# Patient Record
Sex: MALE | Race: BLACK OR AFRICAN AMERICAN | NOT HISPANIC OR LATINO | Employment: UNEMPLOYED | URBAN - NONMETROPOLITAN AREA
[De-identification: names, ages, dates, MRNs, and addresses within clinical notes are randomized per-mention and may not be internally consistent; named-entity substitution may affect disease eponyms.]

---

## 2018-05-01 ENCOUNTER — OUTSIDE FACILITY SERVICE (OUTPATIENT)
Dept: CARDIOLOGY | Facility: CLINIC | Age: 23
End: 2018-05-01

## 2018-05-01 PROCEDURE — 93010 ELECTROCARDIOGRAM REPORT: CPT | Performed by: INTERNAL MEDICINE

## 2018-05-30 ENCOUNTER — OFFICE VISIT (OUTPATIENT)
Dept: CARDIOLOGY | Facility: CLINIC | Age: 23
End: 2018-05-30

## 2018-05-30 VITALS
DIASTOLIC BLOOD PRESSURE: 90 MMHG | OXYGEN SATURATION: 96 % | BODY MASS INDEX: 36.05 KG/M2 | SYSTOLIC BLOOD PRESSURE: 130 MMHG | HEIGHT: 73 IN | WEIGHT: 272 LBS | HEART RATE: 90 BPM

## 2018-05-30 DIAGNOSIS — Z53.21 PATIENT LEFT WITHOUT BEING SEEN: Primary | ICD-10-CM

## 2018-05-30 RX ORDER — HYDROCHLOROTHIAZIDE 25 MG/1
25 TABLET ORAL DAILY
COMMUNITY
End: 2019-10-31

## 2018-05-30 RX ORDER — METOPROLOL TARTRATE 100 MG/1
100 TABLET ORAL DAILY
COMMUNITY
End: 2018-06-06

## 2018-05-30 NOTE — PROGRESS NOTES
"  Patient left without being seen after vitals were taken.    Review of Systems       Objective   /90 (BP Location: Left arm, Patient Position: Sitting, Cuff Size: Adult) Comment: elisabet trevino.  Pulse 90   Ht 185.4 cm (73\")   Wt 123 kg (272 lb)   SpO2 96%   BMI 35.89 kg/m²   Physical Exam  Procedures      "

## 2018-06-06 ENCOUNTER — OFFICE VISIT (OUTPATIENT)
Dept: CARDIOLOGY | Facility: CLINIC | Age: 23
End: 2018-06-06

## 2018-06-06 VITALS
HEART RATE: 85 BPM | BODY MASS INDEX: 35.78 KG/M2 | DIASTOLIC BLOOD PRESSURE: 80 MMHG | OXYGEN SATURATION: 97 % | WEIGHT: 270 LBS | HEIGHT: 73 IN | SYSTOLIC BLOOD PRESSURE: 130 MMHG

## 2018-06-06 DIAGNOSIS — IMO0001 CLASS 2 OBESITY DUE TO EXCESS CALORIES WITH SERIOUS COMORBIDITY AND BODY MASS INDEX (BMI) OF 35.0 TO 35.9 IN ADULT: ICD-10-CM

## 2018-06-06 DIAGNOSIS — Z72.0 TOBACCO ABUSE: ICD-10-CM

## 2018-06-06 DIAGNOSIS — I10 ESSENTIAL HYPERTENSION: Primary | ICD-10-CM

## 2018-06-06 PROBLEM — E66.9 OBESITY: Status: ACTIVE | Noted: 2018-06-06

## 2018-06-06 PROCEDURE — 99243 OFF/OP CNSLTJ NEW/EST LOW 30: CPT | Performed by: INTERNAL MEDICINE

## 2018-06-06 PROCEDURE — 99406 BEHAV CHNG SMOKING 3-10 MIN: CPT | Performed by: INTERNAL MEDICINE

## 2018-06-06 RX ORDER — METOPROLOL SUCCINATE 100 MG/1
100 TABLET, EXTENDED RELEASE ORAL DAILY
COMMUNITY
End: 2018-07-05 | Stop reason: SDUPTHER

## 2018-06-06 NOTE — PROGRESS NOTES
Reason for Visit: Hyptertension.    HPI:  Donnie Webb is a 23 y.o. male is being seen for consultation today at the request of Dr Shay.  He has had gradually progressive hypertension and has had to go on blood pressure medications recently.  He notes his long as he takes his blood pressure medicines as blood pressure tends to be normal.  He has recently seen in the emergency room on 05/01/2018 for chest pain and hypertension.  He was diagnosed with atypical chest pain.  Has not had any recurrence of the symptoms.  He denies any further chest pain, palpitations, dizziness, syncope, PND, or orthopnea.  He smokes about a half pack a day.  He wanted to get set up with the specialists since is a strong family history of hypertension.        Patient Active Problem List   Diagnosis   • Essential hypertension   • Obesity   • Tobacco abuse       Social History   Substance Use Topics   • Smoking status: Current Every Day Smoker     Packs/day: 0.50     Types: Cigarettes   • Smokeless tobacco: Never Used   • Alcohol use No       Family History   Problem Relation Age of Onset   • Hypertension Mother    • Heart disease Mother    • No Known Problems Father        The following portions of the patient's history were reviewed and updated as appropriate: allergies, current medications, past family history, past medical history, past social history, past surgical history and problem list.      Current Outpatient Prescriptions:   •  metoprolol succinate XL (TOPROL-XL) 100 MG 24 hr tablet, Take 100 mg by mouth Daily., Disp: , Rfl:   •  hydrochlorothiazide (HYDRODIURIL) 25 MG tablet, Take 25 mg by mouth Daily., Disp: , Rfl:     Review of Systems   Constitution: Negative for chills, decreased appetite, fever and malaise/fatigue.   HENT: Positive for congestion. Negative for nosebleeds.    Eyes: Negative for blurred vision and double vision.   Cardiovascular: Negative for chest pain, irregular heartbeat, leg swelling and  "palpitations.   Respiratory: Negative for cough.    Endocrine: Negative for cold intolerance and heat intolerance.   Hematologic/Lymphatic: Does not bruise/bleed easily.   Skin: Negative for dry skin, itching and rash.   Musculoskeletal: Negative for back pain, joint pain, muscle cramps and neck pain.   Gastrointestinal: Negative for abdominal pain, constipation, diarrhea, heartburn and melena.   Genitourinary: Negative for dysuria, frequency and hematuria.   Neurological: Positive for headaches. Negative for dizziness, light-headedness and loss of balance.   Psychiatric/Behavioral: Negative for depression. The patient does not have insomnia and is not nervous/anxious.        Objective   /80 (BP Location: Left arm, Patient Position: Sitting, Cuff Size: Adult)   Pulse 85   Ht 185.4 cm (73\")   Wt 122 kg (270 lb)   SpO2 97%   BMI 35.62 kg/m²   Physical Exam   Constitutional: He is oriented to person, place, and time. He appears well-developed and well-nourished.   HENT:   Head: Normocephalic and atraumatic.   Eyes: Conjunctivae and EOM are normal. Pupils are equal, round, and reactive to light.   Neck: Normal range of motion. Neck supple. No JVD present. No thyromegaly present.   Cardiovascular: Normal rate, regular rhythm and normal heart sounds.    No murmur heard.  Pulmonary/Chest: Effort normal and breath sounds normal. He has no wheezes. He has no rales.   Abdominal: Soft. Bowel sounds are normal. He exhibits distension (Obese). There is no tenderness.   Musculoskeletal: Normal range of motion. He exhibits no edema.   Neurological: He is alert and oriented to person, place, and time. Coordination normal.   Skin: Skin is warm and dry. No rash noted.   Psychiatric: He has a normal mood and affect. His behavior is normal.     Procedures      ICD-10-CM ICD-9-CM   1. Essential hypertension I10 401.9   2. Tobacco abuse Z72.0 305.1   3. Class 2 obesity due to excess calories with serious comorbidity and body " mass index (BMI) of 35.0 to 35.9 in adult E66.09 278.00    Z68.35 V85.35         Assessment/Plan:  1.  Essential hypertension: Blood pressures well controlled today on current medications.  Encourage compliance.   on lifestyle modification.    2.  Tobacco abuse: Smoking one half pack per day.   on the importance of smoking cessation for approximately 4 minutes.    3.  Obesity: Patient's Body mass index is 35.62 kg/m². BMI is above normal parameters. Recommendations include: exercise counseling and nutrition counseling.

## 2018-07-05 RX ORDER — METOPROLOL SUCCINATE 100 MG/1
100 TABLET, EXTENDED RELEASE ORAL DAILY
Qty: 30 TABLET | Refills: 11 | Status: SHIPPED | OUTPATIENT
Start: 2018-07-05 | End: 2019-07-05

## 2018-07-05 NOTE — TELEPHONE ENCOUNTER
Patient request Toprol to be refilled. Dr. Shay prescribed but patient stated he no longer sees him and has not established a new PCP.

## 2019-10-29 ENCOUNTER — OUTSIDE FACILITY SERVICE (OUTPATIENT)
Dept: CARDIOLOGY | Facility: CLINIC | Age: 24
End: 2019-10-29

## 2019-10-29 PROCEDURE — 93306 TTE W/DOPPLER COMPLETE: CPT | Performed by: INTERNAL MEDICINE

## 2019-11-07 ENCOUNTER — TELEPHONE (OUTPATIENT)
Dept: CARDIOLOGY | Facility: CLINIC | Age: 24
End: 2019-11-07

## 2019-11-07 ENCOUNTER — OFFICE VISIT (OUTPATIENT)
Dept: CARDIOLOGY | Facility: CLINIC | Age: 24
End: 2019-11-07

## 2019-11-07 VITALS
WEIGHT: 247 LBS | HEART RATE: 89 BPM | SYSTOLIC BLOOD PRESSURE: 142 MMHG | OXYGEN SATURATION: 99 % | DIASTOLIC BLOOD PRESSURE: 100 MMHG | BODY MASS INDEX: 32.74 KG/M2 | HEIGHT: 73 IN

## 2019-11-07 DIAGNOSIS — I50.22 CHRONIC SYSTOLIC CONGESTIVE HEART FAILURE (HCC): Primary | ICD-10-CM

## 2019-11-07 DIAGNOSIS — I31.39 PERICARDIAL EFFUSION: ICD-10-CM

## 2019-11-07 DIAGNOSIS — I10 ESSENTIAL HYPERTENSION: ICD-10-CM

## 2019-11-07 DIAGNOSIS — E66.09 CLASS 1 OBESITY DUE TO EXCESS CALORIES WITH SERIOUS COMORBIDITY AND BODY MASS INDEX (BMI) OF 32.0 TO 32.9 IN ADULT: ICD-10-CM

## 2019-11-07 DIAGNOSIS — Z72.0 TOBACCO ABUSE: ICD-10-CM

## 2019-11-07 DIAGNOSIS — R60.0 LOWER EXTREMITY EDEMA: ICD-10-CM

## 2019-11-07 PROCEDURE — 99214 OFFICE O/P EST MOD 30 MIN: CPT | Performed by: INTERNAL MEDICINE

## 2019-11-07 PROCEDURE — 93000 ELECTROCARDIOGRAM COMPLETE: CPT | Performed by: INTERNAL MEDICINE

## 2019-11-07 PROCEDURE — 99406 BEHAV CHNG SMOKING 3-10 MIN: CPT | Performed by: INTERNAL MEDICINE

## 2019-11-07 RX ORDER — METOPROLOL SUCCINATE 100 MG/1
100 TABLET, EXTENDED RELEASE ORAL DAILY
Qty: 30 TABLET | Refills: 11 | Status: SHIPPED | OUTPATIENT
Start: 2019-11-07 | End: 2020-01-16 | Stop reason: SDUPTHER

## 2019-11-07 RX ORDER — ALBUTEROL SULFATE 90 UG/1
AEROSOL, METERED RESPIRATORY (INHALATION)
Refills: 0 | COMMUNITY
Start: 2019-09-02 | End: 2022-11-09

## 2019-11-07 NOTE — PROGRESS NOTES
Reason for Visit: Edema.    HPI:  Donnie Webb is a 24 y.o. male is being seen for consultation today at the request of Dr Shay for evaluation of edema and cardiomegaly CT scan.  He was previously seen in consultation in June 2018 for hypertension.  He has subsequently been a no-show for multiple appointments since then until being referred back today.  He then had an echocardiogram done on 10/29/2019 that demonstrated mild LV dilation, reduced left ventricular systolic function with EF of 30%, grade 2 diastolic dysfunction, mild RV dilation with mildly reduced systolic function, biatrial enlargement, mild MR, mild to moderate TR, dilated IVC.  He has noted gradually progressive swelling and some shortness of breath.  He has mild dyspnea on exertion.  The swelling has improved with the furosemide is been taking.  He notes that dose has been increased recently.  He is trying to cut back on the smoking.    Previous Cardiac Testing and Procedures:  -Chest x-ray (10/12/2019) mild cardiomegaly but no acute cardiopulmonary process  -CTA chest (10/14/2019) no evidence of pulmonary edema, heart is upper limits of normal size, small pericardial effusion, small right pleural effusion  -Echo (10/29/2019) mild LV dilation, reduced left ventricular systolic function with EF of 30%, grade 2 diastolic dysfunction, mild RV dilation with mildly reduced systolic function, biatrial enlargement, mild MR, mild to moderate TR, dilated IVC.    Patient Active Problem List   Diagnosis   • Essential hypertension   • Obesity   • Tobacco abuse       Social History     Tobacco Use   • Smoking status: Current Some Day Smoker     Packs/day: 0.50     Types: Cigarettes   • Smokeless tobacco: Never Used   Substance Use Topics   • Alcohol use: Yes   • Drug use: No       Family History   Problem Relation Age of Onset   • Hypertension Mother    • Heart disease Mother    • No Known Problems Father        The following portions of the patient's  "history were reviewed and updated as appropriate: allergies, current medications, past family history, past medical history, past social history, past surgical history and problem list.      Current Outpatient Medications:   •  albuterol sulfate  (90 Base) MCG/ACT inhaler, INHALE 1 TO 2 PUFFS BY MOUTH EVERY 4 HOURS AS NEEDED FOR WHEEZING DIFFICULTU BREATHING OR SHORTNESS OF BREATH, Disp: , Rfl: 0  •  furosemide (LASIX) 20 MG tablet, Take 20 mg by mouth Daily., Disp: , Rfl:   •  metoprolol succinate XL (TOPROL-XL) 100 MG 24 hr tablet, Take 1 tablet by mouth Daily., Disp: 30 tablet, Rfl: 11  •  sacubitril-valsartan (ENTRESTO) 24-26 MG tablet, Take 1 tablet by mouth 2 (Two) Times a Day., Disp: 60 tablet, Rfl: 11    Review of Systems   Constitution: Negative for chills and fever.   Cardiovascular: Positive for dyspnea on exertion and leg swelling. Negative for chest pain and paroxysmal nocturnal dyspnea.   Respiratory: Positive for shortness of breath. Negative for cough.    Skin: Negative for rash.   Gastrointestinal: Negative for abdominal pain and heartburn.   Neurological: Negative for dizziness and numbness.       Objective   /100 (BP Location: Left arm, Patient Position: Sitting, Cuff Size: Adult)   Pulse 89   Ht 185.4 cm (73\")   Wt 112 kg (247 lb)   SpO2 99%   BMI 32.59 kg/m²   Physical Exam   Constitutional: He is oriented to person, place, and time. He appears well-developed and well-nourished.   HENT:   Head: Normocephalic and atraumatic.   Cardiovascular: Normal rate, regular rhythm and normal heart sounds.   No murmur heard.  Pulmonary/Chest: Effort normal and breath sounds normal.   Abdominal: He exhibits distension (obese).   Musculoskeletal: He exhibits edema (moderate LE edema bilaterally).   Neurological: He is alert and oriented to person, place, and time.   Skin: Skin is warm and dry.   Psychiatric: He has a normal mood and affect.       ECG 12 Lead  Date/Time: 11/7/2019 10:04 " AM  Performed by: Guille Aguirre MD  Authorized by: Guille Aguirre MD   Comparison: compared with previous ECG from 5/1/2018  Similar to previous ECG  Rhythm: sinus rhythm  Rate: normal  T inversion: V6, V5, III, II and aVF  QRS axis: right                ICD-10-CM ICD-9-CM   1. Chronic systolic congestive heart failure (CMS/HCC) I50.22 428.22     428.0   2. Essential hypertension I10 401.9   3. Pericardial effusion I31.3 423.9   4. Lower extremity edema R60.0 782.3   5. Tobacco abuse Z72.0 305.1   6. Class 1 obesity due to excess calories with serious comorbidity and body mass index (BMI) of 32.0 to 32.9 in adult E66.09 278.00    Z68.32 V85.32         Assessment/Plan:  1.  Chronic systolic congestive heart failure: EF approximately 30%.  Nonischemic.  Improving symptoms with diuresis.  Will start Entresto.  Change metoprolol to succinate and titrate up to 100 mg.  Continue furosemide.    2.  Essential hypertension: Blood pressure is significantly elevated today.  Started on Entresto and titrating up metoprolol.    3.  Pericardial effusion: Small with no evidence of tamponade.  Likely secondary to CHF.    4.  Cardiomegaly: Secondary to CHF.  Mild RV and LV dilation with biatrial enlargement.    5.  Lower extreme edema: Secondary to CHF: Encourage low-sodium diet.  Continue furosemide.    6.  Tobacco abuse: Donnie Webb is a current cigarettes user.  He currently smokes 10 cigarettes per day for a duration of several years. I have educated him on the risk of diseases from using tobacco products such as cancer, COPD and heart diease. I advised him to quit and he is willing to quit. We have discussed the following method/s for tobacco cessation:  Education Material.  Together we have set a quit date for the near future.  He will follow up with me in 2 months or sooner to check on his progress. I spent 6 minutes counseling the patient.    7.  Obesity: Patient's Body mass index is 32.59 kg/m². BMI is above normal  parameters. Recommendations include: exercise counseling and nutrition counseling.

## 2019-11-13 NOTE — TELEPHONE ENCOUNTER
RICH FROM [facility] CALLED TO GET RECORDS ON THIS PT TO FAX TO HER [phone] PHONE [phone] EXT [phone]

## 2019-11-14 ENCOUNTER — TELEPHONE (OUTPATIENT)
Dept: CARDIOLOGY | Facility: CLINIC | Age: 24
End: 2019-11-14

## 2019-11-14 NOTE — TELEPHONE ENCOUNTER
----- Message from Guille Aguirre MD sent at 11/11/2019  5:10 PM CST -----  I called the number listed and requested an peer to peer.  They should be calling to set this up.    ----- Message -----  From: Waleska Ware MA  Sent: 11/8/2019   2:14 PM  To: MD STEVE Rosado FOR ENTREO. PLEASE ADVISE

## 2019-11-14 NOTE — TELEPHONE ENCOUNTER
RICH FROM [facility] CALLED TO GET RECORDS ON THIS PT TO FAX TO HER [phone] PHONE [phone] EXT [phone]

## 2019-11-18 NOTE — TELEPHONE ENCOUNTER
I called the pt and was his current cell in disconnected. I called and left a vm on his mothers phone

## 2019-11-18 NOTE — TELEPHONE ENCOUNTER
PA APPROVED FOR ENTRESTO FROM 11/7/19 THROUGH 11/14/2020. [facility] NOTIFIED-      CASE ID # [id number]

## 2020-01-16 ENCOUNTER — TELEPHONE (OUTPATIENT)
Dept: CARDIOLOGY | Facility: CLINIC | Age: 25
End: 2020-01-16

## 2020-01-16 ENCOUNTER — OFFICE VISIT (OUTPATIENT)
Dept: CARDIOLOGY | Facility: CLINIC | Age: 25
End: 2020-01-16

## 2020-01-16 VITALS
SYSTOLIC BLOOD PRESSURE: 132 MMHG | OXYGEN SATURATION: 99 % | HEIGHT: 73 IN | DIASTOLIC BLOOD PRESSURE: 80 MMHG | WEIGHT: 242 LBS | HEART RATE: 79 BPM | BODY MASS INDEX: 32.07 KG/M2

## 2020-01-16 DIAGNOSIS — E66.09 CLASS 1 OBESITY DUE TO EXCESS CALORIES WITH SERIOUS COMORBIDITY AND BODY MASS INDEX (BMI) OF 31.0 TO 31.9 IN ADULT: ICD-10-CM

## 2020-01-16 DIAGNOSIS — I50.22 CHRONIC SYSTOLIC (CONGESTIVE) HEART FAILURE (HCC): Primary | ICD-10-CM

## 2020-01-16 DIAGNOSIS — I31.39 PERICARDIAL EFFUSION: ICD-10-CM

## 2020-01-16 DIAGNOSIS — Z72.0 TOBACCO ABUSE: ICD-10-CM

## 2020-01-16 DIAGNOSIS — I42.8 NICM (NONISCHEMIC CARDIOMYOPATHY) (HCC): ICD-10-CM

## 2020-01-16 DIAGNOSIS — I10 ESSENTIAL HYPERTENSION: ICD-10-CM

## 2020-01-16 PROCEDURE — 99406 BEHAV CHNG SMOKING 3-10 MIN: CPT | Performed by: INTERNAL MEDICINE

## 2020-01-16 PROCEDURE — 99214 OFFICE O/P EST MOD 30 MIN: CPT | Performed by: INTERNAL MEDICINE

## 2020-01-16 RX ORDER — METOPROLOL SUCCINATE 200 MG/1
200 TABLET, EXTENDED RELEASE ORAL DAILY
Qty: 30 TABLET | Refills: 11 | Status: SHIPPED | OUTPATIENT
Start: 2020-01-16 | End: 2021-01-26 | Stop reason: SDUPTHER

## 2020-01-16 NOTE — PROGRESS NOTES
Reason for Visit: cardiovascular follow up.    HPI:  YESICA Webb is a 24 y.o. male is here today for follow-up.  He was last seen for repeat evaluation after having abnormal echo with decreased ejection fraction of approximately 30%.  He was started on optimal medical therapy Entresto was added.  He has now gotten preapproval for the Entresto but most recently had difficulty getting this from his pharmacy and has been off it for 6 days.  He can definitely tell a difference not taking it.  Symptoms greatly improved while he was on it in his swelling decreased.  He quit smoking for about 6 to 7 days and then started back.  He has now smoking half a pack a day again.  He denies any chest pain, palpitations, dizziness, syncope, PND, or orthopnea.    Previous Cardiac Testing and Procedures:  - Chest x-ray (10/12/2019) mild cardiomegaly but no acute cardiopulmonary process  -CTA chest (10/14/2019) no evidence of pulmonary edema, heart is upper limits of normal size, small pericardial effusion, small right pleural effusion  -Echo (10/29/2019) mild LV dilation, reduced left ventricular systolic function with EF of 30%, grade 2 diastolic dysfunction, mild RV dilation with mildly reduced systolic function, biatrial enlargement, mild MR, mild to moderate TR, dilated IVC.    Patient Active Problem List   Diagnosis   • Essential hypertension   • Obesity   • Tobacco abuse   • Chronic systolic (congestive) heart failure (CMS/HCC)   • NICM (nonischemic cardiomyopathy) (CMS/HCC)       Social History     Tobacco Use   • Smoking status: Current Some Day Smoker     Packs/day: 0.50     Types: Cigarettes   • Smokeless tobacco: Never Used   Substance Use Topics   • Alcohol use: Yes   • Drug use: No       Family History   Problem Relation Age of Onset   • Hypertension Mother    • Heart disease Mother    • No Known Problems Father        The following portions of the patient's history were reviewed and updated as appropriate:  "allergies, current medications, past family history, past medical history, past social history, past surgical history and problem list.      Current Outpatient Medications:   •  albuterol sulfate  (90 Base) MCG/ACT inhaler, INHALE 1 TO 2 PUFFS BY MOUTH EVERY 4 HOURS AS NEEDED FOR WHEEZING DIFFICULTU BREATHING OR SHORTNESS OF BREATH, Disp: , Rfl: 0  •  furosemide (LASIX) 20 MG tablet, Take 20 mg by mouth Daily., Disp: , Rfl:   •  metoprolol succinate XL (TOPROL-XL) 200 MG 24 hr tablet, Take 1 tablet by mouth Daily., Disp: 30 tablet, Rfl: 11  •  sacubitril-valsartan (ENTRESTO) 24-26 MG tablet, Take 1 tablet by mouth 2 (Two) Times a Day., Disp: 60 tablet, Rfl: 11    Review of Systems   Constitution: Negative for chills and fever.   Cardiovascular: Positive for dyspnea on exertion. Negative for chest pain and paroxysmal nocturnal dyspnea.   Respiratory: Positive for shortness of breath. Negative for cough.    Skin: Negative for rash.   Gastrointestinal: Negative for abdominal pain and heartburn.   Neurological: Negative for dizziness and numbness.       Objective   /80 (BP Location: Left arm, Patient Position: Sitting, Cuff Size: Adult)   Pulse 79   Ht 185.4 cm (72.99\")   Wt 110 kg (242 lb)   SpO2 99%   BMI 31.94 kg/m²   Physical Exam   Constitutional: He is oriented to person, place, and time. He appears well-developed and well-nourished.   HENT:   Head: Normocephalic and atraumatic.   Cardiovascular: Normal rate, regular rhythm and normal heart sounds.   No murmur heard.  Pulmonary/Chest: Effort normal and breath sounds normal.   Abdominal: He exhibits distension (obese).   Musculoskeletal: He exhibits no edema.   Neurological: He is alert and oriented to person, place, and time.   Skin: Skin is warm and dry.   Psychiatric: He has a normal mood and affect.     Procedures      ICD-10-CM ICD-9-CM   1. Chronic systolic (congestive) heart failure (CMS/HCC) I50.22 428.22     428.0   2. NICM (nonischemic " cardiomyopathy) (CMS/[facility]) I42.8 425.4   3. Essential hypertension I10 401.9   4. Pericardial effusion I31.3 423.9   5. Tobacco abuse Z72.0 305.1   6. Class 1 obesity due to excess calories with serious comorbidity and body mass index (BMI) of 31.0 to 31.9 in adult E66.09 278.00    Z68.31 V85.31         Assessment/Plan:  1. Chronic systolic congestive heart failure: EF approximately 30% on recent echo from 10/2019.  Nonischemic cardiomyopathy.  Continue Entresto and furosemide and titrate up metoprolol succinate to 200 mg.     2.  Essential hypertension: Blood pressure is borderline elevated today.  Metoprolol titrated up to 200 mg.     3.  Pericardial effusion: Small with no evidence of tamponade.  Likely secondary to CHF.  Will repeat echo once medical therapy is optimized.     4. Tobacco abuse: YESICA Webb  reports that he has been smoking cigarettes. He has been smoking about 0.50 packs per day. He has never used smokeless tobacco.. I have educated him on the risk of diseases from using tobacco products such as cancer, COPD and heart diease.  I advised him to quit and he is willing to quit. We have discussed the following method/s for tobacco cessation:  Education Material Counseling.  Together we have set a quit date for the near future.  He will follow up with me in 3 months or sooner to check on his progress.  I spent 6 minutes counseling the patient.     5.  Obesity: Patient's Body mass index is 31.94 kg/m².  Has lost 5 pounds compared to last visit.  BMI is above normal parameters. Recommendations include: exercise counseling and nutrition counseling.

## 2020-01-16 NOTE — TELEPHONE ENCOUNTER
This pt has yet to  his Entresto meds that was prescribed back in [facility].     PA was approved via peer to peer with DIANA FROM [employer] PHONE [phone] EXT [phone]    Today I called [facility] to see how much the out of pocket cost will be. [facility] said PA is still required.  I called Diana again and left her a vm to call me back.        CASE ID # [id number]

## 2020-01-17 NOTE — TELEPHONE ENCOUNTER
Jackelyn from [facility] called me back stating that the NDC the pharmacy is incorrect. She gave me new NDC of [phone]. I called pt pharmacy with this NDC. I was told that PA is still required. I called [facility] and left them a vm to call me back.

## 2020-01-20 DIAGNOSIS — I50.22 CHRONIC SYSTOLIC CONGESTIVE HEART FAILURE (HCC): ICD-10-CM

## 2020-01-21 NOTE — TELEPHONE ENCOUNTER
PA approval finally worked. Copay $4.00. [facility] notified- I left a vm to pt home.     PA APPROVED FOR ENTRESTO FROM 11/7/19 THROUGH 11/14/2020. [facility] NOTIFIED-        CASE ID # [id number]

## 2020-11-30 ENCOUNTER — TELEPHONE (OUTPATIENT)
Dept: CARDIOLOGY | Facility: CLINIC | Age: 25
End: 2020-11-30

## 2020-11-30 DIAGNOSIS — I50.22 CHRONIC SYSTOLIC CONGESTIVE HEART FAILURE (HCC): ICD-10-CM

## 2020-11-30 RX ORDER — SACUBITRIL AND VALSARTAN 24; 26 MG/1; MG/1
1 TABLET, FILM COATED ORAL 2 TIMES DAILY
Qty: 60 TABLET | Refills: 11 | Status: SHIPPED | OUTPATIENT
Start: 2020-11-30 | End: 2021-08-12 | Stop reason: SDUPTHER

## 2021-01-26 RX ORDER — METOPROLOL SUCCINATE 200 MG/1
200 TABLET, EXTENDED RELEASE ORAL DAILY
Qty: 30 TABLET | Refills: 11 | Status: SHIPPED | OUTPATIENT
Start: 2021-01-26 | End: 2021-10-20 | Stop reason: SDUPTHER

## 2021-05-14 ENCOUNTER — TELEPHONE (OUTPATIENT)
Dept: CARDIOLOGY | Facility: CLINIC | Age: 26
End: 2021-05-14

## 2021-05-14 NOTE — TELEPHONE ENCOUNTER
Patient no showed appt 3-, tried calling patient, number no longer in service.  Mailed no show letter also.

## 2021-08-12 DIAGNOSIS — I50.22 CHRONIC SYSTOLIC CONGESTIVE HEART FAILURE (HCC): ICD-10-CM

## 2021-08-12 NOTE — TELEPHONE ENCOUNTER
PATIENT NEEDS TO KEEP F/U APPT ON 8/18/21 FOR FURTHER MED REFILLS. OTHERWISE, PT NEEDS TO GET THIS AT PCP.

## 2021-08-13 RX ORDER — SACUBITRIL AND VALSARTAN 24; 26 MG/1; MG/1
1 TABLET, FILM COATED ORAL 2 TIMES DAILY
Qty: 60 TABLET | Refills: 0 | Status: SHIPPED | OUTPATIENT
Start: 2021-08-13 | End: 2021-10-20 | Stop reason: SDUPTHER

## 2021-10-20 ENCOUNTER — OFFICE VISIT (OUTPATIENT)
Dept: CARDIOLOGY | Facility: CLINIC | Age: 26
End: 2021-10-20

## 2021-10-20 VITALS
HEART RATE: 84 BPM | WEIGHT: 250 LBS | DIASTOLIC BLOOD PRESSURE: 100 MMHG | HEIGHT: 74 IN | BODY MASS INDEX: 32.08 KG/M2 | OXYGEN SATURATION: 98 % | SYSTOLIC BLOOD PRESSURE: 152 MMHG

## 2021-10-20 DIAGNOSIS — I42.8 NICM (NONISCHEMIC CARDIOMYOPATHY) (HCC): ICD-10-CM

## 2021-10-20 DIAGNOSIS — Z72.0 TOBACCO ABUSE: ICD-10-CM

## 2021-10-20 DIAGNOSIS — I50.22 CHRONIC SYSTOLIC (CONGESTIVE) HEART FAILURE (HCC): Primary | ICD-10-CM

## 2021-10-20 DIAGNOSIS — I31.39 PERICARDIAL EFFUSION: ICD-10-CM

## 2021-10-20 DIAGNOSIS — I50.22 CHRONIC SYSTOLIC CONGESTIVE HEART FAILURE (HCC): ICD-10-CM

## 2021-10-20 DIAGNOSIS — E66.09 CLASS 1 OBESITY DUE TO EXCESS CALORIES WITH SERIOUS COMORBIDITY AND BODY MASS INDEX (BMI) OF 32.0 TO 32.9 IN ADULT: ICD-10-CM

## 2021-10-20 DIAGNOSIS — I10 ESSENTIAL HYPERTENSION: ICD-10-CM

## 2021-10-20 PROBLEM — E66.811 CLASS 1 OBESITY DUE TO EXCESS CALORIES WITH SERIOUS COMORBIDITY AND BODY MASS INDEX (BMI) OF 32.0 TO 32.9 IN ADULT: Status: ACTIVE | Noted: 2018-06-06

## 2021-10-20 PROCEDURE — 99214 OFFICE O/P EST MOD 30 MIN: CPT | Performed by: INTERNAL MEDICINE

## 2021-10-20 PROCEDURE — 93000 ELECTROCARDIOGRAM COMPLETE: CPT | Performed by: INTERNAL MEDICINE

## 2021-10-20 RX ORDER — LEVETIRACETAM 500 MG/1
TABLET ORAL EVERY 12 HOURS SCHEDULED
COMMUNITY

## 2021-10-20 RX ORDER — HYDROCHLOROTHIAZIDE 50 MG/1
TABLET ORAL
COMMUNITY
Start: 2021-08-19 | End: 2022-11-09

## 2021-10-20 RX ORDER — METOPROLOL SUCCINATE 200 MG/1
200 TABLET, EXTENDED RELEASE ORAL DAILY
Qty: 90 TABLET | Refills: 3 | Status: SHIPPED | OUTPATIENT
Start: 2021-10-20 | End: 2022-10-31

## 2021-10-20 RX ORDER — SACUBITRIL AND VALSARTAN 24; 26 MG/1; MG/1
1 TABLET, FILM COATED ORAL 2 TIMES DAILY
Qty: 180 TABLET | Refills: 3 | Status: SHIPPED | OUTPATIENT
Start: 2021-10-20 | End: 2022-11-09

## 2021-10-20 NOTE — PROGRESS NOTES
Reason for Visit: cardiovascular follow up.    HPI:  YESICA Webb is a 26 y.o. male is here today for follow-up.  He has been a no show or cancellation to multiple appointments since he was last seen 1/16/2020.  He ran out of his medications and thinks that is why his blood pressure is high today.  He reports it is normal when he is on the medications.  He had to spend 10 days in care home due to unpaid court bills.  He denies any chest pain, palpitations, dizziness, syncope, PND, or orthopnea.  He is back to working regularly.  He is active at work and denies any significant shortness of breath.  He can tell a difference in how he feels based on his diet.      Previous Cardiac Testing and Procedures:  -Chest x-ray (10/12/2019) mild cardiomegaly but no acute cardiopulmonary process  -CTA chest (10/14/2019) no evidence of pulmonary edema, heart is upper limits of normal size, small pericardial effusion, small right pleural effusion  -Echo (10/29/2019) mild LV dilation, reduced left ventricular systolic function with EF of 30%, grade 2 diastolic dysfunction, mild RV dilation with mildly reduced systolic function, biatrial enlargement, mild MR, mild to moderate TR, dilated IVC.    Patient Active Problem List   Diagnosis   • Essential hypertension   • Class 1 obesity due to excess calories with serious comorbidity and body mass index (BMI) of 32.0 to 32.9 in adult   • Tobacco abuse   • Chronic systolic (congestive) heart failure (HCC)   • NICM (nonischemic cardiomyopathy) (HCC)       Social History     Tobacco Use   • Smoking status: Current Some Day Smoker     Packs/day: 0.50     Types: Cigarettes   • Smokeless tobacco: Never Used   Substance Use Topics   • Alcohol use: Yes   • Drug use: No       Family History   Problem Relation Age of Onset   • Hypertension Mother    • Heart disease Mother    • No Known Problems Father        The following portions of the patient's history were reviewed and updated as appropriate:  "allergies, current medications, past family history, past medical history, past social history, past surgical history and problem list.      Current Outpatient Medications:   •  albuterol sulfate  (90 Base) MCG/ACT inhaler, INHALE 1 TO 2 PUFFS BY MOUTH EVERY 4 HOURS AS NEEDED FOR WHEEZING DIFFICULTU BREATHING OR SHORTNESS OF BREATH, Disp: , Rfl: 0  •  furosemide (LASIX) 20 MG tablet, Take 20 mg by mouth Daily., Disp: , Rfl:   •  hydroCHLOROthiazide (HYDRODIURIL) 50 MG tablet, , Disp: , Rfl:   •  levETIRAcetam (Keppra) 500 MG tablet, Every 12 (Twelve) Hours., Disp: , Rfl:   •  metoprolol succinate XL (TOPROL-XL) 200 MG 24 hr tablet, Take 1 tablet by mouth Daily., Disp: 90 tablet, Rfl: 3  •  Potassium 99 MG tablet, Take  by mouth., Disp: , Rfl:   •  sacubitril-valsartan (Entresto) 24-26 MG tablet, Take 1 tablet by mouth 2 (Two) Times a Day., Disp: 180 tablet, Rfl: 3    Review of Systems   Constitutional: Positive for malaise/fatigue. Negative for chills and fever.   Cardiovascular: Negative for chest pain and paroxysmal nocturnal dyspnea.   Respiratory: Negative for cough and shortness of breath.    Skin: Negative for rash.   Gastrointestinal: Negative for abdominal pain and heartburn.   Neurological: Negative for dizziness and numbness.       Objective   /100 (BP Location: Left arm, Patient Position: Sitting, Cuff Size: Adult)   Pulse 84   Ht 188 cm (74\")   Wt 113 kg (250 lb)   SpO2 98%   BMI 32.10 kg/m²   Constitutional:       Appearance: Well-developed.   HENT:      Head: Normocephalic and atraumatic.   Pulmonary:      Effort: Pulmonary effort is normal.      Breath sounds: Normal breath sounds.   Cardiovascular:      Normal rate. Regular rhythm.      Murmurs: There is no murmur.      No gallop.   Edema:     Peripheral edema absent.   Skin:     General: Skin is warm and dry.   Neurological:      Mental Status: Alert and oriented to person, place, and time.         ECG 12 Lead    Date/Time: " 10/20/2021 3:34 PM  Performed by: Guille Aguirre MD  Authorized by: Guille Aguirre MD   Comparison: compared with previous ECG from 11/7/2019  Similar to previous ECG  Rhythm: sinus rhythm  Rate: normal  T inversion: V6, V5, II, III and aVF                ICD-10-CM ICD-9-CM   1. Chronic systolic (congestive) heart failure (HCC)  I50.22 428.22     428.0   2. Chronic systolic congestive heart failure (HCC)  I50.22 428.22     428.0   3. NICM (nonischemic cardiomyopathy) ([facility])  I42.8 425.4   4. Essential hypertension  I10 401.9   5. Pericardial effusion  I31.3 423.9   6. Tobacco abuse  Z72.0 305.1   7. Class 1 obesity due to excess calories with serious comorbidity and body mass index (BMI) of 32.0 to 32.9 in adult  E66.09 278.00    Z68.32 V85.32         Assessment/Plan:  1.  Chronic systolic congestive heart failure:  Nonischemic  cardiomyopathy.  EF ~ 30% on echo from 10/2019.   Refill Entresto and metoprolol succinate.  Counseled patient on compliance with medications and follow-up.  Continue to titrate up medical therapy as tolerated.  When to repeat echo once medical therapy is optimized.     2.  Essential hypertension: Blood pressure is elevated today but patient has not been taking all of his medications as he ran out of them.  Refill prescriptions and counseled on compliance.     3.  Pericardial effusion: Small with no evidence of tamponade noted on echo from 10/29/2019.  Mostly due to CHF.  Repeat echo planned in the future once he is on optimal medical therapy.     4. Tobacco abuse: YESICA Webb  reports that he has been smoking cigarettes. He has been smoking about 0.50 packs per day. He has never used smokeless tobacco.. I have educated him on the risk of diseases from using tobacco products such as cancer, COPD and heart disease.  I advised him to quit and he is not willing to quit.  I spent 4 minutes counseling the patient.  He is trying to cut back.       5.  Obesity: Patient's Body mass index is  32.1 kg/m². indicating that he is obese (BMI >30). Obesity-related health conditions include the following: hypertension. Obesity is worsening. BMI is is above average; BMI management plan is completed. We discussed portion control and increasing exercise.

## 2022-10-28 ENCOUNTER — TELEPHONE (OUTPATIENT)
Dept: CARDIOLOGY | Facility: CLINIC | Age: 27
End: 2022-10-28

## 2022-10-28 NOTE — TELEPHONE ENCOUNTER
Caller: WebbJeskia    Relationship: Mother    Best call back number: [phone]    What form or medical record are you requesting: PHYSICIANS STATEMENT FOR ASSISTANCE    Who is requesting this form or medical record from you: TAIWO     How would you like to receive the form or medical records (pick-up, mail, fax): FAX  FAX NUMBER LISTED ON FORM    Timeframe paperwork needed: ASAP    Additional notes: PT MOTHER CALLED IN ON PTS BEHALF - SHE REPORTS PAPERWORK WAS DROPPED OFF YESTERDAY AT OFFICE AND REQUIRES A SIGNATURE FROM DR WALLACE - THE PAPERWORK IS TO HELP HER SON GET ASSISTANCE - SHE REPORTS ITS THE ONLY PAPER THE OFFICE IS WAITING ON - PTS MOTHER SAYS FAX NUMBER IS LOCATED ON FORM

## 2022-10-28 NOTE — TELEPHONE ENCOUNTER
Call returned to pt mother. I told her that her have not been seen since 10/2021 and no showed his 3 mo f/u in Jan 2023. I made pt an appt with Dr loyd on 11/3 @ 10:15 Adhikari

## 2022-11-02 RX ORDER — METOPROLOL SUCCINATE 200 MG/1
TABLET, EXTENDED RELEASE ORAL
Qty: 90 TABLET | Refills: 0 | Status: SHIPPED | OUTPATIENT
Start: 2022-11-02 | End: 2022-11-09

## 2022-11-09 ENCOUNTER — OFFICE VISIT (OUTPATIENT)
Dept: CARDIOLOGY | Facility: CLINIC | Age: 27
End: 2022-11-09

## 2022-11-09 VITALS
OXYGEN SATURATION: 98 % | DIASTOLIC BLOOD PRESSURE: 100 MMHG | SYSTOLIC BLOOD PRESSURE: 150 MMHG | BODY MASS INDEX: 28.75 KG/M2 | HEIGHT: 74 IN | HEART RATE: 85 BPM | WEIGHT: 224 LBS

## 2022-11-09 DIAGNOSIS — Z72.0 TOBACCO ABUSE: ICD-10-CM

## 2022-11-09 DIAGNOSIS — I50.22 CHRONIC SYSTOLIC (CONGESTIVE) HEART FAILURE: Primary | ICD-10-CM

## 2022-11-09 DIAGNOSIS — I31.39 PERICARDIAL EFFUSION: ICD-10-CM

## 2022-11-09 DIAGNOSIS — I42.8 NICM (NONISCHEMIC CARDIOMYOPATHY): ICD-10-CM

## 2022-11-09 DIAGNOSIS — I10 ESSENTIAL HYPERTENSION: ICD-10-CM

## 2022-11-09 DIAGNOSIS — E66.3 OVERWEIGHT: ICD-10-CM

## 2022-11-09 PROBLEM — G40.909 SEIZURE DISORDER: Status: ACTIVE | Noted: 2022-11-09

## 2022-11-09 PROBLEM — E66.811 CLASS 1 OBESITY DUE TO EXCESS CALORIES WITH SERIOUS COMORBIDITY AND BODY MASS INDEX (BMI) OF 32.0 TO 32.9 IN ADULT: Status: RESOLVED | Noted: 2018-06-06 | Resolved: 2022-11-09

## 2022-11-09 PROBLEM — E66.09 CLASS 1 OBESITY DUE TO EXCESS CALORIES WITH SERIOUS COMORBIDITY AND BODY MASS INDEX (BMI) OF 32.0 TO 32.9 IN ADULT: Status: RESOLVED | Noted: 2018-06-06 | Resolved: 2022-11-09

## 2022-11-09 PROCEDURE — 93000 ELECTROCARDIOGRAM COMPLETE: CPT | Performed by: INTERNAL MEDICINE

## 2022-11-09 PROCEDURE — 99214 OFFICE O/P EST MOD 30 MIN: CPT | Performed by: INTERNAL MEDICINE

## 2022-11-09 RX ORDER — METOPROLOL SUCCINATE 100 MG/1
100 TABLET, EXTENDED RELEASE ORAL DAILY
Qty: 90 TABLET | Refills: 3 | Status: SHIPPED | OUTPATIENT
Start: 2022-11-09

## 2022-11-09 RX ORDER — SACUBITRIL AND VALSARTAN 49; 51 MG/1; MG/1
1 TABLET, FILM COATED ORAL 2 TIMES DAILY
Qty: 180 TABLET | Refills: 3 | Status: SHIPPED | OUTPATIENT
Start: 2022-11-09

## 2022-11-09 NOTE — PROGRESS NOTES
Reason for Visit: cardiovascular follow up.    HPI:  YESICA Webb is a 27 y.o. male is here today for follow-up.  Last seen in cardiology clinic on 10/20/2021.  He did not keep subsequent follow-up appointments.  He has been in and out of [facility].  He has been feeling well.  He denies any chest pain, palpitations, dizziness, syncope, PND, or orthopnea.  He is active, going out for walks and playing basketball.  He is considering getting a gym membership as well but doesn't want to pay the membership.      Previous Cardiac Testing and Procedures:  -Chest x-ray (10/12/2019) mild cardiomegaly but no acute cardiopulmonary process  -CTA chest (10/14/2019) no evidence of pulmonary edema, heart is upper limits of normal size, small pericardial effusion, small right pleural effusion  -Echo (10/29/2019) mild LV dilation, reduced left ventricular systolic function with EF of 30%, grade 2 diastolic dysfunction, mild RV dilation with mildly reduced systolic function, biatrial enlargement, mild MR, mild to moderate TR, dilated IVC.    Patient Active Problem List   Diagnosis   • Essential hypertension   • Tobacco abuse   • Chronic systolic (congestive) heart failure (HCC)   • NICM (nonischemic cardiomyopathy) (HCC)   • Seizure disorder (HCC)       Social History     Tobacco Use   • Smoking status: Some Days     Packs/day: 0.50     Types: Cigarettes   • Smokeless tobacco: Never   Vaping Use   • Vaping Use: Never used   Substance Use Topics   • Alcohol use: Yes   • Drug use: No       Family History   Problem Relation Age of Onset   • Hypertension Mother    • Heart disease Mother    • No Known Problems Father        The following portions of the patient's history were reviewed and updated as appropriate: allergies, current medications, past family history, past medical history, past social history, past surgical history and problem list.      Current Outpatient Medications:   •  levETIRAcetam (KEPPRA) 500 MG tablet, Every 12  "(Twelve) Hours., Disp: , Rfl:   •  metoprolol succinate XL (TOPROL-XL) 100 MG 24 hr tablet, Take 1 tablet by mouth Daily., Disp: 90 tablet, Rfl: 3  •  Potassium 99 MG tablet, Take  by mouth., Disp: , Rfl:   •  sacubitril-valsartan (Entresto) 49-51 MG tablet, Take 1 tablet by mouth 2 (Two) Times a Day., Disp: 180 tablet, Rfl: 3    Review of Systems   Constitutional: Negative for chills and fever.   Cardiovascular: Negative for chest pain, dyspnea on exertion, irregular heartbeat, paroxysmal nocturnal dyspnea and syncope.   Respiratory: Negative for cough and shortness of breath.    Skin: Negative for rash.   Gastrointestinal: Negative for abdominal pain and heartburn.   Neurological: Negative for dizziness and numbness.       Objective   /100 (BP Location: Left arm, Patient Position: Sitting, Cuff Size: Adult)   Pulse 85   Ht 188 cm (74.02\")   Wt 102 kg (224 lb)   SpO2 98%   BMI 28.75 kg/m²   Constitutional:       Appearance: Well-developed and overweight.   HENT:      Head: Normocephalic and atraumatic.   Pulmonary:      Effort: Pulmonary effort is normal.      Breath sounds: Normal breath sounds.   Cardiovascular:      Normal rate. Regular rhythm.      Murmurs: There is no murmur.      No gallop. No click.   Edema:     Peripheral edema absent.   Skin:     General: Skin is warm and dry.   Neurological:      Mental Status: Alert and oriented to person, place, and time.         ECG 12 Lead    Date/Time: 11/9/2022 3:26 PM  Performed by: Guille Aguirre MD  Authorized by: Guille Aguirre MD   Comparison: compared with previous ECG from 10/20/2021  Similar to previous ECG  Rhythm: sinus rhythm  Rate: normal  T inversion: V6, V5, V4, I, II, III and aVF                ICD-10-CM ICD-9-CM   1. Chronic systolic (congestive) heart failure (HCC)  I50.22 428.22     428.0   2. NICM (nonischemic cardiomyopathy) (HCC)  I42.8 425.4   3. Pericardial effusion  I31.39 423.9   4. Essential hypertension  I10 401.9   5. " Tobacco abuse  Z72.0 305.1   6. Overweight  E66.3 278.02         Assessment/Plan:  1. Chronic systolic congestive heart failure: Secondary to nonischemic cardiomyopathy.  EF reported to be approximately 30% on last available echo from 10/29/2019.  He is euvolemic and stable on exam today and has NYHA class I functional capacity.  He has been intermittently noncompliant with medications as well as follow-up appointment.  Restart metoprolol succinate at 100 mg.  Titrate up Entresto to 49/51 mg.  Plan to repeat echo once medical therapy is optimized.       2.  Essential hypertension: Blood pressure is significantly elevated today.  Restart metoprolol succinate and titrate up Entresto for heart failure and blood pressure control.     3.  Pericardial effusion: Small effusion with no evidence of tamponade noted on echo from 10/29/2019.  Plan to repeat echo as mentioned above once medical therapy is optimized.     4. Tobacco abuse: YESICA Webb  reports that he has been smoking cigarettes. He has been smoking an average of .5 packs per day. He has never used smokeless tobacco. I have educated him on the risk of diseases from using tobacco products such as cancer, COPD and heart disease. I advised him to quit and he is not willing to quit. I spent 3.5 minutes counseling the patient.     5.  Overweight: BMI is >= 25 and <30. (Overweight) The following options were offered after discussion;: exercise counseling/recommendations and nutrition counseling/recommendations.

## 2022-11-10 ENCOUNTER — TELEPHONE (OUTPATIENT)
Dept: CARDIOLOGY | Facility: CLINIC | Age: 27
End: 2022-11-10

## 2022-11-10 NOTE — TELEPHONE ENCOUNTER
Caller: Jesika Webb    Relationship: Mother    Best call back number: [phone]    What form or medical record are you requesting: FOOD STAMP     Who is requesting this form or medical record from you: [facility] STAMP OFFICE    How would you like to receive the form or medical records (pick-up, mail, fax): FAX  If fax, what is the fax number: ON THE PAPER    Timeframe paperwork needed: ASAP    Additional notes: PATIENT SAW DR. WALLACE YESTERDAY 11/09/22 AND WAS GIVEN A PAPER FROM THE FOOD STAMP OFFICE TO FILL OUT AND FAX OFF. PATIENTS MOM IS CALLING TO CHECK STATUS OF THIS PAPERWORK

## 2024-02-12 RX ORDER — METOPROLOL SUCCINATE 100 MG/1
100 TABLET, EXTENDED RELEASE ORAL DAILY
Qty: 30 TABLET | Refills: 0 | OUTPATIENT
Start: 2024-02-12

## 2024-02-15 RX ORDER — METOPROLOL SUCCINATE 100 MG/1
100 TABLET, EXTENDED RELEASE ORAL DAILY
Qty: 2 TABLET | Refills: 0 | OUTPATIENT
Start: 2024-02-15

## 2024-03-20 NOTE — PROGRESS NOTES
Reason for Visit: cardiovascular follow up.    HPI:  YESICA Webb is a 28 y.o. male is here today for follow-up.  He was last seen in cardiology clinic on 11/9/2022.  He subsequently canceled follow-up appointment and was lost to follow-up.  He was recently admitted at at [facility] earlier this month with hypertensive urgency and acute on chronic kidney disease.  Troponin was elevated and felt to be due to hypertensive crisis.  He was unable to breath for about 3 to 4 days prior to admission.  He is doing better now and breathing is improved.  He is only taking hydralazine once per day.  He feels like it makes him urinate more.  His blood pressure is high today.      Previous Cardiac Testing and Procedures:  -Chest x-ray (10/12/2019) mild cardiomegaly but no acute cardiopulmonary process  -CTA chest (10/14/2019) no evidence of pulmonary edema, heart is upper limits of normal size, small pericardial effusion, small right pleural effusion  -Echo (10/29/2019) mild LV dilation, reduced left ventricular systolic function with EF of 30%, grade 2 diastolic dysfunction, mild RV dilation with mildly reduced systolic function, biatrial enlargement, mild MR, mild to moderate TR, dilated IVC  -Chest x-ray (3/8/2024) bilateral alveolar interstitial opacities consistent with edema or pneumonia  -Echo (3/10/2024) EF 20-25%, mild LVH, severe LV dilation, mild MR, moderate LA enlargement, normal RV size and function    Lab data:  -BMP (4/14/2020) creatinine 1.28, potassium 3.4, sodium 138  -CBC (3/8/2024) WBC 7, hemoglobin 14.3, platelets 136  -BMP (3/8/2024) creatinine 3.51, potassium 4.1, sodium 141  -proBNP (3/9/2024) 13,377    Patient Active Problem List   Diagnosis    Essential hypertension    Tobacco use    Chronic systolic (congestive) heart failure    NICM (nonischemic cardiomyopathy)    Seizure disorder    Acute on chronic renal insufficiency       Social History     Tobacco Use    Smoking status:  "Some Days     Current packs/day: 0.50     Types: Cigarettes    Smokeless tobacco: Never   Vaping Use    Vaping status: Never Used   Substance Use Topics    Alcohol use: Yes    Drug use: No       Family History   Problem Relation Age of Onset    Hypertension Mother     Heart disease Mother     No Known Problems Father        The following portions of the patient's history were reviewed and updated as appropriate: allergies, current medications, past family history, past medical history, past social history, past surgical history, and problem list.      Current Outpatient Medications:     amLODIPine (NORVASC) 5 MG tablet, Take 1 tablet by mouth Daily. for blood pressure, Disp: 90 tablet, Rfl: 3    hydrALAZINE (APRESOLINE) 25 MG tablet, Take 1 tablet by mouth 3 (Three) Times a Day., Disp: 270 tablet, Rfl: 3    levETIRAcetam (KEPPRA) 500 MG tablet, Every 12 (Twelve) Hours., Disp: , Rfl:     metoprolol succinate XL (TOPROL-XL) 100 MG 24 hr tablet, Take 1 tablet by mouth Daily., Disp: 90 tablet, Rfl: 3    Potassium 99 MG tablet, Take  by mouth., Disp: , Rfl:     sacubitril-valsartan (Entresto) 49-51 MG tablet, Take 1 tablet by mouth 2 (Two) Times a Day., Disp: 180 tablet, Rfl: 3    isosorbide dinitrate (ISORDIL) 20 MG tablet, Take 1 tablet by mouth 3 (Three) Times a Day., Disp: 270 tablet, Rfl: 3    Review of Systems   Constitutional: Negative for chills and fever.   Cardiovascular:  Negative for chest pain and paroxysmal nocturnal dyspnea.   Respiratory:  Negative for cough and shortness of breath.    Skin:  Negative for rash.   Gastrointestinal:  Negative for abdominal pain and heartburn.   Neurological:  Negative for dizziness and numbness.       Objective   /92 (BP Location: Left arm, Patient Position: Sitting, Cuff Size: Adult)   Pulse 65   Ht 188 cm (74.02\")   Wt 104 kg (229 lb)   SpO2 98%   BMI 29.39 kg/m²   Constitutional:       Appearance: Well-developed.   HENT:      Head: Normocephalic and " atraumatic.   Pulmonary:      Effort: Pulmonary effort is normal.      Breath sounds: Normal breath sounds.   Cardiovascular:      Normal rate. Regular rhythm.   Edema:     Peripheral edema absent.   Skin:     General: Skin is warm and dry.   Neurological:      Mental Status: Alert and oriented to person, place, and time.       Procedures      ICD-10-CM ICD-9-CM   1. Chronic systolic (congestive) heart failure  I50.22 428.22     428.0   2. Essential hypertension  I10 401.9   3. Acute on chronic renal insufficiency  N28.9 593.9    N18.9 585.9   4. Tobacco use  Z72.0 305.1         Assessment/Plan:  1. Chronic systolic congestive heart failure: Nonischemic cardiomyopathy. EF 20-25% on echo from 3/10/2024.  Recent admission at [facility] for decompensated CHF.  Appears euvolemic today.  Continue metoprolol succinate and Entresto.  Only taking hydralazine once a day.  Changed to 25 mg 3 times daily and start Isordil.  Plan to order repeat echo when medical therapy is optimized.     2.  Essential hypertension: Blood pressure is elevated today.  Continue to optimize heart failure therapy as mentioned above to also help improve blood pressure.     3.  Acute on chronic renal insufficiency: Creatinine elevated during recent hospitalization was 3.51 on BMP from 3/8/2024.  Refer to nephrology clinic.  Repeat BMP prior to follow-up.     4. Tobacco use: YESICA Webb  reports that he has been smoking cigarettes. He has never used smokeless tobacco. I have educated him on the risk of diseases from using tobacco products such as cancer, COPD, and heart disease.  I advised him to quit and he is willing to quit. We have discussed the following method/s for tobacco cessation:  Counseling.  Together we have set a quit date for  the near future .  He will follow up with me in 1 month or sooner to check on his progress.  I spent 4 minutes counseling the patient.

## 2024-03-21 ENCOUNTER — OFFICE VISIT (OUTPATIENT)
Dept: CARDIOLOGY | Facility: CLINIC | Age: 29
End: 2024-03-21
Payer: MEDICAID

## 2024-03-21 VITALS
HEART RATE: 65 BPM | BODY MASS INDEX: 29.39 KG/M2 | WEIGHT: 229 LBS | OXYGEN SATURATION: 98 % | HEIGHT: 74 IN | DIASTOLIC BLOOD PRESSURE: 92 MMHG | SYSTOLIC BLOOD PRESSURE: 152 MMHG

## 2024-03-21 DIAGNOSIS — N28.9 ACUTE ON CHRONIC RENAL INSUFFICIENCY: ICD-10-CM

## 2024-03-21 DIAGNOSIS — N18.9 ACUTE ON CHRONIC RENAL INSUFFICIENCY: ICD-10-CM

## 2024-03-21 DIAGNOSIS — Z72.0 TOBACCO USE: ICD-10-CM

## 2024-03-21 DIAGNOSIS — I50.22 CHRONIC SYSTOLIC (CONGESTIVE) HEART FAILURE: Primary | ICD-10-CM

## 2024-03-21 DIAGNOSIS — I10 ESSENTIAL HYPERTENSION: ICD-10-CM

## 2024-03-21 RX ORDER — AMLODIPINE BESYLATE 5 MG/1
5 TABLET ORAL DAILY
COMMUNITY
Start: 2024-03-11 | End: 2024-03-21 | Stop reason: SDUPTHER

## 2024-03-21 RX ORDER — ISOSORBIDE DINITRATE 20 MG/1
20 TABLET ORAL 3 TIMES DAILY
Qty: 270 TABLET | Refills: 3 | Status: SHIPPED | OUTPATIENT
Start: 2024-03-21

## 2024-03-21 RX ORDER — AMLODIPINE BESYLATE 5 MG/1
5 TABLET ORAL DAILY
Qty: 90 TABLET | Refills: 3 | Status: SHIPPED | OUTPATIENT
Start: 2024-03-21

## 2024-03-21 RX ORDER — SACUBITRIL AND VALSARTAN 49; 51 MG/1; MG/1
1 TABLET, FILM COATED ORAL 2 TIMES DAILY
Qty: 180 TABLET | Refills: 3 | Status: SHIPPED | OUTPATIENT
Start: 2024-03-21

## 2024-03-21 RX ORDER — HYDRALAZINE HYDROCHLORIDE 25 MG/1
25 TABLET, FILM COATED ORAL 3 TIMES DAILY
Qty: 270 TABLET | Refills: 3 | Status: SHIPPED | OUTPATIENT
Start: 2024-03-21

## 2024-03-21 RX ORDER — METOPROLOL SUCCINATE 100 MG/1
100 TABLET, EXTENDED RELEASE ORAL DAILY
Qty: 90 TABLET | Refills: 3 | Status: SHIPPED | OUTPATIENT
Start: 2024-03-21

## 2024-03-21 RX ORDER — HYDRALAZINE HYDROCHLORIDE 50 MG/1
50 TABLET, FILM COATED ORAL DAILY
COMMUNITY
Start: 2024-03-11 | End: 2024-03-21 | Stop reason: SDUPTHER

## 2024-03-21 NOTE — LETTER
March 21, 2024     He Ortega MD  [address] [facility] [address]    Patient: YESICA Webb   YOB: 1995   Date of Visit: 3/21/2024       Dear He Ortega MD    YESICA Webb was in my office today. Below is a copy of my note.    If you have questions, please do not hesitate to call me. I look forward to following YESICA along with you.         Sincerely,        Guille Aguirre MD        CC: No Recipients      Reason for Visit: cardiovascular follow up.    HPI:  YESICA Webb is a 28 y.o. male is here today for follow-up.  He was last seen in cardiology clinic on 11/9/2022.  He subsequently canceled follow-up appointment and was lost to follow-up.  He was recently admitted at at [facility] earlier this month with hypertensive urgency and acute on chronic kidney disease.  Troponin was elevated and felt to be due to hypertensive crisis.  He was unable to breath for about 3 to 4 days prior to admission.  He is doing better now and breathing is improved.  He is only taking hydralazine once per day.  He feels like it makes him urinate more.  His blood pressure is high today.      Previous Cardiac Testing and Procedures:  -Chest x-ray (10/12/2019) mild cardiomegaly but no acute cardiopulmonary process  -CTA chest (10/14/2019) no evidence of pulmonary edema, heart is upper limits of normal size, small pericardial effusion, small right pleural effusion  -Echo (10/29/2019) mild LV dilation, reduced left ventricular systolic function with EF of 30%, grade 2 diastolic dysfunction, mild RV dilation with mildly reduced systolic function, biatrial enlargement, mild MR, mild to moderate TR, dilated IVC  -Chest x-ray (3/8/2024) bilateral alveolar interstitial opacities consistent with edema or pneumonia  -Echo (3/10/2024) EF 20-25%, mild LVH, severe LV dilation, mild MR, moderate LA enlargement, normal RV size and function    Lab data:  -BMP (4/14/2020) creatinine 1.28, potassium  3.4, sodium 138  -CBC (3/8/2024) WBC 7, hemoglobin 14.3, platelets 136  -BMP (3/8/2024) creatinine 3.51, potassium 4.1, sodium 141  -proBNP (3/9/2024) 13,377    Patient Active Problem List   Diagnosis   • Essential hypertension   • Tobacco use   • Chronic systolic (congestive) heart failure   • NICM (nonischemic cardiomyopathy)   • Seizure disorder   • Acute on chronic renal insufficiency       Social History     Tobacco Use   • Smoking status: Some Days     Current packs/day: 0.50     Types: Cigarettes   • Smokeless tobacco: Never   Vaping Use   • Vaping status: Never Used   Substance Use Topics   • Alcohol use: Yes   • Drug use: No       Family History   Problem Relation Age of Onset   • Hypertension Mother    • Heart disease Mother    • No Known Problems Father        The following portions of the patient's history were reviewed and updated as appropriate: allergies, current medications, past family history, past medical history, past social history, past surgical history, and problem list.      Current Outpatient Medications:   •  amLODIPine (NORVASC) 5 MG tablet, Take 1 tablet by mouth Daily. for blood pressure, Disp: 90 tablet, Rfl: 3  •  hydrALAZINE (APRESOLINE) 25 MG tablet, Take 1 tablet by mouth 3 (Three) Times a Day., Disp: 270 tablet, Rfl: 3  •  levETIRAcetam (KEPPRA) 500 MG tablet, Every 12 (Twelve) Hours., Disp: , Rfl:   •  metoprolol succinate XL (TOPROL-XL) 100 MG 24 hr tablet, Take 1 tablet by mouth Daily., Disp: 90 tablet, Rfl: 3  •  Potassium 99 MG tablet, Take  by mouth., Disp: , Rfl:   •  sacubitril-valsartan (Entresto) 49-51 MG tablet, Take 1 tablet by mouth 2 (Two) Times a Day., Disp: 180 tablet, Rfl: 3  •  isosorbide dinitrate (ISORDIL) 20 MG tablet, Take 1 tablet by mouth 3 (Three) Times a Day., Disp: 270 tablet, Rfl: 3    Review of Systems   Constitutional: Negative for chills and fever.   Cardiovascular:  Negative for chest pain and paroxysmal nocturnal dyspnea.   Respiratory:  Negative  "for cough and shortness of breath.    Skin:  Negative for rash.   Gastrointestinal:  Negative for abdominal pain and heartburn.   Neurological:  Negative for dizziness and numbness.       Objective  /92 (BP Location: Left arm, Patient Position: Sitting, Cuff Size: Adult)   Pulse 65   Ht 188 cm (74.02\")   Wt 104 kg (229 lb)   SpO2 98%   BMI 29.39 kg/m²   Constitutional:       Appearance: Well-developed.   HENT:      Head: Normocephalic and atraumatic.   Pulmonary:      Effort: Pulmonary effort is normal.      Breath sounds: Normal breath sounds.   Cardiovascular:      Normal rate. Regular rhythm.   Edema:     Peripheral edema absent.   Skin:     General: Skin is warm and dry.   Neurological:      Mental Status: Alert and oriented to person, place, and time.       Procedures      ICD-10-CM ICD-9-CM   1. Chronic systolic (congestive) heart failure  I50.22 428.22     428.0   2. Essential hypertension  I10 401.9   3. Acute on chronic renal insufficiency  N28.9 593.9    N18.9 585.9   4. Tobacco use  Z72.0 305.1         Assessment/Plan:  1. Chronic systolic congestive heart failure: Nonischemic cardiomyopathy. EF 20-25% on echo from 3/10/2024.  Recent admission at [facility] for decompensated CHF.  Appears euvolemic today.  Continue metoprolol succinate and Entresto.  Only taking hydralazine once a day.  Changed to 25 mg 3 times daily and start Isordil.  Plan to order repeat echo when medical therapy is optimized.     2.  Essential hypertension: Blood pressure is elevated today.  Continue to optimize heart failure therapy as mentioned above to also help improve blood pressure.     3.  Acute on chronic renal insufficiency: Creatinine elevated during recent hospitalization was 3.51 on BMP from 3/8/2024.  Refer to nephrology clinic.  Repeat BMP prior to follow-up.     4. Tobacco use: YESICA Webb  reports that he has been smoking cigarettes. He has never used smokeless tobacco. I have educated him on the risk of " diseases from using tobacco products such as cancer, COPD, and heart disease.  I advised him to quit and he is willing to quit. We have discussed the following method/s for tobacco cessation:  Counseling.  Together we have set a quit date for  the near future .  He will follow up with me in 1 month or sooner to check on his progress.  I spent 4 minutes counseling the patient.

## 2024-05-15 ENCOUNTER — TELEPHONE (OUTPATIENT)
Dept: CARDIOLOGY | Facility: CLINIC | Age: 29
End: 2024-05-15
Payer: MEDICAID

## 2024-05-15 NOTE — TELEPHONE ENCOUNTER
Left message for pt on voicemail to reschedule a missed appt if needed.  Okay to schedule next available.

## 2024-11-24 ENCOUNTER — HOSPITAL ENCOUNTER (INPATIENT)
Facility: HOSPITAL | Age: 29
LOS: 3 days | Discharge: HOME | End: 2024-11-27
Attending: INTERNAL MEDICINE | Admitting: HOSPITALIST
Payer: MEDICAID

## 2024-11-24 ENCOUNTER — APPOINTMENT (OUTPATIENT)
Dept: CARDIOLOGY | Facility: HOSPITAL | Age: 29
End: 2024-11-24
Payer: MEDICAID

## 2024-11-24 PROBLEM — F14.10 COCAINE ABUSE: Status: ACTIVE | Noted: 2024-11-24

## 2024-11-24 PROBLEM — I50.9 CONGESTIVE HEART FAILURE: Status: ACTIVE | Noted: 2024-11-24

## 2024-11-24 PROBLEM — Z91.199 MEDICALLY NONCOMPLIANT: Status: ACTIVE | Noted: 2024-11-24

## 2024-11-24 PROBLEM — I16.1 HYPERTENSIVE EMERGENCY: Status: ACTIVE | Noted: 2024-11-24

## 2024-11-24 LAB
ALBUMIN SERPL-MCNC: 3.2 G/DL (ref 3.5–5.2)
ALBUMIN/GLOB SERPL: 1 G/DL
ALP SERPL-CCNC: 70 U/L (ref 39–117)
ALT SERPL W P-5'-P-CCNC: 12 U/L (ref 1–41)
ANION GAP SERPL CALCULATED.3IONS-SCNC: 17 MMOL/L (ref 5–15)
APTT PPP: 33.6 SECONDS (ref 24.5–36)
APTT PPP: 62.9 SECONDS (ref 24.5–36)
AST SERPL-CCNC: 12 U/L (ref 1–40)
BACTERIA UR QL AUTO: NORMAL /HPF
BASOPHILS # BLD AUTO: 0.03 10*3/MM3 (ref 0–0.2)
BASOPHILS NFR BLD AUTO: 0.4 % (ref 0–1.5)
BH CV ECHO MEAS - AO MAX PG: 14.1 MMHG
BH CV ECHO MEAS - AO MEAN PG: 8 MMHG
BH CV ECHO MEAS - AO V2 MAX: 188 CM/SEC
BH CV ECHO MEAS - AO V2 VTI: 32.8 CM
BH CV ECHO MEAS - AVA(I,D): 3.1 CM2
BH CV ECHO MEAS - EDV(CUBED): 251.8 ML
BH CV ECHO MEAS - EDV(MOD-SP2): 146 ML
BH CV ECHO MEAS - EDV(MOD-SP4): 174 ML
BH CV ECHO MEAS - EF(MOD-BP): 30.5 %
BH CV ECHO MEAS - EF(MOD-SP2): 28.8 %
BH CV ECHO MEAS - EF(MOD-SP4): 32.2 %
BH CV ECHO MEAS - ESV(CUBED): 149.7 ML
BH CV ECHO MEAS - ESV(MOD-SP2): 104 ML
BH CV ECHO MEAS - ESV(MOD-SP4): 118 ML
BH CV ECHO MEAS - FS: 15.9 %
BH CV ECHO MEAS - IVS/LVPW: 1.03 CM
BH CV ECHO MEAS - IVSD: 1.5 CM
BH CV ECHO MEAS - LA DIMENSION: 5.4 CM
BH CV ECHO MEAS - LAT PEAK E' VEL: 12.6 CM/SEC
BH CV ECHO MEAS - LV MASS(C)D: 452.8 GRAMS
BH CV ECHO MEAS - LV MAX PG: 6 MMHG
BH CV ECHO MEAS - LV MEAN PG: 4 MMHG
BH CV ECHO MEAS - LV V1 MAX: 122 CM/SEC
BH CV ECHO MEAS - LV V1 VTI: 22.8 CM
BH CV ECHO MEAS - LVIDD: 6.3 CM
BH CV ECHO MEAS - LVIDS: 5.3 CM
BH CV ECHO MEAS - LVOT AREA: 4.5 CM2
BH CV ECHO MEAS - LVOT DIAM: 2.4 CM
BH CV ECHO MEAS - LVPWD: 1.46 CM
BH CV ECHO MEAS - MED PEAK E' VEL: 9 CM/SEC
BH CV ECHO MEAS - MR MAX PG: 74.3 MMHG
BH CV ECHO MEAS - MR MAX VEL: 430.5 CM/SEC
BH CV ECHO MEAS - MV A MAX VEL: 50.3 CM/SEC
BH CV ECHO MEAS - MV DEC SLOPE: 976 CM/SEC2
BH CV ECHO MEAS - MV E MAX VEL: 122 CM/SEC
BH CV ECHO MEAS - MV E/A: 2.43
BH CV ECHO MEAS - MV P1/2T: 45.6 MSEC
BH CV ECHO MEAS - MVA(P1/2T): 4.8 CM2
BH CV ECHO MEAS - PA V2 MAX: 89.3 CM/SEC
BH CV ECHO MEAS - RAP SYSTOLE: 3 MMHG
BH CV ECHO MEAS - RV MAX PG: 2.12 MMHG
BH CV ECHO MEAS - RV V1 MAX: 72.8 CM/SEC
BH CV ECHO MEAS - RVDD: 3.2 CM
BH CV ECHO MEAS - RVSP: 31.7 MMHG
BH CV ECHO MEAS - SV(LVOT): 103.1 ML
BH CV ECHO MEAS - SV(MOD-SP2): 42 ML
BH CV ECHO MEAS - SV(MOD-SP4): 56 ML
BH CV ECHO MEAS - TAPSE (>1.6): 1.58 CM
BH CV ECHO MEAS - TR MAX PG: 28.7 MMHG
BH CV ECHO MEAS - TR MAX VEL: 268 CM/SEC
BH CV ECHO MEASUREMENTS AVERAGE E/E' RATIO: 11.3
BH CV XLRA - RV BASE: 3.7 CM
BILIRUB SERPL-MCNC: 1.7 MG/DL (ref 0–1.2)
BILIRUB UR QL STRIP: NEGATIVE
BUN SERPL-MCNC: 64 MG/DL (ref 6–20)
BUN/CREAT SERPL: 12.9 (ref 7–25)
CALCIUM SPEC-SCNC: 8.6 MG/DL (ref 8.6–10.5)
CHLORIDE SERPL-SCNC: 100 MMOL/L (ref 98–107)
CLARITY UR: CLEAR
CO2 SERPL-SCNC: 21 MMOL/L (ref 22–29)
COLOR UR: YELLOW
CREAT SERPL-MCNC: 4.95 MG/DL (ref 0.76–1.27)
D-LACTATE SERPL-SCNC: 0.9 MMOL/L (ref 0.5–2)
DEPRECATED RDW RBC AUTO: 50.8 FL (ref 37–54)
EGFRCR SERPLBLD CKD-EPI 2021: 15.5 ML/MIN/1.73
EOSINOPHIL # BLD AUTO: 0.16 10*3/MM3 (ref 0–0.4)
EOSINOPHIL NFR BLD AUTO: 2.1 % (ref 0.3–6.2)
ERYTHROCYTE [DISTWIDTH] IN BLOOD BY AUTOMATED COUNT: 17.1 % (ref 12.3–15.4)
GLOBULIN UR ELPH-MCNC: 3.1 GM/DL
GLUCOSE SERPL-MCNC: 94 MG/DL (ref 65–99)
GLUCOSE UR STRIP-MCNC: ABNORMAL MG/DL
HCT VFR BLD AUTO: 31.6 % (ref 37.5–51)
HGB BLD-MCNC: 10 G/DL (ref 13–17.7)
HGB UR QL STRIP.AUTO: NEGATIVE
HYALINE CASTS UR QL AUTO: NORMAL /LPF
IMM GRANULOCYTES # BLD AUTO: 0.01 10*3/MM3 (ref 0–0.05)
IMM GRANULOCYTES NFR BLD AUTO: 0.1 % (ref 0–0.5)
INR PPP: 1.3 (ref 0.91–1.09)
KETONES UR QL STRIP: NEGATIVE
LEFT ATRIUM VOLUME: 75.7 ML
LEUKOCYTE ESTERASE UR QL STRIP.AUTO: NEGATIVE
LYMPHOCYTES # BLD AUTO: 1.68 10*3/MM3 (ref 0.7–3.1)
LYMPHOCYTES NFR BLD AUTO: 22.3 % (ref 19.6–45.3)
MAGNESIUM SERPL-MCNC: 1.8 MG/DL (ref 1.6–2.6)
MCH RBC QN AUTO: 26 PG (ref 26.6–33)
MCHC RBC AUTO-ENTMCNC: 31.6 G/DL (ref 31.5–35.7)
MCV RBC AUTO: 82.3 FL (ref 79–97)
MONOCYTES # BLD AUTO: 0.35 10*3/MM3 (ref 0.1–0.9)
MONOCYTES NFR BLD AUTO: 4.6 % (ref 5–12)
NEUTROPHILS NFR BLD AUTO: 5.32 10*3/MM3 (ref 1.7–7)
NEUTROPHILS NFR BLD AUTO: 70.5 % (ref 42.7–76)
NITRITE UR QL STRIP: NEGATIVE
NRBC BLD AUTO-RTO: 0 /100 WBC (ref 0–0.2)
NT-PROBNP SERPL-MCNC: ABNORMAL PG/ML (ref 0–450)
PH UR STRIP.AUTO: 8.5 [PH] (ref 5–8)
PHOSPHATE SERPL-MCNC: 3.8 MG/DL (ref 2.5–4.5)
PLATELET # BLD AUTO: 230 10*3/MM3 (ref 140–450)
PMV BLD AUTO: 10.9 FL (ref 6–12)
POTASSIUM SERPL-SCNC: 3 MMOL/L (ref 3.5–5.2)
POTASSIUM SERPL-SCNC: 3.8 MMOL/L (ref 3.5–5.2)
PROT ?TM UR-MCNC: 25.4 MG/DL
PROT SERPL-MCNC: 6.3 G/DL (ref 6–8.5)
PROT UR QL STRIP: ABNORMAL
PROTHROMBIN TIME: 16.8 SECONDS (ref 11.8–14.8)
RBC # BLD AUTO: 3.84 10*6/MM3 (ref 4.14–5.8)
RBC # UR STRIP: NORMAL /HPF
REF LAB TEST METHOD: NORMAL
SINUS: 2.6 CM
SODIUM SERPL-SCNC: 138 MMOL/L (ref 136–145)
SODIUM UR-SCNC: 119 MMOL/L
SP GR UR STRIP: 1.01 (ref 1–1.03)
SQUAMOUS #/AREA URNS HPF: NORMAL /HPF
STJ: 2.19 CM
UROBILINOGEN UR QL STRIP: ABNORMAL
WBC # UR STRIP: NORMAL /HPF
WBC NRBC COR # BLD AUTO: 7.55 10*3/MM3 (ref 3.4–10.8)

## 2024-11-24 PROCEDURE — 84132 ASSAY OF SERUM POTASSIUM: CPT | Performed by: INTERNAL MEDICINE

## 2024-11-24 PROCEDURE — 83605 ASSAY OF LACTIC ACID: CPT | Performed by: INTERNAL MEDICINE

## 2024-11-24 PROCEDURE — 83735 ASSAY OF MAGNESIUM: CPT | Performed by: INTERNAL MEDICINE

## 2024-11-24 PROCEDURE — 84100 ASSAY OF PHOSPHORUS: CPT | Performed by: INTERNAL MEDICINE

## 2024-11-24 PROCEDURE — 93306 TTE W/DOPPLER COMPLETE: CPT

## 2024-11-24 PROCEDURE — 84540 ASSAY OF URINE/UREA-N: CPT | Performed by: INTERNAL MEDICINE

## 2024-11-24 PROCEDURE — 25810000003 SODIUM CHLORIDE 0.9 % SOLUTION: Performed by: NURSE PRACTITIONER

## 2024-11-24 PROCEDURE — 81001 URINALYSIS AUTO W/SCOPE: CPT | Performed by: INTERNAL MEDICINE

## 2024-11-24 PROCEDURE — 85025 COMPLETE CBC W/AUTO DIFF WBC: CPT | Performed by: NURSE PRACTITIONER

## 2024-11-24 PROCEDURE — 84300 ASSAY OF URINE SODIUM: CPT | Performed by: INTERNAL MEDICINE

## 2024-11-24 PROCEDURE — 93306 TTE W/DOPPLER COMPLETE: CPT | Performed by: EMERGENCY MEDICINE

## 2024-11-24 PROCEDURE — 83880 ASSAY OF NATRIURETIC PEPTIDE: CPT | Performed by: INTERNAL MEDICINE

## 2024-11-24 PROCEDURE — 99222 1ST HOSP IP/OBS MODERATE 55: CPT | Performed by: EMERGENCY MEDICINE

## 2024-11-24 PROCEDURE — 25010000002 ENOXAPARIN PER 10 MG: Performed by: INTERNAL MEDICINE

## 2024-11-24 PROCEDURE — 25010000002 LORAZEPAM PER 2 MG: Performed by: INTERNAL MEDICINE

## 2024-11-24 PROCEDURE — 80053 COMPREHEN METABOLIC PANEL: CPT | Performed by: INTERNAL MEDICINE

## 2024-11-24 PROCEDURE — 82570 ASSAY OF URINE CREATININE: CPT | Performed by: INTERNAL MEDICINE

## 2024-11-24 PROCEDURE — 85730 THROMBOPLASTIN TIME PARTIAL: CPT | Performed by: NURSE PRACTITIONER

## 2024-11-24 PROCEDURE — 85610 PROTHROMBIN TIME: CPT | Performed by: NURSE PRACTITIONER

## 2024-11-24 PROCEDURE — 84156 ASSAY OF PROTEIN URINE: CPT | Performed by: INTERNAL MEDICINE

## 2024-11-24 RX ORDER — HYDRALAZINE HYDROCHLORIDE 25 MG/1
25 TABLET, FILM COATED ORAL 3 TIMES DAILY
Status: DISCONTINUED | OUTPATIENT
Start: 2024-11-24 | End: 2024-11-25

## 2024-11-24 RX ORDER — LORAZEPAM 2 MG/ML
2 INJECTION INTRAMUSCULAR
Status: DISCONTINUED | OUTPATIENT
Start: 2024-11-24 | End: 2024-11-27 | Stop reason: HOSPADM

## 2024-11-24 RX ORDER — NICOTINE 21 MG/24HR
1 PATCH, TRANSDERMAL 24 HOURS TRANSDERMAL
Status: DISCONTINUED | OUTPATIENT
Start: 2024-11-24 | End: 2024-11-27 | Stop reason: HOSPADM

## 2024-11-24 RX ORDER — ISOSORBIDE DINITRATE 10 MG/1
20 TABLET ORAL 3 TIMES DAILY
Status: DISCONTINUED | OUTPATIENT
Start: 2024-11-24 | End: 2024-11-27

## 2024-11-24 RX ORDER — ENOXAPARIN SODIUM 100 MG/ML
40 INJECTION SUBCUTANEOUS EVERY 24 HOURS
Status: DISCONTINUED | OUTPATIENT
Start: 2024-11-24 | End: 2024-11-27 | Stop reason: HOSPADM

## 2024-11-24 RX ORDER — LEVETIRACETAM 500 MG/1
500 TABLET ORAL EVERY 12 HOURS SCHEDULED
Status: DISCONTINUED | OUTPATIENT
Start: 2024-11-24 | End: 2024-11-27 | Stop reason: HOSPADM

## 2024-11-24 RX ORDER — HEPARIN SODIUM 10000 [USP'U]/100ML
18 INJECTION, SOLUTION INTRAVENOUS
Status: DISCONTINUED | OUTPATIENT
Start: 2024-11-24 | End: 2024-11-24

## 2024-11-24 RX ORDER — POTASSIUM CHLORIDE 1500 MG/1
40 TABLET, EXTENDED RELEASE ORAL EVERY 4 HOURS
Status: COMPLETED | OUTPATIENT
Start: 2024-11-24 | End: 2024-11-24

## 2024-11-24 RX ORDER — CHLORHEXIDINE GLUCONATE 500 MG/1
1 CLOTH TOPICAL EVERY 24 HOURS
Status: DISCONTINUED | OUTPATIENT
Start: 2024-11-25 | End: 2024-11-26

## 2024-11-24 RX ORDER — AMOXICILLIN 250 MG
2 CAPSULE ORAL 2 TIMES DAILY
Status: DISCONTINUED | OUTPATIENT
Start: 2024-11-24 | End: 2024-11-27 | Stop reason: HOSPADM

## 2024-11-24 RX ORDER — ACETAMINOPHEN 325 MG/1
650 TABLET ORAL EVERY 4 HOURS PRN
Status: DISCONTINUED | OUTPATIENT
Start: 2024-11-24 | End: 2024-11-27 | Stop reason: HOSPADM

## 2024-11-24 RX ORDER — AMLODIPINE BESYLATE 5 MG/1
5 TABLET ORAL DAILY
Status: DISCONTINUED | OUTPATIENT
Start: 2024-11-24 | End: 2024-11-26

## 2024-11-24 RX ORDER — NITROGLYCERIN 0.4 MG/1
0.4 TABLET SUBLINGUAL
Status: DISCONTINUED | OUTPATIENT
Start: 2024-11-24 | End: 2024-11-27 | Stop reason: HOSPADM

## 2024-11-24 RX ORDER — ONDANSETRON 4 MG/1
4 TABLET, ORALLY DISINTEGRATING ORAL EVERY 6 HOURS PRN
Status: DISCONTINUED | OUTPATIENT
Start: 2024-11-24 | End: 2024-11-27 | Stop reason: HOSPADM

## 2024-11-24 RX ORDER — CHLORHEXIDINE GLUCONATE 500 MG/1
1 CLOTH TOPICAL ONCE
Status: COMPLETED | OUTPATIENT
Start: 2024-11-24 | End: 2024-11-24

## 2024-11-24 RX ORDER — BISACODYL 5 MG/1
5 TABLET, DELAYED RELEASE ORAL DAILY PRN
Status: DISCONTINUED | OUTPATIENT
Start: 2024-11-24 | End: 2024-11-27 | Stop reason: HOSPADM

## 2024-11-24 RX ORDER — HYDRALAZINE HYDROCHLORIDE 20 MG/ML
10 INJECTION INTRAMUSCULAR; INTRAVENOUS EVERY 4 HOURS PRN
Status: DISCONTINUED | OUTPATIENT
Start: 2024-11-24 | End: 2024-11-27 | Stop reason: HOSPADM

## 2024-11-24 RX ORDER — SODIUM CHLORIDE 9 MG/ML
40 INJECTION, SOLUTION INTRAVENOUS AS NEEDED
Status: DISCONTINUED | OUTPATIENT
Start: 2024-11-24 | End: 2024-11-27 | Stop reason: HOSPADM

## 2024-11-24 RX ORDER — SODIUM CHLORIDE 0.9 % (FLUSH) 0.9 %
10 SYRINGE (ML) INJECTION AS NEEDED
Status: DISCONTINUED | OUTPATIENT
Start: 2024-11-24 | End: 2024-11-27 | Stop reason: HOSPADM

## 2024-11-24 RX ORDER — POLYETHYLENE GLYCOL 3350 17 G/17G
17 POWDER, FOR SOLUTION ORAL DAILY PRN
Status: DISCONTINUED | OUTPATIENT
Start: 2024-11-24 | End: 2024-11-27 | Stop reason: HOSPADM

## 2024-11-24 RX ORDER — ONDANSETRON 2 MG/ML
4 INJECTION INTRAMUSCULAR; INTRAVENOUS EVERY 6 HOURS PRN
Status: DISCONTINUED | OUTPATIENT
Start: 2024-11-24 | End: 2024-11-27 | Stop reason: HOSPADM

## 2024-11-24 RX ORDER — METOPROLOL SUCCINATE 100 MG/1
100 TABLET, EXTENDED RELEASE ORAL DAILY
Status: DISCONTINUED | OUTPATIENT
Start: 2024-11-24 | End: 2024-11-27 | Stop reason: HOSPADM

## 2024-11-24 RX ORDER — SODIUM CHLORIDE 9 MG/ML
75 INJECTION, SOLUTION INTRAVENOUS CONTINUOUS
Status: DISCONTINUED | OUTPATIENT
Start: 2024-11-24 | End: 2024-11-25

## 2024-11-24 RX ORDER — SODIUM CHLORIDE 0.9 % (FLUSH) 0.9 %
10 SYRINGE (ML) INJECTION EVERY 12 HOURS SCHEDULED
Status: DISCONTINUED | OUTPATIENT
Start: 2024-11-24 | End: 2024-11-27 | Stop reason: HOSPADM

## 2024-11-24 RX ORDER — BISACODYL 10 MG
10 SUPPOSITORY, RECTAL RECTAL DAILY PRN
Status: DISCONTINUED | OUTPATIENT
Start: 2024-11-24 | End: 2024-11-27 | Stop reason: HOSPADM

## 2024-11-24 RX ORDER — ACETAMINOPHEN 650 MG/1
650 SUPPOSITORY RECTAL EVERY 6 HOURS PRN
Status: DISCONTINUED | OUTPATIENT
Start: 2024-11-24 | End: 2024-11-27 | Stop reason: HOSPADM

## 2024-11-24 RX ADMIN — DOCUSATE SODIUM 50 MG AND SENNOSIDES 8.6 MG 2 TABLET: 8.6; 5 TABLET, FILM COATED ORAL at 08:34

## 2024-11-24 RX ADMIN — Medication 10 ML: at 20:38

## 2024-11-24 RX ADMIN — METOPROLOL SUCCINATE 100 MG: 100 TABLET, EXTENDED RELEASE ORAL at 08:34

## 2024-11-24 RX ADMIN — POTASSIUM CHLORIDE 40 MEQ: 1500 TABLET, EXTENDED RELEASE ORAL at 16:10

## 2024-11-24 RX ADMIN — LEVETIRACETAM 500 MG: 500 TABLET, FILM COATED ORAL at 20:34

## 2024-11-24 RX ADMIN — POTASSIUM CHLORIDE 40 MEQ: 1500 TABLET, EXTENDED RELEASE ORAL at 12:11

## 2024-11-24 RX ADMIN — SODIUM CHLORIDE 75 ML/HR: 9 INJECTION, SOLUTION INTRAVENOUS at 21:19

## 2024-11-24 RX ADMIN — Medication 1 APPLICATION: at 20:34

## 2024-11-24 RX ADMIN — SODIUM CHLORIDE 75 ML/HR: 9 INJECTION, SOLUTION INTRAVENOUS at 10:01

## 2024-11-24 RX ADMIN — ISOSORBIDE DINITRATE 20 MG: 20 TABLET ORAL at 20:37

## 2024-11-24 RX ADMIN — HYDRALAZINE HYDROCHLORIDE 25 MG: 25 TABLET ORAL at 08:33

## 2024-11-24 RX ADMIN — ENOXAPARIN SODIUM 40 MG: 100 INJECTION SUBCUTANEOUS at 08:34

## 2024-11-24 RX ADMIN — Medication 10 ML: at 08:34

## 2024-11-24 RX ADMIN — POTASSIUM CHLORIDE 40 MEQ: 1500 TABLET, EXTENDED RELEASE ORAL at 08:34

## 2024-11-24 RX ADMIN — CHLORHEXIDINE GLUCONATE 1 APPLICATION: 500 CLOTH TOPICAL at 06:25

## 2024-11-24 RX ADMIN — AMLODIPINE BESYLATE 5 MG: 5 TABLET ORAL at 08:34

## 2024-11-24 RX ADMIN — ISOSORBIDE DINITRATE 20 MG: 20 TABLET ORAL at 16:10

## 2024-11-24 RX ADMIN — ISOSORBIDE DINITRATE 20 MG: 20 TABLET ORAL at 08:34

## 2024-11-24 RX ADMIN — LORAZEPAM 2 MG: 2 INJECTION INTRAMUSCULAR; INTRAVENOUS at 06:25

## 2024-11-24 RX ADMIN — SACUBITRIL AND VALSARTAN 1 TABLET: 49; 51 TABLET, FILM COATED ORAL at 20:34

## 2024-11-24 RX ADMIN — HYDRALAZINE HYDROCHLORIDE 25 MG: 25 TABLET ORAL at 16:10

## 2024-11-24 RX ADMIN — SACUBITRIL AND VALSARTAN 1 TABLET: 49; 51 TABLET, FILM COATED ORAL at 08:33

## 2024-11-24 RX ADMIN — Medication 1 APPLICATION: at 06:25

## 2024-11-24 RX ADMIN — LEVETIRACETAM 500 MG: 500 TABLET, FILM COATED ORAL at 08:33

## 2024-11-24 RX ADMIN — HYDRALAZINE HYDROCHLORIDE 25 MG: 25 TABLET ORAL at 20:34

## 2024-11-24 NOTE — H&P
[facility]    Date of Admission: 11/24/2024  Date of Note: 11/24/24  Primary Care Physician: He Ortega MD   LOS: 0 days     History   Next of Kin:  Primary Emergency Contact: Jesika Webb   Code Status: Full Code.    He is a 29 y.o. male cocaine, marijuana user [facility] 11/24/2024 with hypertensive emergency witnessed by renal dysfunction and congestive heart failure who also  has a past medical history of Cardiomegaly, HTN (hypertension), and Seizure.  A quick review of his medications shows that the only drug that he currently has sufficient supplies of his metoprolol.  He has not filled any of his other medications since July.  He arrived late last night by private vehicle to the outside hospital complaining of chest pain, shortness of breath, headache and nausea and vomiting.  Blood pressure was 203/158.  They reported that he was taking Entresto, Keppra, hydralazine, isosorbide, metoprolol, amlodipine but I cannot see any recent fills of any of those except metoprolol.  He has a history of poor dentition, myocardial infarction, chronic bronchitis, cardiomegaly, renal failure, substance abuse disorder, seizure disorder.  He smokes a half a pack per day.  Denied alcohol use but he also denied cocaine use.  At the outside facility they started ondansetron, Bumex 1 mg x 2 doses along with 325 mg of aspirin, started on a heparin drip.  Patient was placed on a Cardene drip and transferred to us for further care and management.    Chest x-ray from the outside facility is noted as significant perihilar and hilar congestion with cephalization, prominent right horizontal fissure, no obvious nodules or consolidation.  No pleural effusions noted.  Cardiothoracic ratio increased 1 84-3 15 double heart density splaying of the kathy.  Urine drug screen positive for cocaine--patient denies.    On his chart  "(which can contain diagnoses that are not current) shows he  has Essential hypertension; Tobacco use; Chronic systolic (congestive) heart failure; NICM (nonischemic cardiomyopathy); Seizure disorder; Acute on chronic renal insufficiency; and Congestive heart failure on their problem list..     He takes Potassium, amLODIPine, hydrALAZINE, isosorbide dinitrate, levETIRAcetam, metoprolol succinate XL, and sacubitril-valsartan at home based on most current med reconciliation.   Complete list is below.  Reviewing his fill chart shows noncompliance but the patient says he has filled the more often than we think.  He says he only ran out of his sacubitril/valsartan a week ago even though he had a 90-day filled 7/11/2024 and should have run out in October.  He says that he has been taking the half dose which she had a few extras of.  He also says that Keppra was refilled sometime in October by the report but he thinks he refilled it after that but he admits he has not taken it for a long period of time.    Our 2020 emergency room notes state that the patient has a history of of congenital congestive heart failure--but he says he does not know of any congenital issue other than \"either me or my brother had a hole in her heart when we were born.\"  No surgery, no syndrome he is aware of.  He thinks he might have missed spoken and someone transcribed his congestive heart failure for congenital., in 2022 he had loss of consciousness generalized tonic clonic seizure after drinking 3-5 shots of hard liquor the night before.  He also mentioned a previous seizure previously: He said that he been using methamphetamine and watching his friends play video games with that initial event.  He also mentioned a type II non-ST elevation MI, chronic marijuana use, acute methamphetamine use, he was started on Keppra.  Did not follow-up.  There CT scan showed a asymmetric volume loss with adjacent hypodensities in the right temporal lobe and " left occipital encephalomalacia.    11/24/24: The ICU team was asked to evaluate the patient for admission.  Patient has proteinuria, cold hematuria, creatinine 5.29, albumin 2.9, bilirubin 1.62, proBNP 18,000 1817, arterial blood gas 7.4 60/30/66 likely consistent with a venous sample.  Bicarb 21.  Patient hyperventilated prior to needlestick most likely explanation for his mild respiratory alkalosis.  In their records they note chronic smoking, chronic alcohol use.     Past Medical History     Active and Resolved Problems  Active Hospital Problems    Diagnosis  POA    **Congestive heart failure [I50.9]  Yes      Resolved Hospital Problems   No resolved problems to display.       Past Medical History:   Past Medical History:   Diagnosis Date    Cardiomegaly     Congenital fusion of carpal bone     HTN (hypertension)     Seizure        Prior Surgeries: He  has a past surgical history that includes Elbow surgery.    Past Surgical History:   Past Surgical History:   Procedure Laterality Date    ELBOW PROCEDURE         Social and Family History     Family History:  family history includes Heart disease in his mother; Hypertension in his mother; No Known Problems in his father.    Tobacco/Social History:  reports that he has been smoking cigarettes. He has never used smokeless tobacco. He reports current alcohol use. He reports that he does not use drugs.    Allergies     Allergies:   He is allergic to penicillins.    Labs   Labs ordered.    Inpatient Medications   Scheduled Meds:[START ON 11/25/2024] Chlorhexidine Gluconate Cloth, 1 Application, Topical, Q24H  mupirocin, 1 Application, Each Nare, BID  niCARdipine in NaCl, , ,   nicotine, 1 patch, Transdermal, Q24H  potassium chloride ER, 40 mEq, Oral, Q4H  senna-docusate sodium, 2 tablet, Oral, BID  sodium chloride, 10 mL, Intravenous, Q12H      Continuous Infusions:niCARdipine, 5-15 mg/hr, Last Rate: Stopped (11/24/24 0514)      PRN Meds:.  niCARdipine in NaCl    I  "have reviewed the patient's current medications.   Outpatient Medications     Current Outpatient Medications   Medication Instructions    amLODIPine (NORVASC) 5 mg, Oral, Daily, for blood pressure    hydrALAZINE (APRESOLINE) 25 mg, Oral, 3 Times Daily    isosorbide dinitrate (ISORDIL) 20 mg, Oral, 3 Times Daily    levETIRAcetam (KEPPRA) 500 MG tablet Every 12 Hours Scheduled    metoprolol succinate XL (TOPROL-XL) 100 mg, Oral, Daily    Potassium 99 MG tablet Oral    sacubitril-valsartan (Entresto) 49-51 MG tablet 1 tablet, Oral, 2 Times Daily       Current Antibiotics   This patient does not have an active medication from one of the medication groupers.    Exam   Vent settings for last 24 hours:       Vital signs for last 24 hours:  Temp:  [98.3 °F (36.8 °C)] 98.3 °F (36.8 °C)  Heart Rate:  [97] 97  Resp:  [18] 18  BP: (122)/(65) 122/65    Vitals: His  height is 188 cm (74\") and weight is 96.8 kg (213 lb 6.5 oz). His oral temperature is 98.3 °F (36.8 °C). His blood pressure is 122/65 and his pulse is 97. His respiration is 18 and oxygen saturation is 98%.     PHYSICAL FINDINGS: SEE VITALS ABOVE  GENERAL APPEARANCE: ° Awake. ° Alert. ° Oriented to time, place, and person. ° Well developed. ° Well nourished. ° In no acute distress. ° Not ill-appearing. ° No jaundice.  HEAD: Injuries: No evidence of a head injury. ° Appearance: Head normocephalic.  NECK: No masses ° Thyroid: ° Not diffusely enlarged.  EYES: General/bilateral: Extraocular Movements: ° Normal. Pupils: ° PERRLA. Sclera: ° Showed no icterus.  THROAT: No stridor auscultated/heard.  EARS: Hearing: ° No hearing loss noted.  NOSE: General/bilateral: External Deformities: ° No external nose deformities.  LYMPH NODES: No cervical or generalized adenopathy.  CHEST: ° Visual inspection revealed no abnormalities.  LUNGS: ° Respiration rhythm and depth was normal. ° Normal breath sounds sounds. ° No wheezing was heard bilaterally. ° No rhonchi were heard. ° No " "rales/crackles were heard. ° No clubbing noted.  CARDIOVASCULAR: JVD not increased. Heart Rate And Rhythm: Normal. ° Heart Sounds: No S3/ S4 heard. ° Murmurs: No murmurs were heard. ° Edema: No edema noted.  BACK: No obvious deformity noted.  ABDOMEN: Visual Inspection: Abdomen was not distended. Auscultation: ° Abdominal auscultation revealed no abnormalities. ° Bowel sounds were normal. ° Palpation: ° Abdomen was soft. ° No abdominal tenderness. ° No mass was palpated in the abdomen. ° Soft. °Liver: Not visibly enlarged. Spleen: Not visibly enlarged.  MUSCULOSKELETAL SYSTEM : General/bilateral: ° Normal movement of all extremities.  FEET/EXTREMITIES: General/bilateral: ° No visible swelling of the feet.  NEUROLOGICAL: Nonfocal. °   SKIN: ° General appearance was normal. ° No new rashes noted.    Intake/Output   Intake/Output this shift:  I/O this shift:  In: -   Out: 425 [Urine:425]    Intake/Output last 3 shifts:  No intake/output data recorded.    Results and Cultures Review     Result Review:  I have personally reviewed the results from the time of this admission to 11/24/2024 05:28 CST and agree with these findings:  [x]  Laboratory list / accordion  [x]  Microbiology  [x]  Radiology  [x]  EKG/Telemetry   [x]  Cardiology/Vascular   []  Pathology  []  Old records  []  Other:  Most notable findings include: Reviewed labs from outside hospital.  No micro  X-ray consistent with heart failure  Ordered echo  Bedside EKG shows sinus    Assessment/Plan       Acute hypertensive emergency secondary to cocaine use: He denies cocaine use.  Says someone \"might have mixed in with my marijuana\" but he does admit to previous methamphetamine use.  Supportive care.  Cardene drip off.  Restart patient's home medications this morning.  Use Ativan for chest pain given cocaine abuse.  Use Ativan for hypertension given cocaine abuse.  Counseled patient strongly regarding cessation of street drugs.  See also #4.  Poor prognosis given " patient's noncompliance with medications, lack of prescription refills, changing dosages without requested changes from physician, noncompliance with tobacco and drug cessation.  Seizure: Patient says he is supposed to be on Keppra but ran out.  Restart home medication.  Tobaccoism: Recommend tobacco cessation.  Provide patient with nicotine patch.  Cocaine, marijuana and history of methamphetamine abuse: Recommend cessation.  Chest pain: Completely resolved.  Treat with Ativan.  Discontinue heparin drip.    Troponin elevation: Due to cocaine use.  Due to hypertension.  Due to noncompliance.  Chronic.  Get echocardiogram.  Consult cardiology for further recommendations as I suspect his EF is going to be low and he may be a candidate for further intervention.    Troponin  Diagnosis:  Non myocardial ischemia related troponin elevation:  During the normal course of the workup prior to admission to the hospital the patient underwent a troponin test.  Troponin is an excellent marker for acute ischemia of the myocardium but it is not diagnostic of a myocardial infarction and does not risk stratify patient's towards future risk of myocardial infarctions either.  Troponin elevation needs to be looked at in the appropriate clinical context.  Cocaine use definitely can cause an elevated troponin as can severe hypertension from noncompliance.  His renal failure will also augment any troponin elevation.    Excellent review: [email or web address]  here is a brief summary.   A type 1 MI, STEMI and non-STEMI, are supported by the presence of an acute coronary thrombus or a strong suspicion of such if angiography is unavailable.  Most patients with type 1 non STEMI or STEMI come with symptoms consistent with that diagnosis.    Type 2: Supply demand mismatch has heterogeneous underlying causes including acute blood loss, acute  hypoxia, shock, coronary vasospasm, bradyarrhythmias along with renal dysfunction etc..  Type 2 myocardial infarctions are often related to a fixed obstructive coronary disease but underlying CAD may not be present.  If patients have symptoms of acute myocardial ischemia such as typical chest pain, new ischemic EKG changes, pathological Q-waves, imaging evidence of loss of viable myocardium with a significant reversible perfusion defect on nuclear imaging or wall motion abnormality on echocardiography then further evaluation including risk stratification and or angiography may be beneficial.  When there is only an elevated troponin including a rise in troponin without new symptoms or EKG/imaging evidence it is usually most important to document a non MI troponin elevation due to nonischemic mechanisms of myocardial injury.    Type 3: Sudden cardiac death, type 4 MI due to PCI, type 5 MI due to CABG are not often seen on our service.  They usually have a clear cause.    For non myocardial ischemia related troponin elevation there are a multitude of causes.     Acute (on chronic) systolic or diastolic heart failure: Usually due to acute ventricular wall stretch/strain. Troponin elevations tend to be mild, with more indolent (or even flat) troponin trajectories.   Pericarditis and myocarditis: Due to direct injury from myocardial inflammation.   Cardiopulmonary resuscitation (CPR): Due to physical injury to the heart from mechanical chest compressions and from electrical shocks of external defibrillation.   Stress-induced (takotsubo) cardiomyopathy: Stress-induced release of neurohormonal factors and catecholamines that cause direct myocyte injury and transient dilatation of the ventricle.   Acute pulmonary embolism: Result of acute right ventricular wall stretch/strain, not from myocardial ischemia.   Sepsis without shock: Direct toxicity of circulating cytokines to cardiac myocytes. In the absence of evidence of  shock and symptoms/signs of myocardial ischemia, do not document type 2 MI.   Renal failure (acute kidney injury or chronic kidney disease): Multiple etiologies, but at least partially related to reduced renal clearance of troponin. In general, renal failure in the absence of symptoms/signs of ischemia is best classified as a non-MI troponin elevation. ESRD patients who present with volume overload due to missed dialysis also typically have a non-MI troponin elevation.   Stroke/intracranial hemorrhage: Mechanisms of myocardial injury and troponin elevation are incompletely understood, but may include catecholamine surges that injure the heart.    Unfortunately all patients are at risk of myocardial infarction at any time.  Many of the same risk factors including obesity, sleep apnea, high blood pressure, diabetes, hyperlipidemia among others put them at risk for myocardial infarction at any time.  At this time after evaluating the patient I believe that they are diagnosed with Non myocardial ischemia related troponin elevation:  There is no risk stratification or preventative measures in these patients other than treating their other underlying poor health conditions that may decrease the risk of myocardial infarction in the future.    VTE Prophylaxis: Discontinue heparin drip.  Start Lovenox.  40 mg twice daily for DVT prophylaxis.  No need to fully anticoagulate at this time.    Full code.    Total critical care time: 65 minutes.  Reviewing external charts.  Discussing with consultants.  Placing orders.  Young male with very chronic illness.  Called in by nurse practitioner.  Appropriately.  This is a very sick young man.    Due to a high probability of clinically significant, life threatening deterioration, the patient required my highest level of preparedness to intervene emergently and I personally spent this critical care time directly and personally managing the patient.     This critical care time included  obtaining a history; examining the patient; pulse oximetry; ordering and review of studies; arranging urgent treatment with development of a management plan; evaluation of patient's response to treatment; frequent reassessment; and, discussions with other providers.    This critical care time was performed to assess and manage the high probability of imminent, life-threatening deterioration that could result in multi-organ failure. It was exclusive of separately billable procedures and treating other patients and teaching time.    Please see MDM section and the rest of the note for further information on patient assessment and treatment.    Part of this note may be an electronic transcription/translation of spoken language to printed text using the Dragon Dictation System    Electronically signed by Mik Jaimes MD on 11/24/2024 at 05:28 CST  Pulmonary, Critical Care  [facility]  Please call with any questions  (Cell [phone])

## 2024-11-24 NOTE — CONSULTS
Nephrology ([facility]) Consult Note      Patient:  YESICA Webb  YOB: 1995  Date of Service: 11/24/2024  MRN: [id number]   Acct: [id number]   Primary Care Physician: He Ortega MD  Advance Directive:   Code Status and Medical Interventions: CPR (Attempt to Resuscitate); Full Support   Ordered at: 11/24/24 0646     Level Of Support Discussed With:    Patient     Code Status (Patient has no pulse and is not breathing):    CPR (Attempt to Resuscitate)     Medical Interventions (Patient has pulse or is breathing):    Full Support     Admit Date: 11/24/2024       Hospital Day: 0  Referring Provider: Mik Jaimes MD      Patient personally seen and examined.  Complete chart including Consults, Notes, Operative Reports, Labs, Cardiology, and Radiology studies reviewed as able.        Subjective:  YESICA Webb is a 29 y.o. male for whom we were consulted for evaluation and treatment of acute kidney injury with hypertension.  Patient has been seen once in the office in 2020 and subsequent lost to follow-up and also did not show for repeat referral earlier this year.  He was admitted in transfer from [facility] for acute kidney injury with CHF and hypertension.  There is a history of noncompliance with medications.  Also, he has a history of drug usage including alcohol cocaine cocaine, marijuana and  methamphetamine as well.  There was a reported history of hematuria but this was not available as there are no urine studies at this facility.  Patient denies specific lites upon question including chest pain, shortness of air at rest, nausea or vomiting, dysuria, hematuria, hemoptysis or rash    Allergies:  Penicillins    Home Meds:  Medications Prior to Admission   Medication Sig Dispense Refill Last Dose/Taking    amLODIPine (NORVASC) 5 MG tablet Take 1 tablet by mouth Daily. for blood pressure 90 tablet 3     hydrALAZINE (APRESOLINE) 25 MG tablet  Take 1 tablet by mouth 3 (Three) Times a Day. 270 tablet 3     isosorbide dinitrate (ISORDIL) 20 MG tablet Take 1 tablet by mouth 3 (Three) Times a Day. 270 tablet 3     levETIRAcetam (KEPPRA) 500 MG tablet Every 12 (Twelve) Hours.       metoprolol succinate XL (TOPROL-XL) 100 MG 24 hr tablet Take 1 tablet by mouth Daily. 90 tablet 3     Potassium 99 MG tablet Take  by mouth.       sacubitril-valsartan (Entresto) 49-51 MG tablet Take 1 tablet by mouth 2 (Two) Times a Day. 180 tablet 3        Medicines:  Current Facility-Administered Medications   Medication Dose Route Frequency Provider Last Rate Last Admin    acetaminophen (TYLENOL) tablet 650 mg  650 mg Oral Q4H PRN Mik Jaimes MD        Or    acetaminophen (TYLENOL) suppository 650 mg  650 mg Rectal Q6H PRN Mik Jaimes MD        amLODIPine (NORVASC) tablet 5 mg  5 mg Oral Daily Mik Jaimes MD   5 mg at 11/24/24 0834    sennosides-docusate (PERICOLACE) 8.6-50 MG per tablet 2 tablet  2 tablet Oral BID Mik Jaimes MD   2 tablet at 11/24/24 0834    And    polyethylene glycol (MIRALAX) packet 17 g  17 g Oral Daily PRN Mik Jaimes MD        And    bisacodyl (DULCOLAX) EC tablet 5 mg  5 mg Oral Daily PRN Mik Jaimes MD        And    bisacodyl (DULCOLAX) suppository 10 mg  10 mg Rectal Daily PRN Mik Jaimes MD        Calcium Replacement - Follow Nurse / BPA Driven Protocol   Not Applicable PRN Mik Jaimes MD        [START ON 11/25/2024] Chlorhexidine Gluconate Cloth 2 % pads 1 Application  1 Application Topical Q24H Mik Jaimes MD        Enoxaparin Sodium (LOVENOX) syringe 40 mg  40 mg Subcutaneous Q24H Mik Jaimes MD   40 mg at 11/24/24 0834    hydrALAZINE (APRESOLINE) tablet 25 mg  25 mg Oral TID Mik Jaimes MD   25 mg at 11/24/24 1610    isosorbide dinitrate (ISORDIL) tablet 20 mg  20 mg Oral TID Mik Jaimes MD   20 mg at 11/24/24 1610    levETIRAcetam (KEPPRA) tablet 500 mg  500 mg Oral Q12H Mik Jaimes  MD   500 mg at 11/24/24 0833    LORazepam (ATIVAN) injection 2 mg  2 mg Intravenous Q2H PRN Mik Jaimes MD   2 mg at 11/24/24 0625    Magnesium Standard Dose Replacement - Follow Nurse / BPA Driven Protocol   Not Applicable PRN Mik Jaimes MD        metoprolol succinate XL (TOPROL-XL) 24 hr tablet 100 mg  100 mg Oral Daily Mik Jaimes MD   100 mg at 11/24/24 0834    mupirocin (BACTROBAN) 2 % nasal ointment 1 Application  1 Application Each Nare BID Mik Jaimes MD   1 Application at 11/24/24 0625    niCARdipine (CARDENE) 20 mg in 200 mL NS infusion  5-15 mg/hr Intravenous Titrated Nallely Fuller APRN   Held at 11/24/24 0513    nicotine (NICODERM CQ) 14 MG/24HR patch 1 patch  1 patch Transdermal Q24H Mik Jaimes MD        nitroglycerin (NITROSTAT) SL tablet 0.4 mg  0.4 mg Sublingual Q5 Min PRN Mik Jaimes MD        ondansetron ODT (ZOFRAN-ODT) disintegrating tablet 4 mg  4 mg Oral Q6H PRN Mik Jaimes MD        Or    ondansetron (ZOFRAN) injection 4 mg  4 mg Intravenous Q6H PRN Mik Jaimes MD        Phosphorus Replacement - Follow Nurse / BPA Driven Protocol   Not Applicable PRN Mik Jaimes MD        Potassium Replacement - Follow Nurse / BPA Driven Protocol   Not Applicable PRN Mik Jaimes MD        sacubitril-valsartan (ENTRESTO) 49-51 MG tablet 1 tablet  1 tablet Oral BID Mik Jaimes MD   1 tablet at 11/24/24 0833    sodium chloride 0.9 % flush 10 mL  10 mL Intravenous Q12H Mik Jaimes MD   10 mL at 11/24/24 0834    sodium chloride 0.9 % flush 10 mL  10 mL Intravenous PRN Mik Jaimes MD        sodium chloride 0.9 % infusion 40 mL  40 mL Intravenous PRN Mik Jaimes MD        sodium chloride 0.9 % infusion  75 mL/hr Intravenous Continuous Adrian Pelaez APRN 75 mL/hr at 11/24/24 1001 75 mL/hr at 11/24/24 1001       Past Medical History:  Past Medical History:   Diagnosis Date    Cardiomegaly     Congenital fusion of carpal bone     HTN  (hypertension)     Seizure        Past Surgical History:  Past Surgical History:   Procedure Laterality Date    ELBOW PROCEDURE         Family History  Family History   Problem Relation Age of Onset    Hypertension Mother     Heart disease Mother     No Known Problems Father        Social History  Social History     Socioeconomic History    Marital status: Single   Tobacco Use    Smoking status: Some Days     Current packs/day: 0.50     Types: Cigarettes    Smokeless tobacco: Never   Vaping Use    Vaping status: Never Used   Substance and Sexual Activity    Alcohol use: Yes    Drug use: Yes     Types: Marijuana, Cocaine(coke)    Sexual activity: Defer         Review of Systems:  History obtained from chart review and the patient  General ROS: No fever or chills  Respiratory ROS: No cough, shortness of breath, wheezing  Cardiovascular ROS: No chest pain or palpitations  Gastrointestinal ROS: No abdominal pain or melena  Genito-Urinary ROS: No dysuria or hematuria  Psych ROS: No anxiety and depression  14 point ROS reviewed with the patient and negative except as noted above and in the HPI unless unable to obtain.      Objective:  Patient Vitals for the past 24 hrs:   BP Temp Temp src Pulse Resp SpO2 Height Weight   11/24/24 1545 141/90 -- -- 88 -- 97 % -- --   11/24/24 1530 148/95 -- -- 88 -- 98 % -- --   11/24/24 1515 152/93 -- -- 91 -- 94 % -- --   11/24/24 1500 149/93 -- -- 91 19 94 % -- --   11/24/24 1415 148/87 -- -- 88 -- 91 % -- --   11/24/24 1400 152/96 -- -- 93 21 90 % -- --   11/24/24 1345 150/90 -- -- 89 -- 94 % -- --   11/24/24 1330 153/90 -- -- 88 -- 96 % -- --   11/24/24 1315 153/98 -- -- 86 -- 98 % -- --   11/24/24 1300 152/98 -- -- 91 -- 93 % -- --   11/24/24 1215 157/97 -- -- 93 -- 95 % -- --   11/24/24 1200 150/97 98.3 °F (36.8 °C) Oral 92 24 94 % -- --   11/24/24 1145 150/98 -- -- 88 -- 95 % -- --   11/24/24 1130 152/97 -- -- 90 -- 95 % -- --   11/24/24 1115 146/95 -- -- 88 -- 92 % -- --   11/24/24  "1100 142/90 -- -- 92 19 95 % -- --   11/24/24 1045 144/96 -- -- 91 -- 93 % -- --   11/24/24 1030 144/83 -- -- 92 -- 93 % -- --   11/24/24 1015 144/85 -- -- 94 -- 93 % -- --   11/24/24 1000 135/89 -- -- 92 20 94 % -- --   11/24/24 0945 152/92 -- -- 97 -- 91 % -- --   11/24/24 0930 150/85 -- -- 97 -- 91 % -- --   11/24/24 0915 155/85 -- -- 84 -- 91 % -- --   11/24/24 0900 133/71 -- -- 106 21 91 % -- --   11/24/24 0845 144/80 -- -- 100 -- 94 % -- --   11/24/24 0815 143/97 98.3 °F (36.8 °C) Oral 100 21 96 % -- --   11/24/24 0800 168/92 -- -- 102 -- 98 % -- --   11/24/24 0745 159/94 -- -- 103 -- 92 % -- --   11/24/24 0730 160/92 -- -- 99 -- 95 % -- --   11/24/24 0715 (!) 174/106 -- -- 102 -- 92 % -- --   11/24/24 0700 163/93 -- -- 99 -- 95 % -- --   11/24/24 0615 143/84 -- -- 98 -- 93 % -- --   11/24/24 0600 154/85 -- -- 93 -- 95 % -- --   11/24/24 0545 152/89 -- -- 95 -- 96 % -- --   11/24/24 0530 150/80 -- -- 93 -- 95 % -- --   11/24/24 0515 138/80 -- -- 95 -- 98 % -- --   11/24/24 0513 122/65 -- -- -- -- -- -- --   11/24/24 0500 122/65 98.3 °F (36.8 °C) Oral 97 18 98 % -- --   11/24/24 0459 -- -- -- 97 -- 98 % 188 cm (74\") 96.8 kg (213 lb 6.5 oz)       Intake/Output Summary (Last 24 hours) at 11/24/2024 1626  Last data filed at 11/24/2024 1606  Gross per 24 hour   Intake 537.1 ml   Output 5850 ml   Net -5312.9 ml     General: awake/alert   Chest:  clear to auscultation bilaterally without respiratory distress  CVS: regular rate and rhythm  Abdominal: soft, nontender, positive bowel sounds  Extremities: no cyanosis or edema  Skin: warm and dry without rash      Labs:  Results from last 7 days   Lab Units 11/24/24  0543   WBC 10*3/mm3 7.55   HEMOGLOBIN g/dL 10.0*   HEMATOCRIT % 31.6*   PLATELETS 10*3/mm3 230         Results from last 7 days   Lab Units 11/24/24  0550   SODIUM mmol/L 138   POTASSIUM mmol/L 3.0*   CHLORIDE mmol/L 100   CO2 mmol/L 21.0*   BUN mg/dL 64*   CREATININE mg/dL 4.95*   CALCIUM mg/dL 8.6   EGFR " "mL/min/1.73 15.5*   BILIRUBIN mg/dL 1.7*   ALK PHOS U/L 70   ALT (SGPT) U/L 12   AST (SGOT) U/L 12   GLUCOSE mg/dL 94       Radiology:   Imaging Results (Last 72 Hours)       ** No results found for the last 72 hours. **            Culture:  No results found for: \"BLOODCX\", \"URINECX\", \"WOUNDCX\", \"MRSACX\", \"RESPCX\", \"STOOLCX\"      Assessment   Acute kidney injury  Accelerated hypertension  Hypokalemia  Metabolic acidosis  Polysubstance abuse  Anemia  Nonischemic cardiomyopathy  Chronic systolic congestive heart failure    Plan:  Discussed with patient, nursing  Workup reviewed today  Monitor labs  Cardiology evaluation reviewed  Encourage substance cessation  Monitor potassium levels and replace as needed/indicated cautiously given acute kidney injury with uncertain baseline as available data is 4 years old  Blood pressure medications adjusted  Wean Cardene drip as tolerated  Further testing ordered ongoing  Encouraged follow-up with nephrology as outpatient if able to avoid dialysis for continued medical management      Thank you for the consult, we appreciate the opportunity to provide care to your patients.  Feel free to contact me if I can be of any further assistance.      Rangel Vigil MD  11/24/2024  16:26 CST    "

## 2024-11-24 NOTE — CONSULTS
[facility] CONSULT NOTE    Patient Care Team:  He Ortega MD as PCP - General (Internal Medicine)  Mik Jaimes MD  REASON FOR REFERRAL: Heart failure      Subjective     Patient is a 29 y.o. male with a past medical history of dilated cardiomyopathy with an ejection fraction on echocardiogram earlier this year showing systolic function in the 20 to 25% range as well as drug abuse including cocaine and marijuana (and possibly methamphetamine) who was transferred to the ICU from [facility] overnight for hypertensive emergency, acute kidney injury and acute CHF exacerbation.  He also has a history of hypertension and seizure disorder.  He is noncompliant with his medications.  It is difficult to get a history out of the patient as he will only stay awake for a few seconds before falling back asleep.    Patient reportedly presented to the emergency department at outside facility complaining of chest pain, shortness of breath and nausea and vomiting.  Blood pressure was elevated with systolics greater than 200 mmHg.  He was placed on a heparin and Cardene drip and given Bumex IV before being transferred.    Dr. Jaimes was able to review his home medication list which should include potassium, amlodipine, hydralazine, Imdur, Keppra, metoprolol, and Entresto, which patient has not been taking any of.    This morning, lab work showed a BUN of 64 and a creatinine of 4.95.  Potassium low at 3.0.  Lactic acid normal.  BNP was elevated at 23,000    Cardiology consulted to assist with care.    Review of Systems   Review of Systems   Constitutional:  Positive for fatigue.   HENT:  Negative for trouble swallowing.    Eyes:  Negative for pain.   Respiratory:  Positive for chest tightness and shortness of breath. Negative for cough and wheezing.    Cardiovascular:  Negative for chest pain, palpitations and leg swelling.   Gastrointestinal:  Positive for nausea and vomiting. Negative for  abdominal pain.   Musculoskeletal:  Negative for arthralgias.   Skin:  Negative for color change.   Neurological:  Positive for weakness. Negative for dizziness.   Psychiatric/Behavioral:  Negative for confusion.        History  Past Medical History:   Diagnosis Date    Cardiomegaly     Congenital fusion of carpal bone     HTN (hypertension)     Seizure      Past Surgical History:   Procedure Laterality Date    ELBOW PROCEDURE       Family History   Problem Relation Age of Onset    Hypertension Mother     Heart disease Mother     No Known Problems Father      Social History     Tobacco Use    Smoking status: Some Days     Current packs/day: 0.50     Types: Cigarettes    Smokeless tobacco: Never   Vaping Use    Vaping status: Never Used   Substance Use Topics    Alcohol use: Yes    Drug use: Yes     Types: Marijuana, Cocaine(coke)     Medications Prior to Admission   Medication Sig Dispense Refill Last Dose/Taking    amLODIPine (NORVASC) 5 MG tablet Take 1 tablet by mouth Daily. for blood pressure 90 tablet 3     hydrALAZINE (APRESOLINE) 25 MG tablet Take 1 tablet by mouth 3 (Three) Times a Day. 270 tablet 3     isosorbide dinitrate (ISORDIL) 20 MG tablet Take 1 tablet by mouth 3 (Three) Times a Day. 270 tablet 3     levETIRAcetam (KEPPRA) 500 MG tablet Every 12 (Twelve) Hours.       metoprolol succinate XL (TOPROL-XL) 100 MG 24 hr tablet Take 1 tablet by mouth Daily. 90 tablet 3     Potassium 99 MG tablet Take  by mouth.       sacubitril-valsartan (Entresto) 49-51 MG tablet Take 1 tablet by mouth 2 (Two) Times a Day. 180 tablet 3      Allergies:  Penicillins    Objective     Vital Signs  Temp:  [98.3 °F (36.8 °C)] 98.3 °F (36.8 °C)  Heart Rate:  [] 106  Resp:  [18-21] 21  BP: (122-174)/() 133/71    Physical Exam:  Vitals and nursing note reviewed.   Constitutional:       Appearance: Normal appearance. Well-developed. Acutely ill-appearing.   Eyes:      Extraocular Movements: Extraocular movements intact.       Pupils: Pupils are equal, round, and reactive to light.   HENT:      Head: Normocephalic and atraumatic.    Mouth/Throat:      Pharynx: Oropharynx is clear.   Neck:      Vascular: JVD normal.      Trachea: Trachea normal.   Pulmonary:      Effort: Pulmonary effort is normal.      Breath sounds: Normal breath sounds. No wheezing. No rhonchi. No rales.   Cardiovascular:      PMI at left midclavicular line. Normal rate. Regular rhythm. Normal S1. Normal S2.       Murmurs: There is no murmur.      No gallop.  No click. No rub.   Pulses:     Dorsalis pedis: 2+ bilaterally.     Posterior tibial: 2+ bilaterally.  Abdominal:      General: Bowel sounds are normal.      Palpations: Abdomen is soft.      Tenderness: There is no abdominal tenderness.   Musculoskeletal: Normal range of motion.      Cervical back: Normal range of motion and neck supple. Skin:     General: Skin is warm and dry.      Capillary Refill: Capillary refill takes less than 2 seconds.   Feet:      Right foot:      Skin integrity: Skin integrity normal.      Left foot:      Skin integrity: Skin integrity normal.   Neurological:      Mental Status: Oriented to person, place and time. Lethargic.      Sensory: Sensation is intact.      Motor: Motor function is intact.      Coordination: Coordination is intact.   Psychiatric:         Speech: Speech normal.         Behavior: Behavior is cooperative.       Results Review:   Lab Results (last 72 hours)       Procedure Component Value Units Date/Time    Comprehensive Metabolic Panel [[id number]]  (Abnormal) Collected: 11/24/24 0550    Specimen: Blood Updated: 11/24/24 0630     Glucose 94 mg/dL      BUN 64 mg/dL      Creatinine 4.95 mg/dL      Sodium 138 mmol/L      Potassium 3.0 mmol/L      Chloride 100 mmol/L      CO2 21.0 mmol/L      Calcium 8.6 mg/dL      Total Protein 6.3 g/dL      Albumin 3.2 g/dL      ALT (SGPT) 12 U/L      AST (SGOT) 12 U/L      Alkaline Phosphatase 70 U/L      Total Bilirubin 1.7 mg/dL       Globulin 3.1 gm/dL      A/G Ratio 1.0 g/dL      BUN/Creatinine Ratio 12.9     Anion Gap 17.0 mmol/L      eGFR 15.5 mL/min/1.73     Narrative:      GFR Normal >60  Chronic Kidney Disease <60  Kidney Failure <15      Magnesium [[id number]]  (Normal) Collected: 11/24/24 0550    Specimen: Blood Updated: 11/24/24 0630     Magnesium 1.8 mg/dL     Phosphorus [[id number]]  (Normal) Collected: 11/24/24 0550    Specimen: Blood Updated: 11/24/24 0628     Phosphorus 3.8 mg/dL     BNP [[id number]]  (Abnormal) Collected: 11/24/24 0550    Specimen: Blood Updated: 11/24/24 0626     proBNP 23,612.0 pg/mL     Narrative:      This assay is used as an aid in the diagnosis of individuals suspected of having heart failure. It can be used as an aid in the diagnosis of acute decompensated heart failure (ADHF) in patients presenting with signs and symptoms of ADHF to the emergency department (ED). In addition, NT-proBNP of <300 pg/mL indicates ADHF is not likely.    Age Range Result Interpretation  NT-proBNP Concentration (pg/mL:      <50             Positive            >450                   Gray                 300-450                    Negative             <300    50-75           Positive            >900                  Gray                300-900                  Negative            <300      >75             Positive            >1800                  Gray                300-1800                  Negative            <300    Lactic Acid, Plasma [[id number]]  (Normal) Collected: 11/24/24 0550    Specimen: Blood Updated: 11/24/24 0622     Lactate 0.9 mmol/L     Protime-INR [[id number]]  (Abnormal) Collected: 11/24/24 0543    Specimen: Blood Updated: 11/24/24 0620     Protime 16.8 Seconds      INR 1.30    aPTT [[id number]]  (Abnormal) Collected: 11/24/24 0543    Specimen: Blood Updated: 11/24/24 0620     PTT 62.9 seconds     CBC & Differential [[id number]]  (Abnormal) Collected: 11/24/24 0543    Specimen: Blood Updated: 11/24/24 0609     Narrative:      The following orders were created for panel order CBC & Differential.  Procedure                               Abnormality         Status                     ---------                               -----------         ------                     CBC Auto Differential[[id number]]        Abnormal            Final result                 Please view results for these tests on the individual orders.    CBC Auto Differential [[id number]]  (Abnormal) Collected: 11/24/24 0543    Specimen: Blood Updated: 11/24/24 0609     WBC 7.55 10*3/mm3      RBC 3.84 10*6/mm3      Hemoglobin 10.0 g/dL      Hematocrit 31.6 %      MCV 82.3 fL      MCH 26.0 pg      MCHC 31.6 g/dL      RDW 17.1 %      RDW-SD 50.8 fl      MPV 10.9 fL      Platelets 230 10*3/mm3      Neutrophil % 70.5 %      Lymphocyte % 22.3 %      Monocyte % 4.6 %      Eosinophil % 2.1 %      Basophil % 0.4 %      Immature Grans % 0.1 %      Neutrophils, Absolute 5.32 10*3/mm3      Lymphocytes, Absolute 1.68 10*3/mm3      Monocytes, Absolute 0.35 10*3/mm3      Eosinophils, Absolute 0.16 10*3/mm3      Basophils, Absolute 0.03 10*3/mm3      Immature Grans, Absolute 0.01 10*3/mm3      nRBC 0.0 /100 WBC             Results for orders placed in visit on 10/29/19    SCANNED - ECHOCARDIOGRAM      Assessment & Plan       Hypertensive emergency    Essential hypertension    Tobacco use    Chronic systolic (congestive) heart failure    NICM (nonischemic cardiomyopathy)    Acute on chronic renal insufficiency    Congestive heart failure    Cocaine abuse    Medically noncompliant      Plan:    29-year-old male with history of nonischemic, dilated cardiomyopathy with an ejection fraction in the 20s on echocardiogram performed at outside facility earlier this year presents as a transfer for hypertensive emergency, acute kidney injury, altered mental status and drug abuse including marijuana, cocaine and possibly methamphetamine.  Vitals are currently stable.  Patient is very  lethargic but does awaken and able to answer questions for a few seconds before falling back asleep.    Currently he is receiving amlodipine 5 daily, Lovenox prophylactic dose, hydralazine 25 3 times daily, Imdur 20 3 times daily, metoprolol 100 daily, Entresto 49/51 twice daily.  He is also on a Cardene drip to control his pressures.    Cardiology recommendations:  1.  Repeat transthoracic echocardiogram to assess LV function and volume status  2.  Will likely need LifeVest on discharge  3.   on drug abuse cessation  4.  Patient has already been restarted on his home CHF regimen including amlodipine, hydralazine, Imdur, metoprolol and Entresto.  Hold off on diuresis due to the acute kidney injury  5.  Recommend nephrology consult to discuss need for dialysis    Continue to follow along.  Thank you for the consult.  Please call with any questions.    I discussed the patient's findings and my recommendations with patient.     Bryan Maciel,   Interventional cardiology  [facility]  11/24/24  10:19 CST

## 2024-11-24 NOTE — PLAN OF CARE
Goal Outcome Evaluation:      Pt A&OX4. Follows commands. NSR 80's-90's. SBP maintained <160. RA. Voiding in urinal. Potassium checked around 1620 d/t replacement and pt voiding 4L of urine out - recheck 3.8. NS infusing @75. NPO at midnight for renal US.

## 2024-11-25 ENCOUNTER — APPOINTMENT (OUTPATIENT)
Dept: ULTRASOUND IMAGING | Facility: HOSPITAL | Age: 29
End: 2024-11-25
Payer: MEDICAID

## 2024-11-25 LAB
25(OH)D3 SERPL-MCNC: 12.8 NG/ML (ref 30–100)
ALBUMIN SERPL-MCNC: 3.2 G/DL (ref 3.5–5.2)
ALBUMIN/GLOB SERPL: 1.1 G/DL
ALP SERPL-CCNC: 66 U/L (ref 39–117)
ALT SERPL W P-5'-P-CCNC: 11 U/L (ref 1–41)
ANION GAP SERPL CALCULATED.3IONS-SCNC: 14 MMOL/L (ref 5–15)
AST SERPL-CCNC: 12 U/L (ref 1–40)
BASOPHILS # BLD AUTO: 0.05 10*3/MM3 (ref 0–0.2)
BASOPHILS NFR BLD AUTO: 0.7 % (ref 0–1.5)
BILIRUB SERPL-MCNC: 0.9 MG/DL (ref 0–1.2)
BUN SERPL-MCNC: 50 MG/DL (ref 6–20)
BUN/CREAT SERPL: 12.1 (ref 7–25)
CALCIUM SPEC-SCNC: 8.6 MG/DL (ref 8.6–10.5)
CHLORIDE SERPL-SCNC: 106 MMOL/L (ref 98–107)
CO2 SERPL-SCNC: 20 MMOL/L (ref 22–29)
CREAT SERPL-MCNC: 4.13 MG/DL (ref 0.76–1.27)
CREAT UR-MCNC: 33.5 MG/DL
DEPRECATED RDW RBC AUTO: 52.6 FL (ref 37–54)
EGFRCR SERPLBLD CKD-EPI 2021: 19.3 ML/MIN/1.73
EOSINOPHIL # BLD AUTO: 0.23 10*3/MM3 (ref 0–0.4)
EOSINOPHIL NFR BLD AUTO: 3.1 % (ref 0.3–6.2)
ERYTHROCYTE [DISTWIDTH] IN BLOOD BY AUTOMATED COUNT: 17.1 % (ref 12.3–15.4)
GLOBULIN UR ELPH-MCNC: 3 GM/DL
GLUCOSE SERPL-MCNC: 88 MG/DL (ref 65–99)
HCT VFR BLD AUTO: 32.6 % (ref 37.5–51)
HGB BLD-MCNC: 9.9 G/DL (ref 13–17.7)
IMM GRANULOCYTES # BLD AUTO: 0.02 10*3/MM3 (ref 0–0.05)
IMM GRANULOCYTES NFR BLD AUTO: 0.3 % (ref 0–0.5)
LYMPHOCYTES # BLD AUTO: 1.59 10*3/MM3 (ref 0.7–3.1)
LYMPHOCYTES NFR BLD AUTO: 21.4 % (ref 19.6–45.3)
MAGNESIUM SERPL-MCNC: 1.8 MG/DL (ref 1.6–2.6)
MCH RBC QN AUTO: 25.6 PG (ref 26.6–33)
MCHC RBC AUTO-ENTMCNC: 30.4 G/DL (ref 31.5–35.7)
MCV RBC AUTO: 84.5 FL (ref 79–97)
MONOCYTES # BLD AUTO: 0.44 10*3/MM3 (ref 0.1–0.9)
MONOCYTES NFR BLD AUTO: 5.9 % (ref 5–12)
NEUTROPHILS NFR BLD AUTO: 5.09 10*3/MM3 (ref 1.7–7)
NEUTROPHILS NFR BLD AUTO: 68.6 % (ref 42.7–76)
NRBC BLD AUTO-RTO: 0 /100 WBC (ref 0–0.2)
PHOSPHATE SERPL-MCNC: 3.1 MG/DL (ref 2.5–4.5)
PLATELET # BLD AUTO: 253 10*3/MM3 (ref 140–450)
PMV BLD AUTO: 11.1 FL (ref 6–12)
POTASSIUM SERPL-SCNC: 3.7 MMOL/L (ref 3.5–5.2)
PROT SERPL-MCNC: 6.2 G/DL (ref 6–8.5)
PTH-INTACT SERPL-MCNC: 238.2 PG/ML (ref 15–65)
RBC # BLD AUTO: 3.86 10*6/MM3 (ref 4.14–5.8)
SODIUM SERPL-SCNC: 140 MMOL/L (ref 136–145)
URATE SERPL-MCNC: 10.6 MG/DL (ref 3.4–7)
UUN 24H UR-MCNC: 280 MG/DL
WBC NRBC COR # BLD AUTO: 7.42 10*3/MM3 (ref 3.4–10.8)

## 2024-11-25 PROCEDURE — 82306 VITAMIN D 25 HYDROXY: CPT | Performed by: INTERNAL MEDICINE

## 2024-11-25 PROCEDURE — 84550 ASSAY OF BLOOD/URIC ACID: CPT | Performed by: NURSE PRACTITIONER

## 2024-11-25 PROCEDURE — 25010000002 NICARDIPINE HCL IN NACL 20-0.9 MG/200ML-% SOLUTION: Performed by: NURSE PRACTITIONER

## 2024-11-25 PROCEDURE — 83735 ASSAY OF MAGNESIUM: CPT | Performed by: INTERNAL MEDICINE

## 2024-11-25 PROCEDURE — 99232 SBSQ HOSP IP/OBS MODERATE 35: CPT | Performed by: HOSPITALIST

## 2024-11-25 PROCEDURE — 84100 ASSAY OF PHOSPHORUS: CPT | Performed by: INTERNAL MEDICINE

## 2024-11-25 PROCEDURE — 25010000002 ENOXAPARIN PER 10 MG: Performed by: INTERNAL MEDICINE

## 2024-11-25 PROCEDURE — 85025 COMPLETE CBC W/AUTO DIFF WBC: CPT | Performed by: INTERNAL MEDICINE

## 2024-11-25 PROCEDURE — 25010000002 HYDRALAZINE PER 20 MG: Performed by: NURSE PRACTITIONER

## 2024-11-25 PROCEDURE — 76775 US EXAM ABDO BACK WALL LIM: CPT

## 2024-11-25 PROCEDURE — 83970 ASSAY OF PARATHORMONE: CPT | Performed by: INTERNAL MEDICINE

## 2024-11-25 PROCEDURE — 80053 COMPREHEN METABOLIC PANEL: CPT | Performed by: INTERNAL MEDICINE

## 2024-11-25 RX ORDER — HYDRALAZINE HYDROCHLORIDE 50 MG/1
50 TABLET, FILM COATED ORAL 3 TIMES DAILY
Status: DISCONTINUED | OUTPATIENT
Start: 2024-11-25 | End: 2024-11-26

## 2024-11-25 RX ADMIN — Medication 1 APPLICATION: at 09:32

## 2024-11-25 RX ADMIN — LEVETIRACETAM 500 MG: 500 TABLET, FILM COATED ORAL at 09:31

## 2024-11-25 RX ADMIN — CHLORHEXIDINE GLUCONATE 1 APPLICATION: 500 CLOTH TOPICAL at 04:55

## 2024-11-25 RX ADMIN — ISOSORBIDE DINITRATE 20 MG: 20 TABLET ORAL at 16:07

## 2024-11-25 RX ADMIN — ISOSORBIDE DINITRATE 20 MG: 20 TABLET ORAL at 20:11

## 2024-11-25 RX ADMIN — HYDRALAZINE HYDROCHLORIDE 50 MG: 50 TABLET ORAL at 16:07

## 2024-11-25 RX ADMIN — ISOSORBIDE DINITRATE 20 MG: 20 TABLET ORAL at 09:31

## 2024-11-25 RX ADMIN — SACUBITRIL AND VALSARTAN 1 TABLET: 49; 51 TABLET, FILM COATED ORAL at 20:11

## 2024-11-25 RX ADMIN — METOPROLOL SUCCINATE 100 MG: 100 TABLET, EXTENDED RELEASE ORAL at 09:31

## 2024-11-25 RX ADMIN — NICARDIPINE HYDROCHLORIDE 5 MG/HR: 0.1 INJECTION INTRAVENOUS at 06:36

## 2024-11-25 RX ADMIN — LEVETIRACETAM 500 MG: 500 TABLET, FILM COATED ORAL at 20:11

## 2024-11-25 RX ADMIN — HYDRALAZINE HYDROCHLORIDE 50 MG: 50 TABLET ORAL at 09:31

## 2024-11-25 RX ADMIN — SACUBITRIL AND VALSARTAN 1 TABLET: 49; 51 TABLET, FILM COATED ORAL at 09:31

## 2024-11-25 RX ADMIN — AMLODIPINE BESYLATE 5 MG: 5 TABLET ORAL at 09:31

## 2024-11-25 RX ADMIN — Medication 1 APPLICATION: at 20:11

## 2024-11-25 RX ADMIN — Medication 10 ML: at 20:11

## 2024-11-25 RX ADMIN — HYDRALAZINE HYDROCHLORIDE 10 MG: 20 INJECTION INTRAMUSCULAR; INTRAVENOUS at 01:41

## 2024-11-25 RX ADMIN — ENOXAPARIN SODIUM 40 MG: 100 INJECTION SUBCUTANEOUS at 09:31

## 2024-11-25 RX ADMIN — DOCUSATE SODIUM 50 MG AND SENNOSIDES 8.6 MG 2 TABLET: 8.6; 5 TABLET, FILM COATED ORAL at 09:31

## 2024-11-25 RX ADMIN — Medication 10 ML: at 09:32

## 2024-11-25 RX ADMIN — HYDRALAZINE HYDROCHLORIDE 50 MG: 50 TABLET ORAL at 20:11

## 2024-11-25 NOTE — PROGRESS NOTES
Nephrology ([facility]) Progress Note      Patient:  YESICA Webb  YOB: 1995  Date of Service: 11/25/2024  MRN: [id number]   Acct: [id number]   Primary Care Physician: He Ortega MD  Advance Directive:   Code Status and Medical Interventions: CPR (Attempt to Resuscitate); Full Support   Ordered at: 11/24/24 0646     Level Of Support Discussed With:    Patient     Code Status (Patient has no pulse and is not breathing):    CPR (Attempt to Resuscitate)     Medical Interventions (Patient has pulse or is breathing):    Full Support     Admit Date: 11/24/2024       Hospital Day: 1  Referring Provider: Mik Jaimes MD      Patient personally seen and examined.  Complete chart including Consults, Notes, Operative Reports, Labs, Cardiology, and Radiology studies reviewed as able.        Subjective:  YESICA Webb is a 29 y.o. male for whom we were consulted for evaluation and treatment of acute kidney injury/hypertension.  Patient previously seen in our office once in 202 but lost to follow up since then. Had repeat referral earlier this year but no-showed for his visit. History of hypertension, dilated cardiomyopathy, chronic systolic CHF, seizure disorder, polysubstance abuse. Had note been taking any of his home medications recently. Presented to [place] [facility] with chest pain, dyspnea, n/v. Found to have blood pressure >200 systolic and MINDY. Transferred to [facility]. Initial labs at this facility showed creatinine 4.95, BUN 64. Blood pressure managed with Cardene drip and resumption of home medications. On initial nephrology exam denied chest pain or dyspnea. No urine changes. No hemopytsis or rash.     Today is awake and alert. Feeling a little better. Cardene is off and blood pressure is improving but not yet at goal. Urine output nonoliguric    Allergies:  Penicillins    Home Meds:  Medications Prior to Admission   Medication Sig Dispense Refill Last  Dose/Taking    metoprolol succinate XL (TOPROL-XL) 100 MG 24 hr tablet Take 1 tablet by mouth Daily. 90 tablet 3 Past Week    amLODIPine (NORVASC) 5 MG tablet Take 1 tablet by mouth Daily. for blood pressure 90 tablet 3 More than a month    hydrALAZINE (APRESOLINE) 25 MG tablet Take 1 tablet by mouth 3 (Three) Times a Day. 270 tablet 3 More than a month    isosorbide dinitrate (ISORDIL) 20 MG tablet Take 1 tablet by mouth 3 (Three) Times a Day. 270 tablet 3 More than a month    levETIRAcetam (KEPPRA) 500 MG tablet Take 1 tablet by mouth 2 (Two) Times a Day.   More than a month    Potassium 99 MG tablet Take 1 tablet by mouth Daily.   More than a month    sacubitril-valsartan (Entresto) 49-51 MG tablet Take 1 tablet by mouth 2 (Two) Times a Day. 180 tablet 3 More than a month       Medicines:  Current Facility-Administered Medications   Medication Dose Route Frequency Provider Last Rate Last Admin    acetaminophen (TYLENOL) tablet 650 mg  650 mg Oral Q4H PRN Mik Jaimes MD        Or    acetaminophen (TYLENOL) suppository 650 mg  650 mg Rectal Q6H PRN Mik Jaimes MD        amLODIPine (NORVASC) tablet 5 mg  5 mg Oral Daily Mik Jaimes MD   5 mg at 11/24/24 0834    sennosides-docusate (PERICOLACE) 8.6-50 MG per tablet 2 tablet  2 tablet Oral BID Mik Jaimes MD   2 tablet at 11/24/24 0834    And    polyethylene glycol (MIRALAX) packet 17 g  17 g Oral Daily PRN Mik Jaimes MD        And    bisacodyl (DULCOLAX) EC tablet 5 mg  5 mg Oral Daily PRN Mik Jaimes MD        And    bisacodyl (DULCOLAX) suppository 10 mg  10 mg Rectal Daily PRN Mik Jaimes MD        Calcium Replacement - Follow Nurse / BPA Driven Protocol   Not Applicable PRN Mik Jaimes MD        Chlorhexidine Gluconate Cloth 2 % pads 1 Application  1 Application Topical Q24H Mik Jaimes MD   1 Application at 11/25/24 0455    Enoxaparin Sodium (LOVENOX) syringe 40 mg  40 mg Subcutaneous Q24H Mik Jaimes MD   40 mg  at 11/24/24 0834    hydrALAZINE (APRESOLINE) injection 10 mg  10 mg Intravenous Q4H PRN Nallely Fuller APRN   10 mg at 11/25/24 0141    hydrALAZINE (APRESOLINE) tablet 50 mg  50 mg Oral TID Mani Mena MD        isosorbide dinitrate (ISORDIL) tablet 20 mg  20 mg Oral TID Mik Jaimes MD   20 mg at 11/24/24 2037    levETIRAcetam (KEPPRA) tablet 500 mg  500 mg Oral Q12H Mik Jaimes MD   500 mg at 11/24/24 2034    LORazepam (ATIVAN) injection 2 mg  2 mg Intravenous Q2H PRN Mik Jaimes MD   2 mg at 11/24/24 0625    Magnesium Standard Dose Replacement - Follow Nurse / BPA Driven Protocol   Not Applicable PRN Mik Jaimes MD        metoprolol succinate XL (TOPROL-XL) 24 hr tablet 100 mg  100 mg Oral Daily Mik Jaimes MD   100 mg at 11/24/24 0834    mupirocin (BACTROBAN) 2 % nasal ointment 1 Application  1 Application Each Nare BID Mik Jaimes MD   1 Application at 11/24/24 2034    niCARdipine (CARDENE) 20 mg in 200 mL NS infusion  5-15 mg/hr Intravenous Titrated Nallely Fuller APRN   Stopped at 11/25/24 0700    nicotine (NICODERM CQ) 14 MG/24HR patch 1 patch  1 patch Transdermal Q24H Mik Jaimes MD        nitroglycerin (NITROSTAT) SL tablet 0.4 mg  0.4 mg Sublingual Q5 Min PRN Mik Jaimes MD        ondansetron ODT (ZOFRAN-ODT) disintegrating tablet 4 mg  4 mg Oral Q6H PRN Mik Jaimes MD        Or    ondansetron (ZOFRAN) injection 4 mg  4 mg Intravenous Q6H PRN Mik Jaimes MD        Phosphorus Replacement - Follow Nurse / BPA Driven Protocol   Not Applicable PRN Mik Jaimes MD        Potassium Replacement - Follow Nurse / BPA Driven Protocol   Not Applicable PRN Mik Jaimes MD        sacubitril-valsartan (ENTRESTO) 49-51 MG tablet 1 tablet  1 tablet Oral BID Mik Jaimes MD   1 tablet at 11/24/24 2034    sodium chloride 0.9 % flush 10 mL  10 mL Intravenous Q12H Mik Jaimes MD   10 mL at 11/24/24 2038    sodium chloride 0.9 % flush 10 mL  10 mL  Intravenous PRN Mik Jaimes MD        sodium chloride 0.9 % infusion 40 mL  40 mL Intravenous YEISONN Mik Jaimes MD           Past Medical History:  Past Medical History:   Diagnosis Date    Cardiomegaly     Congenital fusion of carpal bone     HTN (hypertension)     Seizure        Past Surgical History:  Past Surgical History:   Procedure Laterality Date    ELBOW PROCEDURE         Family History  Family History   Problem Relation Age of Onset    Hypertension Mother     Heart disease Mother     No Known Problems Father        Social History  Social History     Socioeconomic History    Marital status: Single   Tobacco Use    Smoking status: Some Days     Current packs/day: 0.50     Types: Cigarettes    Smokeless tobacco: Never   Vaping Use    Vaping status: Never Used   Substance and Sexual Activity    Alcohol use: Yes    Drug use: Yes     Types: Marijuana, Cocaine(coke)    Sexual activity: Defer       Review of Systems:  History obtained from chart review and the patient  General ROS: No fever or chills  Respiratory ROS: No cough, shortness of breath, wheezing  Cardiovascular ROS: No chest pain or palpitations  Gastrointestinal ROS: No abdominal pain or melena  Genito-Urinary ROS: No dysuria or hematuria  Psych ROS: No anxiety and depression  14 point ROS reviewed with the patient and negative except as noted above and in the HPI unless unable to obtain.    Objective:  Patient Vitals for the past 24 hrs:   BP Temp Temp src Pulse Resp SpO2 Weight   11/25/24 0800 (P) 143/95 -- -- (P) 85 -- (P) 98 % --   11/25/24 0700 144/93 -- -- (P) 86 -- (P) 100 % --   11/25/24 0636 140/86 -- -- -- -- -- --   11/25/24 0630 133/79 -- -- 88 -- 97 % --   11/25/24 0615 140/86 -- -- 90 -- 98 % --   11/25/24 0600 142/78 -- -- 88 -- 97 % --   11/25/24 0545 146/80 -- -- 88 -- 98 % --   11/25/24 0530 145/78 -- -- 90 -- 96 % --   11/25/24 0515 149/82 -- -- 89 -- 96 % --   11/25/24 0500 157/87 -- -- 91 -- 97 % --   11/25/24 0456 (!)  170/103 -- -- -- -- -- --   11/25/24 0400 (!) 169/102 98.7 °F (37.1 °C) Oral 90 13 96 % 98.5 kg (217 lb 2.5 oz)   11/25/24 0300 151/98 -- -- 93 -- 97 % --   11/25/24 0245 152/93 -- -- 89 -- 99 % --   11/25/24 0205 156/98 -- -- 95 -- 97 % --   11/25/24 0130 (!) 162/108 -- -- 92 -- 97 % --   11/25/24 0115 137/100 -- -- 92 -- 97 % --   11/25/24 0100 138/99 -- -- 92 -- 97 % --   11/25/24 0045 150/100 -- -- 91 -- 96 % --   11/25/24 0030 (!) 143/102 -- -- 91 -- 96 % --   11/25/24 0015 (!) 147/105 -- -- 90 -- 98 % --   11/24/24 2315 150/96 98.5 °F (36.9 °C) Oral 90 13 98 % --   11/24/24 2245 138/86 -- -- 90 -- 99 % --   11/24/24 2215 147/96 -- -- 96 -- 94 % --   11/24/24 2200 146/97 -- -- 91 -- 94 % --   11/24/24 2145 153/93 -- -- 93 -- 95 % --   11/24/24 2120 159/97 -- -- 88 -- 97 % --   11/24/24 2030 147/99 -- -- 100 -- (!) 89 % --   11/24/24 2015 145/94 -- -- 85 -- 92 % --   11/24/24 2000 137/89 98.5 °F (36.9 °C) Oral 89 15 93 % --   11/24/24 1945 138/86 -- -- 90 -- 95 % --   11/24/24 1915 144/98 -- -- 87 -- 98 % --   11/24/24 1900 137/94 -- -- 77 -- 95 % --   11/24/24 1845 127/84 -- -- 92 -- 95 % --   11/24/24 1830 131/87 -- -- 90 -- 96 % --   11/24/24 1815 148/96 -- -- 92 -- 95 % --   11/24/24 1730 128/74 -- -- 91 -- 96 % --   11/24/24 1715 132/80 -- -- 89 -- 96 % --   11/24/24 1700 131/83 -- -- 87 20 97 % --   11/24/24 1645 (!) 143/102 -- -- 83 -- 99 % --   11/24/24 1630 (!) 156/103 -- -- 88 -- 99 % --   11/24/24 1600 (!) 151/101 -- -- 84 -- 95 % --   11/24/24 1545 141/90 98.7 °F (37.1 °C) Oral 88 -- 97 % --   11/24/24 1530 148/95 -- -- 88 -- 98 % --   11/24/24 1515 152/93 -- -- 91 -- 94 % --   11/24/24 1500 149/93 -- -- 91 19 94 % --   11/24/24 1415 148/87 -- -- 88 -- 91 % --   11/24/24 1400 152/96 -- -- 93 21 90 % --   11/24/24 1345 150/90 -- -- 89 -- 94 % --   11/24/24 1330 153/90 -- -- 88 -- 96 % --   11/24/24 1315 153/98 -- -- 86 -- 98 % --   11/24/24 1300 152/98 -- -- 91 -- 93 % --   11/24/24 1215 157/97 -- --  93 -- 95 % --   11/24/24 1200 150/97 98.3 °F (36.8 °C) Oral 92 24 94 % --   11/24/24 1145 150/98 -- -- 88 -- 95 % --   11/24/24 1130 152/97 -- -- 90 -- 95 % --   11/24/24 1115 146/95 -- -- 88 -- 92 % --   11/24/24 1100 142/90 -- -- 92 19 95 % --   11/24/24 1045 144/96 -- -- 91 -- 93 % --   11/24/24 1030 144/83 -- -- 92 -- 93 % --   11/24/24 1015 144/85 -- -- 94 -- 93 % --   11/24/24 1000 135/89 -- -- 92 20 94 % --   11/24/24 0945 152/92 -- -- 97 -- 91 % --   11/24/24 0930 150/85 -- -- 97 -- 91 % --   11/24/24 0915 155/85 -- -- 84 -- 91 % --       Intake/Output Summary (Last 24 hours) at 11/25/2024 0908  Last data filed at 11/25/2024 0700  Gross per 24 hour   Intake 1907.85 ml   Output 6750 ml   Net -4842.15 ml     General: awake/alert   Chest:  clear to auscultation bilaterally without respiratory distress  CVS: regular rate and rhythm  Abdominal: soft, nontender, positive bowel sounds  Extremities: no cyanosis or edema  Skin: warm and dry without rash      Labs:  Results from last 7 days   Lab Units 11/25/24  0355 11/24/24  0543   WBC 10*3/mm3 7.42 7.55   HEMOGLOBIN g/dL 9.9* 10.0*   HEMATOCRIT % 32.6* 31.6*   PLATELETS 10*3/mm3 253 230         Results from last 7 days   Lab Units 11/25/24  0355 11/24/24  1621 11/24/24  0550   SODIUM mmol/L 140  --  138   POTASSIUM mmol/L 3.7 3.8 3.0*   CHLORIDE mmol/L 106  --  100   CO2 mmol/L 20.0*  --  21.0*   BUN mg/dL 50*  --  64*   CREATININE mg/dL 4.13*  --  4.95*   CALCIUM mg/dL 8.6  --  8.6   EGFR mL/min/1.73 19.3*  --  15.5*   BILIRUBIN mg/dL 0.9  --  1.7*   ALK PHOS U/L 66  --  70   ALT (SGPT) U/L 11  --  12   AST (SGOT) U/L 12  --  12   GLUCOSE mg/dL 88  --  94       Radiology:   Imaging Results (Last 72 Hours)       Procedure Component Value Units Date/Time    US Renal Bilateral [[id number]] Collected: 11/25/24 0836     Updated: 11/25/24 0840    Narrative:      RENAL ULTRASOUND COMPLETE 11/25/2024 6:59 AM     REASON FOR EXAM: ACUTE KIDNEY INJURY       COMPARISON: None   "     TECHNIQUE: Multiple longitudinal and transverse realtime sonographic  images of the kidneys and urinary bladder are obtained.  Images and  report are stored in PACS per institutional and state regulations.     FINDINGS:     RIGHT KIDNEY: 10.9 cm. Normal in size, shape, contour and position. No  solid or cystic masses. The central echo complex is compact with no  evidence for hydronephrosis. No nephrolithiasis or abnormal perinephric  fluid collections . No hydroureter.     LEFT KIDNEY: 10.7 cm. Normal in size, shape, contour and position. No  solid or cystic masses. The central echo complex is compact with no  evidence for hydronephrosis. No nephrolithiasis or abnormal perinephric  fluid collections . No hydroureter.     PELVIS: The bladder is mildly distended with anechoic urine and  demonstrates no significant wall thickening or internal echogenicities.  There is no surrounding ascites.       Impression:      1. Unremarkable renal ultrasound.           This report was signed and finalized on 11/25/2024 8:36 AM by Dr. Juan Parrish MD.               Culture:  No results found for: \"BLOODCX\", \"URINECX\", \"WOUNDCX\", \"MRSACX\", \"RESPCX\", \"STOOLCX\"      Assessment    Acute kidney injury  Unknown baseline chronic kidney disease  Hypokalemia--improved  Metabolic acidosis  Hypertensive emergency  Polysubstance abuse  Anemia  Nonischemic cardiomyopathy  Chronic systolic congestive heart failure  Secondary hyperparathyroidism   Medical noncompliance    Plan:   OK to discontinue IV fluids  Renal US reviewed--unremarkable  Maintaining excellent urine output so far. Net negative fluid balance several liters since admission. No need for diuretics at this time but if urine output declines or if additional BP control is needed would not be opposed to starting a diuretic  Renal function starting to improve. No indication for dialysis currently. Will monitor closely for additional renal recovery but suspect patient has " fairly advanced CKD as his baseline.      Dario Fernandez, APRN  11/25/2024  09:08 CST

## 2024-11-25 NOTE — PLAN OF CARE
Goal Outcome Evaluation:  Plan of Care Reviewed With: patient        Progress: improving  Outcome Evaluation: A/O. NSR 80-90s. SBP goal<160. Hydralazine x1, cardene gtt restarted. RA. NPO since midnight for renal US today. Safety maintained       Problem: Adult Inpatient Plan of Care  Goal: Plan of Care Review  Outcome: Progressing  Flowsheets (Taken 11/25/2024 0537)  Progress: improving  Outcome Evaluation: A/O. NSR 80-90s. SBP goal<160. Hydralazine x1, cardene gtt restarted. RA. NPO since midnight for renal US today. Safety maintained  Plan of Care Reviewed With: patient  Goal: Patient-Specific Goal (Individualized)  Outcome: Progressing  Goal: Absence of Hospital-Acquired Illness or Injury  Outcome: Progressing  Intervention: Identify and Manage Fall Risk  Recent Flowsheet Documentation  Taken 11/25/2024 0500 by Anna Gates RN  Safety Promotion/Fall Prevention: safety round/check completed  Taken 11/25/2024 0400 by Anna Gates RN  Safety Promotion/Fall Prevention: safety round/check completed  Taken 11/25/2024 0300 by Anna Gates RN  Safety Promotion/Fall Prevention: safety round/check completed  Taken 11/25/2024 0200 by Anna Gates RN  Safety Promotion/Fall Prevention: safety round/check completed  Taken 11/25/2024 0100 by Anna Gates RN  Safety Promotion/Fall Prevention: safety round/check completed  Taken 11/25/2024 0000 by Anna Gates RN  Safety Promotion/Fall Prevention: safety round/check completed  Taken 11/24/2024 2300 by Anna Gates RN  Safety Promotion/Fall Prevention: safety round/check completed  Taken 11/24/2024 2200 by Anna Gates RN  Safety Promotion/Fall Prevention: safety round/check completed  Taken 11/24/2024 2100 by Anna Gates RN  Safety Promotion/Fall Prevention: safety round/check completed  Taken 11/24/2024 2000 by Anna Gates RN  Safety Promotion/Fall Prevention: safety round/check completed  Taken 11/24/2024 1900 by Rushing,  JULIANN Castillo  Safety Promotion/Fall Prevention: safety round/check completed  Intervention: Prevent Skin Injury  Recent Flowsheet Documentation  Taken 11/25/2024 0500 by Anna Gates RN  Body Position: position changed independently  Taken 11/25/2024 0400 by Anna Gates RN  Skin Protection:   incontinence pads utilized   silicone foam dressing in place  Taken 11/25/2024 0300 by Anna Gates RN  Body Position: position changed independently  Taken 11/25/2024 0100 by Anna Gates RN  Body Position: position changed independently  Taken 11/25/2024 0000 by Anna Gates RN  Skin Protection:   incontinence pads utilized   silicone foam dressing in place  Taken 11/24/2024 2300 by Anna Gates RN  Body Position: position changed independently  Taken 11/24/2024 2100 by Anna Gates RN  Body Position: position changed independently  Taken 11/24/2024 2000 by Anna Gates RN  Skin Protection:   incontinence pads utilized   silicone foam dressing in place  Taken 11/24/2024 1900 by Anna Gates RN  Body Position: position changed independently  Intervention: Prevent and Manage VTE (Venous Thromboembolism) Risk  Recent Flowsheet Documentation  Taken 11/24/2024 2000 by Anna Gates RN  VTE Prevention/Management: (see MAR) other (see comments)  Intervention: Prevent Infection  Recent Flowsheet Documentation  Taken 11/25/2024 0400 by Anna Gates RN  Infection Prevention: hand hygiene promoted  Taken 11/25/2024 0000 by Anna Gates RN  Infection Prevention: hand hygiene promoted  Taken 11/24/2024 2000 by Anna Gates RN  Infection Prevention: hand hygiene promoted  Goal: Optimal Comfort and Wellbeing  Outcome: Progressing  Intervention: Provide Person-Centered Care  Recent Flowsheet Documentation  Taken 11/25/2024 0400 by Anna Gates RN  Trust Relationship/Rapport: care explained  Taken 11/25/2024 0000 by Anna Gates RN  Trust Relationship/Rapport: care  explained  Taken 11/24/2024 2000 by Anna Gates RN  Trust Relationship/Rapport: care explained  Goal: Readiness for Transition of Care  Outcome: Progressing     Problem: Comorbidity Management  Goal: Maintenance of Heart Failure Symptom Control  Outcome: Progressing  Intervention: Maintain Heart Failure Management  Recent Flowsheet Documentation  Taken 11/25/2024 0400 by Anna Gates RN  Medication Review/Management: medications reviewed  Taken 11/25/2024 0000 by Anna Gates RN  Medication Review/Management: medications reviewed  Taken 11/24/2024 2000 by Anna Gates RN  Medication Review/Management: medications reviewed  Goal: Blood Pressure in Desired Range  Outcome: Progressing  Intervention: Maintain Blood Pressure Management  Recent Flowsheet Documentation  Taken 11/25/2024 0400 by Anna Gates RN  Medication Review/Management: medications reviewed  Taken 11/25/2024 0000 by Anna Gates RN  Medication Review/Management: medications reviewed  Taken 11/24/2024 2000 by Anna Gates RN  Medication Review/Management: medications reviewed

## 2024-11-25 NOTE — PROGRESS NOTES
[facility] Intensivist Services    Date of Admission: 11/24/2024  Date of Note: 11/25/24  Primary Care Physician: He Ortega MD   LOS: 1 day     History   Next of Kin:  Primary Emergency Contact: Jesika Webb   Code Status: Code Status and Medical Interventions: CPR (Attempt to Resuscitate); Full Support    He is a 29 y.o. male admitted 11/24/2024 with Hypertensive emergency who also  has a past medical history of Cardiomegaly, Congenital fusion of carpal bone, HTN (hypertension), and Seizure.    On his chart (which can contain diagnoses that are not current) shows he  has Essential hypertension; Tobacco use; Chronic systolic (congestive) heart failure; NICM (nonischemic cardiomyopathy); Seizure disorder; Acute on chronic renal insufficiency; Congestive heart failure; Cocaine abuse; Medically noncompliant; and Hypertensive emergency on their problem list..     The ICU team was asked to evaluate the patient for hypertensive emergency with renal dysfunction and congestive heart failure.  He was transferred to our facility from [facility].  He does have a history of cardiomegaly, hypertension, and seizures.  He tells me that he is not taken many of his medications in a few days as he has been nauseous.  He is a marijuana user, but was noted to be also positive for cocaine on outside urine drug screen.  Based on our medication dispense history, it appears he has not filled many of his medications with the exception of metoprolol in several months.  He has a history of poor dentition, myocardial infarction, chronic bronchitis, cardiomegaly, renal failure, substance abuse disorder, and seizure disorder.  He has been restarted on all of his home medication.  Cardiology has increased his hydralazine to 50 mg 3 times daily.    Interval history:  11/25: Patient remains on isosorbide 20 mg 3 times daily, amlodipine 5 mg daily, Entresto 49-51 twice daily, and metoprolol 100  mg daily.  Hydralazine has been increased to 50 mg 3 times daily.  He has no complaints for me this morning.  Latest echocardiogram from yesterday shows EF 31 to 35%, moderate to severely dilated left ventricle, along with moderate concentric LVH, mildly dilated left atrium, and no hemodynamically significant valvular disease.  Past Medical History     Active and Resolved Problems  Active Hospital Problems    Diagnosis  POA    **Hypertensive emergency [I16.1]  Unknown    Congestive heart failure [I50.9]  Yes    Cocaine abuse [F14.10]  Unknown    Medically noncompliant [Z91.199]  Not Applicable    Acute on chronic renal insufficiency [N28.9, N18.9]  Yes    Chronic systolic (congestive) heart failure [I50.22]  Yes    NICM (nonischemic cardiomyopathy) [I42.8]  Yes    Tobacco use [Z72.0]  Yes    Essential hypertension [I10]  Yes      Resolved Hospital Problems   No resolved problems to display.       Past Medical History:   Past Medical History:   Diagnosis Date    Cardiomegaly     Congenital fusion of carpal bone     HTN (hypertension)     Seizure        Prior Surgeries: He  has a past surgical history that includes Elbow surgery.    Past Surgical History:   Past Surgical History:   Procedure Laterality Date    ELBOW PROCEDURE         Social and Family History     Family History:  family history includes Heart disease in his mother; Hypertension in his mother; No Known Problems in his father.    Tobacco/Social History:  reports that he has been smoking cigarettes. He has never used smokeless tobacco. He reports current alcohol use. He reports current drug use. Drugs: Marijuana and Cocaine(coke).    Allergies     Allergies:   He is allergic to penicillins.    Labs   Basic Labs:  CBC:      Lab 11/25/24  0355 11/24/24  0543   WBC 7.42 7.55   HEMOGLOBIN 9.9* 10.0*   HEMATOCRIT 32.6* 31.6*   PLATELETS 253 230   NEUTROS ABS 5.09 5.32   IMMATURE GRANS (ABS) 0.02 0.01   LYMPHS ABS 1.59 1.68   MONOS ABS 0.44 0.35   EOS ABS 0.23  0.16   MCV 84.5 82.3       LIVER FUNCTION TESTS:      Lab 11/25/24  0355 11/24/24  0550   TOTAL PROTEIN 6.2 6.3   ALBUMIN 3.2* 3.2*   GLOBULIN 3.0 3.1   ALT (SGPT) 11 12   AST (SGOT) 12 12   BILIRUBIN 0.9 1.7*   ALK PHOS 66 70       ABG:      Lab 11/25/24  0355 11/24/24  0550   ANION GAP 14.0 17.0*       CMP:      Lab 11/25/24  0355 11/24/24  1621 11/24/24  0550   SODIUM 140  --  138   POTASSIUM 3.7 3.8 3.0*   CHLORIDE 106  --  100   BUN 50*  --  64*   CREATININE 4.13*  --  4.95*   CALCIUM 8.6  --  8.6   MAGNESIUM 1.8  --  1.8   PHOSPHORUS 3.1  --  3.8       Diabetic:      Inpatient Medications   Medications: Scheduled Meds:amLODIPine, 5 mg, Oral, Daily  Chlorhexidine Gluconate Cloth, 1 Application, Topical, Q24H  enoxaparin, 40 mg, Subcutaneous, Q24H  hydrALAZINE, 25 mg, Oral, TID  isosorbide dinitrate, 20 mg, Oral, TID  levETIRAcetam, 500 mg, Oral, Q12H  metoprolol succinate XL, 100 mg, Oral, Daily  mupirocin, 1 Application, Each Nare, BID  nicotine, 1 patch, Transdermal, Q24H  sacubitril-valsartan, 1 tablet, Oral, BID  senna-docusate sodium, 2 tablet, Oral, BID  sodium chloride, 10 mL, Intravenous, Q12H      Continuous Infusions:niCARdipine, 5-15 mg/hr, Last Rate: Stopped (11/25/24 0700)  sodium chloride, 75 mL/hr, Last Rate: 75 mL/hr (11/24/24 2119)      PRN Meds:.  acetaminophen **OR** acetaminophen    senna-docusate sodium **AND** polyethylene glycol **AND** bisacodyl **AND** bisacodyl    Calcium Replacement - Follow Nurse / BPA Driven Protocol    hydrALAZINE    LORazepam    Magnesium Standard Dose Replacement - Follow Nurse / BPA Driven Protocol    nitroglycerin    ondansetron ODT **OR** ondansetron    Phosphorus Replacement - Follow Nurse / BPA Driven Protocol    Potassium Replacement - Follow Nurse / BPA Driven Protocol    sodium chloride    sodium chloride    I have reviewed the patient's current medications.   Outpatient Medications     Current Outpatient Medications   Medication Instructions     "amLODIPine (NORVASC) 5 mg, Oral, Daily, for blood pressure    hydrALAZINE (APRESOLINE) 25 mg, Oral, 3 Times Daily    isosorbide dinitrate (ISORDIL) 20 mg, Oral, 3 Times Daily    levETIRAcetam (KEPPRA) 500 mg, Oral, 2 Times Daily    metoprolol succinate XL (TOPROL-XL) 100 mg, Oral, Daily    Potassium 99 MG tablet 1 tablet, Oral, Daily    sacubitril-valsartan (Entresto) 49-51 MG tablet 1 tablet, Oral, 2 Times Daily       Current Antibiotics   This patient does not have an active medication from one of the medication groupers.    Exam   Vent settings for last 24 hours:       Vital signs for last 24 hours:  Temp:  [98.3 °F (36.8 °C)-98.7 °F (37.1 °C)] 98.7 °F (37.1 °C)  Heart Rate:  [] 88  Resp:  [13-24] 13  BP: (127-170)/() 144/93    Vitals: His  height is 188 cm (74\") and weight is 98.5 kg (217 lb 2.5 oz). His oral temperature is 98.7 °F (37.1 °C). His blood pressure is 144/93 and his pulse is 88. His respiration is 13 and oxygen saturation is 97%.     GENERAL:  Alert, awake and oriented x 3, pleasant, no acute distress.   SKIN:  Warm, dry, capillary refill < 2 seconds  EYES:  Pupils equal, round and reactive to light.  EOMs intact.    HEAD:  Normocephalic, atraumatic  NECK:  Supple   RESP:  Lungs clear to auscultation. Good airflow. Normal respiratory effort.   CARDIAC:  Regular rate and rhythm. Normal S1,S2. No edema  GI:  Soft, nontender, normal bowel sounds  MSK:  Normal muscle bulk, tone  NEUROLOGICAL:  No focal neurological deficits. Moves all extremities well. Alert and Oriented x 3.   PSYCHIATRIC:  Normal affect and mood.      Intake/Output   Intake/Output this shift:  No intake/output data recorded.    Intake/Output last 3 shifts:  I/O last 3 completed shifts:  In: 1907.9 [P.O.:480; I.V.:1427.9]  Out: 9100 [Urine:9100]    Results and Cultures Review     Result Review:  I have personally reviewed the results from the time of this admission to 11/25/2024 08:24 CST and agree with these findings:  [x] " " Laboratory list / accordion  []  Microbiology  [x]  Radiology  []  EKG/Telemetry   [x]  Cardiology/Vascular   []  Pathology  []  Old records  []  Other:  Most notable findings include: BNP 23,000 612, BUN 50, creatinine 4.13, uric acid 10.6, hemoglobin 9.9, hematocrit 32.6.  Echocardiogram showed EF 31 to 35%, findings consistent with dilated cardiomyopathy, and mildly dilated left atrial cavity.      Culture Data:   No results found for: \"BLOODCX\", \"URINECX\", \"WOUNDCX\", \"MRSACX\", \"RESPCX\", \"STOOLCX\"    Assessment/Plan   29-year-old male with a history of myocardial infarction, chronic bronchitis, cardiomegaly, renal failure, substance use disorder, and seizure disorder admitted with hypertensive emergency, renal dysfunction, and congestive heart failure.  He has been restarted on his home antihypertensive medications.  He remains off of the Cardene infusion at this time.    Acute hypertensive emergency secondary to cocaine use:   -He denies cocaine use.  Says someone \"might have mixed in with my marijuana\" but he does admit to previous methamphetamine use.  -Continue supportive care.  -Patient did require cardene infusion early this morning, but it has since been weaned off  -Remains on sosorbide 20 mg 3 times daily, amlodipine 5 mg daily, Entresto 49-51 twice daily, and metoprolol 100 mg daily.  Hydralazine has been increased to 50 mg 3 times daily.  -Ativan ordered prn for chest pain given cocaine abuse.  -Counseled patient strongly regarding cessation of street drugs.  See also #4.  -Poor prognosis given patient's noncompliance with medications, lack of prescription refills, changing dosages without requested changes from physician, noncompliance with tobacco and drug cessation.    History of Seizures  -No seizure activity noted at our facility   -Patient says he is supposed to be on Keppra but ran out.  -Home dose Keppra of  500 mg BID has been restarted    Tobacco abuse   -Recommend tobacco cessation.  " Continue nicotine patch q24h    Cocaine, marijuana and history of methamphetamine abuse   -Recommend cessation.    Chest pain  -Completely resolved.  -Heparin drip was discontinued.  -Ativan available prn      Troponin elevation:  -In setting of cocaine use and hypertension as well as medication noncompliance.  Mild chronic noted from previous labs  -Echocardiogram results as mentioned above  -Cardiology following, we appreciate their recommendations     Chronic Systolic Congestive Heart failure  -EF 31 to 35% as noted on echo from yesterday  -Patient also found to have findings consistent with dilated cardiomyopathy  -Continue hydralazine, isosorbide, Entresto, Toprol-XL, and amlodipine as scheduled  -Patient may need LifeVest on discharge, defer to cardiology  -Patient will likely need outpatient follow-up with cardiology    Acute kidney injury  -In setting of substance abuse and hypertensive emergency  -Unclear as to patient's baseline renal function as we did not have previous labs here within the last 4 years on this patient  -BUN 50, creatinine 4.13 and improving  -Patient continues to have excellent urine output  -Renal ultrasound was unremarkable  -Nephrology following, we appreciate their recommendations  -Patient may benefit from from outpatient nephrology referral once discharged    VTE Prophylaxis:    Pharmacologic VTE prophylaxis orders are present.      Active VTE Prophylaxis:  Pharmacologic:        Start     Dose Route Frequency Stop    11/24/24 0900  Enoxaparin Sodium (LOVENOX) syringe 40 mg         40 mg SC Every 24 Hours --                  Select Mechanical VTE Prophylaxis if Desired & Appropriate    Code Status and Medical Interventions: CPR (Attempt to Resuscitate); Full Support   Ordered at: 11/24/24 0646     Level Of Support Discussed With:    Patient     Code Status (Patient has no pulse and is not breathing):    CPR (Attempt to Resuscitate)     Medical Interventions (Patient has pulse or is  breathing):    Full Support         Part of this note may be an electronic transcription/translation of spoken language to printed text using the Dragon Dictation System    Electronically signed by Manjit Walker PA-C on 11/25/2024 at 08:24 CST

## 2024-11-25 NOTE — PLAN OF CARE
Goal Outcome Evaluation:  Plan of Care Reviewed With: patient        Progress: improving     Cardene remains off today. No complaints. Waiting on an open bed for transfer out of ICU.       Problem: Adult Inpatient Plan of Care  Goal: Plan of Care Review  Outcome: Progressing  Flowsheets (Taken 11/25/2024 1616)  Progress: improving  Plan of Care Reviewed With: patient  Goal: Patient-Specific Goal (Individualized)  Outcome: Progressing  Goal: Absence of Hospital-Acquired Illness or Injury  Outcome: Progressing  Intervention: Identify and Manage Fall Risk  Recent Flowsheet Documentation  Taken 11/25/2024 1600 by Jackelyn Aponte RN  Safety Promotion/Fall Prevention: safety round/check completed  Taken 11/25/2024 1500 by Jackelyn Aponte RN  Safety Promotion/Fall Prevention: (Simultaneous filing. User may be unaware of other data.) safety round/check completed  Taken 11/25/2024 1300 by Jackelyn Aponte RN  Safety Promotion/Fall Prevention: safety round/check completed  Taken 11/25/2024 1200 by Jackelyn Aponte RN  Safety Promotion/Fall Prevention: safety round/check completed  Intervention: Prevent Skin Injury  Recent Flowsheet Documentation  Taken 11/25/2024 1600 by Jackelyn Aponte RN  Body Position: position changed independently  Taken 11/25/2024 1300 by Jackelyn Aponte RN  Body Position: position changed independently  Taken 11/25/2024 1142 by Jackelyn Aponte RN  Skin Protection: incontinence pads utilized  Taken 11/25/2024 0800 by Jackelyn Aponte RN  Skin Protection: incontinence pads utilized  Goal: Optimal Comfort and Wellbeing  Outcome: Progressing  Goal: Readiness for Transition of Care  Outcome: Progressing     Problem: Comorbidity Management  Goal: Maintenance of Heart Failure Symptom Control  Outcome: Progressing  Goal: Blood Pressure in Desired Range  Outcome: Progressing

## 2024-11-25 NOTE — PROGRESS NOTES
"ID: YESICA Webb is a 29 y.o. male with a PMH of CHF due to NICM, hypertension, CKD who presents for hypertensive emergency in the context of substance use.    S: Today, the patient is alert and reports feeling well.  He states that he had presented with shortness of breath and chest discomfort that have resolved and have not recurred since transfer to [facility].  He does mention that prior to hospitalization he had not been taking his medications for a little bit due to previously having some nausea and difficulty keeping the pills down and then not getting back on them.  When he was taking his medications, he states that he was only taking his Entresto, isosorbide, and hydralazine once daily.  He reports feeling quite well this morning without any significant symptoms.  He denies chest pain, shortness breath, palpitations, lightheadedness, nausea, or other acute concerns.  He was more hypertensive again overnight and was started on nicardipine infusion that has been weaned off as of this morning.    Medications: Reviewed    Review of Systems: All pertinent negative and positives as noted above.  Otherwise, all systems reviewed and found to be negative.    Telemetry: Reviewed by me and shows sinus rhythm with 1 6 beat run of NSVT overnight but no other significant arrhythmias.    O:  BP (P) 143/95   Pulse (P) 85   Temp (P) 97.9 °F (36.6 °C) (Oral)   Resp (P) 18   Ht 188 cm (74\")   Wt 98.5 kg (217 lb 2.5 oz)   SpO2 (P) 98%   BMI 27.88 kg/m²   Temp:  [97.9 °F (36.6 °C)-98.7 °F (37.1 °C)] (P) 97.9 °F (36.6 °C)  Heart Rate:  [] (P) 85  Resp:  [13-24] (P) 18  BP: (127-170)/() (P) 143/95    Gen: male lying in bed in NAD  HEENT: NC/AT, sclera anicteric  Neck: Supple, JVD elevated  CV: RRR, no m/r/g  Pulm: CTAB, NWOB  Abd: Soft, ND/NT  Ext: WWP, no edema  Neuro: Aox3, grossly nonfocal  Psych: Appropriate mood and affect    Diagnostic Data:    CMP   CMP          11/24/2024    05:50 11/24/2024    " 16:21 11/25/2024    03:55   CMP   Glucose 94   88    BUN 64   50    Creatinine 4.95   4.13    EGFR 15.5   19.3    Sodium 138   140    Potassium 3.0  3.8  3.7    Chloride 100   106    Calcium 8.6   8.6    Total Protein 6.3   6.2    Albumin 3.2   3.2    Globulin 3.1   3.0    Total Bilirubin 1.7   0.9    Alkaline Phosphatase 70   66    AST (SGOT) 12   12    ALT (SGPT) 12   11    Albumin/Globulin Ratio 1.0   1.1    BUN/Creatinine Ratio 12.9   12.1    Anion Gap 17.0   14.0      CBC   CBC          11/24/2024    05:43 11/25/2024    03:55   CBC   WBC 7.55  7.42    RBC 3.84  3.86    Hemoglobin 10.0  9.9    Hematocrit 31.6  32.6    MCV 82.3  84.5    MCH 26.0  25.6    MCHC 31.6  30.4    RDW 17.1  17.1    Platelets 230  253      Magnesium   Magnesium   Date/Time Value Ref Range Status   11/25/2024 0355 1.8 1.6 - 2.6 mg/dL Final     Results for orders placed during the hospital encounter of 11/24/24    Adult Transthoracic Echo Complete W/ Cont if Necessary Per Protocol    Interpretation Summary    Left ventricular systolic function is severely decreased. Left ventricular ejection fraction appears to be 31 - 35%.    The left ventricular cavity is moderate to severely dilated. Left ventricular wall thickness is consistent with moderate concentric hypertrophy.    The findings are consistent with dilated cardiomyopathy.    Mildly reduced right ventricular systolic function noted.    The left atrial cavity is mildly dilated.    No hemodynamically significant valvular disease.      ASSESSMENT/PLAN:    1.  Hypertensive emergency  2.  Essential hypertension  3.  Chronic systolic CHF  4.  Nonischemic cardiomyopathy  5.  Acute on chronic renal insufficiency-improving  6.  Cocaine use  7.  Medical nonadherence    - Increase hydralazine to 50 mg 3 times daily  - Continue Isordil 20 mg 3 times daily but if BP remains elevated consider increasing to 30 mg 3 times daily later today  - Continue Entresto 49/51 mg twice daily  - Continue  Toprol- mg daily  - Continue amlodipine 5 mg daily  - He does appear to have elevated JVD and may benefit from some diuresis although it may be worthwhile to allow a little more time for renal function to stabilize.  Nephrology also consulted, their input is appreciated.  - Encourage abstinence from substance use.  - Patient counseled on the role of BID and 3 times daily medications.  He states that he was unaware of meds being prescribed multiple times a day and would be adherent if continued on medications with multiple doses per day.  - Consider LifeVest on discharge

## 2024-11-25 NOTE — PAYOR COMM NOTE
"REF:  [id number]    [facility]  [place],   [phone] OR  FAX  [phone]       YESICA Webb (29 y.o. Male)       Date of Birth   1995    Social Security Number       Address   [address] [place] [address]    Home Phone   [phone]    MRN   [id number]       [place]   Christianity    Marital Status   Single                            Admission Date   11/24/24    Admission Type   Urgent    Admitting Provider   Mik Jaimes MD    Attending Provider   Mik Jaimes MD    Department, Room/Bed   [facility] INTENSIVE CARE, I002/1       Discharge Date       Discharge Disposition       Discharge Destination                                 Attending Provider: Mik Jaimes MD    Allergies: Penicillins    Isolation: None   Infection: None   Code Status: CPR    Ht: 188 cm (74\")   Wt: 98.5 kg (217 lb 2.5 oz)    Admission Cmt: None   Principal Problem: Hypertensive emergency [I16.1]                   Active Insurance as of 11/24/2024       Primary Coverage       Payor Plan Insurance Group Employer/Plan Group    [employer] MEDICAID [facility] MEDICAID [id number]       Payor Plan Address Payor Plan Phone Number Payor Plan Fax Number Effective Dates    [address] [address] [phone]  5/1/2018 - None Entered    [facility] [id number]         Subscriber Name Subscriber Birth Date Member ID       YESICA WEBB 1995 [id number]                     Emergency Contacts        (Rel.) Home Phone Work Phone Mobile Phone    Jesika Webb (Mother) -- -- [phone]          Vic Trejo, RN   Registered Nurse  Nursing     Plan of Care      Signed     Date of Service: 11/24/24 1659  Creation Time: 11/24/24 1659     Signed         Goal Outcome Evaluation:   Pt A&OX4. Follows commands. NSR 80's-90's. SBP maintained <160. RA. Voiding in urinal. Potassium checked around 1620 d/t replacement and pt voiding 4L of urine out - recheck 3.8. NS infusing @75. NPO at midnight for " renal US.                     Anna Gates, RN   Registered Nurse  Nursing     Plan of Care      Signed     Date of Service: 11/25/24 0543  Creation Time: 11/25/24 0543     Signed         Goal Outcome Evaluation:  Plan of Care Reviewed With: patient  Progress: improving  Outcome Evaluation: A/O. NSR 80-90s. SBP goal<160. Hydralazine x1, cardene gtt restarted. RA. NPO since midnight for renal US today. Safety maintained   Problem: Adult Inpatient Plan of Care  Goal: Plan of Care Review  Outcome: Progressing  Flowsheets (Taken 11/25/2024 0537)  Progress: improving  Outcome Evaluation: A/O. NSR 80-90s. SBP goal<160. Hydralazine x1, cardene gtt restarted. RA. NPO since midnight for renal US today. Safety maintained  Plan of Care Reviewed With: patient                   History & Physical        Mik Jaimes MD at 11/24/24 0528               [facility] Intensivist Services    Date of Admission: 11/24/2024  Date of Note: 11/24/24  Primary Care Physician: He Ortega MD   LOS: 0 days     History   Next of Kin:  Primary Emergency Contact: Jesika Webb   Code Status: Full Code.    He is a 29 y.o. male cocaine, marijuana user transferred from [facility] and admitted 11/24/2024 with hypertensive emergency witnessed by renal dysfunction and congestive heart failure who also  has a past medical history of Cardiomegaly, HTN (hypertension), and Seizure.  A quick review of his medications shows that the only drug that he currently has sufficient supplies of his metoprolol.  He has not filled any of his other medications since July.  He arrived late last night by private vehicle to the outside hospital complaining of chest pain, shortness of breath, headache and nausea and vomiting.  Blood pressure was 203/158.  They reported that he was taking Entresto, Keppra, hydralazine, isosorbide, metoprolol, amlodipine but I cannot see any recent fills of any of those except  metoprolol.  He has a history of poor dentition, myocardial infarction, chronic bronchitis, cardiomegaly, renal failure, substance abuse disorder, seizure disorder.  He smokes a half a pack per day.  Denied alcohol use but he also denied cocaine use.  At the outside facility they started ondansetron, Bumex 1 mg x 2 doses along with 325 mg of aspirin, started on a heparin drip.  Patient was placed on a Cardene drip and transferred to us for further care and management.    Chest x-ray from the outside facility is noted as significant perihilar and hilar congestion with cephalization, prominent right horizontal fissure, no obvious nodules or consolidation.  No pleural effusions noted.  Cardiothoracic ratio increased 1 84-3 15 double heart density splaying of the kathy.  Urine drug screen positive for cocaine--patient denies.    On his chart (which can contain diagnoses that are not current) shows he  has Essential hypertension; Tobacco use; Chronic systolic (congestive) heart failure; NICM (nonischemic cardiomyopathy); Seizure disorder; Acute on chronic renal insufficiency; and Congestive heart failure on their problem list..     He takes Potassium, amLODIPine, hydrALAZINE, isosorbide dinitrate, levETIRAcetam, metoprolol succinate XL, and sacubitril-valsartan at home based on most current med reconciliation.   Complete list is below.  Reviewing his fill chart shows noncompliance but the patient says he has filled the more often than we think.  He says he only ran out of his sacubitril/valsartan a week ago even though he had a 90-day filled 7/11/2024 and should have run out in October.  He says that he has been taking the half dose which she had a few extras of.  He also says that Keppra was refilled sometime in October by the report but he thinks he refilled it after that but he admits he has not taken it for a long period of time.    Our 2020 emergency room notes state that the patient has a history of of congenital  "congestive heart failure--but he says he does not know of any congenital issue other than \"either me or my brother had a hole in her heart when we were born.\"  No surgery, no syndrome he is aware of.  He thinks he might have missed spoken and someone transcribed his congestive heart failure for congenital., in 2022 he had loss of consciousness generalized tonic clonic seizure after drinking 3-5 shots of hard liquor the night before.  He also mentioned a previous seizure previously: He said that he been using methamphetamine and watching his friends play video games with that initial event.  He also mentioned a type II non-ST elevation MI, chronic marijuana use, acute methamphetamine use, he was started on Keppra.  Did not follow-up.  There CT scan showed a asymmetric volume loss with adjacent hypodensities in the right temporal lobe and left occipital encephalomalacia.    11/24/24: The ICU team was asked to evaluate the patient for admission.  Patient has proteinuria, cold hematuria, creatinine 5.29, albumin 2.9, bilirubin 1.62, proBNP 18,000 1817, arterial blood gas 7.4 60/30/66 likely consistent with a venous sample.  Bicarb 21.  Patient hyperventilated prior to needlestick most likely explanation for his mild respiratory alkalosis.  In their records they note chronic smoking, chronic alcohol use.     Past Medical History     Active and Resolved Problems  Active Hospital Problems    Diagnosis  POA    **Congestive heart failure [I50.9]  Yes      Resolved Hospital Problems   No resolved problems to display.       Past Medical History:   Past Medical History:   Diagnosis Date    Cardiomegaly     Congenital fusion of carpal bone     HTN (hypertension)     Seizure        Prior Surgeries: He  has a past surgical history that includes Elbow surgery.    Past Surgical History:   Past Surgical History:   Procedure Laterality Date    ELBOW PROCEDURE         Social and Family History     Family History:  family history " "includes Heart disease in his mother; Hypertension in his mother; No Known Problems in his father.    Tobacco/Social History:  reports that he has been smoking cigarettes. He has never used smokeless tobacco. He reports current alcohol use. He reports that he does not use drugs.    Allergies     Allergies:   He is allergic to penicillins.    Labs   Labs ordered.    Inpatient Medications   Scheduled Meds:[START ON 11/25/2024] Chlorhexidine Gluconate Cloth, 1 Application, Topical, Q24H  mupirocin, 1 Application, Each Nare, BID  niCARdipine in NaCl, , ,   nicotine, 1 patch, Transdermal, Q24H  potassium chloride ER, 40 mEq, Oral, Q4H  senna-docusate sodium, 2 tablet, Oral, BID  sodium chloride, 10 mL, Intravenous, Q12H      Continuous Infusions:niCARdipine, 5-15 mg/hr, Last Rate: Stopped (11/24/24 0514)      PRN Meds:.  niCARdipine in NaCl    I have reviewed the patient's current medications.   Outpatient Medications     Current Outpatient Medications   Medication Instructions    amLODIPine (NORVASC) 5 mg, Oral, Daily, for blood pressure    hydrALAZINE (APRESOLINE) 25 mg, Oral, 3 Times Daily    isosorbide dinitrate (ISORDIL) 20 mg, Oral, 3 Times Daily    levETIRAcetam (KEPPRA) 500 MG tablet Every 12 Hours Scheduled    metoprolol succinate XL (TOPROL-XL) 100 mg, Oral, Daily    Potassium 99 MG tablet Oral    sacubitril-valsartan (Entresto) 49-51 MG tablet 1 tablet, Oral, 2 Times Daily       Current Antibiotics   This patient does not have an active medication from one of the medication groupers.    Exam   Vent settings for last 24 hours:       Vital signs for last 24 hours:  Temp:  [98.3 °F (36.8 °C)] 98.3 °F (36.8 °C)  Heart Rate:  [97] 97  Resp:  [18] 18  BP: (122)/(65) 122/65    Vitals: His  height is 188 cm (74\") and weight is 96.8 kg (213 lb 6.5 oz). His oral temperature is 98.3 °F (36.8 °C). His blood pressure is 122/65 and his pulse is 97. His respiration is 18 and oxygen saturation is 98%.     PHYSICAL FINDINGS: " SEE VITALS ABOVE  GENERAL APPEARANCE: ° Awake. ° Alert. ° Oriented to time, place, and person. ° Well developed. ° Well nourished. ° In no acute distress. ° Not ill-appearing. ° No jaundice.  HEAD: Injuries: No evidence of a head injury. ° Appearance: Head normocephalic.  NECK: No masses ° Thyroid: ° Not diffusely enlarged.  EYES: General/bilateral: Extraocular Movements: ° Normal. Pupils: ° PERRLA. Sclera: ° Showed no icterus.  THROAT: No stridor auscultated/heard.  EARS: Hearing: ° No hearing loss noted.  NOSE: General/bilateral: External Deformities: ° No external nose deformities.  LYMPH NODES: No cervical or generalized adenopathy.  CHEST: ° Visual inspection revealed no abnormalities.  LUNGS: ° Respiration rhythm and depth was normal. ° Normal breath sounds sounds. ° No wheezing was heard bilaterally. ° No rhonchi were heard. ° No rales/crackles were heard. ° No clubbing noted.  CARDIOVASCULAR: JVD not increased. Heart Rate And Rhythm: Normal. ° Heart Sounds: No S3/ S4 heard. ° Murmurs: No murmurs were heard. ° Edema: No edema noted.  BACK: No obvious deformity noted.  ABDOMEN: Visual Inspection: Abdomen was not distended. Auscultation: ° Abdominal auscultation revealed no abnormalities. ° Bowel sounds were normal. ° Palpation: ° Abdomen was soft. ° No abdominal tenderness. ° No mass was palpated in the abdomen. ° Soft. °Liver: Not visibly enlarged. Spleen: Not visibly enlarged.  MUSCULOSKELETAL SYSTEM : General/bilateral: ° Normal movement of all extremities.  FEET/EXTREMITIES: General/bilateral: ° No visible swelling of the feet.  NEUROLOGICAL: Nonfocal. °   SKIN: ° General appearance was normal. ° No new rashes noted.    Intake/Output   Intake/Output this shift:  I/O this shift:  In: -   Out: 425 [Urine:425]    Intake/Output last 3 shifts:  No intake/output data recorded.    Results and Cultures Review     Result Review:  I have personally reviewed the results from the time of this admission to 11/24/2024  "05:28 CST and agree with these findings:  [x]  Laboratory list / accordion  [x]  Microbiology  [x]  Radiology  [x]  EKG/Telemetry   [x]  Cardiology/Vascular   []  Pathology  []  Old records  []  Other:  Most notable findings include: Reviewed labs from outside hospital.  No micro  X-ray consistent with heart failure  Ordered echo  Bedside EKG shows sinus    Assessment/Plan       Acute hypertensive emergency secondary to cocaine use: He denies cocaine use.  Says someone \"might have mixed in with my marijuana\" but he does admit to previous methamphetamine use.  Supportive care.  Cardene drip off.  Restart patient's home medications this morning.  Use Ativan for chest pain given cocaine abuse.  Use Ativan for hypertension given cocaine abuse.  Counseled patient strongly regarding cessation of street drugs.  See also #4.  Poor prognosis given patient's noncompliance with medications, lack of prescription refills, changing dosages without requested changes from physician, noncompliance with tobacco and drug cessation.  Seizure: Patient says he is supposed to be on Keppra but ran out.  Restart home medication.  Tobaccoism: Recommend tobacco cessation.  Provide patient with nicotine patch.  Cocaine, marijuana and history of methamphetamine abuse: Recommend cessation.  Chest pain: Completely resolved.  Treat with Ativan.  Discontinue heparin drip.    Troponin elevation: Due to cocaine use.  Due to hypertension.  Due to noncompliance.  Chronic.  Get echocardiogram.  Consult cardiology for further recommendations as I suspect his EF is going to be low and he may be a candidate for further intervention.    Troponin  Diagnosis:  Non myocardial ischemia related troponin elevation:  During the normal course of the workup prior to admission to the hospital the patient underwent a troponin test.  Troponin is an excellent marker for acute ischemia of the myocardium but it is not diagnostic of a myocardial infarction and does not " risk stratify patient's towards future risk of myocardial infarctions either.  Troponin elevation needs to be looked at in the appropriate clinical context.  Cocaine use definitely can cause an elevated troponin as can severe hypertension from noncompliance.  His renal failure will also augment any troponin elevation.    Excellent review: https://www.the-hospitalist.org/hospitalist/article/864177/cardiology/ogbj-xnnzqcdd-emyzsyevz-[email or web address]-myocardial-infarction  here is a brief summary.   A type 1 MI, STEMI and non-STEMI, are supported by the presence of an acute coronary thrombus or a strong suspicion of such if angiography is unavailable.  Most patients with type 1 non STEMI or STEMI come with symptoms consistent with that diagnosis.    Type 2: Supply demand mismatch has heterogeneous underlying causes including acute blood loss, acute hypoxia, shock, coronary vasospasm, bradyarrhythmias along with renal dysfunction etc..  Type 2 myocardial infarctions are often related to a fixed obstructive coronary disease but underlying CAD may not be present.  If patients have symptoms of acute myocardial ischemia such as typical chest pain, new ischemic EKG changes, pathological Q-waves, imaging evidence of loss of viable myocardium with a significant reversible perfusion defect on nuclear imaging or wall motion abnormality on echocardiography then further evaluation including risk stratification and or angiography may be beneficial.  When there is only an elevated troponin including a rise in troponin without new symptoms or EKG/imaging evidence it is usually most important to document a non MI troponin elevation due to nonischemic mechanisms of myocardial injury.    Type 3: Sudden cardiac death, type 4 MI due to PCI, type 5 MI due to CABG are not often seen on our service.  They usually have a clear cause.    For non myocardial ischemia related troponin elevation there are a multitude of causes.     Acute (on chronic)  systolic or diastolic heart failure: Usually due to acute ventricular wall stretch/strain. Troponin elevations tend to be mild, with more indolent (or even flat) troponin trajectories.   Pericarditis and myocarditis: Due to direct injury from myocardial inflammation.   Cardiopulmonary resuscitation (CPR): Due to physical injury to the heart from mechanical chest compressions and from electrical shocks of external defibrillation.   Stress-induced (takotsubo) cardiomyopathy: Stress-induced release of neurohormonal factors and catecholamines that cause direct myocyte injury and transient dilatation of the ventricle.   Acute pulmonary embolism: Result of acute right ventricular wall stretch/strain, not from myocardial ischemia.   Sepsis without shock: Direct toxicity of circulating cytokines to cardiac myocytes. In the absence of evidence of shock and symptoms/signs of myocardial ischemia, do not document type 2 MI.   Renal failure (acute kidney injury or chronic kidney disease): Multiple etiologies, but at least partially related to reduced renal clearance of troponin. In general, renal failure in the absence of symptoms/signs of ischemia is best classified as a non-MI troponin elevation. ESRD patients who present with volume overload due to missed dialysis also typically have a non-MI troponin elevation.   Stroke/intracranial hemorrhage: Mechanisms of myocardial injury and troponin elevation are incompletely understood, but may include catecholamine surges that injure the heart.    Unfortunately all patients are at risk of myocardial infarction at any time.  Many of the same risk factors including obesity, sleep apnea, high blood pressure, diabetes, hyperlipidemia among others put them at risk for myocardial infarction at any time.  At this time after evaluating the patient I believe that they are diagnosed with Non myocardial ischemia related troponin elevation:  There is no risk stratification or preventative  measures in these patients other than treating their other underlying poor health conditions that may decrease the risk of myocardial infarction in the future.    VTE Prophylaxis: Discontinue heparin drip.  Start Lovenox.  40 mg twice daily for DVT prophylaxis.  No need to fully anticoagulate at this time.    Full code.    Total critical care time: 65 minutes.  Reviewing external charts.  Discussing with consultants.  Placing orders.  Young male with very chronic illness.  Called in by nurse practitioner.  Appropriately.  This is a very sick young man.    Due to a high probability of clinically significant, life threatening deterioration, the patient required my highest level of preparedness to intervene emergently and I personally spent this critical care time directly and personally managing the patient.     This critical care time included obtaining a history; examining the patient; pulse oximetry; ordering and review of studies; arranging urgent treatment with development of a management plan; evaluation of patient's response to treatment; frequent reassessment; and, discussions with other providers.    This critical care time was performed to assess and manage the high probability of imminent, life-threatening deterioration that could result in multi-organ failure. It was exclusive of separately billable procedures and treating other patients and teaching time.    Please see MDM section and the rest of the note for further information on patient assessment and treatment.    Part of this note may be an electronic transcription/translation of spoken language to printed text using the Dragon Dictation System    Electronically signed by Mik Jaimes MD on 11/24/2024 at 05:28 CST  Pulmonary, Critical Care  [facility]  Please call with any questions  (Cell [phone])                        Electronically signed by Mik Jaimes MD at 11/24/24 1322       Vital Signs (last 2 days)       Date/Time  Temp Temp src Pulse Resp BP Patient Position SpO2    11/25/24 0636 -- -- -- -- 140/86 -- --    11/25/24 0630 -- -- 88 -- 133/79 -- 97    11/25/24 0615 -- -- 90 -- 140/86 -- 98    11/25/24 0600 -- -- 88 -- 142/78 -- 97    11/25/24 0545 -- -- 88 -- 146/80 -- 98    11/25/24 0530 -- -- 90 -- 145/78 -- 96    11/25/24 0515 -- -- 89 -- 149/82 -- 96    11/25/24 0500 -- -- 91 -- 157/87 -- 97    11/25/24 0456 -- -- -- -- 170/103 -- --    11/25/24 0400 98.7 (37.1) Oral 90 13 169/102 -- 96    11/25/24 0300 -- -- 93 -- 151/98 -- 97    11/25/24 0245 -- -- 89 -- 152/93 -- 99    11/25/24 0205 -- -- 95 -- 156/98 -- 97    11/25/24 0130 -- -- 92 -- 162/108 -- 97    11/25/24 0115 -- -- 92 -- 137/100 -- 97    11/25/24 0100 -- -- 92 -- 138/99 -- 97    11/25/24 0045 -- -- 91 -- 150/100 -- 96    11/25/24 0030 -- -- 91 -- 143/102 -- 96    11/25/24 0015 -- -- 90 -- 147/105 -- 98    11/24/24 2315 98.5 (36.9) Oral 90 13 150/96 Lying 98    11/24/24 2245 -- -- 90 -- 138/86 -- 99    11/24/24 2215 -- -- 96 -- 147/96 -- 94    11/24/24 2200 -- -- 91 -- 146/97 -- 94    11/24/24 2145 -- -- 93 -- 153/93 -- 95    11/24/24 2120 -- -- 88 -- 159/97 -- 97    11/24/24 2030 -- -- 100 -- 147/99 -- 89    11/24/24 2015 -- -- 85 -- 145/94 -- 92    11/24/24 2000 98.5 (36.9) Oral 89 15 137/89 Lying 93    11/24/24 1945 -- -- 90 -- 138/86 -- 95    11/24/24 1915 -- -- 87 -- 144/98 -- 98    11/24/24 1900 -- -- 77 -- 137/94 -- 95    11/24/24 1845 -- -- 92 -- 127/84 -- 95    11/24/24 1830 -- -- 90 -- 131/87 -- 96    11/24/24 1815 -- -- 92 -- 148/96 -- 95    11/24/24 1730 -- -- 91 -- 128/74 -- 96    11/24/24 1715 -- -- 89 -- 132/80 -- 96    11/24/24 1700 -- -- 87 20 131/83 Lying 97    11/24/24 1645 -- -- 83 -- 143/102 -- 99    11/24/24 1630 -- -- 88 -- 156/103 -- 99    11/24/24 1600 -- -- 84 -- 151/101 -- 95    11/24/24 1545 98.7 (37.1) Oral 88 -- 141/90 -- 97    11/24/24 1530 -- -- 88 -- 148/95 -- 98    11/24/24 1515 -- -- 91 -- 152/93 -- 94    11/24/24 1500 -- -- 91 19  149/93 Lying 94    11/24/24 1415 -- -- 88 -- 148/87 -- 91    11/24/24 1400 -- -- 93 21 152/96 Lying 90    11/24/24 1345 -- -- 89 -- 150/90 -- 94    11/24/24 1330 -- -- 88 -- 153/90 -- 96    11/24/24 1315 -- -- 86 -- 153/98 -- 98    11/24/24 1300 -- -- 91 -- 152/98 -- 93    11/24/24 1215 -- -- 93 -- 157/97 -- 95    11/24/24 1200 98.3 (36.8) Oral 92 24 150/97 Lying 94    11/24/24 1145 -- -- 88 -- 150/98 -- 95    11/24/24 1130 -- -- 90 -- 152/97 -- 95    11/24/24 1115 -- -- 88 -- 146/95 -- 92    11/24/24 1100 -- -- 92 19 142/90 Lying 95    11/24/24 1045 -- -- 91 -- 144/96 -- 93    11/24/24 1030 -- -- 92 -- 144/83 -- 93    11/24/24 1015 -- -- 94 -- 144/85 -- 93    11/24/24 1000 -- -- 92 20 135/89 Lying 94    11/24/24 0945 -- -- 97 -- 152/92 -- 91    11/24/24 0930 -- -- 97 -- 150/85 -- 91    11/24/24 0915 -- -- 84 -- 155/85 -- 91    11/24/24 0900 -- -- 106 21 133/71 Lying 91    11/24/24 0845 -- -- 100 -- 144/80 -- 94    11/24/24 0815 98.3 (36.8) Oral 100 21 143/97 Lying 96    11/24/24 0800 -- -- 102 -- 168/92 -- 98    11/24/24 0745 -- -- 103 -- 159/94 -- 92    11/24/24 0730 -- -- 99 -- 160/92 -- 95    11/24/24 0715 -- -- 102 -- 174/106 -- 92    11/24/24 0700 -- -- 99 -- 163/93 -- 95    11/24/24 0615 -- -- 98 -- 143/84 -- 93    11/24/24 0600 -- -- 93 -- 154/85 -- 95    11/24/24 0545 -- -- 95 -- 152/89 -- 96    11/24/24 0530 -- -- 93 -- 150/80 -- 95    11/24/24 0515 -- -- 95 -- 138/80 -- 98    11/24/24 0513 -- -- -- -- 122/65 -- --    11/24/24 0500 98.3 (36.8) Oral 97 18 122/65 -- 98    11/24/24 0459 -- -- 97 -- -- -- 98          Oxygen Therapy (last 2 days)       Date/Time SpO2 Device (Oxygen Therapy) Flow (L/min) (Oxygen Therapy) Oxygen Concentration (%) ETCO2 (mmHg)    11/25/24 0630 97 -- -- -- --    11/25/24 0615 98 -- -- -- --    11/25/24 0600 97 -- -- -- --    11/25/24 0545 98 -- -- -- --    11/25/24 0530 96 -- -- -- --    11/25/24 0515 96 -- -- -- --    11/25/24 0500 97 -- -- -- --    11/25/24 0400 96 room air -- --  --    11/25/24 0300 97 -- -- -- --    11/25/24 0245 99 -- -- -- --    11/25/24 0205 97 -- -- -- --    11/25/24 0130 97 -- -- -- --    11/25/24 0115 97 -- -- -- --    11/25/24 0100 97 -- -- -- --    11/25/24 0045 96 -- -- -- --    11/25/24 0030 96 -- -- -- --    11/25/24 0015 98 -- -- -- --    11/25/24 0000 -- room air -- -- --    11/24/24 2315 98 room air -- -- --    11/24/24 2245 99 -- -- -- --    11/24/24 2215 94 -- -- -- --    11/24/24 2200 94 -- -- -- --    11/24/24 2145 95 -- -- -- --    11/24/24 2120 97 -- -- -- --    11/24/24 2030 89 -- -- -- --    11/24/24 2015 92 -- -- -- --    11/24/24 2000 93 room air -- -- --    11/24/24 1945 95 -- -- -- --    11/24/24 1915 98 -- -- -- --    11/24/24 1900 95 -- -- -- --    11/24/24 1845 95 -- -- -- --    11/24/24 1830 96 -- -- -- --    11/24/24 1815 95 -- -- -- --    11/24/24 1730 96 -- -- -- --    11/24/24 1715 96 -- -- -- --    11/24/24 1700 97 room air -- -- --    11/24/24 1645 99 -- -- -- --    11/24/24 1630 99 room air -- -- --    11/24/24 1600 95 -- -- -- --    11/24/24 1545 97 -- -- -- --    11/24/24 1530 98 -- -- -- --    11/24/24 1515 94 -- -- -- --    11/24/24 1500 94 room air -- -- --    11/24/24 1415 91 -- -- -- --    11/24/24 1400 90 room air -- -- --    11/24/24 1345 94 -- -- -- --    11/24/24 1330 96 -- -- -- --    11/24/24 1315 98 -- -- -- --    11/24/24 1300 93 -- -- -- --    11/24/24 1230 -- room air -- -- --    11/24/24 1215 95 -- -- -- --    11/24/24 1200 94 room air -- -- --    11/24/24 1145 95 -- -- -- --    11/24/24 1130 95 -- -- -- --    11/24/24 1115 92 -- -- -- --    11/24/24 1100 95 room air -- -- --    11/24/24 1045 93 -- -- -- --    11/24/24 1030 93 -- -- -- --    11/24/24 1015 93 -- -- -- --    11/24/24 1000 94 room air -- -- --    11/24/24 0945 91 -- -- -- --    11/24/24 0930 91 -- -- -- --    11/24/24 0915 91 -- -- -- --    11/24/24 0900 91 room air -- -- --    11/24/24 0845 94 -- -- -- --    11/24/24 0830 -- room air -- -- --    11/24/24  0815 96 room air -- -- --    11/24/24 0800 98 -- -- -- --    11/24/24 0745 92 -- -- -- --    11/24/24 0730 95 -- -- -- --    11/24/24 0715 92 -- -- -- --    11/24/24 0700 95 -- -- -- --    11/24/24 0615 93 -- -- -- --    11/24/24 0600 95 -- -- -- --    11/24/24 0545 96 -- -- -- --    11/24/24 0530 95 -- -- -- --    11/24/24 0515 98 -- -- -- --    11/24/24 0500 98 room air -- -- --    11/24/24 0459 98 -- -- -- --          Intake & Output (last 2 days)         11/23 0701 11/24 0700 11/24 0701 11/25 0700    P.O.  480    I.V. (mL/kg)  1427.9 (14.5)    Total Intake(mL/kg)  1907.9 (19.4)    Urine (mL/kg/hr) 1125 7675 (3.2)    Total Output 1125 7675    Net -9095 -7664.2                Facility-Administered Medications as of 11/25/2024   Medication Dose Route Frequency Provider Last Rate Last Admin    acetaminophen (TYLENOL) tablet 650 mg  650 mg Oral Q4H PRN Mik Jaimes MD        Or    acetaminophen (TYLENOL) suppository 650 mg  650 mg Rectal Q6H PRN Mik Jaimes MD        amLODIPine (NORVASC) tablet 5 mg  5 mg Oral Daily Mik Jaimes MD   5 mg at 11/24/24 0834    sennosides-docusate (PERICOLACE) 8.6-50 MG per tablet 2 tablet  2 tablet Oral BID Mik Jaimes MD   2 tablet at 11/24/24 0834    And    polyethylene glycol (MIRALAX) packet 17 g  17 g Oral Daily PRN Mik Jaimes MD        And    bisacodyl (DULCOLAX) EC tablet 5 mg  5 mg Oral Daily PRN Mik Jaimes MD        And    bisacodyl (DULCOLAX) suppository 10 mg  10 mg Rectal Daily PRN Mik Jaimes MD        Calcium Replacement - Follow Nurse / BPA Driven Protocol   Not Applicable PRN Mik Jaimes MD        [COMPLETED] Chlorhexidine Gluconate Cloth 2 % pads 1 Application  1 Application Topical Once Mik Jaimes MD   1 Application at 11/24/24 0625    Chlorhexidine Gluconate Cloth 2 % pads 1 Application  1 Application Topical Q24H Mik Jaimes MD   1 Application at 11/25/24 0455    Enoxaparin Sodium (LOVENOX) syringe 40 mg  40 mg  Subcutaneous Q24H Mik Jaimes MD   40 mg at 11/24/24 0834    hydrALAZINE (APRESOLINE) injection 10 mg  10 mg Intravenous Q4H PRN Nallely Fuller APRN   10 mg at 11/25/24 0141    hydrALAZINE (APRESOLINE) tablet 25 mg  25 mg Oral TID Mik Jaimes MD   25 mg at 11/24/24 2034    isosorbide dinitrate (ISORDIL) tablet 20 mg  20 mg Oral TID Mik Jaimes MD   20 mg at 11/24/24 2037    levETIRAcetam (KEPPRA) tablet 500 mg  500 mg Oral Q12H Mik Jaimes MD   500 mg at 11/24/24 2034    LORazepam (ATIVAN) injection 2 mg  2 mg Intravenous Q2H PRN Mik Jaimes MD   2 mg at 11/24/24 0625    Magnesium Standard Dose Replacement - Follow Nurse / BPA Driven Protocol   Not Applicable PRN Mik Jaimes MD        metoprolol succinate XL (TOPROL-XL) 24 hr tablet 100 mg  100 mg Oral Daily Mik Jaimes MD   100 mg at 11/24/24 0834    mupirocin (BACTROBAN) 2 % nasal ointment 1 Application  1 Application Each Nare BID Mik Jaimes MD   1 Application at 11/24/24 2034    niCARdipine (CARDENE) 20 mg in 200 mL NS infusion  5-15 mg/hr Intravenous Titrated Nallely Fuller APRN 50 mL/hr at 11/25/24 0636 5 mg/hr at 11/25/24 0636    nicotine (NICODERM CQ) 14 MG/24HR patch 1 patch  1 patch Transdermal Q24H Mik Jaimes MD        nitroglycerin (NITROSTAT) SL tablet 0.4 mg  0.4 mg Sublingual Q5 Min PRN Mik Jaimes MD        ondansetron ODT (ZOFRAN-ODT) disintegrating tablet 4 mg  4 mg Oral Q6H PRN Mik Jaimes MD        Or    ondansetron (ZOFRAN) injection 4 mg  4 mg Intravenous Q6H PRN Mik Jaimes MD        Phosphorus Replacement - Follow Nurse / BPA Driven Protocol   Not Applicable PRN Mik Jaimes MD        [COMPLETED] potassium chloride (KLOR-CON M20) CR tablet 40 mEq  40 mEq Oral Q4H Dada Santos MD   40 mEq at 11/24/24 1610    Potassium Replacement - Follow Nurse / BPA Driven Protocol   Not Applicable PRN Mik Jaimes MD        sacubitril-valsartan (ENTRESTO) 49-51 MG tablet 1  tablet  1 tablet Oral BID Mik Jaimes MD   1 tablet at 11/24/24 2034    sodium chloride 0.9 % flush 10 mL  10 mL Intravenous Q12H Mik Jaimes MD   10 mL at 11/24/24 2038    sodium chloride 0.9 % flush 10 mL  10 mL Intravenous PRN Mik Jaimes MD        sodium chloride 0.9 % infusion 40 mL  40 mL Intravenous PRN Mik Jaimes MD        sodium chloride 0.9 % infusion  75 mL/hr Intravenous Continuous Adrian Pelaez, APRN 75 mL/hr at 11/24/24 2119 75 mL/hr at 11/24/24 2119     Orders (last 48 hrs)        Start     Ordered    11/25/24 0600  CBC & Differential  Every Third Day,   Status:  Canceled      Comments: Discontinue After Heparin Stopped      11/24/24 0501    11/25/24 0600  Calcium, Ionized  Morning Draw         11/24/24 0540    11/25/24 0600  CBC & Differential  Morning Draw         11/24/24 0540    11/25/24 0600  Comprehensive Metabolic Panel  Morning Draw         11/24/24 0540    11/25/24 0600  Magnesium  Morning Draw         11/24/24 0540    11/25/24 0600  Phosphorus  Morning Draw         11/24/24 0540    11/25/24 0600  PTH, Intact  Morning Draw         11/24/24 1632    11/25/24 0600  Vitamin D,25-Hydroxy  Morning Draw         11/24/24 1632    11/25/24 0600  CBC Auto Differential  PROCEDURE ONCE         11/24/24 2201    11/25/24 0400  Chlorhexidine Gluconate Cloth 2 % pads 1 Application  Every 24 Hours         11/24/24 0549    11/25/24 0001  NPO Diet NPO Type: Strict NPO  Diet Effective Midnight         11/24/24 1707    11/24/24 2250  hydrALAZINE (APRESOLINE) injection 10 mg  Every 4 Hours PRN         11/24/24 2250    11/24/24 2133  Urinalysis, Microscopic Only - Urine, Clean Catch  Once         11/24/24 2132    11/24/24 1932  Potassium  Timed,   Status:  Canceled         11/24/24 0632    11/24/24 1633  Strict Intake & Output  Every Shift       11/24/24 1632    11/24/24 1633  Daily Weights  Daily       11/24/24 1632    11/24/24 1633  Urinalysis With Microscopic If Indicated (No Culture) -  "Urine, Clean Catch  Once         11/24/24 1632    11/24/24 1633  Creatinine Urine Random (kidney function) GFR component - Urine, Clean Catch  Once         11/24/24 1632    11/24/24 1633  Sodium, Urine, Random - Urine, Clean Catch  Once         11/24/24 1632    11/24/24 1633  Protein, Urine, Random - Urine, Clean Catch  Once         11/24/24 1632    11/24/24 1633  Urea Nitrogen, Urine - Urine, Clean Catch  Once         11/24/24 1632    11/24/24 1633  US Renal Bilateral  1 Time Imaging         11/24/24 1632    11/24/24 1610  Potassium  STAT         11/24/24 1609    11/24/24 1000  sodium chloride 0.9 % infusion  Continuous         11/24/24 0904    11/24/24 0900  sodium chloride 0.9 % flush 10 mL  Every 12 Hours Scheduled         11/24/24 0549    11/24/24 0900  sennosides-docusate (PERICOLACE) 8.6-50 MG per tablet 2 tablet  2 Times Daily        Placed in \"And\" Linked Group    11/24/24 0549    11/24/24 0900  amLODIPine (NORVASC) tablet 5 mg  Daily         11/24/24 0638    11/24/24 0900  hydrALAZINE (APRESOLINE) tablet 25 mg  3 Times Daily         11/24/24 0638    11/24/24 0900  isosorbide dinitrate (ISORDIL) tablet 20 mg  3 Times Daily         11/24/24 0638    11/24/24 0900  levETIRAcetam (KEPPRA) tablet 500 mg  Every 12 Hours Scheduled        Note to Pharmacy: For tube route administration disperse crushed tablets in 10 mL of water, shake for 5 minutes to dissolve, and administer immediately via enteral feeding tube.    11/24/24 0638    11/24/24 0900  metoprolol succinate XL (TOPROL-XL) 24 hr tablet 100 mg  Daily         11/24/24 0638    11/24/24 0900  sacubitril-valsartan (ENTRESTO) 49-51 MG tablet 1 tablet  2 Times Daily         11/24/24 0638    11/24/24 0900  Enoxaparin Sodium (LOVENOX) syringe 40 mg  Every 24 Hours         11/24/24 0646    11/24/24 0730  potassium chloride (KLOR-CON M20) CR tablet 40 mEq  Every 4 Hours         11/24/24 0632    11/24/24 0700  Adult Transthoracic Echo Complete W/ Cont if Necessary " "Per Protocol  Once        Comments: CHF in 28 YO Drug user, Positive COCAINE and troponins.    11/24/24 0549    11/24/24 0700  Inpatient Cardiology Consult  Once        Specialty:  Cardiology  Provider:  Bryan Maciel,     11/24/24 0549    11/24/24 0700  Tobacco Cessation Education  Once         11/24/24 0549    11/24/24 0645  mupirocin (BACTROBAN) 2 % nasal ointment 1 Application  2 Times Daily         11/24/24 0549    11/24/24 0645  Chlorhexidine Gluconate Cloth 2 % pads 1 Application  Once         11/24/24 0549    11/24/24 0645  nicotine (NICODERM CQ) 14 MG/24HR patch 1 patch  Every 24 Hours Scheduled         11/24/24 0549    11/24/24 0643  Code Status and Medical Interventions: CPR (Attempt to Resuscitate); Full Support  Continuous         11/24/24 0646    11/24/24 0600  aPTT  Every 6 Hours,   Status:  Canceled      Comments: During Infusion, Once 2 Consecutive Therapeutic Levels Are Drawn, Change to Every Morning      11/24/24 0501    11/24/24 0600  heparin 09692 units/250 mL (100 units/mL) in 0.45 % NaCl infusion  Titrated,   Status:  Discontinued         11/24/24 0501    11/24/24 0600  niCARdipine (CARDENE) 20 mg in 200 mL NS infusion  Titrated         11/24/24 0501    11/24/24 0600  Vital Signs Every Hour and Per Hospital Policy Based on Patient Condition  Every Hour       11/24/24 0549    11/24/24 0600  Intake & Output  Every Hour       11/24/24 0549    11/24/24 0550  Daily Weights  Daily       11/24/24 0549    11/24/24 0548  ondansetron ODT (ZOFRAN-ODT) disintegrating tablet 4 mg  Every 6 Hours PRN        Placed in \"Or\" Linked Group    11/24/24 0549    11/24/24 0548  ondansetron (ZOFRAN) injection 4 mg  Every 6 Hours PRN        Placed in \"Or\" Linked Group    11/24/24 0549    11/24/24 0548  POC Glucose Once  Once        Comments: On Arrival to Unit. If Greater Than 140, Call Admitting Provider for Orders      11/24/24 0549    11/24/24 0544  acetaminophen (TYLENOL) tablet 650 mg  Every 4 Hours " "PRN        Placed in \"Or\" Linked Group    11/24/24 0549    11/24/24 0544  acetaminophen (TYLENOL) suppository 650 mg  Every 6 Hours PRN        Placed in \"Or\" Linked Group    11/24/24 0549    11/24/24 0544  Potassium Replacement - Follow Nurse / BPA Driven Protocol  As Needed         11/24/24 0549    11/24/24 0544  Magnesium Standard Dose Replacement - Follow Nurse / BPA Driven Protocol  As Needed         11/24/24 0549    11/24/24 0544  Phosphorus Replacement - Follow Nurse / BPA Driven Protocol  As Needed         11/24/24 0549    11/24/24 0544  Calcium Replacement - Follow Nurse / BPA Driven Protocol  As Needed         11/24/24 0549    11/24/24 0542  Diet: Cardiac, Renal; Healthy Heart (2-3 Na+); Low Potassium, Low Sodium (2-3g), Low Phosphorus; Texture: Regular (IDDSI 7); Fluid Consistency: Thin (IDDSI 0)  Diet Effective Now,   Status:  Canceled         11/24/24 0549    11/24/24 0542  Inpatient Nephrology Consult  Once        Specialty:  Nephrology  Provider:  Rangel Vigil MD    11/24/24 0549    11/24/24 0541  Continuous Cardiac Monitoring  Continuous        Comments: Follow Standing Orders As Outlined in Process Instructions (Open Order Report to View Full Instructions)    11/24/24 0549    11/24/24 0541  Maintain IV Access  Continuous,   Status:  Canceled         11/24/24 0549    11/24/24 0541  Telemetry - Place Orders & Notify Provider of Results When Patient Experiences Acute Chest Pain, Dysrhythmia or Respiratory Distress  Continuous        Comments: Open Order Report to View Parameters Requiring Provider Notification    11/24/24 0549    11/24/24 0541  Continuous Pulse Oximetry  Continuous         11/24/24 0549    11/24/24 0541  Height & Weight  Once         11/24/24 0549    11/24/24 0541  Oral Care - Patient Not on NPPV & Not Intubated  Every Shift       11/24/24 0549    11/24/24 0541  Target Arousal Level RASS 0 to -2  Continuous         11/24/24 0549    11/24/24 0541  Use Mobility Guidelines " "for Advancement of Activity  Continuous         11/24/24 0549    11/24/24 0541  Insert Peripheral IV  Once         11/24/24 0549    11/24/24 0541  Saline Lock & Maintain IV Access  Continuous         11/24/24 0549    11/24/24 0540  nitroglycerin (NITROSTAT) SL tablet 0.4 mg  Every 5 Minutes PRN         11/24/24 0549    11/24/24 0540  sodium chloride 0.9 % flush 10 mL  As Needed         11/24/24 0549    11/24/24 0540  sodium chloride 0.9 % infusion 40 mL  As Needed         11/24/24 0549    11/24/24 0540  polyethylene glycol (MIRALAX) packet 17 g  Daily PRN        Placed in \"And\" Linked Group    11/24/24 0549    11/24/24 0540  bisacodyl (DULCOLAX) EC tablet 5 mg  Daily PRN        Placed in \"And\" Linked Group    11/24/24 0549    11/24/24 0540  bisacodyl (DULCOLAX) suppository 10 mg  Daily PRN        Placed in \"And\" Linked Group    11/24/24 0549    11/24/24 0539  LORazepam (ATIVAN) injection 2 mg  Every 2 Hours PRN         11/24/24 0540    11/24/24 0538  BNP  STAT         11/24/24 0540    11/24/24 0537  Comprehensive Metabolic Panel  STAT         11/24/24 0540    11/24/24 0537  Magnesium  STAT         11/24/24 0540    11/24/24 0537  Phosphorus  STAT         11/24/24 0540    11/24/24 0537  Lactic Acid, Plasma  STAT         11/24/24 0540    11/24/24 0502  Inpatient Admission  Once         11/24/24 0502    11/24/24 0455  Initiate & Follow Heparin Protocol  Continuous,   Status:  Canceled         11/24/24 0501    11/24/24 0455  Heparin Nomogram / Protocol Adjustment  Once,   Status:  Canceled         11/24/24 0501    11/24/24 0455  Protime-INR  STAT        Comments: Prior to Initial Heparin Bolus      11/24/24 0501    11/24/24 0455  aPTT  STAT        Comments: Prior to Initial Heparin Bolus      11/24/24 0501    11/24/24 0455  CBC & Differential  STAT        Comments: Prior to Initial Heparin Bolus      11/24/24 0501    11/24/24 0455  Notify Provider Before Increasing Rate Above 2000 units/hr  Continuous        Comments: " Open Order Report to View Parameters Requiring Provider Notification    11/24/24 0501 11/24/24 0455  Notify Provider if Any Bleeding Occurs (Thomas or Occult)  Continuous        Comments: Open Order Report to View Parameters Requiring Provider Notification    11/24/24 0501 11/24/24 0455  CBC Auto Differential  PROCEDURE ONCE        Comments: Prior to Initial Heparin Bolus      11/24/24 0501    Unscheduled  aPTT  As Needed       11/24/24 0501    Unscheduled  RN To Release aPTT Order 6 Hours After Heparin Bolus & 6 Hours After Any Heparin Rate Change  As Needed       11/24/24 0501    Unscheduled  After 2 Consecutive Therapeutic aPTTs, Obtain aPTT Daily.  If a Rate Adjustment is Necessary, Resume Every 6 Hour aPTT Draws  As Needed       11/24/24 0501                     Consult Notes (last 48 hours)        Rangel Vigil MD at 11/24/24 1626        Consult Orders    1. Inpatient Nephrology Consult [[id number]] ordered by Mik Jaimes MD at 11/24/24 0549                 Nephrology ([facility]) Consult Note      Patient:  YESICA Webb  YOB: 1995  Date of Service: 11/24/2024  MRN: [id number]   Acct: [id number]   Primary Care Physician: He Ortega MD  Advance Directive:   Code Status and Medical Interventions: CPR (Attempt to Resuscitate); Full Support   Ordered at: 11/24/24 0646     Level Of Support Discussed With:    Patient     Code Status (Patient has no pulse and is not breathing):    CPR (Attempt to Resuscitate)     Medical Interventions (Patient has pulse or is breathing):    Full Support     Admit Date: 11/24/2024       Hospital Day: 0  Referring Provider: Mik Jaimes MD      Patient personally seen and examined.  Complete chart including Consults, Notes, Operative Reports, Labs, Cardiology, and Radiology studies reviewed as able.        Subjective:  YESICA Webb is a 29 y.o. male for whom we were consulted for evaluation and treatment of  acute kidney injury with hypertension.  Patient has been seen once in the office in 2020 and subsequent lost to follow-up and also did not show for repeat referral earlier this year.  He was admitted in transfer from [facility] for acute kidney injury with CHF and hypertension.  There is a history of noncompliance with medications.  Also, he has a history of drug usage including alcohol cocaine cocaine, marijuana and  methamphetamine as well.  There was a reported history of hematuria but this was not available as there are no urine studies at this facility.  Patient denies specific lites upon question including chest pain, shortness of air at rest, nausea or vomiting, dysuria, hematuria, hemoptysis or rash    Allergies:  Penicillins    Home Meds:  Medications Prior to Admission   Medication Sig Dispense Refill Last Dose/Taking    amLODIPine (NORVASC) 5 MG tablet Take 1 tablet by mouth Daily. for blood pressure 90 tablet 3     hydrALAZINE (APRESOLINE) 25 MG tablet Take 1 tablet by mouth 3 (Three) Times a Day. 270 tablet 3     isosorbide dinitrate (ISORDIL) 20 MG tablet Take 1 tablet by mouth 3 (Three) Times a Day. 270 tablet 3     levETIRAcetam (KEPPRA) 500 MG tablet Every 12 (Twelve) Hours.       metoprolol succinate XL (TOPROL-XL) 100 MG 24 hr tablet Take 1 tablet by mouth Daily. 90 tablet 3     Potassium 99 MG tablet Take  by mouth.       sacubitril-valsartan (Entresto) 49-51 MG tablet Take 1 tablet by mouth 2 (Two) Times a Day. 180 tablet 3        Medicines:  Current Facility-Administered Medications   Medication Dose Route Frequency Provider Last Rate Last Admin    acetaminophen (TYLENOL) tablet 650 mg  650 mg Oral Q4H PRN Mik Jaimes MD        Or    acetaminophen (TYLENOL) suppository 650 mg  650 mg Rectal Q6H PRN Mik Jaimes MD        amLODIPine (NORVASC) tablet 5 mg  5 mg Oral Daily Mik Jaimes MD   5 mg at 11/24/24 0834    sennosides-docusate (PERICOLACE) 8.6-50 MG per  tablet 2 tablet  2 tablet Oral BID Mik Jaimes MD   2 tablet at 11/24/24 0834    And    polyethylene glycol (MIRALAX) packet 17 g  17 g Oral Daily PRN Mik Jaimes MD        And    bisacodyl (DULCOLAX) EC tablet 5 mg  5 mg Oral Daily PRN Mik Jaimes MD        And    bisacodyl (DULCOLAX) suppository 10 mg  10 mg Rectal Daily PRN Mik Jaimes MD        Calcium Replacement - Follow Nurse / BPA Driven Protocol   Not Applicable PRN Mik Jaimes MD        [START ON 11/25/2024] Chlorhexidine Gluconate Cloth 2 % pads 1 Application  1 Application Topical Q24H Mik Jaimes MD        Enoxaparin Sodium (LOVENOX) syringe 40 mg  40 mg Subcutaneous Q24H Mik Jaimes MD   40 mg at 11/24/24 0834    hydrALAZINE (APRESOLINE) tablet 25 mg  25 mg Oral TID Mik Jaimes MD   25 mg at 11/24/24 1610    isosorbide dinitrate (ISORDIL) tablet 20 mg  20 mg Oral TID iMk Jaimes MD   20 mg at 11/24/24 1610    levETIRAcetam (KEPPRA) tablet 500 mg  500 mg Oral Q12H Mik Jaimes MD   500 mg at 11/24/24 0833    LORazepam (ATIVAN) injection 2 mg  2 mg Intravenous Q2H PRN Mik Jaimes MD   2 mg at 11/24/24 0625    Magnesium Standard Dose Replacement - Follow Nurse / BPA Driven Protocol   Not Applicable PRN Mik Jaimes MD        metoprolol succinate XL (TOPROL-XL) 24 hr tablet 100 mg  100 mg Oral Daily Mik Jaimes MD   100 mg at 11/24/24 0834    mupirocin (BACTROBAN) 2 % nasal ointment 1 Application  1 Application Each Nare BID Mik Jaimes MD   1 Application at 11/24/24 0625    niCARdipine (CARDENE) 20 mg in 200 mL NS infusion  5-15 mg/hr Intravenous Titrated Nallely Fuller APRN   Held at 11/24/24 0513    nicotine (NICODERM CQ) 14 MG/24HR patch 1 patch  1 patch Transdermal Q24H Mik Jaimes MD        nitroglycerin (NITROSTAT) SL tablet 0.4 mg  0.4 mg Sublingual Q5 Min PRN Mik Jaimes MD        ondansetron ODT (ZOFRAN-ODT) disintegrating tablet 4 mg  4 mg Oral Q6H PRN Mik Jaimes  MD KAVON        Or    ondansetron (ZOFRAN) injection 4 mg  4 mg Intravenous Q6H PRN Mik Jaimes MD        Phosphorus Replacement - Follow Nurse / BPA Driven Protocol   Not Applicable PRN Mik Jaimes MD        Potassium Replacement - Follow Nurse / BPA Driven Protocol   Not Applicable PRN Mik Jaimes MD        sacubitril-valsartan (ENTRESTO) 49-51 MG tablet 1 tablet  1 tablet Oral BID Mik Jaimes MD   1 tablet at 11/24/24 0833    sodium chloride 0.9 % flush 10 mL  10 mL Intravenous Q12H Mik Jaimes MD   10 mL at 11/24/24 0834    sodium chloride 0.9 % flush 10 mL  10 mL Intravenous PRN Mik Jaimes MD        sodium chloride 0.9 % infusion 40 mL  40 mL Intravenous PRN Mik Jaimes MD        sodium chloride 0.9 % infusion  75 mL/hr Intravenous Continuous Adrian Pelaez APRN 75 mL/hr at 11/24/24 1001 75 mL/hr at 11/24/24 1001       Past Medical History:  Past Medical History:   Diagnosis Date    Cardiomegaly     Congenital fusion of carpal bone     HTN (hypertension)     Seizure        Past Surgical History:  Past Surgical History:   Procedure Laterality Date    ELBOW PROCEDURE         Family History  Family History   Problem Relation Age of Onset    Hypertension Mother     Heart disease Mother     No Known Problems Father        Social History  Social History     Socioeconomic History    Marital status: Single   Tobacco Use    Smoking status: Some Days     Current packs/day: 0.50     Types: Cigarettes    Smokeless tobacco: Never   Vaping Use    Vaping status: Never Used   Substance and Sexual Activity    Alcohol use: Yes    Drug use: Yes     Types: Marijuana, Cocaine(coke)    Sexual activity: Defer         Review of Systems:  History obtained from chart review and the patient  General ROS: No fever or chills  Respiratory ROS: No cough, shortness of breath, wheezing  Cardiovascular ROS: No chest pain or palpitations  Gastrointestinal ROS: No abdominal pain or melena  Genito-Urinary ROS:  No dysuria or hematuria  Psych ROS: No anxiety and depression  14 point ROS reviewed with the patient and negative except as noted above and in the HPI unless unable to obtain.      Objective:  Patient Vitals for the past 24 hrs:   BP Temp Temp src Pulse Resp SpO2 Height Weight   11/24/24 1545 141/90 -- -- 88 -- 97 % -- --   11/24/24 1530 148/95 -- -- 88 -- 98 % -- --   11/24/24 1515 152/93 -- -- 91 -- 94 % -- --   11/24/24 1500 149/93 -- -- 91 19 94 % -- --   11/24/24 1415 148/87 -- -- 88 -- 91 % -- --   11/24/24 1400 152/96 -- -- 93 21 90 % -- --   11/24/24 1345 150/90 -- -- 89 -- 94 % -- --   11/24/24 1330 153/90 -- -- 88 -- 96 % -- --   11/24/24 1315 153/98 -- -- 86 -- 98 % -- --   11/24/24 1300 152/98 -- -- 91 -- 93 % -- --   11/24/24 1215 157/97 -- -- 93 -- 95 % -- --   11/24/24 1200 150/97 98.3 °F (36.8 °C) Oral 92 24 94 % -- --   11/24/24 1145 150/98 -- -- 88 -- 95 % -- --   11/24/24 1130 152/97 -- -- 90 -- 95 % -- --   11/24/24 1115 146/95 -- -- 88 -- 92 % -- --   11/24/24 1100 142/90 -- -- 92 19 95 % -- --   11/24/24 1045 144/96 -- -- 91 -- 93 % -- --   11/24/24 1030 144/83 -- -- 92 -- 93 % -- --   11/24/24 1015 144/85 -- -- 94 -- 93 % -- --   11/24/24 1000 135/89 -- -- 92 20 94 % -- --   11/24/24 0945 152/92 -- -- 97 -- 91 % -- --   11/24/24 0930 150/85 -- -- 97 -- 91 % -- --   11/24/24 0915 155/85 -- -- 84 -- 91 % -- --   11/24/24 0900 133/71 -- -- 106 21 91 % -- --   11/24/24 0845 144/80 -- -- 100 -- 94 % -- --   11/24/24 0815 143/97 98.3 °F (36.8 °C) Oral 100 21 96 % -- --   11/24/24 0800 168/92 -- -- 102 -- 98 % -- --   11/24/24 0745 159/94 -- -- 103 -- 92 % -- --   11/24/24 0730 160/92 -- -- 99 -- 95 % -- --   11/24/24 0715 (!) 174/106 -- -- 102 -- 92 % -- --   11/24/24 0700 163/93 -- -- 99 -- 95 % -- --   11/24/24 0615 143/84 -- -- 98 -- 93 % -- --   11/24/24 0600 154/85 -- -- 93 -- 95 % -- --   11/24/24 0545 152/89 -- -- 95 -- 96 % -- --   11/24/24 0530 150/80 -- -- 93 -- 95 % -- --   11/24/24 0515  "138/80 -- -- 95 -- 98 % -- --   11/24/24 0513 122/65 -- -- -- -- -- -- --   11/24/24 0500 122/65 98.3 °F (36.8 °C) Oral 97 18 98 % -- --   11/24/24 0459 -- -- -- 97 -- 98 % 188 cm (74\") 96.8 kg (213 lb 6.5 oz)       Intake/Output Summary (Last 24 hours) at 11/24/2024 1626  Last data filed at 11/24/2024 1606  Gross per 24 hour   Intake 537.1 ml   Output 5850 ml   Net -5312.9 ml     General: awake/alert   Chest:  clear to auscultation bilaterally without respiratory distress  CVS: regular rate and rhythm  Abdominal: soft, nontender, positive bowel sounds  Extremities: no cyanosis or edema  Skin: warm and dry without rash      Labs:  Results from last 7 days   Lab Units 11/24/24  0543   WBC 10*3/mm3 7.55   HEMOGLOBIN g/dL 10.0*   HEMATOCRIT % 31.6*   PLATELETS 10*3/mm3 230         Results from last 7 days   Lab Units 11/24/24  0550   SODIUM mmol/L 138   POTASSIUM mmol/L 3.0*   CHLORIDE mmol/L 100   CO2 mmol/L 21.0*   BUN mg/dL 64*   CREATININE mg/dL 4.95*   CALCIUM mg/dL 8.6   EGFR mL/min/1.73 15.5*   BILIRUBIN mg/dL 1.7*   ALK PHOS U/L 70   ALT (SGPT) U/L 12   AST (SGOT) U/L 12   GLUCOSE mg/dL 94       Radiology:   Imaging Results (Last 72 Hours)       ** No results found for the last 72 hours. **            Culture:  No results found for: \"BLOODCX\", \"URINECX\", \"WOUNDCX\", \"MRSACX\", \"RESPCX\", \"STOOLCX\"      Assessment   Acute kidney injury  Accelerated hypertension  Hypokalemia  Metabolic acidosis  Polysubstance abuse  Anemia  Nonischemic cardiomyopathy  Chronic systolic congestive heart failure    Plan:  Discussed with patient, nursing  Workup reviewed today  Monitor labs  Cardiology evaluation reviewed  Encourage substance cessation  Monitor potassium levels and replace as needed/indicated cautiously given acute kidney injury with uncertain baseline as available data is 4 years old  Blood pressure medications adjusted  Wean Cardene drip as tolerated  Further testing ordered ongoing  Encouraged follow-up with " nephrology as outpatient if able to avoid dialysis for continued medical management      Thank you for the consult, we appreciate the opportunity to provide care to your patients.  Feel free to contact me if I can be of any further assistance.      Rangel Vigil MD  11/24/2024  16:26 CST      Electronically signed by Rangel Vigil MD at 11/24/24 1632       Ajitcarlos Bryan DO Jose at 11/24/24 1019            [facility] CONSULT NOTE    Patient Care Team:  He Ortega MD as PCP - General (Internal Medicine)  Mik Jaimes MD  REASON FOR REFERRAL: Heart failure      Subjective     Patient is a 29 y.o. male with a past medical history of dilated cardiomyopathy with an ejection fraction on echocardiogram earlier this year showing systolic function in the 20 to 25% range as well as drug abuse including cocaine and marijuana (and possibly methamphetamine) who was transferred to the ICU from [facility] overnight for hypertensive emergency, acute kidney injury and acute CHF exacerbation.  He also has a history of hypertension and seizure disorder.  He is noncompliant with his medications.  It is difficult to get a history out of the patient as he will only stay awake for a few seconds before falling back asleep.    Patient reportedly presented to the emergency department at outside facility complaining of chest pain, shortness of breath and nausea and vomiting.  Blood pressure was elevated with systolics greater than 200 mmHg.  He was placed on a heparin and Cardene drip and given Bumex IV before being transferred.    Dr. Jaimes was able to review his home medication list which should include potassium, amlodipine, hydralazine, Imdur, Keppra, metoprolol, and Entresto, which patient has not been taking any of.    This morning, lab work showed a BUN of 64 and a creatinine of 4.95.  Potassium low at 3.0.  Lactic acid normal.  BNP was elevated at 23,000    Cardiology  consulted to assist with care.    Review of Systems   Review of Systems   Constitutional:  Positive for fatigue.   HENT:  Negative for trouble swallowing.    Eyes:  Negative for pain.   Respiratory:  Positive for chest tightness and shortness of breath. Negative for cough and wheezing.    Cardiovascular:  Negative for chest pain, palpitations and leg swelling.   Gastrointestinal:  Positive for nausea and vomiting. Negative for abdominal pain.   Musculoskeletal:  Negative for arthralgias.   Skin:  Negative for color change.   Neurological:  Positive for weakness. Negative for dizziness.   Psychiatric/Behavioral:  Negative for confusion.        History  Past Medical History:   Diagnosis Date    Cardiomegaly     Congenital fusion of carpal bone     HTN (hypertension)     Seizure      Past Surgical History:   Procedure Laterality Date    ELBOW PROCEDURE       Family History   Problem Relation Age of Onset    Hypertension Mother     Heart disease Mother     No Known Problems Father      Social History     Tobacco Use    Smoking status: Some Days     Current packs/day: 0.50     Types: Cigarettes    Smokeless tobacco: Never   Vaping Use    Vaping status: Never Used   Substance Use Topics    Alcohol use: Yes    Drug use: Yes     Types: Marijuana, Cocaine(coke)     Medications Prior to Admission   Medication Sig Dispense Refill Last Dose/Taking    amLODIPine (NORVASC) 5 MG tablet Take 1 tablet by mouth Daily. for blood pressure 90 tablet 3     hydrALAZINE (APRESOLINE) 25 MG tablet Take 1 tablet by mouth 3 (Three) Times a Day. 270 tablet 3     isosorbide dinitrate (ISORDIL) 20 MG tablet Take 1 tablet by mouth 3 (Three) Times a Day. 270 tablet 3     levETIRAcetam (KEPPRA) 500 MG tablet Every 12 (Twelve) Hours.       metoprolol succinate XL (TOPROL-XL) 100 MG 24 hr tablet Take 1 tablet by mouth Daily. 90 tablet 3     Potassium 99 MG tablet Take  by mouth.       sacubitril-valsartan (Entresto) 49-51 MG tablet Take 1 tablet by  mouth 2 (Two) Times a Day. 180 tablet 3      Allergies:  Penicillins    Objective     Vital Signs  Temp:  [98.3 °F (36.8 °C)] 98.3 °F (36.8 °C)  Heart Rate:  [] 106  Resp:  [18-21] 21  BP: (122-174)/() 133/71    Physical Exam:  Vitals and nursing note reviewed.   Constitutional:       Appearance: Normal appearance. Well-developed. Acutely ill-appearing.   Eyes:      Extraocular Movements: Extraocular movements intact.      Pupils: Pupils are equal, round, and reactive to light.   HENT:      Head: Normocephalic and atraumatic.    Mouth/Throat:      Pharynx: Oropharynx is clear.   Neck:      Vascular: JVD normal.      Trachea: Trachea normal.   Pulmonary:      Effort: Pulmonary effort is normal.      Breath sounds: Normal breath sounds. No wheezing. No rhonchi. No rales.   Cardiovascular:      PMI at left midclavicular line. Normal rate. Regular rhythm. Normal S1. Normal S2.       Murmurs: There is no murmur.      No gallop.  No click. No rub.   Pulses:     Dorsalis pedis: 2+ bilaterally.     Posterior tibial: 2+ bilaterally.  Abdominal:      General: Bowel sounds are normal.      Palpations: Abdomen is soft.      Tenderness: There is no abdominal tenderness.   Musculoskeletal: Normal range of motion.      Cervical back: Normal range of motion and neck supple. Skin:     General: Skin is warm and dry.      Capillary Refill: Capillary refill takes less than 2 seconds.   Feet:      Right foot:      Skin integrity: Skin integrity normal.      Left foot:      Skin integrity: Skin integrity normal.   Neurological:      Mental Status: Oriented to person, place and time. Lethargic.      Sensory: Sensation is intact.      Motor: Motor function is intact.      Coordination: Coordination is intact.   Psychiatric:         Speech: Speech normal.         Behavior: Behavior is cooperative.       Results Review:   Lab Results (last 72 hours)       Procedure Component Value Units Date/Time    Comprehensive Metabolic Panel  [[id number]]  (Abnormal) Collected: 11/24/24 0550    Specimen: Blood Updated: 11/24/24 0630     Glucose 94 mg/dL      BUN 64 mg/dL      Creatinine 4.95 mg/dL      Sodium 138 mmol/L      Potassium 3.0 mmol/L      Chloride 100 mmol/L      CO2 21.0 mmol/L      Calcium 8.6 mg/dL      Total Protein 6.3 g/dL      Albumin 3.2 g/dL      ALT (SGPT) 12 U/L      AST (SGOT) 12 U/L      Alkaline Phosphatase 70 U/L      Total Bilirubin 1.7 mg/dL      Globulin 3.1 gm/dL      A/G Ratio 1.0 g/dL      BUN/Creatinine Ratio 12.9     Anion Gap 17.0 mmol/L      eGFR 15.5 mL/min/1.73     Narrative:      GFR Normal >60  Chronic Kidney Disease <60  Kidney Failure <15      Magnesium [[id number]]  (Normal) Collected: 11/24/24 0550    Specimen: Blood Updated: 11/24/24 0630     Magnesium 1.8 mg/dL     Phosphorus [[id number]]  (Normal) Collected: 11/24/24 0550    Specimen: Blood Updated: 11/24/24 0628     Phosphorus 3.8 mg/dL     BNP [[id number]]  (Abnormal) Collected: 11/24/24 0550    Specimen: Blood Updated: 11/24/24 0626     proBNP 23,612.0 pg/mL     Narrative:      This assay is used as an aid in the diagnosis of individuals suspected of having heart failure. It can be used as an aid in the diagnosis of acute decompensated heart failure (ADHF) in patients presenting with signs and symptoms of ADHF to the emergency department (ED). In addition, NT-proBNP of <300 pg/mL indicates ADHF is not likely.    Age Range Result Interpretation  NT-proBNP Concentration (pg/mL:      <50             Positive            >450                   Gray                 300-450                    Negative             <300    50-75           Positive            >900                  Gray                300-900                  Negative            <300      >75             Positive            >1800                  Gray                300-1800                  Negative            <300    Lactic Acid, Plasma [[id number]]  (Normal) Collected: 11/24/24 0550    Specimen:  Blood Updated: 11/24/24 0622     Lactate 0.9 mmol/L     Protime-INR [[id number]]  (Abnormal) Collected: 11/24/24 0543    Specimen: Blood Updated: 11/24/24 0620     Protime 16.8 Seconds      INR 1.30    aPTT [[id number]]  (Abnormal) Collected: 11/24/24 0543    Specimen: Blood Updated: 11/24/24 0620     PTT 62.9 seconds     CBC & Differential [[id number]]  (Abnormal) Collected: 11/24/24 0543    Specimen: Blood Updated: 11/24/24 0609    Narrative:      The following orders were created for panel order CBC & Differential.  Procedure                               Abnormality         Status                     ---------                               -----------         ------                     CBC Auto Differential[[id number]]        Abnormal            Final result                 Please view results for these tests on the individual orders.    CBC Auto Differential [[id number]]  (Abnormal) Collected: 11/24/24 0543    Specimen: Blood Updated: 11/24/24 0609     WBC 7.55 10*3/mm3      RBC 3.84 10*6/mm3      Hemoglobin 10.0 g/dL      Hematocrit 31.6 %      MCV 82.3 fL      MCH 26.0 pg      MCHC 31.6 g/dL      RDW 17.1 %      RDW-SD 50.8 fl      MPV 10.9 fL      Platelets 230 10*3/mm3      Neutrophil % 70.5 %      Lymphocyte % 22.3 %      Monocyte % 4.6 %      Eosinophil % 2.1 %      Basophil % 0.4 %      Immature Grans % 0.1 %      Neutrophils, Absolute 5.32 10*3/mm3      Lymphocytes, Absolute 1.68 10*3/mm3      Monocytes, Absolute 0.35 10*3/mm3      Eosinophils, Absolute 0.16 10*3/mm3      Basophils, Absolute 0.03 10*3/mm3      Immature Grans, Absolute 0.01 10*3/mm3      nRBC 0.0 /100 WBC             Results for orders placed in visit on 10/29/19    SCANNED - ECHOCARDIOGRAM      Assessment & Plan       Hypertensive emergency    Essential hypertension    Tobacco use    Chronic systolic (congestive) heart failure    NICM (nonischemic cardiomyopathy)    Acute on chronic renal insufficiency    Congestive heart failure    Cocaine  abuse    Medically noncompliant      Plan:    29-year-old male with history of nonischemic, dilated cardiomyopathy with an ejection fraction in the 20s on echocardiogram performed at outside facility earlier this year presents as a transfer for hypertensive emergency, acute kidney injury, altered mental status and drug abuse including marijuana, cocaine and possibly methamphetamine.  Vitals are currently stable.  Patient is very lethargic but does awaken and able to answer questions for a few seconds before falling back asleep.    Currently he is receiving amlodipine 5 daily, Lovenox prophylactic dose, hydralazine 25 3 times daily, Imdur 20 3 times daily, metoprolol 100 daily, Entresto 49/51 twice daily.  He is also on a Cardene drip to control his pressures.    Cardiology recommendations:  1.  Repeat transthoracic echocardiogram to assess LV function and volume status  2.  Will likely need LifeVest on discharge  3.   on drug abuse cessation  4.  Patient has already been restarted on his home CHF regimen including amlodipine, hydralazine, Imdur, metoprolol and Entresto.  Hold off on diuresis due to the acute kidney injury  5.  Recommend nephrology consult to discuss need for dialysis    Continue to follow along.  Thank you for the consult.  Please call with any questions.    I discussed the patient's findings and my recommendations with patient.     Bryan Maciel DO  Interventional cardiology  [facility]  11/24/24  10:19 CST        Electronically signed by Bryan Maciel DO at 11/24/24 2814

## 2024-11-26 LAB
25(OH)D3 SERPL-MCNC: 8.3 NG/ML (ref 30–100)
ABSOLUTE LUNG FLUID CONTENT: 47 % (ref 20–35)
ALBUMIN SERPL-MCNC: 3.2 G/DL (ref 3.5–5.2)
ALBUMIN/GLOB SERPL: 1.1 G/DL
ALP SERPL-CCNC: 65 U/L (ref 39–117)
ALT SERPL W P-5'-P-CCNC: 11 U/L (ref 1–41)
ANION GAP SERPL CALCULATED.3IONS-SCNC: 13 MMOL/L (ref 5–15)
AST SERPL-CCNC: 12 U/L (ref 1–40)
BILIRUB SERPL-MCNC: 0.5 MG/DL (ref 0–1.2)
BUN SERPL-MCNC: 44 MG/DL (ref 6–20)
BUN/CREAT SERPL: 10.5 (ref 7–25)
CALCIUM SPEC-SCNC: 8.6 MG/DL (ref 8.6–10.5)
CHLORIDE SERPL-SCNC: 103 MMOL/L (ref 98–107)
CO2 SERPL-SCNC: 22 MMOL/L (ref 22–29)
CREAT SERPL-MCNC: 4.18 MG/DL (ref 0.76–1.27)
DEPRECATED RDW RBC AUTO: 52.1 FL (ref 37–54)
EGFRCR SERPLBLD CKD-EPI 2021: 19 ML/MIN/1.73
ERYTHROCYTE [DISTWIDTH] IN BLOOD BY AUTOMATED COUNT: 16.9 % (ref 12.3–15.4)
GLOBULIN UR ELPH-MCNC: 3 GM/DL
GLUCOSE SERPL-MCNC: 99 MG/DL (ref 65–99)
HCT VFR BLD AUTO: 34.6 % (ref 37.5–51)
HGB BLD-MCNC: 10.5 G/DL (ref 13–17.7)
MCH RBC QN AUTO: 25.7 PG (ref 26.6–33)
MCHC RBC AUTO-ENTMCNC: 30.3 G/DL (ref 31.5–35.7)
MCV RBC AUTO: 84.8 FL (ref 79–97)
PLATELET # BLD AUTO: 271 10*3/MM3 (ref 140–450)
PMV BLD AUTO: 11 FL (ref 6–12)
POTASSIUM SERPL-SCNC: 3.9 MMOL/L (ref 3.5–5.2)
PROT SERPL-MCNC: 6.2 G/DL (ref 6–8.5)
RBC # BLD AUTO: 4.08 10*6/MM3 (ref 4.14–5.8)
SODIUM SERPL-SCNC: 138 MMOL/L (ref 136–145)
WBC NRBC COR # BLD AUTO: 6.47 10*3/MM3 (ref 3.4–10.8)

## 2024-11-26 PROCEDURE — 94726 PLETHYSMOGRAPHY LUNG VOLUMES: CPT | Performed by: HOSPITALIST

## 2024-11-26 PROCEDURE — 80053 COMPREHEN METABOLIC PANEL: CPT | Performed by: NURSE PRACTITIONER

## 2024-11-26 PROCEDURE — 94726 PLETHYSMOGRAPHY LUNG VOLUMES: CPT

## 2024-11-26 PROCEDURE — 99232 SBSQ HOSP IP/OBS MODERATE 35: CPT

## 2024-11-26 PROCEDURE — 85027 COMPLETE CBC AUTOMATED: CPT | Performed by: PHYSICIAN ASSISTANT

## 2024-11-26 PROCEDURE — 82306 VITAMIN D 25 HYDROXY: CPT | Performed by: NURSE PRACTITIONER

## 2024-11-26 PROCEDURE — 25010000002 ENOXAPARIN PER 10 MG: Performed by: INTERNAL MEDICINE

## 2024-11-26 RX ORDER — HYDRALAZINE HYDROCHLORIDE 50 MG/1
100 TABLET, FILM COATED ORAL 3 TIMES DAILY
Status: DISCONTINUED | OUTPATIENT
Start: 2024-11-26 | End: 2024-11-27 | Stop reason: HOSPADM

## 2024-11-26 RX ORDER — AMLODIPINE BESYLATE 5 MG/1
5 TABLET ORAL
Status: COMPLETED | OUTPATIENT
Start: 2024-11-26 | End: 2024-11-26

## 2024-11-26 RX ORDER — AMLODIPINE BESYLATE 10 MG/1
10 TABLET ORAL DAILY
Status: DISCONTINUED | OUTPATIENT
Start: 2024-11-27 | End: 2024-11-27 | Stop reason: HOSPADM

## 2024-11-26 RX ADMIN — METOPROLOL SUCCINATE 100 MG: 100 TABLET, EXTENDED RELEASE ORAL at 09:13

## 2024-11-26 RX ADMIN — ENOXAPARIN SODIUM 40 MG: 100 INJECTION SUBCUTANEOUS at 09:18

## 2024-11-26 RX ADMIN — HYDRALAZINE HYDROCHLORIDE 50 MG: 50 TABLET ORAL at 09:12

## 2024-11-26 RX ADMIN — AMLODIPINE BESYLATE 5 MG: 5 TABLET ORAL at 12:57

## 2024-11-26 RX ADMIN — AMLODIPINE BESYLATE 5 MG: 5 TABLET ORAL at 09:13

## 2024-11-26 RX ADMIN — DOCUSATE SODIUM 50 MG AND SENNOSIDES 8.6 MG 2 TABLET: 8.6; 5 TABLET, FILM COATED ORAL at 20:59

## 2024-11-26 RX ADMIN — SACUBITRIL AND VALSARTAN 1 TABLET: 49; 51 TABLET, FILM COATED ORAL at 21:00

## 2024-11-26 RX ADMIN — HYDRALAZINE HYDROCHLORIDE 100 MG: 50 TABLET ORAL at 15:54

## 2024-11-26 RX ADMIN — ISOSORBIDE DINITRATE 20 MG: 20 TABLET ORAL at 20:59

## 2024-11-26 RX ADMIN — ISOSORBIDE DINITRATE 20 MG: 20 TABLET ORAL at 09:14

## 2024-11-26 RX ADMIN — HYDRALAZINE HYDROCHLORIDE 100 MG: 50 TABLET ORAL at 21:07

## 2024-11-26 RX ADMIN — Medication 10 ML: at 21:08

## 2024-11-26 RX ADMIN — SACUBITRIL AND VALSARTAN 1 TABLET: 49; 51 TABLET, FILM COATED ORAL at 09:11

## 2024-11-26 RX ADMIN — LEVETIRACETAM 500 MG: 500 TABLET, FILM COATED ORAL at 09:14

## 2024-11-26 RX ADMIN — ISOSORBIDE DINITRATE 20 MG: 20 TABLET ORAL at 15:54

## 2024-11-26 RX ADMIN — Medication 10 ML: at 11:46

## 2024-11-26 RX ADMIN — DOCUSATE SODIUM 50 MG AND SENNOSIDES 8.6 MG 2 TABLET: 8.6; 5 TABLET, FILM COATED ORAL at 09:13

## 2024-11-26 RX ADMIN — LEVETIRACETAM 500 MG: 500 TABLET, FILM COATED ORAL at 21:00

## 2024-11-26 NOTE — PROGRESS NOTES
Nephrology ([facility]) Progress Note      Patient:  YESICA Webb  YOB: 1995  Date of Service: 11/26/2024  MRN: [id number]   Acct: [id number]   Primary Care Physician: He Ortega MD  Advance Directive:   Code Status and Medical Interventions: CPR (Attempt to Resuscitate); Full Support   Ordered at: 11/24/24 0646     Level Of Support Discussed With:    Patient     Code Status (Patient has no pulse and is not breathing):    CPR (Attempt to Resuscitate)     Medical Interventions (Patient has pulse or is breathing):    Full Support     Admit Date: 11/24/2024       Hospital Day: 2  Referring Provider: Mik Jaimes MD      Patient personally seen and examined.  Complete chart including Consults, Notes, Operative Reports, Labs, Cardiology, and Radiology studies reviewed as able.        Subjective:  YESICA Webb is a 29 y.o. male for whom we were consulted for evaluation and treatment of acute kidney injury/hypertension.  Patient previously seen in our office once in 202 but lost to follow up since then. Had repeat referral earlier this year but no-showed for his visit. History of hypertension, dilated cardiomyopathy, chronic systolic CHF, seizure disorder, polysubstance abuse. Had note been taking any of his home medications recently. Presented to [place] ER with chest pain, dyspnea, n/v. Found to have blood pressure >200 systolic and MINDY. Transferred to [facility]. Initial labs at this facility showed creatinine 4.95, BUN 64. Blood pressure managed with Cardene drip and resumption of home medications. On initial nephrology exam denied chest pain or dyspnea. No urine changes. No hemopytsis or rash. BP improved and Cardene weaned off. Moved to medical floor    Today is awake and alert. No new complaints or overnight issues. Urine output nonoliguric    Allergies:  Penicillins    Home Meds:  Medications Prior to Admission   Medication Sig Dispense Refill Last  Dose/Taking    metoprolol succinate XL (TOPROL-XL) 100 MG 24 hr tablet Take 1 tablet by mouth Daily. 90 tablet 3 Past Week    amLODIPine (NORVASC) 5 MG tablet Take 1 tablet by mouth Daily. for blood pressure 90 tablet 3 More than a month    hydrALAZINE (APRESOLINE) 25 MG tablet Take 1 tablet by mouth 3 (Three) Times a Day. 270 tablet 3 More than a month    isosorbide dinitrate (ISORDIL) 20 MG tablet Take 1 tablet by mouth 3 (Three) Times a Day. 270 tablet 3 More than a month    levETIRAcetam (KEPPRA) 500 MG tablet Take 1 tablet by mouth 2 (Two) Times a Day.   More than a month    Potassium 99 MG tablet Take 1 tablet by mouth Daily.   More than a month    sacubitril-valsartan (Entresto) 49-51 MG tablet Take 1 tablet by mouth 2 (Two) Times a Day. 180 tablet 3 More than a month       Medicines:  Current Facility-Administered Medications   Medication Dose Route Frequency Provider Last Rate Last Admin    acetaminophen (TYLENOL) tablet 650 mg  650 mg Oral Q4H PRN Mik Jaimes MD        Or    acetaminophen (TYLENOL) suppository 650 mg  650 mg Rectal Q6H PRN Mik Jaimes MD        amLODIPine (NORVASC) tablet 5 mg  5 mg Oral Daily Mik Jaimes MD   5 mg at 11/26/24 0913    sennosides-docusate (PERICOLACE) 8.6-50 MG per tablet 2 tablet  2 tablet Oral BID Mik Jaimes MD   2 tablet at 11/26/24 0913    And    polyethylene glycol (MIRALAX) packet 17 g  17 g Oral Daily PRN Mik Jaimes MD        And    bisacodyl (DULCOLAX) EC tablet 5 mg  5 mg Oral Daily PRN Mik Jaimes MD        And    bisacodyl (DULCOLAX) suppository 10 mg  10 mg Rectal Daily PRN Mik Jaimes MD        Calcium Replacement - Follow Nurse / BPA Driven Protocol   Not Applicable PRN Mik Jaimes MD        Enoxaparin Sodium (LOVENOX) syringe 40 mg  40 mg Subcutaneous Q24H Mik Jaimes MD   40 mg at 11/26/24 0918    hydrALAZINE (APRESOLINE) injection 10 mg  10 mg Intravenous Q4H PRN Nallely Fuller, APRN   10 mg at 11/25/24 0148     hydrALAZINE (APRESOLINE) tablet 100 mg  100 mg Oral TID Jenna Ospina MD        isosorbide dinitrate (ISORDIL) tablet 20 mg  20 mg Oral TID Mik Jaimes MD   20 mg at 11/26/24 0914    levETIRAcetam (KEPPRA) tablet 500 mg  500 mg Oral Q12H Mik Jaimes MD   500 mg at 11/26/24 0914    LORazepam (ATIVAN) injection 2 mg  2 mg Intravenous Q2H PRN Mik Jaimes MD   2 mg at 11/24/24 0625    Magnesium Standard Dose Replacement - Follow Nurse / BPA Driven Protocol   Not Applicable PRN Mik Jaimes MD        metoprolol succinate XL (TOPROL-XL) 24 hr tablet 100 mg  100 mg Oral Daily Mik Jaimes MD   100 mg at 11/26/24 0913    nicotine (NICODERM CQ) 14 MG/24HR patch 1 patch  1 patch Transdermal Q24H Mik Jaimes MD        nitroglycerin (NITROSTAT) SL tablet 0.4 mg  0.4 mg Sublingual Q5 Min PRN Mik Jaimes MD        ondansetron ODT (ZOFRAN-ODT) disintegrating tablet 4 mg  4 mg Oral Q6H PRN Mik Jaimes MD        Or    ondansetron (ZOFRAN) injection 4 mg  4 mg Intravenous Q6H PRN Mik Jaimes MD        Phosphorus Replacement - Follow Nurse / BPA Driven Protocol   Not Applicable PRN Mik Jaimes MD        Potassium Replacement - Follow Nurse / BPA Driven Protocol   Not Applicable PRN Mik Jaimes MD        sacubitril-valsartan (ENTRESTO) 49-51 MG tablet 1 tablet  1 tablet Oral BID Mik Jaimes MD   1 tablet at 11/26/24 0911    sodium chloride 0.9 % flush 10 mL  10 mL Intravenous Q12H Mik Jaimes MD   10 mL at 11/25/24 2011    sodium chloride 0.9 % flush 10 mL  10 mL Intravenous PRN Mik Jaimes MD        sodium chloride 0.9 % infusion 40 mL  40 mL Intravenous PRN Mik Jaimes MD           Past Medical History:  Past Medical History:   Diagnosis Date    Cardiomegaly     Congenital fusion of carpal bone     HTN (hypertension)     Seizure        Past Surgical History:  Past Surgical History:   Procedure Laterality Date    ELBOW PROCEDURE         Family  "History  Family History   Problem Relation Age of Onset    Hypertension Mother     Heart disease Mother     No Known Problems Father        Social History  Social History     Socioeconomic History    Marital status: Single   Tobacco Use    Smoking status: Some Days     Current packs/day: 0.50     Types: Cigarettes    Smokeless tobacco: Never   Vaping Use    Vaping status: Never Used   Substance and Sexual Activity    Alcohol use: Yes    Drug use: Yes     Types: Marijuana, Cocaine(coke)    Sexual activity: Defer       Review of Systems:  History obtained from chart review and the patient  General ROS: No fever or chills  Respiratory ROS: No cough, shortness of breath, wheezing  Cardiovascular ROS: No chest pain or palpitations  Gastrointestinal ROS: No abdominal pain or melena  Genito-Urinary ROS: No dysuria or hematuria  Psych ROS: No anxiety and depression  14 point ROS reviewed with the patient and negative except as noted above and in the HPI unless unable to obtain.    Objective:  Patient Vitals for the past 24 hrs:   BP Temp Temp src Pulse Resp SpO2 Height Weight   11/26/24 0911 (!) 154/103 -- -- 79 -- -- -- --   11/26/24 0740 143/86 98.4 °F (36.9 °C) Oral 79 18 98 % -- --   11/26/24 0428 148/97 97.8 °F (36.6 °C) Oral 93 18 98 % 188 cm (74\") 93.3 kg (205 lb 9.6 oz)   11/26/24 0000 149/92 98.3 °F (36.8 °C) Oral -- 16 -- -- --   11/25/24 2000 144/96 97.8 °F (36.6 °C) Oral 89 16 97 % -- --   11/25/24 1600 147/97 98.9 °F (37.2 °C) -- 87 10 99 % -- --   11/25/24 1419 149/82 -- -- 89 15 98 % -- --   11/25/24 1211 152/95 -- -- 78 19 100 % -- --       Intake/Output Summary (Last 24 hours) at 11/26/2024 1101  Last data filed at 11/26/2024 0440  Gross per 24 hour   Intake 1580 ml   Output 1750 ml   Net -170 ml     General: awake/alert   Chest:  clear to auscultation bilaterally without respiratory distress  CVS: regular rate and rhythm  Abdominal: soft, nontender, positive bowel sounds  Extremities: no cyanosis or " edema  Skin: warm and dry without rash      Labs:  Results from last 7 days   Lab Units 11/26/24 0228 11/25/24  0355 11/24/24  0543   WBC 10*3/mm3 6.47 7.42 7.55   HEMOGLOBIN g/dL 10.5* 9.9* 10.0*   HEMATOCRIT % 34.6* 32.6* 31.6*   PLATELETS 10*3/mm3 271 253 230         Results from last 7 days   Lab Units 11/26/24 0228 11/25/24  0355 11/24/24  1621 11/24/24  0550   SODIUM mmol/L 138 140  --  138   POTASSIUM mmol/L 3.9 3.7 3.8 3.0*   CHLORIDE mmol/L 103 106  --  100   CO2 mmol/L 22.0 20.0*  --  21.0*   BUN mg/dL 44* 50*  --  64*   CREATININE mg/dL 4.18* 4.13*  --  4.95*   CALCIUM mg/dL 8.6 8.6  --  8.6   EGFR mL/min/1.73 19.0* 19.3*  --  15.5*   BILIRUBIN mg/dL 0.5 0.9  --  1.7*   ALK PHOS U/L 65 66  --  70   ALT (SGPT) U/L 11 11  --  12   AST (SGOT) U/L 12 12  --  12   GLUCOSE mg/dL 99 88  --  94       Radiology:   Imaging Results (Last 72 Hours)       Procedure Component Value Units Date/Time    US Renal Bilateral [[id number]] Collected: 11/25/24 0836     Updated: 11/25/24 0840    Narrative:      RENAL ULTRASOUND COMPLETE 11/25/2024 6:59 AM     REASON FOR EXAM: ACUTE KIDNEY INJURY       COMPARISON: None       TECHNIQUE: Multiple longitudinal and transverse realtime sonographic  images of the kidneys and urinary bladder are obtained.  Images and  report are stored in PACS per institutional and state regulations.     FINDINGS:     RIGHT KIDNEY: 10.9 cm. Normal in size, shape, contour and position. No  solid or cystic masses. The central echo complex is compact with no  evidence for hydronephrosis. No nephrolithiasis or abnormal perinephric  fluid collections . No hydroureter.     LEFT KIDNEY: 10.7 cm. Normal in size, shape, contour and position. No  solid or cystic masses. The central echo complex is compact with no  evidence for hydronephrosis. No nephrolithiasis or abnormal perinephric  fluid collections . No hydroureter.     PELVIS: The bladder is mildly distended with anechoic urine and  demonstrates no  "significant wall thickening or internal echogenicities.  There is no surrounding ascites.       Impression:      1. Unremarkable renal ultrasound.           This report was signed and finalized on 11/25/2024 8:36 AM by Dr. Juan Parrish MD.               Culture:  No results found for: \"BLOODCX\", \"URINECX\", \"WOUNDCX\", \"MRSACX\", \"RESPCX\", \"STOOLCX\"      Assessment    Acute kidney injury  Unknown baseline chronic kidney disease  Hypokalemia--improved  Metabolic acidosis  Hypertensive emergency  Polysubstance abuse  Anemia  Nonischemic cardiomyopathy  Chronic systolic congestive heart failure  Secondary hyperparathyroidism   Medical noncompliance    Plan:  Continue to titrate HTN medications as needed  Monitor labs for any additional renal recovery      Dario Fernandez, APRN  11/26/2024  11:01 CST    "

## 2024-11-26 NOTE — PROGRESS NOTES
[facility] Intensivist Services  Transfer Note    Date of Admission: 11/24/2024  Date of Note: 11/26/24  Primary Care Physician: He Ortega MD      History     He is a 29 y.o. male admitted 11/24/2024 with Hypertensive emergency who also  has a past medical history of Cardiomegaly, Congenital fusion of carpal bone, HTN (hypertension), and Seizure.    The ICU team was asked to evaluate the patient for hypertensive urgency felt to be secondary to substance abuse, acute on chronic CKD.  Cardiology and nephrology has been consulted.  Patient has been transition from nicardipine to oral antihypertensives.    He is remained hemodynamically stable throughout the day felt to be stable for downgrade to MedSurg    Case was discussed with the overnight hospitalist Dr. Conway who agrees to accept the patient under his services    Past Medical History     Active Hospital Problems:    Active Hospital Problems    Diagnosis     **Hypertensive emergency     Congestive heart failure     Cocaine abuse     Medically noncompliant     Acute on chronic renal insufficiency     Chronic systolic (congestive) heart failure     NICM (nonischemic cardiomyopathy)     Tobacco use     Essential hypertension        Past Medical History:   Past Medical History:   Diagnosis Date    Cardiomegaly     Congenital fusion of carpal bone     HTN (hypertension)     Seizure        Prior Surgeries: He  has a past surgical history that includes Elbow surgery.    Past Surgical History:   Past Surgical History:   Procedure Laterality Date    ELBOW PROCEDURE         Social and Family History     Family History:  family history includes Heart disease in his mother; Hypertension in his mother; No Known Problems in his father.    Tobacco/Social History:  reports that he has been smoking cigarettes. He has never used smokeless tobacco. He reports current alcohol use. He reports current drug use. Drugs: Marijuana and  "Cocaine(coke).    Allergies     Allergies:   Allergies   Allergen Reactions    Penicillins Anaphylaxis         Exam     Vitals: His  height is 188 cm (74\") and weight is 98.5 kg (217 lb 2.5 oz). His oral temperature is 98.3 °F (36.8 °C). His blood pressure is 149/92 and his pulse is 89. His respiration is 16 and oxygen saturation is 97%.       Assessment/Plan     Plan: Downgrade to MedSurg/telemetry    OK to floor.    Hospitalist Team will assist with medical management, and assume Primary Attending, once on the Floor.    Thank you.        Electronically signed by SUKHDEV Gastelum on 11/26/2024 at 04:02 CST   "

## 2024-11-26 NOTE — PLAN OF CARE
Problem: Adult Inpatient Plan of Care  Goal: Plan of Care Review  Outcome: Progressing  Flowsheets (Taken 11/26/2024 0519)  Progress: no change  Outcome Evaluation: Patient has no c/o pain. Up ad charli. VSS. Safety maintained.  Plan of Care Reviewed With: patient

## 2024-11-26 NOTE — PROGRESS NOTES
[facility] Intensivist Services    Date of Admission: 11/24/2024  Date of Note: 11/26/24  Primary Care Physician: He Ortega MD   LOS: 2 days     History   Next of Kin:  Primary Emergency Contact: Jesika Webb   Code Status: Code Status and Medical Interventions: CPR (Attempt to Resuscitate); Full Support    He is a 29 y.o. male admitted 11/24/2024 with Hypertensive emergency who also  has a past medical history of Cardiomegaly, Congenital fusion of carpal bone, HTN (hypertension), and Seizure.    On his chart (which can contain diagnoses that are not current) shows he  has Essential hypertension; Tobacco use; Chronic systolic (congestive) heart failure; NICM (nonischemic cardiomyopathy); Seizure disorder; Acute on chronic renal insufficiency; Congestive heart failure; Cocaine abuse; Medically noncompliant; and Hypertensive emergency on their problem list..     The ICU team was asked to evaluate the patient for hypertensive emergency with renal dysfunction and congestive heart failure.  He was transferred to our facility from [facility].  He does have a history of cardiomegaly, hypertension, and seizures.  He tells me that he is not taken many of his medications in a few days as he has been nauseous.  He is a marijuana user, but was noted to be also positive for cocaine on outside urine drug screen.  Based on our medication dispense history, it appears he has not filled many of his medications with the exception of metoprolol in several months.  He has a history of poor dentition, myocardial infarction, chronic bronchitis, cardiomegaly, renal failure, substance abuse disorder, and seizure disorder.  He has been restarted on all of his home medication.  Cardiology has increased his hydralazine to 50 mg 3 times daily.    Interval history:  11/25: Patient remains on isosorbide 20 mg 3 times daily, amlodipine 5 mg daily, Entresto 49-51 twice daily, and metoprolol 100  mg daily.  Hydralazine has been increased to 50 mg 3 times daily.  He has no complaints for me this morning.  Latest echocardiogram from yesterday shows EF 31 to 35%, moderate to severely dilated left ventricle, along with moderate concentric LVH, mildly dilated left atrium, and no hemodynamically significant valvular disease.  11/26  Was transferred to us on the floor overnight creatinine 4.18 ultrasound unremarkable cardiomyopathy EF 31% cardiology on board history of cocaine abuse felt nonischemic cardiomyopathy remains on hydralazine and Imdur Entresto Toprol-XL Norvasc consider LifeVest on discharge  Past Medical History     Active and Resolved Problems  Active Hospital Problems    Diagnosis  POA    **Hypertensive emergency [I16.1]  Unknown    Congestive heart failure [I50.9]  Yes    Cocaine abuse [F14.10]  Unknown    Medically noncompliant [Z91.199]  Not Applicable    Acute on chronic renal insufficiency [N28.9, N18.9]  Yes    Chronic systolic (congestive) heart failure [I50.22]  Yes    NICM (nonischemic cardiomyopathy) [I42.8]  Yes    Tobacco use [Z72.0]  Yes    Essential hypertension [I10]  Yes      Resolved Hospital Problems   No resolved problems to display.       Past Medical History:   Past Medical History:   Diagnosis Date    Cardiomegaly     Congenital fusion of carpal bone     HTN (hypertension)     Seizure        Prior Surgeries: He  has a past surgical history that includes Elbow surgery.    Past Surgical History:   Past Surgical History:   Procedure Laterality Date    ELBOW PROCEDURE         Social and Family History     Family History:  family history includes Heart disease in his mother; Hypertension in his mother; No Known Problems in his father.    Tobacco/Social History:  reports that he has been smoking cigarettes. He has never used smokeless tobacco. He reports current alcohol use. He reports current drug use. Drugs: Marijuana and Cocaine(coke).    Allergies     Allergies:   He is allergic  to penicillins.    Labs   Basic Labs:  CBC:      Lab 11/26/24 0228 11/25/24  0355 11/24/24  0543   WBC 6.47 7.42 7.55   HEMOGLOBIN 10.5* 9.9* 10.0*   HEMATOCRIT 34.6* 32.6* 31.6*   PLATELETS 271 253 230   NEUTROS ABS  --  5.09 5.32   IMMATURE GRANS (ABS)  --  0.02 0.01   LYMPHS ABS  --  1.59 1.68   MONOS ABS  --  0.44 0.35   EOS ABS  --  0.23 0.16   MCV 84.8 84.5 82.3       LIVER FUNCTION TESTS:      Lab 11/26/24 0228 11/25/24  0355 11/24/24  0550   TOTAL PROTEIN 6.2 6.2 6.3   ALBUMIN 3.2* 3.2* 3.2*   GLOBULIN 3.0 3.0 3.1   ALT (SGPT) 11 11 12   AST (SGOT) 12 12 12   BILIRUBIN 0.5 0.9 1.7*   ALK PHOS 65 66 70       ABG:      Lab 11/26/24 0228 11/25/24  0355 11/24/24  0550   ANION GAP 13.0 14.0 17.0*       CMP:      Lab 11/26/24 0228 11/25/24  0355 11/24/24  1621 11/24/24  0550   SODIUM 138 140  --  138   POTASSIUM 3.9 3.7 3.8 3.0*   CHLORIDE 103 106  --  100   BUN 44* 50*  --  64*   CREATININE 4.18* 4.13*  --  4.95*   CALCIUM 8.6 8.6  --  8.6   MAGNESIUM  --  1.8  --  1.8   PHOSPHORUS  --  3.1  --  3.8       Diabetic:      Inpatient Medications   Medications: Scheduled Meds:amLODIPine, 5 mg, Oral, Daily  enoxaparin, 40 mg, Subcutaneous, Q24H  hydrALAZINE, 50 mg, Oral, TID  isosorbide dinitrate, 20 mg, Oral, TID  levETIRAcetam, 500 mg, Oral, Q12H  metoprolol succinate XL, 100 mg, Oral, Daily  nicotine, 1 patch, Transdermal, Q24H  sacubitril-valsartan, 1 tablet, Oral, BID  senna-docusate sodium, 2 tablet, Oral, BID  sodium chloride, 10 mL, Intravenous, Q12H      Continuous Infusions:     PRN Meds:.  acetaminophen **OR** acetaminophen    senna-docusate sodium **AND** polyethylene glycol **AND** bisacodyl **AND** bisacodyl    Calcium Replacement - Follow Nurse / BPA Driven Protocol    hydrALAZINE    LORazepam    Magnesium Standard Dose Replacement - Follow Nurse / BPA Driven Protocol    nitroglycerin    ondansetron ODT **OR** ondansetron    Phosphorus Replacement - Follow Nurse / BPA Driven Protocol    Potassium  "Replacement - Follow Nurse / BPA Driven Protocol    sodium chloride    sodium chloride    I have reviewed the patient's current medications.   Outpatient Medications     Current Outpatient Medications   Medication Instructions    amLODIPine (NORVASC) 5 mg, Oral, Daily, for blood pressure    hydrALAZINE (APRESOLINE) 25 mg, Oral, 3 Times Daily    isosorbide dinitrate (ISORDIL) 20 mg, Oral, 3 Times Daily    levETIRAcetam (KEPPRA) 500 mg, Oral, 2 Times Daily    metoprolol succinate XL (TOPROL-XL) 100 mg, Oral, Daily    Potassium 99 MG tablet 1 tablet, Oral, Daily    sacubitril-valsartan (Entresto) 49-51 MG tablet 1 tablet, Oral, 2 Times Daily       Current Antibiotics   This patient does not have an active medication from one of the medication groupers.    Exam   Vent settings for last 24 hours:       Vital signs for last 24 hours:  Temp:  [97.8 °F (36.6 °C)-98.9 °F (37.2 °C)] 98.4 °F (36.9 °C)  Heart Rate:  [78-93] 79  Resp:  [10-19] 18  BP: (136-154)/() 154/103    Vitals: His  height is 188 cm (74\") and weight is 93.3 kg (205 lb 9.6 oz). His oral temperature is 98.4 °F (36.9 °C). His blood pressure is 154/103 (abnormal) and his pulse is 79. His respiration is 18 and oxygen saturation is 98%.     GENERAL:  Alert, awake and oriented x 3, pleasant, no acute distress.   SKIN:  Warm, dry, capillary refill < 2 seconds  EYES:  Pupils equal, round and reactive to light.  EOMs intact.    HEAD:  Normocephalic, atraumatic  NECK:  Supple   RESP:  Lungs clear to auscultation. Good airflow. Normal respiratory effort.   CARDIAC:  Regular rate and rhythm. Normal S1,S2. No edema  GI:  Soft, nontender, normal bowel sounds  MSK:  Normal muscle bulk, tone  NEUROLOGICAL:  No focal neurological deficits. Moves all extremities well. Alert and Oriented x 3.   PSYCHIATRIC:  Normal affect and mood.      Intake/Output   Intake/Output this shift:  No intake/output data recorded.    Intake/Output last 3 shifts:  I/O last 3 completed " "shifts:  In: 4611.8 [P.O.:3260; I.V.:1351.8]  Out: 5550 [Urine:5550]    Results and Cultures Review     Result Review:  I have personally reviewed the results from the time of this admission to 11/26/2024 10:27 CST and agree with these findings:  [x]  Laboratory list / accordion  []  Microbiology  [x]  Radiology  []  EKG/Telemetry   [x]  Cardiology/Vascular   []  Pathology  []  Old records  []  Other:  Most notable findings include: BNP 23,000 612, BUN 50, creatinine 4.13, uric acid 10.6, hemoglobin 9.9, hematocrit 32.6.  Echocardiogram showed EF 31 to 35%, findings consistent with dilated cardiomyopathy, and mildly dilated left atrial cavity.      Culture Data:   No results found for: \"BLOODCX\", \"URINECX\", \"WOUNDCX\", \"MRSACX\", \"RESPCX\", \"STOOLCX\"    Assessment/Plan   29-year-old male with a history of myocardial infarction, chronic bronchitis, cardiomegaly, renal failure, substance use disorder, and seizure disorder admitted with hypertensive emergency, renal dysfunction, and congestive heart failure.  He has been restarted on his home antihypertensive medications.  He remains off of the Cardene infusion at this time.    Acute hypertensive emergency secondary to cocaine use:   -He denies cocaine use.  Says someone \"might have mixed in with my marijuana\" but he does admit to previous methamphetamine use.  -Continue supportive care.  -Patient did require cardene infusion early this morning, but it has since been weaned off  -Remains on sosorbide 20 mg 3 times daily, amlodipine 5 mg daily, Entresto 49-51 twice daily, and metoprolol 100 mg daily.  Hydralazine has been increased to 50 mg 3 times daily.  -Ativan ordered prn for chest pain given cocaine abuse.  -Counseled patient strongly regarding cessation of street drugs.  See also #4.  -Poor prognosis given patient's noncompliance with medications, lack of prescription refills, changing dosages without requested changes from physician, noncompliance with tobacco and " drug cessation.    History of Seizures  -No seizure activity noted at our facility   -Patient says he is supposed to be on Keppra but ran out.  -Home dose Keppra of  500 mg BID has been restarted    Tobacco abuse   -Recommend tobacco cessation.  Continue nicotine patch q24h    Cocaine, marijuana and history of methamphetamine abuse   -Recommend cessation.    Chest pain  -Completely resolved.  -Heparin drip was discontinued.  -Ativan available prn      Troponin elevation:  -In setting of cocaine use and hypertension as well as medication noncompliance.  Mild chronic noted from previous labs  -Echocardiogram results as mentioned above  -Cardiology following, we appreciate their recommendations     Chronic Systolic Congestive Heart failure  -EF 31 to 35% as noted on echo from yesterday  -Patient also found to have findings consistent with dilated cardiomyopathy  -Continue hydralazine, isosorbide, Entresto, Toprol-XL, and amlodipine as scheduled  -Patient may need LifeVest on discharge, defer to cardiology  -Patient will likely need outpatient follow-up with cardiology    Acute kidney injury  -In setting of substance abuse and hypertensive emergency  -Unclear as to patient's baseline renal function as we did not have previous labs here within the last 4 years on this patient  -BUN 50, creatinine 4.13 and improving  -Patient continues to have excellent urine output  -Renal ultrasound was unremarkable  -Nephrology following, we appreciate their recommendations  -Patient may benefit from from outpatient nephrology referral once discharged    VTE Prophylaxis:    Pharmacologic VTE prophylaxis orders are present.      Active VTE Prophylaxis:  Pharmacologic:        Start     Dose Route Frequency Stop    11/24/24 0900  Enoxaparin Sodium (LOVENOX) syringe 40 mg         40 mg SC Every 24 Hours --                  Select Mechanical VTE Prophylaxis if Desired & Appropriate    Code Status and Medical Interventions: CPR (Attempt to  Resuscitate); Full Support   Ordered at: 11/24/24 0646     Level Of Support Discussed With:    Patient     Code Status (Patient has no pulse and is not breathing):    CPR (Attempt to Resuscitate)     Medical Interventions (Patient has pulse or is breathing):    Full Support         Part of this note may be an electronic transcription/translation of spoken language to printed text using the Dragon Dictation System    Electronically signed by Jenna Ospina MD on 11/26/2024 at 10:27 CST

## 2024-11-26 NOTE — PROGRESS NOTES
[facility]    Date:  11/26/24     Vitals:    11/26/24 0911   BP: (!) 154/103   Pulse: 79   Resp:    Temp:    SpO2:         Indication:  Heart Failure    Procedure:  ReDS device sensor unit applied to right side of chest and right side of back.  Appropriate positioning confirmed based off of the unit's calculation.  Chest measurement obtained with the chest size ruler.  Measurement session performed over 45 seconds.      Results:  ReDS Value= 47    Interpretation:  >46% - markedly elevated lung fluid content    Fatmata Liz, CRT 11/26/24 09:29 CST

## 2024-11-26 NOTE — PROGRESS NOTES
[facility] -  Progress Note     LOS: 2 days   Patient Care Team:  He Ortega MD as PCP - General (Internal Medicine)    Chief Complaint:    Subjective     Interval History:   Upon my examination patient is lying flat in bed.  He denies any cardiac concerns at this time.  Tolerating his medications well.  Denies any dizziness lightheadedness.  Able to ambulate and void appropriately.    Review of Systems:     Review of Systems   All other systems reviewed and are negative.    Objective     Vital Sign Min/Max for last 24 hours  Temp  Min: 97.8 °F (36.6 °C)  Max: 98.9 °F (37.2 °C)   BP  Min: 143/86  Max: 154/103   Pulse  Min: 78  Max: 93   Resp  Min: 10  Max: 19   SpO2  Min: 97 %  Max: 100 %   No data recorded   Weight  Min: 93.3 kg (205 lb 9.6 oz)  Max: 93.3 kg (205 lb 9.6 oz)         11/26/24  0428   Weight: 93.3 kg (205 lb 9.6 oz)       Telemetry: Normal sinus rhythm rates in the 80s predominantly      Physical Exam:    Constitutional:       Appearance: Healthy appearance. Not in distress.   Neck:      Vascular: JVD normal.   Pulmonary:      Effort: Pulmonary effort is normal.      Breath sounds: Normal breath sounds. No wheezing. No rhonchi. No rales.   Cardiovascular:      PMI at left midclavicular line. Normal rate. Regular rhythm. Normal S1. Normal S2.       Murmurs: There is no murmur.      No gallop.  No click. No rub.   Edema:     Peripheral edema absent.   Musculoskeletal: Normal range of motion.      Cervical back: Normal range of motion. Skin:     General: Skin is warm and dry.   Neurological:      Mental Status: Alert and oriented to person, place and time.       Results Review:   Lab Results (last 72 hours)       Procedure Component Value Units Date/Time    Comprehensive Metabolic Panel [[id number]]  (Abnormal) Collected: 11/26/24 0228    Specimen: Blood Updated: 11/26/24 0358     Glucose 99 mg/dL      BUN 44 mg/dL      Creatinine 4.18 mg/dL      Sodium 138 mmol/L       Potassium 3.9 mmol/L      Chloride 103 mmol/L      CO2 22.0 mmol/L      Calcium 8.6 mg/dL      Total Protein 6.2 g/dL      Albumin 3.2 g/dL      ALT (SGPT) 11 U/L      AST (SGOT) 12 U/L      Alkaline Phosphatase 65 U/L      Total Bilirubin 0.5 mg/dL      Globulin 3.0 gm/dL      A/G Ratio 1.1 g/dL      BUN/Creatinine Ratio 10.5     Anion Gap 13.0 mmol/L      eGFR 19.0 mL/min/1.73     Narrative:      GFR Normal >60  Chronic Kidney Disease <60  Kidney Failure <15      CBC (No Diff) [[id number]]  (Abnormal) Collected: 11/26/24 0228    Specimen: Blood Updated: 11/26/24 0333     WBC 6.47 10*3/mm3      RBC 4.08 10*6/mm3      Hemoglobin 10.5 g/dL      Hematocrit 34.6 %      MCV 84.8 fL      MCH 25.7 pg      MCHC 30.3 g/dL      RDW 16.9 %      RDW-SD 52.1 fl      MPV 11.0 fL      Platelets 271 10*3/mm3     Vitamin D,25-Hydroxy [[id number]] Collected: 11/26/24 0228    Specimen: Blood Updated: 11/26/24 0329    Creatinine Urine Random (kidney function) GFR component - Urine, Clean Catch [[id number]] Collected: 11/24/24 2041    Specimen: Urine, Clean Catch Updated: 11/25/24 1157     Creatinine, Urine 33.5 mg/dL     Narrative:      Reference intervals for random urine have not been established.  Clinical usage is dependent upon physician's interpretation in combination with other laboratory tests.       Urea Nitrogen, Urine - Urine, Clean Catch [[id number]] Collected: 11/24/24 2041    Specimen: Urine, Clean Catch Updated: 11/25/24 1157     Urea Nitrogen, Urine 280 mg/dL     Narrative:      Reference intervals for random urine have not been established.  Clinical usage is dependent upon physician's interpretation in combination with other laboratory tests.       Vitamin D,25-Hydroxy [[id number]]  (Abnormal) Collected: 11/25/24 0355    Specimen: Blood Updated: 11/25/24 1147     25 Hydroxy, Vitamin D 12.8 ng/ml     Narrative:      Reference Range for Total Vitamin D 25(OH)     Deficiency <20.0 ng/mL   Insufficiency 21-29 ng/mL    Sufficiency  ng/mL  Toxicity >100 ng/ml      Uric Acid [[id number]]  (Abnormal) Collected: 11/25/24 0355    Specimen: Blood Updated: 11/25/24 0926     Uric Acid 10.6 mg/dL     Comprehensive Metabolic Panel [[id number]]  (Abnormal) Collected: 11/25/24 0355    Specimen: Blood Updated: 11/25/24 0520     Glucose 88 mg/dL      BUN 50 mg/dL      Creatinine 4.13 mg/dL      Sodium 140 mmol/L      Potassium 3.7 mmol/L      Chloride 106 mmol/L      CO2 20.0 mmol/L      Calcium 8.6 mg/dL      Total Protein 6.2 g/dL      Albumin 3.2 g/dL      ALT (SGPT) 11 U/L      AST (SGOT) 12 U/L      Alkaline Phosphatase 66 U/L      Total Bilirubin 0.9 mg/dL      Globulin 3.0 gm/dL      A/G Ratio 1.1 g/dL      BUN/Creatinine Ratio 12.1     Anion Gap 14.0 mmol/L      eGFR 19.3 mL/min/1.73     Narrative:      GFR Normal >60  Chronic Kidney Disease <60  Kidney Failure <15      Magnesium [[id number]]  (Normal) Collected: 11/25/24 0355    Specimen: Blood Updated: 11/25/24 0520     Magnesium 1.8 mg/dL     Phosphorus [[id number]]  (Normal) Collected: 11/25/24 0355    Specimen: Blood Updated: 11/25/24 0520     Phosphorus 3.1 mg/dL     PTH, Intact [[id number]]  (Abnormal) Collected: 11/25/24 0355    Specimen: Blood Updated: 11/25/24 0455     PTH, Intact 238.2 pg/mL     Narrative:      Results may be falsely decreased if patient taking Biotin.      CBC & Differential [[id number]]  (Abnormal) Collected: 11/25/24 0355    Specimen: Blood Updated: 11/25/24 0444    Narrative:      The following orders were created for panel order CBC & Differential.  Procedure                               Abnormality         Status                     ---------                               -----------         ------                     CBC Auto Differential[[id number]]        Abnormal            Final result                 Please view results for these tests on the individual orders.    CBC Auto Differential [[id number]]  (Abnormal) Collected: 11/25/24 0355     Specimen: Blood Updated: 11/25/24 0444     WBC 7.42 10*3/mm3      RBC 3.86 10*6/mm3      Hemoglobin 9.9 g/dL      Hematocrit 32.6 %      MCV 84.5 fL      MCH 25.6 pg      MCHC 30.4 g/dL      RDW 17.1 %      RDW-SD 52.6 fl      MPV 11.1 fL      Platelets 253 10*3/mm3      Neutrophil % 68.6 %      Lymphocyte % 21.4 %      Monocyte % 5.9 %      Eosinophil % 3.1 %      Basophil % 0.7 %      Immature Grans % 0.3 %      Neutrophils, Absolute 5.09 10*3/mm3      Lymphocytes, Absolute 1.59 10*3/mm3      Monocytes, Absolute 0.44 10*3/mm3      Eosinophils, Absolute 0.23 10*3/mm3      Basophils, Absolute 0.05 10*3/mm3      Immature Grans, Absolute 0.02 10*3/mm3      nRBC 0.0 /100 WBC     Urinalysis With Microscopic If Indicated (No Culture) - Urine, Clean Catch [[id number]]  (Abnormal) Collected: 11/24/24 2041    Specimen: Urine, Clean Catch Updated: 11/24/24 2136     Color, UA Yellow     Appearance, UA Clear     pH, UA 8.5     Specific Gravity, UA 1.010     Glucose,  mg/dL (Trace)     Ketones, UA Negative     Bilirubin, UA Negative     Blood, UA Negative     Protein, UA 30 mg/dL (1+)     Leuk Esterase, UA Negative     Nitrite, UA Negative     Urobilinogen, UA 1.0 E.U./dL    Urinalysis, Microscopic Only - Urine, Clean Catch [[id number]] Collected: 11/24/24 2041    Specimen: Urine, Clean Catch Updated: 11/24/24 2136     RBC, UA 0-2 /HPF      WBC, UA None Seen /HPF      Bacteria, UA None Seen /HPF      Squamous Epithelial Cells, UA None Seen /HPF      Hyaline Casts, UA None Seen /LPF      Methodology Automated Microscopy    Sodium, Urine, Random - Urine, Clean Catch [[id number]] Collected: 11/24/24 2041    Specimen: Urine, Clean Catch Updated: 11/24/24 2117     Sodium, Urine 119 mmol/L     Narrative:      Reference intervals for random urine have not been established.  Clinical usage is dependent upon physician's interpretation in combination with other laboratory tests.       Protein, Urine, Random - Urine, Clean Catch  [[id number]] Collected: 11/24/24 2041    Specimen: Urine, Clean Catch Updated: 11/24/24 2117     Total Protein, Urine 25.4 mg/dL     Narrative:      Reference intervals for random urine have not been established.  Clinical usage is dependent upon physician's interpretation in combination with other laboratory tests.       Potassium [[id number]]  (Normal) Collected: 11/24/24 1621    Specimen: Blood Updated: 11/24/24 1645     Potassium 3.8 mmol/L     aPTT [[id number]]  (Normal) Collected: 11/24/24 1221    Specimen: Blood Updated: 11/24/24 1255     PTT 33.6 seconds     Comprehensive Metabolic Panel [[id number]]  (Abnormal) Collected: 11/24/24 0550    Specimen: Blood Updated: 11/24/24 0630     Glucose 94 mg/dL      BUN 64 mg/dL      Creatinine 4.95 mg/dL      Sodium 138 mmol/L      Potassium 3.0 mmol/L      Chloride 100 mmol/L      CO2 21.0 mmol/L      Calcium 8.6 mg/dL      Total Protein 6.3 g/dL      Albumin 3.2 g/dL      ALT (SGPT) 12 U/L      AST (SGOT) 12 U/L      Alkaline Phosphatase 70 U/L      Total Bilirubin 1.7 mg/dL      Globulin 3.1 gm/dL      A/G Ratio 1.0 g/dL      BUN/Creatinine Ratio 12.9     Anion Gap 17.0 mmol/L      eGFR 15.5 mL/min/1.73     Narrative:      GFR Normal >60  Chronic Kidney Disease <60  Kidney Failure <15      Magnesium [[id number]]  (Normal) Collected: 11/24/24 0550    Specimen: Blood Updated: 11/24/24 0630     Magnesium 1.8 mg/dL     Phosphorus [[id number]]  (Normal) Collected: 11/24/24 0550    Specimen: Blood Updated: 11/24/24 0628     Phosphorus 3.8 mg/dL     BNP [[id number]]  (Abnormal) Collected: 11/24/24 0550    Specimen: Blood Updated: 11/24/24 0626     proBNP 23,612.0 pg/mL     Narrative:      This assay is used as an aid in the diagnosis of individuals suspected of having heart failure. It can be used as an aid in the diagnosis of acute decompensated heart failure (ADHF) in patients presenting with signs and symptoms of ADHF to the emergency department (ED). In addition, NT-proBNP of  <300 pg/mL indicates ADHF is not likely.    Age Range Result Interpretation  NT-proBNP Concentration (pg/mL:      <50             Positive            >450                   Gray                 300-450                    Negative             <300    50-75           Positive            >900                  Gray                300-900                  Negative            <300      >75             Positive            >1800                  Gray                300-1800                  Negative            <300    Lactic Acid, Plasma [[id number]]  (Normal) Collected: 11/24/24 0550    Specimen: Blood Updated: 11/24/24 0622     Lactate 0.9 mmol/L     Protime-INR [[id number]]  (Abnormal) Collected: 11/24/24 0543    Specimen: Blood Updated: 11/24/24 0620     Protime 16.8 Seconds      INR 1.30    aPTT [[id number]]  (Abnormal) Collected: 11/24/24 0543    Specimen: Blood Updated: 11/24/24 0620     PTT 62.9 seconds     CBC & Differential [[id number]]  (Abnormal) Collected: 11/24/24 0543    Specimen: Blood Updated: 11/24/24 0609    Narrative:      The following orders were created for panel order CBC & Differential.  Procedure                               Abnormality         Status                     ---------                               -----------         ------                     CBC Auto Differential[[id number]]        Abnormal            Final result                 Please view results for these tests on the individual orders.    CBC Auto Differential [[id number]]  (Abnormal) Collected: 11/24/24 0543    Specimen: Blood Updated: 11/24/24 0609     WBC 7.55 10*3/mm3      RBC 3.84 10*6/mm3      Hemoglobin 10.0 g/dL      Hematocrit 31.6 %      MCV 82.3 fL      MCH 26.0 pg      MCHC 31.6 g/dL      RDW 17.1 %      RDW-SD 50.8 fl      MPV 10.9 fL      Platelets 230 10*3/mm3      Neutrophil % 70.5 %      Lymphocyte % 22.3 %      Monocyte % 4.6 %      Eosinophil % 2.1 %      Basophil % 0.4 %      Immature Grans % 0.1 %       "Neutrophils, Absolute 5.32 10*3/mm3      Lymphocytes, Absolute 1.68 10*3/mm3      Monocytes, Absolute 0.35 10*3/mm3      Eosinophils, Absolute 0.16 10*3/mm3      Basophils, Absolute 0.03 10*3/mm3      Immature Grans, Absolute 0.01 10*3/mm3      nRBC 0.0 /100 WBC                 Echo EF Estimated  No results found for: \"ECHOEFEST\"      Cath Ejection Fraction Quantitative  No results found for: \"CATHEF\"        Medication Review: yes  Current Facility-Administered Medications   Medication Dose Route Frequency Provider Last Rate Last Admin    acetaminophen (TYLENOL) tablet 650 mg  650 mg Oral Q4H PRN Mik Jaimes MD        Or    acetaminophen (TYLENOL) suppository 650 mg  650 mg Rectal Q6H PRN Mik Jaimes MD        amLODIPine (NORVASC) tablet 5 mg  5 mg Oral Daily Mik Jaimes MD   5 mg at 11/26/24 0913    sennosides-docusate (PERICOLACE) 8.6-50 MG per tablet 2 tablet  2 tablet Oral BID Mik Jaimes MD   2 tablet at 11/26/24 0913    And    polyethylene glycol (MIRALAX) packet 17 g  17 g Oral Daily PRN Mik Jaimes MD        And    bisacodyl (DULCOLAX) EC tablet 5 mg  5 mg Oral Daily PRN Mik Jaimes MD        And    bisacodyl (DULCOLAX) suppository 10 mg  10 mg Rectal Daily PRN Mik Jaimes MD        Calcium Replacement - Follow Nurse / BPA Driven Protocol   Not Applicable PRN Mik Jaimes MD        Enoxaparin Sodium (LOVENOX) syringe 40 mg  40 mg Subcutaneous Q24H Mik Jaimes MD   40 mg at 11/26/24 0918    hydrALAZINE (APRESOLINE) injection 10 mg  10 mg Intravenous Q4H PRN Nallely Fuller APRN   10 mg at 11/25/24 0141    hydrALAZINE (APRESOLINE) tablet 100 mg  100 mg Oral TID Jenna Ospina MD        isosorbide dinitrate (ISORDIL) tablet 20 mg  20 mg Oral TID Mik Jaimes MD   20 mg at 11/26/24 0914    levETIRAcetam (KEPPRA) tablet 500 mg  500 mg Oral Q12H Mik Jaimes MD   500 mg at 11/26/24 0914    LORazepam (ATIVAN) injection 2 mg  2 mg Intravenous Q2H PRN Theodore, " Mik JACOBSON MD   2 mg at 11/24/24 0625    Magnesium Standard Dose Replacement - Follow Nurse / BPA Driven Protocol   Not Applicable PRN Mik Jaimes MD        metoprolol succinate XL (TOPROL-XL) 24 hr tablet 100 mg  100 mg Oral Daily Mik Jaimes MD   100 mg at 11/26/24 0913    nicotine (NICODERM CQ) 14 MG/24HR patch 1 patch  1 patch Transdermal Q24H Mik Jaimes MD        nitroglycerin (NITROSTAT) SL tablet 0.4 mg  0.4 mg Sublingual Q5 Min PRN Mik Jaimes MD        ondansetron ODT (ZOFRAN-ODT) disintegrating tablet 4 mg  4 mg Oral Q6H PRN Mik Jaimes MD        Or    ondansetron (ZOFRAN) injection 4 mg  4 mg Intravenous Q6H PRN Mik Jaimes MD        Phosphorus Replacement - Follow Nurse / BPA Driven Protocol   Not Applicable PRN Mik Jaimes MD        Potassium Replacement - Follow Nurse / BPA Driven Protocol   Not Applicable PRN Mik Jaimes MD        sacubitril-valsartan (ENTRESTO) 49-51 MG tablet 1 tablet  1 tablet Oral BID Mik Jaimes MD   1 tablet at 11/26/24 0911    sodium chloride 0.9 % flush 10 mL  10 mL Intravenous Q12H Mik Jaimes MD   10 mL at 11/25/24 2011    sodium chloride 0.9 % flush 10 mL  10 mL Intravenous PRN Mik Jaimes MD        sodium chloride 0.9 % infusion 40 mL  40 mL Intravenous PRN Mik Jaimes MD             Assessment & Plan     1.  Hypertensive emergency  2.  Essential hypertension  3.  Chronic systolic CHF  4.  Nonischemic cardiomyopathy  5.  Acute on chronic renal insufficiency-improving  6.  Cocaine use  7.  Medical nonadherence    -Continue hydralazine 100 mg 3 times daily  - Continue Toprol- mg daily  - Continue Entresto 49/51 mg twice daily  - Increase amlodipine to 10 mg daily (will give an additional 5 mg dose now  -Discussed diuretic therapy with nephrology, will continue to monitor renal function and consider in the near future  -Cardiology will continue to follow    Further orders per Dr. Mena    Electronically signed  by Bassem Mathisor, APRN, 11/26/24, 11:02 AM CST.

## 2024-11-27 ENCOUNTER — READMISSION MANAGEMENT (OUTPATIENT)
Dept: CALL CENTER | Facility: HOSPITAL | Age: 29
End: 2024-11-27
Payer: MEDICAID

## 2024-11-27 VITALS
WEIGHT: 206.2 LBS | DIASTOLIC BLOOD PRESSURE: 84 MMHG | SYSTOLIC BLOOD PRESSURE: 135 MMHG | HEART RATE: 86 BPM | OXYGEN SATURATION: 99 % | HEIGHT: 74 IN | RESPIRATION RATE: 16 BRPM | TEMPERATURE: 97.4 F | BODY MASS INDEX: 26.46 KG/M2

## 2024-11-27 LAB
ALBUMIN SERPL-MCNC: 3.2 G/DL (ref 3.5–5.2)
ALBUMIN/GLOB SERPL: 1.1 G/DL
ALP SERPL-CCNC: 58 U/L (ref 39–117)
ALT SERPL W P-5'-P-CCNC: 10 U/L (ref 1–41)
ANION GAP SERPL CALCULATED.3IONS-SCNC: 8 MMOL/L (ref 5–15)
AST SERPL-CCNC: 12 U/L (ref 1–40)
BILIRUB SERPL-MCNC: 0.3 MG/DL (ref 0–1.2)
BUN SERPL-MCNC: 45 MG/DL (ref 6–20)
BUN/CREAT SERPL: 10.9 (ref 7–25)
CALCIUM SPEC-SCNC: 8.7 MG/DL (ref 8.6–10.5)
CHLORIDE SERPL-SCNC: 108 MMOL/L (ref 98–107)
CO2 SERPL-SCNC: 24 MMOL/L (ref 22–29)
CREAT SERPL-MCNC: 4.13 MG/DL (ref 0.76–1.27)
EGFRCR SERPLBLD CKD-EPI 2021: 19.3 ML/MIN/1.73
GLOBULIN UR ELPH-MCNC: 2.8 GM/DL
GLUCOSE SERPL-MCNC: 99 MG/DL (ref 65–99)
POTASSIUM SERPL-SCNC: 4.2 MMOL/L (ref 3.5–5.2)
PROT SERPL-MCNC: 6 G/DL (ref 6–8.5)
SODIUM SERPL-SCNC: 140 MMOL/L (ref 136–145)

## 2024-11-27 PROCEDURE — 99232 SBSQ HOSP IP/OBS MODERATE 35: CPT

## 2024-11-27 PROCEDURE — 25010000002 HYDRALAZINE PER 20 MG: Performed by: NURSE PRACTITIONER

## 2024-11-27 PROCEDURE — 25010000002 ENOXAPARIN PER 10 MG: Performed by: INTERNAL MEDICINE

## 2024-11-27 PROCEDURE — 80053 COMPREHEN METABOLIC PANEL: CPT | Performed by: NURSE PRACTITIONER

## 2024-11-27 RX ORDER — FUROSEMIDE 20 MG/1
20 TABLET ORAL 2 TIMES DAILY
Qty: 60 TABLET | Refills: 0 | Status: SHIPPED | OUTPATIENT
Start: 2024-11-27

## 2024-11-27 RX ORDER — AMLODIPINE BESYLATE 10 MG/1
10 TABLET ORAL DAILY
Qty: 30 TABLET | Refills: 0 | Status: SHIPPED | OUTPATIENT
Start: 2024-11-28

## 2024-11-27 RX ORDER — HYDRALAZINE HYDROCHLORIDE 100 MG/1
100 TABLET, FILM COATED ORAL 3 TIMES DAILY
Qty: 90 TABLET | Refills: 0 | Status: SHIPPED | OUTPATIENT
Start: 2024-11-27

## 2024-11-27 RX ORDER — ISOSORBIDE DINITRATE 10 MG/1
40 TABLET ORAL 3 TIMES DAILY
Qty: 360 TABLET | Refills: 0 | Status: SHIPPED | OUTPATIENT
Start: 2024-11-27

## 2024-11-27 RX ORDER — ASPIRIN 81 MG/1
81 TABLET ORAL DAILY
Qty: 30 TABLET | Refills: 0 | Status: SHIPPED | OUTPATIENT
Start: 2024-11-27

## 2024-11-27 RX ORDER — ISOSORBIDE DINITRATE 10 MG/1
40 TABLET ORAL 3 TIMES DAILY
Status: DISCONTINUED | OUTPATIENT
Start: 2024-11-27 | End: 2024-11-27 | Stop reason: HOSPADM

## 2024-11-27 RX ADMIN — ACETAMINOPHEN 650 MG: 325 TABLET ORAL at 05:06

## 2024-11-27 RX ADMIN — AMLODIPINE BESYLATE 10 MG: 10 TABLET ORAL at 08:54

## 2024-11-27 RX ADMIN — DOCUSATE SODIUM 50 MG AND SENNOSIDES 8.6 MG 2 TABLET: 8.6; 5 TABLET, FILM COATED ORAL at 08:54

## 2024-11-27 RX ADMIN — ISOSORBIDE DINITRATE 40 MG: 10 TABLET ORAL at 09:02

## 2024-11-27 RX ADMIN — SACUBITRIL AND VALSARTAN 1 TABLET: 49; 51 TABLET, FILM COATED ORAL at 08:53

## 2024-11-27 RX ADMIN — LEVETIRACETAM 500 MG: 500 TABLET, FILM COATED ORAL at 08:53

## 2024-11-27 RX ADMIN — HYDRALAZINE HYDROCHLORIDE 100 MG: 50 TABLET ORAL at 08:53

## 2024-11-27 RX ADMIN — ENOXAPARIN SODIUM 40 MG: 100 INJECTION SUBCUTANEOUS at 08:54

## 2024-11-27 RX ADMIN — METOPROLOL SUCCINATE 100 MG: 100 TABLET, EXTENDED RELEASE ORAL at 08:54

## 2024-11-27 RX ADMIN — HYDRALAZINE HYDROCHLORIDE 10 MG: 20 INJECTION INTRAMUSCULAR; INTRAVENOUS at 05:06

## 2024-11-27 NOTE — PROGRESS NOTES
[facility] Intensivist Services    Date of Admission: 11/24/2024  Date of Note: 11/27/24  Primary Care Physician: He Ortega MD   LOS: 3 days     History   Next of Kin:  Primary Emergency Contact: Jesika Webb   Code Status: Code Status and Medical Interventions: CPR (Attempt to Resuscitate); Full Support    He is a 29 y.o. male admitted 11/24/2024 with Hypertensive emergency who also  has a past medical history of Cardiomegaly, Congenital fusion of carpal bone, HTN (hypertension), and Seizure.    On his chart (which can contain diagnoses that are not current) shows he  has Essential hypertension; Tobacco use; Chronic systolic (congestive) heart failure; NICM (nonischemic cardiomyopathy); Seizure disorder; Acute on chronic renal insufficiency; Congestive heart failure; Cocaine abuse; Medically noncompliant; and Hypertensive emergency on their problem list..     The ICU team was asked to evaluate the patient for hypertensive emergency with renal dysfunction and congestive heart failure.  He was transferred to our facility from [facility].  He does have a history of cardiomegaly, hypertension, and seizures.  He tells me that he is not taken many of his medications in a few days as he has been nauseous.  He is a marijuana user, but was noted to be also positive for cocaine on outside urine drug screen.  Based on our medication dispense history, it appears he has not filled many of his medications with the exception of metoprolol in several months.  He has a history of poor dentition, myocardial infarction, chronic bronchitis, cardiomegaly, renal failure, substance abuse disorder, and seizure disorder.  He has been restarted on all of his home medication.  Cardiology has increased his hydralazine to 50 mg 3 times daily.    Interval history:  11/25: Patient remains on isosorbide 20 mg 3 times daily, amlodipine 5 mg daily, Entresto 49-51 twice daily, and metoprolol 100  mg daily.  Hydralazine has been increased to 50 mg 3 times daily.  He has no complaints for me this morning.  Latest echocardiogram from yesterday shows EF 31 to 35%, moderate to severely dilated left ventricle, along with moderate concentric LVH, mildly dilated left atrium, and no hemodynamically significant valvular disease.  11/26  Was transferred to us on the floor overnight creatinine 4.18 ultrasound unremarkable cardiomyopathy EF 31% cardiology on board history of cocaine abuse felt nonischemic cardiomyopathy remains on hydralazine and Imdur Entresto Toprol-XL Norvasc consider LifeVest on discharge  11/27  As before appreciate cardiology and renal  Past Medical History     Active and Resolved Problems  Active Hospital Problems    Diagnosis  POA    **Hypertensive emergency [I16.1]  Unknown    Congestive heart failure [I50.9]  Yes    Cocaine abuse [F14.10]  Unknown    Medically noncompliant [Z91.199]  Not Applicable    Acute on chronic renal insufficiency [N28.9, N18.9]  Yes    Chronic systolic (congestive) heart failure [I50.22]  Yes    NICM (nonischemic cardiomyopathy) [I42.8]  Yes    Tobacco use [Z72.0]  Yes    Essential hypertension [I10]  Yes      Resolved Hospital Problems   No resolved problems to display.       Past Medical History:   Past Medical History:   Diagnosis Date    Cardiomegaly     Congenital fusion of carpal bone     HTN (hypertension)     Seizure        Prior Surgeries: He  has a past surgical history that includes Elbow surgery.    Past Surgical History:   Past Surgical History:   Procedure Laterality Date    ELBOW PROCEDURE         Social and Family History     Family History:  family history includes Heart disease in his mother; Hypertension in his mother; No Known Problems in his father.    Tobacco/Social History:  reports that he has been smoking cigarettes. He has never used smokeless tobacco. He reports current alcohol use. He reports current drug use. Drugs: Marijuana and  Cocaine(coke).    Allergies     Allergies:   He is allergic to penicillins.    Labs   Basic Labs:  CBC:      Lab 11/26/24 0228 11/25/24  0355 11/24/24  0543   WBC 6.47 7.42 7.55   HEMOGLOBIN 10.5* 9.9* 10.0*   HEMATOCRIT 34.6* 32.6* 31.6*   PLATELETS 271 253 230   NEUTROS ABS  --  5.09 5.32   IMMATURE GRANS (ABS)  --  0.02 0.01   LYMPHS ABS  --  1.59 1.68   MONOS ABS  --  0.44 0.35   EOS ABS  --  0.23 0.16   MCV 84.8 84.5 82.3       LIVER FUNCTION TESTS:      Lab 11/27/24 0236 11/26/24 0228 11/25/24  0355 11/24/24  0550   TOTAL PROTEIN 6.0 6.2 6.2 6.3   ALBUMIN 3.2* 3.2* 3.2* 3.2*   GLOBULIN 2.8 3.0 3.0 3.1   ALT (SGPT) 10 11 11 12   AST (SGOT) 12 12 12 12   BILIRUBIN 0.3 0.5 0.9 1.7*   ALK PHOS 58 65 66 70       ABG:      Lab 11/27/24  0236 11/26/24  0228 11/25/24  0355 11/24/24  0550   ANION GAP 8.0 13.0 14.0 17.0*       CMP:      Lab 11/27/24  0236 11/26/24 0228 11/25/24  0355 11/24/24  1621 11/24/24  0550   SODIUM 140 138 140  --  138   POTASSIUM 4.2 3.9 3.7 3.8 3.0*   CHLORIDE 108* 103 106  --  100   BUN 45* 44* 50*  --  64*   CREATININE 4.13* 4.18* 4.13*  --  4.95*   CALCIUM 8.7 8.6 8.6  --  8.6   MAGNESIUM  --   --  1.8  --  1.8   PHOSPHORUS  --   --  3.1  --  3.8       Diabetic:      Inpatient Medications   Medications: Scheduled Meds:amLODIPine, 10 mg, Oral, Daily  enoxaparin, 40 mg, Subcutaneous, Q24H  hydrALAZINE, 100 mg, Oral, TID  isosorbide dinitrate, 40 mg, Oral, TID  levETIRAcetam, 500 mg, Oral, Q12H  metoprolol succinate XL, 100 mg, Oral, Daily  nicotine, 1 patch, Transdermal, Q24H  sacubitril-valsartan, 1 tablet, Oral, BID  senna-docusate sodium, 2 tablet, Oral, BID  sodium chloride, 10 mL, Intravenous, Q12H      Continuous Infusions:     PRN Meds:.  acetaminophen **OR** acetaminophen    senna-docusate sodium **AND** polyethylene glycol **AND** bisacodyl **AND** bisacodyl    Calcium Replacement - Follow Nurse / BPA Driven Protocol    hydrALAZINE    LORazepam    Magnesium Standard Dose  "Replacement - Follow Nurse / BPA Driven Protocol    nitroglycerin    ondansetron ODT **OR** ondansetron    Phosphorus Replacement - Follow Nurse / BPA Driven Protocol    Potassium Replacement - Follow Nurse / BPA Driven Protocol    sodium chloride    sodium chloride    I have reviewed the patient's current medications.   Outpatient Medications     Current Outpatient Medications   Medication Instructions    amLODIPine (NORVASC) 5 mg, Oral, Daily, for blood pressure    hydrALAZINE (APRESOLINE) 25 mg, Oral, 3 Times Daily    isosorbide dinitrate (ISORDIL) 20 mg, Oral, 3 Times Daily    levETIRAcetam (KEPPRA) 500 mg, Oral, 2 Times Daily    metoprolol succinate XL (TOPROL-XL) 100 mg, Oral, Daily    Potassium 99 MG tablet 1 tablet, Oral, Daily    sacubitril-valsartan (Entresto) 49-51 MG tablet 1 tablet, Oral, 2 Times Daily       Current Antibiotics   This patient does not have an active medication from one of the medication groupers.    Exam   Vent settings for last 24 hours:       Vital signs for last 24 hours:  Temp:  [97.6 °F (36.4 °C)-98.8 °F (37.1 °C)] 97.6 °F (36.4 °C)  Heart Rate:  [82-90] 82  Resp:  [16-18] 16  BP: (130-163)/(66-93) 132/80    Vitals: His  height is 188 cm (74\") and weight is 93.5 kg (206 lb 3.2 oz). His oral temperature is 97.6 °F (36.4 °C). His blood pressure is 132/80 and his pulse is 82. His respiration is 16 and oxygen saturation is 98%.     GENERAL:  Alert, awake and oriented x 3, pleasant, no acute distress.   SKIN:  Warm, dry, capillary refill < 2 seconds  EYES:  Pupils equal, round and reactive to light.  EOMs intact.    HEAD:  Normocephalic, atraumatic  NECK:  Supple   RESP:  Lungs clear to auscultation. Good airflow. Normal respiratory effort.   CARDIAC:  Regular rate and rhythm. Normal S1,S2. No edema  GI:  Soft, nontender, normal bowel sounds  MSK:  Normal muscle bulk, tone  NEUROLOGICAL:  No focal neurological deficits. Moves all extremities well. Alert and Oriented x 3.   PSYCHIATRIC:  " "Normal affect and mood.      Intake/Output   Intake/Output this shift:  No intake/output data recorded.    Intake/Output last 3 shifts:  I/O last 3 completed shifts:  In: 1220 [P.O.:1220]  Out: 3625 [Urine:3625]    Results and Cultures Review     Result Review:  I have personally reviewed the results from the time of this admission to 11/27/2024 10:11 CST and agree with these findings:  [x]  Laboratory list / accordion  []  Microbiology  [x]  Radiology  []  EKG/Telemetry   [x]  Cardiology/Vascular   []  Pathology  []  Old records  []  Other:  Most notable findings include: BNP 23,000 612, BUN 50, creatinine 4.13, uric acid 10.6, hemoglobin 9.9, hematocrit 32.6.  Echocardiogram showed EF 31 to 35%, findings consistent with dilated cardiomyopathy, and mildly dilated left atrial cavity.      Culture Data:   No results found for: \"BLOODCX\", \"URINECX\", \"WOUNDCX\", \"MRSACX\", \"RESPCX\", \"STOOLCX\"    Assessment/Plan   29-year-old male with a history of myocardial infarction, chronic bronchitis, cardiomegaly, renal failure, substance use disorder, and seizure disorder admitted with hypertensive emergency, renal dysfunction, and congestive heart failure.  He has been restarted on his home antihypertensive medications.  He remains off of the Cardene infusion at this time.    Acute hypertensive emergency secondary to cocaine use:   -He denies cocaine use.  Says someone \"might have mixed in with my marijuana\" but he does admit to previous methamphetamine use.  -Continue supportive care.  -Patient did require cardene infusion early this morning, but it has since been weaned off  -Remains on sosorbide 20 mg 3 times daily, amlodipine 5 mg daily, Entresto 49-51 twice daily, and metoprolol 100 mg daily.  Hydralazine has been increased to 50 mg 3 times daily.  -Ativan ordered prn for chest pain given cocaine abuse.  -Counseled patient strongly regarding cessation of street drugs.  See also #4.  -Poor prognosis given patient's " noncompliance with medications, lack of prescription refills, changing dosages without requested changes from physician, noncompliance with tobacco and drug cessation.    History of Seizures  -No seizure activity noted at our facility   -Patient says he is supposed to be on Keppra but ran out.  -Home dose Keppra of  500 mg BID has been restarted    Tobacco abuse   -Recommend tobacco cessation.  Continue nicotine patch q24h    Cocaine, marijuana and history of methamphetamine abuse   -Recommend cessation.    Chest pain  -Completely resolved.  -Heparin drip was discontinued.  -Ativan available prn      Troponin elevation:  -In setting of cocaine use and hypertension as well as medication noncompliance.  Mild chronic noted from previous labs  -Echocardiogram results as mentioned above  -Cardiology following, we appreciate their recommendations     Chronic Systolic Congestive Heart failure  -EF 31 to 35% as noted on echo from yesterday  -Patient also found to have findings consistent with dilated cardiomyopathy  -Continue hydralazine, isosorbide, Entresto, Toprol-XL, and amlodipine as scheduled  -Patient may need LifeVest on discharge, defer to cardiology  -Patient will likely need outpatient follow-up with cardiology    Acute kidney injury  -In setting of substance abuse and hypertensive emergency  -Unclear as to patient's baseline renal function as we did not have previous labs here within the last 4 years on this patient  -BUN 50, creatinine 4.13 and improving  -Patient continues to have excellent urine output  -Renal ultrasound was unremarkable  -Nephrology following, we appreciate their recommendations  -Patient may benefit from from outpatient nephrology referral once discharged    VTE Prophylaxis:    Pharmacologic VTE prophylaxis orders are present.      Active VTE Prophylaxis:  Pharmacologic:        Start     Dose Route Frequency Stop    11/24/24 0900  Enoxaparin Sodium (LOVENOX) syringe 40 mg         40 mg SC  Every 24 Hours --                  Select Mechanical VTE Prophylaxis if Desired & Appropriate    Code Status and Medical Interventions: CPR (Attempt to Resuscitate); Full Support   Ordered at: 11/24/24 0646     Level Of Support Discussed With:    Patient     Code Status (Patient has no pulse and is not breathing):    CPR (Attempt to Resuscitate)     Medical Interventions (Patient has pulse or is breathing):    Full Support         Part of this note may be an electronic transcription/translation of spoken language to printed text using the Dragon Dictation System    Electronically signed by Jenna Ospina MD on 11/27/2024 at 10:11 CST

## 2024-11-27 NOTE — PLAN OF CARE
Problem: Adult Inpatient Plan of Care  Goal: Plan of Care Review  Outcome: Progressing  Flowsheets (Taken 11/27/2024 0511)  Progress: no change  Outcome Evaluation: Patient had an uneventful night, and rested well.  Patient woke this morning with c/o a toothache rated 9/10.  He said that it was pain was because of lack of a tooth.  His bp was also slightly elevated.  Hydralizine PRN given.  Output greater than input.  Cont. to monitor.  call for concerns.  Plan of Care Reviewed With: patient   Goal Outcome Evaluation:  Plan of Care Reviewed With: patient        Progress: no change  Outcome Evaluation: Patient had an uneventful night, and rested well.  Patient woke this morning with c/o a toothache rated 9/10.  He said that it was pain was because of lack of a tooth.  His bp was also slightly elevated.  Hydralizine PRN given.  Output greater than input.  Cont. to monitor.  call for concerns.

## 2024-11-27 NOTE — PROGRESS NOTES
[facility] -  Progress Note     LOS: 3 days   Patient Care Team:  He Ortega MD as PCP - General (Internal Medicine)    Chief Complaint: Follow-up shortness of breath    Subjective     Interval History:   Overnight patient is doing well.  He denies any cardiac complaints.  He is able to lie flat in bed with no shortness of breath.  Ambulating well in the room.  Voiding appropriately.  Believes he is ready go home.      Review of Systems:     Review of Systems   All other systems reviewed and are negative.    Objective     Vital Sign Min/Max for last 24 hours  Temp  Min: 97.6 °F (36.4 °C)  Max: 98.8 °F (37.1 °C)   BP  Min: 130/66  Max: 163/93   Pulse  Min: 82  Max: 90   Resp  Min: 16  Max: 18   SpO2  Min: 96 %  Max: 100 %   No data recorded   Weight  Min: 93.5 kg (206 lb 3.2 oz)  Max: 93.5 kg (206 lb 3.2 oz)         11/27/24  0454   Weight: 93.5 kg (206 lb 3.2 oz)       Telemetry: Normal sinus rhythm rate in the 80s      Physical Exam:    Constitutional:       Appearance: Healthy appearance. Not in distress.   Neck:      Vascular: JVD normal.   Pulmonary:      Effort: Pulmonary effort is normal.      Breath sounds: Normal breath sounds. No wheezing. No rhonchi. No rales.   Cardiovascular:      PMI at left midclavicular line. Normal rate. Regular rhythm. Normal S1. Normal S2.       Murmurs: There is no murmur.      No gallop.  No click. No rub.   Edema:     Peripheral edema absent.   Musculoskeletal: Normal range of motion.      Cervical back: Normal range of motion. Skin:     General: Skin is warm and dry.   Neurological:      Mental Status: Alert and oriented to person, place and time.       Results Review:   Lab Results (last 72 hours)       Procedure Component Value Units Date/Time    Comprehensive Metabolic Panel [[id number]]  (Abnormal) Collected: 11/27/24 0236    Specimen: Blood Updated: 11/27/24 0321     Glucose 99 mg/dL      BUN 45 mg/dL      Creatinine 4.13 mg/dL      Sodium 140  mmol/L      Potassium 4.2 mmol/L      Chloride 108 mmol/L      CO2 24.0 mmol/L      Calcium 8.7 mg/dL      Total Protein 6.0 g/dL      Albumin 3.2 g/dL      ALT (SGPT) 10 U/L      AST (SGOT) 12 U/L      Alkaline Phosphatase 58 U/L      Total Bilirubin 0.3 mg/dL      Globulin 2.8 gm/dL      A/G Ratio 1.1 g/dL      BUN/Creatinine Ratio 10.9     Anion Gap 8.0 mmol/L      eGFR 19.3 mL/min/1.73     Narrative:      GFR Normal >60  Chronic Kidney Disease <60  Kidney Failure <15      Vitamin D,25-Hydroxy [[id number]]  (Abnormal) Collected: 11/26/24 0228    Specimen: Blood Updated: 11/26/24 1155     25 Hydroxy, Vitamin D 8.3 ng/ml     Narrative:      Reference Range for Total Vitamin D 25(OH)     Deficiency <20.0 ng/mL   Insufficiency 21-29 ng/mL   Sufficiency  ng/mL  Toxicity >100 ng/ml      Comprehensive Metabolic Panel [[id number]]  (Abnormal) Collected: 11/26/24 0228    Specimen: Blood Updated: 11/26/24 0358     Glucose 99 mg/dL      BUN 44 mg/dL      Creatinine 4.18 mg/dL      Sodium 138 mmol/L      Potassium 3.9 mmol/L      Chloride 103 mmol/L      CO2 22.0 mmol/L      Calcium 8.6 mg/dL      Total Protein 6.2 g/dL      Albumin 3.2 g/dL      ALT (SGPT) 11 U/L      AST (SGOT) 12 U/L      Alkaline Phosphatase 65 U/L      Total Bilirubin 0.5 mg/dL      Globulin 3.0 gm/dL      A/G Ratio 1.1 g/dL      BUN/Creatinine Ratio 10.5     Anion Gap 13.0 mmol/L      eGFR 19.0 mL/min/1.73     Narrative:      GFR Normal >60  Chronic Kidney Disease <60  Kidney Failure <15      CBC (No Diff) [[id number]]  (Abnormal) Collected: 11/26/24 0228    Specimen: Blood Updated: 11/26/24 0333     WBC 6.47 10*3/mm3      RBC 4.08 10*6/mm3      Hemoglobin 10.5 g/dL      Hematocrit 34.6 %      MCV 84.8 fL      MCH 25.7 pg      MCHC 30.3 g/dL      RDW 16.9 %      RDW-SD 52.1 fl      MPV 11.0 fL      Platelets 271 10*3/mm3     Creatinine Urine Random (kidney function) GFR component - Urine, Clean Catch [[id number]] Collected: 11/24/24 2041    Specimen:  Urine, Clean Catch Updated: 11/25/24 1157     Creatinine, Urine 33.5 mg/dL     Narrative:      Reference intervals for random urine have not been established.  Clinical usage is dependent upon physician's interpretation in combination with other laboratory tests.       Urea Nitrogen, Urine - Urine, Clean Catch [[id number]] Collected: 11/24/24 2041    Specimen: Urine, Clean Catch Updated: 11/25/24 1157     Urea Nitrogen, Urine 280 mg/dL     Narrative:      Reference intervals for random urine have not been established.  Clinical usage is dependent upon physician's interpretation in combination with other laboratory tests.       Vitamin D,25-Hydroxy [[id number]]  (Abnormal) Collected: 11/25/24 0355    Specimen: Blood Updated: 11/25/24 1147     25 Hydroxy, Vitamin D 12.8 ng/ml     Narrative:      Reference Range for Total Vitamin D 25(OH)     Deficiency <20.0 ng/mL   Insufficiency 21-29 ng/mL   Sufficiency  ng/mL  Toxicity >100 ng/ml      Uric Acid [[id number]]  (Abnormal) Collected: 11/25/24 0355    Specimen: Blood Updated: 11/25/24 0926     Uric Acid 10.6 mg/dL     Comprehensive Metabolic Panel [[id number]]  (Abnormal) Collected: 11/25/24 0355    Specimen: Blood Updated: 11/25/24 0520     Glucose 88 mg/dL      BUN 50 mg/dL      Creatinine 4.13 mg/dL      Sodium 140 mmol/L      Potassium 3.7 mmol/L      Chloride 106 mmol/L      CO2 20.0 mmol/L      Calcium 8.6 mg/dL      Total Protein 6.2 g/dL      Albumin 3.2 g/dL      ALT (SGPT) 11 U/L      AST (SGOT) 12 U/L      Alkaline Phosphatase 66 U/L      Total Bilirubin 0.9 mg/dL      Globulin 3.0 gm/dL      A/G Ratio 1.1 g/dL      BUN/Creatinine Ratio 12.1     Anion Gap 14.0 mmol/L      eGFR 19.3 mL/min/1.73     Narrative:      GFR Normal >60  Chronic Kidney Disease <60  Kidney Failure <15      Magnesium [[id number]]  (Normal) Collected: 11/25/24 0355    Specimen: Blood Updated: 11/25/24 0520     Magnesium 1.8 mg/dL     Phosphorus [[id number]]  (Normal) Collected: 11/25/24  0355    Specimen: Blood Updated: 11/25/24 0520     Phosphorus 3.1 mg/dL     PTH, Intact [[id number]]  (Abnormal) Collected: 11/25/24 0355    Specimen: Blood Updated: 11/25/24 0455     PTH, Intact 238.2 pg/mL     Narrative:      Results may be falsely decreased if patient taking Biotin.      CBC & Differential [[id number]]  (Abnormal) Collected: 11/25/24 0355    Specimen: Blood Updated: 11/25/24 0444    Narrative:      The following orders were created for panel order CBC & Differential.  Procedure                               Abnormality         Status                     ---------                               -----------         ------                     CBC Auto Differential[[id number]]        Abnormal            Final result                 Please view results for these tests on the individual orders.    CBC Auto Differential [[id number]]  (Abnormal) Collected: 11/25/24 0355    Specimen: Blood Updated: 11/25/24 0444     WBC 7.42 10*3/mm3      RBC 3.86 10*6/mm3      Hemoglobin 9.9 g/dL      Hematocrit 32.6 %      MCV 84.5 fL      MCH 25.6 pg      MCHC 30.4 g/dL      RDW 17.1 %      RDW-SD 52.6 fl      MPV 11.1 fL      Platelets 253 10*3/mm3      Neutrophil % 68.6 %      Lymphocyte % 21.4 %      Monocyte % 5.9 %      Eosinophil % 3.1 %      Basophil % 0.7 %      Immature Grans % 0.3 %      Neutrophils, Absolute 5.09 10*3/mm3      Lymphocytes, Absolute 1.59 10*3/mm3      Monocytes, Absolute 0.44 10*3/mm3      Eosinophils, Absolute 0.23 10*3/mm3      Basophils, Absolute 0.05 10*3/mm3      Immature Grans, Absolute 0.02 10*3/mm3      nRBC 0.0 /100 WBC     Urinalysis With Microscopic If Indicated (No Culture) - Urine, Clean Catch [[id number]]  (Abnormal) Collected: 11/24/24 2041    Specimen: Urine, Clean Catch Updated: 11/24/24 2136     Color, UA Yellow     Appearance, UA Clear     pH, UA 8.5     Specific Gravity, UA 1.010     Glucose,  mg/dL (Trace)     Ketones, UA Negative     Bilirubin, UA Negative     Blood, UA  "Negative     Protein, UA 30 mg/dL (1+)     Leuk Esterase, UA Negative     Nitrite, UA Negative     Urobilinogen, UA 1.0 E.U./dL    Urinalysis, Microscopic Only - Urine, Clean Catch [[id number]] Collected: 11/24/24 2041    Specimen: Urine, Clean Catch Updated: 11/24/24 2136     RBC, UA 0-2 /HPF      WBC, UA None Seen /HPF      Bacteria, UA None Seen /HPF      Squamous Epithelial Cells, UA None Seen /HPF      Hyaline Casts, UA None Seen /LPF      Methodology Automated Microscopy    Sodium, Urine, Random - Urine, Clean Catch [[id number]] Collected: 11/24/24 2041    Specimen: Urine, Clean Catch Updated: 11/24/24 2117     Sodium, Urine 119 mmol/L     Narrative:      Reference intervals for random urine have not been established.  Clinical usage is dependent upon physician's interpretation in combination with other laboratory tests.       Protein, Urine, Random - Urine, Clean Catch [[id number]] Collected: 11/24/24 2041    Specimen: Urine, Clean Catch Updated: 11/24/24 2117     Total Protein, Urine 25.4 mg/dL     Narrative:      Reference intervals for random urine have not been established.  Clinical usage is dependent upon physician's interpretation in combination with other laboratory tests.       Potassium [[id number]]  (Normal) Collected: 11/24/24 1621    Specimen: Blood Updated: 11/24/24 1645     Potassium 3.8 mmol/L     aPTT [[id number]]  (Normal) Collected: 11/24/24 1221    Specimen: Blood Updated: 11/24/24 1255     PTT 33.6 seconds                 Echo EF Estimated  No results found for: \"ECHOEFEST\"      Cath Ejection Fraction Quantitative  No results found for: \"CATHEF\"        Medication Review: yes  Current Facility-Administered Medications   Medication Dose Route Frequency Provider Last Rate Last Admin    acetaminophen (TYLENOL) tablet 650 mg  650 mg Oral Q4H PRN Mik Jaimes MD   650 mg at 11/27/24 0506    Or    acetaminophen (TYLENOL) suppository 650 mg  650 mg Rectal Q6H PRN Mik Jaimes MD        " amLODIPine (NORVASC) tablet 10 mg  10 mg Oral Daily Bassem Coon APRN   10 mg at 11/27/24 0854    sennosides-docusate (PERICOLACE) 8.6-50 MG per tablet 2 tablet  2 tablet Oral BID Mik Jaimes MD   2 tablet at 11/27/24 0854    And    polyethylene glycol (MIRALAX) packet 17 g  17 g Oral Daily PRN Mik Jaimes MD        And    bisacodyl (DULCOLAX) EC tablet 5 mg  5 mg Oral Daily PRN Mik Jaimes MD        And    bisacodyl (DULCOLAX) suppository 10 mg  10 mg Rectal Daily PRN Mik Jaimes MD        Calcium Replacement - Follow Nurse / BPA Driven Protocol   Not Applicable PRN Mik Jaimes MD        Enoxaparin Sodium (LOVENOX) syringe 40 mg  40 mg Subcutaneous Q24H Mik Jaimes MD   40 mg at 11/27/24 0854    hydrALAZINE (APRESOLINE) injection 10 mg  10 mg Intravenous Q4H PRN Nallely Fuller APRN   10 mg at 11/27/24 0506    hydrALAZINE (APRESOLINE) tablet 100 mg  100 mg Oral TID Jenna Ospina MD   100 mg at 11/27/24 0853    isosorbide dinitrate (ISORDIL) tablet 40 mg  40 mg Oral TID Mani Mena MD   40 mg at 11/27/24 0902    levETIRAcetam (KEPPRA) tablet 500 mg  500 mg Oral Q12H Mik Jaimes MD   500 mg at 11/27/24 0853    LORazepam (ATIVAN) injection 2 mg  2 mg Intravenous Q2H PRN Mik Jaimes MD   2 mg at 11/24/24 0625    Magnesium Standard Dose Replacement - Follow Nurse / BPA Driven Protocol   Not Applicable PRN Mik Jaimes MD        metoprolol succinate XL (TOPROL-XL) 24 hr tablet 100 mg  100 mg Oral Daily Mik Jaimes MD   100 mg at 11/27/24 0854    nicotine (NICODERM CQ) 14 MG/24HR patch 1 patch  1 patch Transdermal Q24H Mik Jaimes MD        nitroglycerin (NITROSTAT) SL tablet 0.4 mg  0.4 mg Sublingual Q5 Min PRN Mik Jaimes MD        ondansetron ODT (ZOFRAN-ODT) disintegrating tablet 4 mg  4 mg Oral Q6H PRN Mik Jaimes MD        Or    ondansetron (ZOFRAN) injection 4 mg  4 mg Intravenous Q6H PRN Mik Jaimes MD        Phosphorus Replacement -  Follow Nurse / BPA Driven Protocol   Not Applicable PRN Mik Jaimes MD        Potassium Replacement - Follow Nurse / BPA Driven Protocol   Not Applicable PRMik Dueñas MD        sacubitril-valsartan (ENTRESTO) 49-51 MG tablet 1 tablet  1 tablet Oral BID Mik Jaimes MD   1 tablet at 11/27/24 0853    sodium chloride 0.9 % flush 10 mL  10 mL Intravenous Q12H Mik Jaimes MD   10 mL at 11/26/24 2108    sodium chloride 0.9 % flush 10 mL  10 mL Intravenous PRN Mik Jaimes MD        sodium chloride 0.9 % infusion 40 mL  40 mL Intravenous PRN Mik Jaimes MD             Assessment & Plan     1.  Hypertensive emergency  2.  Essential hypertension  3.  Chronic systolic CHF  4.  Nonischemic cardiomyopathy  5.  Acute on chronic renal insufficiency-improving  6.  Cocaine use  7.  Medical nonadherence    -At this time the patient appears euvolemic and tolerating his regimen well  - Increase Isordil to 40 mg 3 times daily given persistent hypertension  - Continue Toprol- mg daily, Entresto 49/51 mg twice daily, amlodipine 10 mg daily, and hydralazine 100 mg 3 times daily  - Consider further diuretic therapy in the future, however currently he does not appear to have symptoms of volume overload  -Discussed LifeVest with patient, he declines    At this time general cardiology will sign off.  Recommend patient have an expedited follow-up with Dr. Aguirre  at [facility] cardiology at discharge.      Electronically signed by SUKHDEV Cooley, 11/27/24, 11:05 AM LILY.

## 2024-11-27 NOTE — DISCHARGE SUMMARY
[facility] Medicine Services  DISCHARGE SUMMARY       Date of Admission: 11/24/2024  Date of Discharge:  11/27/2024  Primary Care Physician: He Ortega MD    Presenting Problem/History of Present Illness:  He is a 29 y.o. male admitted 11/24/2024 with Hypertensive emergency who also  has a past medical history of Cardiomegaly, Congenital fusion of carpal bone, HTN (hypertension), and Seizure.     On his chart (which can contain diagnoses that are not current) shows he  has Essential hypertension; Tobacco use; Chronic systolic (congestive) heart failure; NICM (nonischemic cardiomyopathy); Seizure disorder; Acute on chronic renal insufficiency; Congestive heart failure; Cocaine abuse; Medically noncompliant; and Hypertensive emergency on their problem list..      The ICU team was asked to evaluate the patient for hypertensive emergency with renal dysfunction and congestive heart failure.  He was transferred to our facility from [facility].  He does have a history of cardiomegaly, hypertension, and seizures.  He tells me that he is not taken many of his medications in a few days as he has been nauseous.  He is a marijuana user, but was noted to be also positive for cocaine on outside urine drug screen.  Based on our medication dispense history, it appears he has not filled many of his medications with the exception of metoprolol in several months.  He has a history of poor dentition, myocardial infarction, chronic bronchitis, cardiomegaly, renal failure, substance abuse disorder, and seizure disorder.  He has been restarted on all of his home medication.  Cardiology has increased his hydralazine to 50 mg 3 times daily.     Interval history:  11/25: Patient remains on isosorbide 20 mg 3 times daily, amlodipine 5 mg daily, Entresto 49-51 twice daily, and metoprolol 100 mg daily.  Hydralazine has been increased to 50 mg 3 times daily.  He has no complaints for me  this morning.  Latest echocardiogram from yesterday shows EF 31 to 35%, moderate to severely dilated left ventricle, along with moderate concentric LVH, mildly dilated left atrium, and no hemodynamically significant valvular disease.  11/26  Was transferred to us on the floor overnight creatinine 4.18 ultrasound unremarkable cardiomyopathy EF 31% cardiology on board history of cocaine abuse felt nonischemic cardiomyopathy remains on hydralazine and Imdur Entresto Toprol-XL Norvasc consider LifeVest on discharge  11/27  As before appreciate cardiology and renal  - Increase Isordil to 40 mg 3 times daily given persistent hypertension  - Continue Toprol- mg daily, Entresto 49/51 mg twice daily, amlodipine 10 mg daily, and hydralazine 100 mg 3 times daily  - Consider further diuretic therapy in the future, however currently he does not appear to have symptoms of volume overload  -Discussed LifeVest with patient, he declines    Final Discharge Diagnoses:  Active Hospital Problems    Diagnosis     **Hypertensive emergency     Congestive heart failure     Cocaine abuse     Medically noncompliant     Acute on chronic renal insufficiency     Chronic systolic (congestive) heart failure     NICM (nonischemic cardiomyopathy)     Tobacco use     Essential hypertension        Consults: Cardiology nephrology    Procedures Performed: None    Pertinent Test Results:   Results for orders placed during the hospital encounter of 11/24/24    Adult Transthoracic Echo Complete W/ Cont if Necessary Per Protocol    Interpretation Summary    Left ventricular systolic function is severely decreased. Left ventricular ejection fraction appears to be 31 - 35%.    The left ventricular cavity is moderate to severely dilated. Left ventricular wall thickness is consistent with moderate concentric hypertrophy.    The findings are consistent with dilated cardiomyopathy.    Mildly reduced right ventricular systolic function noted.    The left  atrial cavity is mildly dilated.    No hemodynamically significant valvular disease.      Imaging Results (All)       Procedure Component Value Units Date/Time    US Renal Bilateral [[id number]] Collected: 11/25/24 0836     Updated: 11/25/24 0840    Narrative:      RENAL ULTRASOUND COMPLETE 11/25/2024 6:59 AM     REASON FOR EXAM: ACUTE KIDNEY INJURY       COMPARISON: None       TECHNIQUE: Multiple longitudinal and transverse realtime sonographic  images of the kidneys and urinary bladder are obtained.  Images and  report are stored in PACS per institutional and state regulations.     FINDINGS:     RIGHT KIDNEY: 10.9 cm. Normal in size, shape, contour and position. No  solid or cystic masses. The central echo complex is compact with no  evidence for hydronephrosis. No nephrolithiasis or abnormal perinephric  fluid collections . No hydroureter.     LEFT KIDNEY: 10.7 cm. Normal in size, shape, contour and position. No  solid or cystic masses. The central echo complex is compact with no  evidence for hydronephrosis. No nephrolithiasis or abnormal perinephric  fluid collections . No hydroureter.     PELVIS: The bladder is mildly distended with anechoic urine and  demonstrates no significant wall thickening or internal echogenicities.  There is no surrounding ascites.       Impression:      1. Unremarkable renal ultrasound.           This report was signed and finalized on 11/25/2024 8:36 AM by Dr. uJan Parrish MD.             LAB RESULTS:      Lab 11/26/24  0228 11/25/24  0355 11/24/24  1221 11/24/24  0550 11/24/24  0543   WBC 6.47 7.42  --   --  7.55   HEMOGLOBIN 10.5* 9.9*  --   --  10.0*   HEMATOCRIT 34.6* 32.6*  --   --  31.6*   PLATELETS 271 253  --   --  230   NEUTROS ABS  --  5.09  --   --  5.32   IMMATURE GRANS (ABS)  --  0.02  --   --  0.01   LYMPHS ABS  --  1.59  --   --  1.68   MONOS ABS  --  0.44  --   --  0.35   EOS ABS  --  0.23  --   --  0.16   MCV 84.8 84.5  --   --  82.3   LACTATE  --   --   --  0.9   --    PROTIME  --   --   --   --  16.8*   APTT  --   --  33.6  --  62.9*         Lab 11/27/24  0236 11/26/24 0228 11/25/24  0355 11/24/24  1621 11/24/24  0550   SODIUM 140 138 140  --  138   POTASSIUM 4.2 3.9 3.7 3.8 3.0*   CHLORIDE 108* 103 106  --  100   CO2 24.0 22.0 20.0*  --  21.0*   ANION GAP 8.0 13.0 14.0  --  17.0*   BUN 45* 44* 50*  --  64*   CREATININE 4.13* 4.18* 4.13*  --  4.95*   EGFR 19.3* 19.0* 19.3*  --  15.5*   GLUCOSE 99 99 88  --  94   CALCIUM 8.7 8.6 8.6  --  8.6   MAGNESIUM  --   --  1.8  --  1.8   PHOSPHORUS  --   --  3.1  --  3.8         Lab 11/27/24  0236 11/26/24 0228 11/25/24  0355 11/24/24  0550   TOTAL PROTEIN 6.0 6.2 6.2 6.3   ALBUMIN 3.2* 3.2* 3.2* 3.2*   GLOBULIN 2.8 3.0 3.0 3.1   ALT (SGPT) 10 11 11 12   AST (SGOT) 12 12 12 12   BILIRUBIN 0.3 0.5 0.9 1.7*   ALK PHOS 58 65 66 70         Lab 11/24/24  0550 11/24/24  0543   PROBNP 23,612.0*  --    PROTIME  --  16.8*   INR  --  1.30*                 Brief Urine Lab Results  (Last result in the past 365 days)        Color   Clarity   Blood   Leuk Est   Nitrite   Protein   CREAT   Urine HCG        11/24/24 2041             33.5         11/24/24 2041 Yellow   Clear   Negative   Negative   Negative   30 mg/dL (1+)                 Microbiology Results (last 10 days)       ** No results found for the last 240 hours. **            Hospital Course:   He is a 29 y.o. male admitted 11/24/2024 with Hypertensive emergency who also  has a past medical history of Cardiomegaly, Congenital fusion of carpal bone, HTN (hypertension), and Seizure.     On his chart (which can contain diagnoses that are not current) shows he  has Essential hypertension; Tobacco use; Chronic systolic (congestive) heart failure; NICM (nonischemic cardiomyopathy); Seizure disorder; Acute on chronic renal insufficiency; Congestive heart failure; Cocaine abuse; Medically noncompliant; and Hypertensive emergency on their problem list..      The ICU team was asked to evaluate the  "patient for hypertensive emergency with renal dysfunction and congestive heart failure.  He was transferred to our facility from [facility].  He does have a history of cardiomegaly, hypertension, and seizures.  He tells me that he is not taken many of his medications in a few days as he has been nauseous.  He is a marijuana user, but was noted to be also positive for cocaine on outside urine drug screen.  Based on our medication dispense history, it appears he has not filled many of his medications with the exception of metoprolol in several months.  He has a history of poor dentition, myocardial infarction, chronic bronchitis, cardiomegaly, renal failure, substance abuse disorder, and seizure disorder.  He has been restarted on all of his home medication.  Cardiology has increased his hydralazine to 50 mg 3 times daily.     Interval history:  11/25: Patient remains on isosorbide 20 mg 3 times daily, amlodipine 5 mg daily, Entresto 49-51 twice daily, and metoprolol 100 mg daily.  Hydralazine has been increased to 50 mg 3 times daily.  He has no complaints for me this morning.  Latest echocardiogram from yesterday shows EF 31 to 35%, moderate to severely dilated left ventricle, along with moderate concentric LVH, mildly dilated left atrium, and no hemodynamically significant valvular disease.  11/26  Was transferred to us on the floor overnight creatinine 4.18 ultrasound unremarkable cardiomyopathy EF 31% cardiology on board history of cocaine abuse felt nonischemic cardiomyopathy remains on hydralazine and Imdur Entresto Toprol-XL Norvasc consider LifeVest on discharge  11/27  As before appreciate cardiology and renal    Physical Exam on Discharge:  /84 (BP Location: Left arm, Patient Position: Sitting)   Pulse 86   Temp 97.4 °F (36.3 °C) (Oral)   Resp 16   Ht 188 cm (74\")   Wt 93.5 kg (206 lb 3.2 oz)   SpO2 99%   BMI 26.47 kg/m²   Physical Exam  HENT:      Head: Normocephalic.      Nose: " Nose normal.   Eyes:      Pupils: Pupils are equal, round, and reactive to light.   Cardiovascular:      Rate and Rhythm: Normal rate.   Pulmonary:      Effort: Pulmonary effort is normal.   Abdominal:      General: Abdomen is flat.   Musculoskeletal:      Cervical back: Normal range of motion.   Neurological:      Mental Status: He is alert.           Condition on Discharge: Stable    Discharge Disposition:  Home or Self Care    Discharge Medications:     Discharge Medications        New Medications        Instructions Start Date   aspirin 81 MG EC tablet   81 mg, Oral, Daily      furosemide 20 MG tablet  Commonly known as: Lasix   20 mg, Oral, 2 Times Daily             Changes to Medications        Instructions Start Date   amLODIPine 10 MG tablet  Commonly known as: NORVASC  What changed:   medication strength  how much to take  additional instructions   10 mg, Oral, Daily   Start Date: November 28, 2024     hydrALAZINE 25 MG tablet  Commonly known as: APRESOLINE  What changed: Another medication with the same name was added. Make sure you understand how and when to take each.   25 mg, Oral, 3 Times Daily      hydrALAZINE 100 MG tablet  Commonly known as: APRESOLINE  What changed: You were already taking a medication with the same name, and this prescription was added. Make sure you understand how and when to take each.   100 mg, Oral, 3 Times Daily      isosorbide dinitrate 20 MG tablet  Commonly known as: ISORDIL  What changed: Another medication with the same name was added. Make sure you understand how and when to take each.   20 mg, Oral, 3 Times Daily      isosorbide dinitrate 40 MG tablet  Commonly known as: ISORDIL  What changed: You were already taking a medication with the same name, and this prescription was added. Make sure you understand how and when to take each.   40 mg, Oral, 3 Times Daily             Continue These Medications        Instructions Start Date   Entresto 49-51 MG tablet  Generic  drug: sacubitril-valsartan   1 tablet, Oral, 2 Times Daily      levETIRAcetam 500 MG tablet  Commonly known as: KEPPRA   500 mg, Oral, 2 Times Daily      metoprolol succinate  MG 24 hr tablet  Commonly known as: TOPROL-XL   100 mg, Oral, Daily      Potassium 99 MG tablet   1 tablet, Oral, Daily                   Discharge Diet:     Activity at Discharge:     Follow-up Appointments:   No future appointments.    Test Results Pending at Discharge: no    Electronically signed by Jenna Ospina MD, 11/27/24, 11:37 CST.    Time: 45 minutes.

## 2024-11-27 NOTE — PROGRESS NOTES
Nephrology ([facility]) Progress Note      Patient:  YESICA Webb  YOB: 1995  Date of Service: 11/27/2024  MRN: [id number]   Acct: [id number]   Primary Care Physician: He Ortega MD  Advance Directive:   Code Status and Medical Interventions: CPR (Attempt to Resuscitate); Full Support   Ordered at: 11/24/24 0646     Level Of Support Discussed With:    Patient     Code Status (Patient has no pulse and is not breathing):    CPR (Attempt to Resuscitate)     Medical Interventions (Patient has pulse or is breathing):    Full Support     Admit Date: 11/24/2024       Hospital Day: 3  Referring Provider: Mik Jaimes MD      Patient personally seen and examined.  Complete chart including Consults, Notes, Operative Reports, Labs, Cardiology, and Radiology studies reviewed as able.        Subjective:  YESICA Webb is a 29 y.o. male for whom we were consulted for evaluation and treatment of acute kidney injury/hypertension.  Patient previously seen in our office once in 202 but lost to follow up since then. Had repeat referral earlier this year but no-showed for his visit. History of hypertension, dilated cardiomyopathy, chronic systolic CHF, seizure disorder, polysubstance abuse. Had note been taking any of his home medications recently. Presented to [place] ER with chest pain, dyspnea, n/v. Found to have blood pressure >200 systolic and MINDY. Transferred to [facility]. Initial labs at this facility showed creatinine 4.95, BUN 64. Blood pressure managed with Cardene drip and resumption of home medications. On initial nephrology exam denied chest pain or dyspnea. No urine changes. No hemopytsis or rash. BP improved and Cardene weaned off. Moved to medical floor    Today is awake and alert. No new complaints or overnight issues. Urine output nonoliguric. Bps have overall improved.     Allergies:  Penicillins    Home Meds:  Medications Prior to Admission    Medication Sig Dispense Refill Last Dose/Taking    metoprolol succinate XL (TOPROL-XL) 100 MG 24 hr tablet Take 1 tablet by mouth Daily. 90 tablet 3 Past Week    amLODIPine (NORVASC) 5 MG tablet Take 1 tablet by mouth Daily. for blood pressure 90 tablet 3 More than a month    hydrALAZINE (APRESOLINE) 25 MG tablet Take 1 tablet by mouth 3 (Three) Times a Day. 270 tablet 3 More than a month    isosorbide dinitrate (ISORDIL) 20 MG tablet Take 1 tablet by mouth 3 (Three) Times a Day. 270 tablet 3 More than a month    levETIRAcetam (KEPPRA) 500 MG tablet Take 1 tablet by mouth 2 (Two) Times a Day.   More than a month    Potassium 99 MG tablet Take 1 tablet by mouth Daily.   More than a month    sacubitril-valsartan (Entresto) 49-51 MG tablet Take 1 tablet by mouth 2 (Two) Times a Day. 180 tablet 3 More than a month       Medicines:  Current Facility-Administered Medications   Medication Dose Route Frequency Provider Last Rate Last Admin    acetaminophen (TYLENOL) tablet 650 mg  650 mg Oral Q4H PRN Mik Jaimes MD   650 mg at 11/27/24 0506    Or    acetaminophen (TYLENOL) suppository 650 mg  650 mg Rectal Q6H PRN Mik Jaimes MD        amLODIPine (NORVASC) tablet 10 mg  10 mg Oral Daily Bassem Coon APRN   10 mg at 11/27/24 0854    sennosides-docusate (PERICOLACE) 8.6-50 MG per tablet 2 tablet  2 tablet Oral BID Mik Jaimes MD   2 tablet at 11/27/24 0854    And    polyethylene glycol (MIRALAX) packet 17 g  17 g Oral Daily PRN Mik Jaimes MD        And    bisacodyl (DULCOLAX) EC tablet 5 mg  5 mg Oral Daily PRN Mik Jaimes MD        And    bisacodyl (DULCOLAX) suppository 10 mg  10 mg Rectal Daily PRN Mik Jaimes MD        Calcium Replacement - Follow Nurse / BPA Driven Protocol   Not Applicable PRN Mik Jaimes MD        Enoxaparin Sodium (LOVENOX) syringe 40 mg  40 mg Subcutaneous Q24H Mik Jaimes MD   40 mg at 11/27/24 0854    hydrALAZINE (APRESOLINE) injection 10 mg  10 mg  Intravenous Q4H PRN Nallely Fuller APRN   10 mg at 11/27/24 0506    hydrALAZINE (APRESOLINE) tablet 100 mg  100 mg Oral TID Jenna Ospina MD   100 mg at 11/27/24 0853    isosorbide dinitrate (ISORDIL) tablet 40 mg  40 mg Oral TID Mani Mena MD   40 mg at 11/27/24 0902    levETIRAcetam (KEPPRA) tablet 500 mg  500 mg Oral Q12H Mik Jaimes MD   500 mg at 11/27/24 0853    LORazepam (ATIVAN) injection 2 mg  2 mg Intravenous Q2H PRN Mik Jaimes MD   2 mg at 11/24/24 0625    Magnesium Standard Dose Replacement - Follow Nurse / BPA Driven Protocol   Not Applicable PRN Mik Jaimes MD        metoprolol succinate XL (TOPROL-XL) 24 hr tablet 100 mg  100 mg Oral Daily Mik Jaimes MD   100 mg at 11/27/24 0854    nicotine (NICODERM CQ) 14 MG/24HR patch 1 patch  1 patch Transdermal Q24H Mik Jaimes MD        nitroglycerin (NITROSTAT) SL tablet 0.4 mg  0.4 mg Sublingual Q5 Min PRN Mik Jaimes MD        ondansetron ODT (ZOFRAN-ODT) disintegrating tablet 4 mg  4 mg Oral Q6H PRN Mik Jaimes MD        Or    ondansetron (ZOFRAN) injection 4 mg  4 mg Intravenous Q6H PRN Mik Jaimes MD        Phosphorus Replacement - Follow Nurse / BPA Driven Protocol   Not Applicable PRN Mik Jaimes MD        Potassium Replacement - Follow Nurse / BPA Driven Protocol   Not Applicable PRN Mik Jaimes MD        sacubitril-valsartan (ENTRESTO) 49-51 MG tablet 1 tablet  1 tablet Oral BID Mik Jaimes MD   1 tablet at 11/27/24 0853    sodium chloride 0.9 % flush 10 mL  10 mL Intravenous Q12H Mik Jaimes MD   10 mL at 11/26/24 2108    sodium chloride 0.9 % flush 10 mL  10 mL Intravenous PRN Mik Jaimes MD        sodium chloride 0.9 % infusion 40 mL  40 mL Intravenous PRN Mik Jaimes MD           Past Medical History:  Past Medical History:   Diagnosis Date    Cardiomegaly     Congenital fusion of carpal bone     HTN (hypertension)     Seizure        Past Surgical History:  Past  Surgical History:   Procedure Laterality Date    ELBOW PROCEDURE         Family History  Family History   Problem Relation Age of Onset    Hypertension Mother     Heart disease Mother     No Known Problems Father        Social History  Social History     Socioeconomic History    Marital status: Single   Tobacco Use    Smoking status: Some Days     Current packs/day: 0.50     Types: Cigarettes    Smokeless tobacco: Never   Vaping Use    Vaping status: Never Used   Substance and Sexual Activity    Alcohol use: Yes    Drug use: Yes     Types: Marijuana, Cocaine(coke)    Sexual activity: Defer       Review of Systems:  History obtained from chart review and the patient  General ROS: No fever or chills  Respiratory ROS: No cough, shortness of breath, wheezing  Cardiovascular ROS: No chest pain or palpitations  Gastrointestinal ROS: No abdominal pain or melena  Genito-Urinary ROS: No dysuria or hematuria  Psych ROS: No anxiety and depression  14 point ROS reviewed with the patient and negative except as noted above and in the HPI unless unable to obtain.    Objective:  Patient Vitals for the past 24 hrs:   BP Temp Temp src Pulse Resp SpO2 Weight   11/27/24 1121 135/84 97.4 °F (36.3 °C) Oral 86 16 99 % --   11/27/24 0831 132/80 97.6 °F (36.4 °C) Oral 82 16 98 % --   11/27/24 0454 163/93 98.3 °F (36.8 °C) Oral 88 18 97 % 93.5 kg (206 lb 3.2 oz)   11/26/24 2353 139/87 98.8 °F (37.1 °C) Oral 90 18 96 % --   11/26/24 2041 130/66 98.5 °F (36.9 °C) Oral 85 18 100 % --   11/26/24 1611 138/80 98.2 °F (36.8 °C) Oral 90 18 96 % --       Intake/Output Summary (Last 24 hours) at 11/27/2024 1330  Last data filed at 11/27/2024 0454  Gross per 24 hour   Intake 720 ml   Output 2775 ml   Net -2055 ml     General: awake/alert   Chest:  clear to auscultation bilaterally without respiratory distress  CVS: regular rate and rhythm  Abdominal: soft, nontender, positive bowel sounds  Extremities: no cyanosis or edema  Skin: warm and dry without  rash      Labs:  Results from last 7 days   Lab Units 11/26/24  0228 11/25/24  0355 11/24/24  0543   WBC 10*3/mm3 6.47 7.42 7.55   HEMOGLOBIN g/dL 10.5* 9.9* 10.0*   HEMATOCRIT % 34.6* 32.6* 31.6*   PLATELETS 10*3/mm3 271 253 230         Results from last 7 days   Lab Units 11/27/24  0236 11/26/24 0228 11/25/24  0355   SODIUM mmol/L 140 138 140   POTASSIUM mmol/L 4.2 3.9 3.7   CHLORIDE mmol/L 108* 103 106   CO2 mmol/L 24.0 22.0 20.0*   BUN mg/dL 45* 44* 50*   CREATININE mg/dL 4.13* 4.18* 4.13*   CALCIUM mg/dL 8.7 8.6 8.6   EGFR mL/min/1.73 19.3* 19.0* 19.3*   BILIRUBIN mg/dL 0.3 0.5 0.9   ALK PHOS U/L 58 65 66   ALT (SGPT) U/L 10 11 11   AST (SGOT) U/L 12 12 12   GLUCOSE mg/dL 99 99 88       Radiology:   Imaging Results (Last 72 Hours)       Procedure Component Value Units Date/Time    US Renal Bilateral [[id number]] Collected: 11/25/24 0836     Updated: 11/25/24 0840    Narrative:      RENAL ULTRASOUND COMPLETE 11/25/2024 6:59 AM     REASON FOR EXAM: ACUTE KIDNEY INJURY       COMPARISON: None       TECHNIQUE: Multiple longitudinal and transverse realtime sonographic  images of the kidneys and urinary bladder are obtained.  Images and  report are stored in PACS per institutional and state regulations.     FINDINGS:     RIGHT KIDNEY: 10.9 cm. Normal in size, shape, contour and position. No  solid or cystic masses. The central echo complex is compact with no  evidence for hydronephrosis. No nephrolithiasis or abnormal perinephric  fluid collections . No hydroureter.     LEFT KIDNEY: 10.7 cm. Normal in size, shape, contour and position. No  solid or cystic masses. The central echo complex is compact with no  evidence for hydronephrosis. No nephrolithiasis or abnormal perinephric  fluid collections . No hydroureter.     PELVIS: The bladder is mildly distended with anechoic urine and  demonstrates no significant wall thickening or internal echogenicities.  There is no surrounding ascites.       Impression:      1.  "Unremarkable renal ultrasound.           This report was signed and finalized on 11/25/2024 8:36 AM by Dr. Juan Parrish MD.               Culture:  No results found for: \"BLOODCX\", \"URINECX\", \"WOUNDCX\", \"MRSACX\", \"RESPCX\", \"STOOLCX\"      Assessment    Acute kidney injury  Unknown baseline chronic kidney disease  Hypokalemia--improved  Metabolic acidosis  Hypertensive emergency  Polysubstance abuse  Anemia  Nonischemic cardiomyopathy  Chronic systolic congestive heart failure  Secondary hyperparathyroidism   Medical noncompliance    Plan:  Continue current BP meds regimen  Monitor labs for any additional renal recovery  If discharged, follow up at the renal  clinic within 7-10 days.       Durga Hammer MD  11/27/2024  13:30 CST    "

## 2024-11-28 NOTE — OUTREACH NOTE
Prep Survey      Flowsheet Row Responses   [facility] facility patient discharged from? [place]   Is LACE score < 7 ? No   Eligibility Readm Mgmt   Discharge diagnosis Hypertensive emergency   Does the patient have one of the following disease processes/diagnoses(primary or secondary)? Other   Prep survey completed? Yes            Joi BOSCH - Registered Nurse

## 2024-11-28 NOTE — PAYOR COMM NOTE
"DC HOME 11-27-24    YESICA Webb (29 y.o. Male)       Date of Birth   1995    Social Security Number       Address   [address] [place] [address]    Home Phone   [phone]    MRN   [id number]       Evangelical   Jain    Marital Status   Single                            Admission Date   11/24/24    Admission Type   Urgent    Admitting Provider   Jenna Ospina MD    Attending Provider       Department, Room/Bed   [facility] 4B, 462/1       Discharge Date   11/27/2024    Discharge Disposition   Home or Self Care    Discharge Destination                                 Attending Provider: (none)   Allergies: Penicillins    Isolation: None   Infection: None   Code Status: Prior    Ht: 188 cm (74\")   Wt: 93.5 kg (206 lb 3.2 oz)    Admission Cmt: None   Principal Problem: Hypertensive emergency [I16.1]                   Active Insurance as of 11/24/2024       Primary Coverage       Payor Plan Insurance Group Employer/Plan Group    ANTHEM MEDICAID ANTHEM MEDICAID [id number]       Payor Plan Address Payor Plan Phone Number Payor Plan Fax Number Effective Dates    [address] [address] [phone]  5/1/2018 - None Entered    [facility] [id number]         Subscriber Name Subscriber Birth Date Member ID       YESICA WEBB 1995 [id number]                     Emergency Contacts        (Rel.) Home Phone Work Phone Mobile Phone    Jesika Webb (Mother) -- -- [phone]                 Discharge Summary        Jenna Ospina MD at 11/27/24 1136                [facility] Medicine Services  DISCHARGE SUMMARY       Date of Admission: 11/24/2024  Date of Discharge:  11/27/2024  Primary Care Physician: He Ortega MD    Presenting Problem/History of Present Illness:  He is a 29 y.o. male admitted 11/24/2024 with Hypertensive emergency who also  has a past medical history of Cardiomegaly, Congenital fusion of carpal bone, HTN " (hypertension), and Seizure.     On his chart (which can contain diagnoses that are not current) shows he  has Essential hypertension; Tobacco use; Chronic systolic (congestive) heart failure; NICM (nonischemic cardiomyopathy); Seizure disorder; Acute on chronic renal insufficiency; Congestive heart failure; Cocaine abuse; Medically noncompliant; and Hypertensive emergency on their problem list..      The ICU team was asked to evaluate the patient for hypertensive emergency with renal dysfunction and congestive heart failure.  He was transferred to our facility from [facility].  He does have a history of cardiomegaly, hypertension, and seizures.  He tells me that he is not taken many of his medications in a few days as he has been nauseous.  He is a marijuana user, but was noted to be also positive for cocaine on outside urine drug screen.  Based on our medication dispense history, it appears he has not filled many of his medications with the exception of metoprolol in several months.  He has a history of poor dentition, myocardial infarction, chronic bronchitis, cardiomegaly, renal failure, substance abuse disorder, and seizure disorder.  He has been restarted on all of his home medication.  Cardiology has increased his hydralazine to 50 mg 3 times daily.     Interval history:  11/25: Patient remains on isosorbide 20 mg 3 times daily, amlodipine 5 mg daily, Entresto 49-51 twice daily, and metoprolol 100 mg daily.  Hydralazine has been increased to 50 mg 3 times daily.  He has no complaints for me this morning.  Latest echocardiogram from yesterday shows EF 31 to 35%, moderate to severely dilated left ventricle, along with moderate concentric LVH, mildly dilated left atrium, and no hemodynamically significant valvular disease.  11/26  Was transferred to us on the floor overnight creatinine 4.18 ultrasound unremarkable cardiomyopathy EF 31% cardiology on board history of cocaine abuse felt nonischemic  cardiomyopathy remains on hydralazine and Imdur Entresto Toprol-XL Norvasc consider LifeVest on discharge  11/27  As before appreciate cardiology and renal  - Increase Isordil to 40 mg 3 times daily given persistent hypertension  - Continue Toprol- mg daily, Entresto 49/51 mg twice daily, amlodipine 10 mg daily, and hydralazine 100 mg 3 times daily  - Consider further diuretic therapy in the future, however currently he does not appear to have symptoms of volume overload  -Discussed LifeVest with patient, he declines    Final Discharge Diagnoses:  Active Hospital Problems    Diagnosis     **Hypertensive emergency     Congestive heart failure     Cocaine abuse     Medically noncompliant     Acute on chronic renal insufficiency     Chronic systolic (congestive) heart failure     NICM (nonischemic cardiomyopathy)     Tobacco use     Essential hypertension        Consults: Cardiology nephrology    Procedures Performed: None    Pertinent Test Results:   Results for orders placed during the hospital encounter of 11/24/24    Adult Transthoracic Echo Complete W/ Cont if Necessary Per Protocol    Interpretation Summary    Left ventricular systolic function is severely decreased. Left ventricular ejection fraction appears to be 31 - 35%.    The left ventricular cavity is moderate to severely dilated. Left ventricular wall thickness is consistent with moderate concentric hypertrophy.    The findings are consistent with dilated cardiomyopathy.    Mildly reduced right ventricular systolic function noted.    The left atrial cavity is mildly dilated.    No hemodynamically significant valvular disease.      Imaging Results (All)       Procedure Component Value Units Date/Time    US Renal Bilateral [[id number]] Collected: 11/25/24 0836     Updated: 11/25/24 0840    Narrative:      RENAL ULTRASOUND COMPLETE 11/25/2024 6:59 AM     REASON FOR EXAM: ACUTE KIDNEY INJURY       COMPARISON: None       TECHNIQUE: Multiple longitudinal  and transverse realtime sonographic  images of the kidneys and urinary bladder are obtained.  Images and  report are stored in PACS per institutional and state regulations.     FINDINGS:     RIGHT KIDNEY: 10.9 cm. Normal in size, shape, contour and position. No  solid or cystic masses. The central echo complex is compact with no  evidence for hydronephrosis. No nephrolithiasis or abnormal perinephric  fluid collections . No hydroureter.     LEFT KIDNEY: 10.7 cm. Normal in size, shape, contour and position. No  solid or cystic masses. The central echo complex is compact with no  evidence for hydronephrosis. No nephrolithiasis or abnormal perinephric  fluid collections . No hydroureter.     PELVIS: The bladder is mildly distended with anechoic urine and  demonstrates no significant wall thickening or internal echogenicities.  There is no surrounding ascites.       Impression:      1. Unremarkable renal ultrasound.           This report was signed and finalized on 11/25/2024 8:36 AM by Dr. Juan Parrish MD.             LAB RESULTS:      Lab 11/26/24 0228 11/25/24  0355 11/24/24  1221 11/24/24  0550 11/24/24  0543   WBC 6.47 7.42  --   --  7.55   HEMOGLOBIN 10.5* 9.9*  --   --  10.0*   HEMATOCRIT 34.6* 32.6*  --   --  31.6*   PLATELETS 271 253  --   --  230   NEUTROS ABS  --  5.09  --   --  5.32   IMMATURE GRANS (ABS)  --  0.02  --   --  0.01   LYMPHS ABS  --  1.59  --   --  1.68   MONOS ABS  --  0.44  --   --  0.35   EOS ABS  --  0.23  --   --  0.16   MCV 84.8 84.5  --   --  82.3   LACTATE  --   --   --  0.9  --    PROTIME  --   --   --   --  16.8*   APTT  --   --  33.6  --  62.9*         Lab 11/27/24  0236 11/26/24 0228 11/25/24  0355 11/24/24  1621 11/24/24  0550   SODIUM 140 138 140  --  138   POTASSIUM 4.2 3.9 3.7 3.8 3.0*   CHLORIDE 108* 103 106  --  100   CO2 24.0 22.0 20.0*  --  21.0*   ANION GAP 8.0 13.0 14.0  --  17.0*   BUN 45* 44* 50*  --  64*   CREATININE 4.13* 4.18* 4.13*  --  4.95*   EGFR 19.3*  19.0* 19.3*  --  15.5*   GLUCOSE 99 99 88  --  94   CALCIUM 8.7 8.6 8.6  --  8.6   MAGNESIUM  --   --  1.8  --  1.8   PHOSPHORUS  --   --  3.1  --  3.8         Lab 11/27/24  0236 11/26/24  0228 11/25/24  0355 11/24/24  0550   TOTAL PROTEIN 6.0 6.2 6.2 6.3   ALBUMIN 3.2* 3.2* 3.2* 3.2*   GLOBULIN 2.8 3.0 3.0 3.1   ALT (SGPT) 10 11 11 12   AST (SGOT) 12 12 12 12   BILIRUBIN 0.3 0.5 0.9 1.7*   ALK PHOS 58 65 66 70         Lab 11/24/24  0550 11/24/24  0543   PROBNP 23,612.0*  --    PROTIME  --  16.8*   INR  --  1.30*                 Brief Urine Lab Results  (Last result in the past 365 days)        Color   Clarity   Blood   Leuk Est   Nitrite   Protein   CREAT   Urine HCG        11/24/24 2041             33.5         11/24/24 2041 Yellow   Clear   Negative   Negative   Negative   30 mg/dL (1+)                 Microbiology Results (last 10 days)       ** No results found for the last 240 hours. **            Hospital Course:   He is a 29 y.o. male admitted 11/24/2024 with Hypertensive emergency who also  has a past medical history of Cardiomegaly, Congenital fusion of carpal bone, HTN (hypertension), and Seizure.     On his chart (which can contain diagnoses that are not current) shows he  has Essential hypertension; Tobacco use; Chronic systolic (congestive) heart failure; NICM (nonischemic cardiomyopathy); Seizure disorder; Acute on chronic renal insufficiency; Congestive heart failure; Cocaine abuse; Medically noncompliant; and Hypertensive emergency on their problem list..      The ICU team was asked to evaluate the patient for hypertensive emergency with renal dysfunction and congestive heart failure.  He was transferred to our facility from [facility].  He does have a history of cardiomegaly, hypertension, and seizures.  He tells me that he is not taken many of his medications in a few days as he has been nauseous.  He is a marijuana user, but was noted to be also positive for cocaine on outside urine  "drug screen.  Based on our medication dispense history, it appears he has not filled many of his medications with the exception of metoprolol in several months.  He has a history of poor dentition, myocardial infarction, chronic bronchitis, cardiomegaly, renal failure, substance abuse disorder, and seizure disorder.  He has been restarted on all of his home medication.  Cardiology has increased his hydralazine to 50 mg 3 times daily.     Interval history:  11/25: Patient remains on isosorbide 20 mg 3 times daily, amlodipine 5 mg daily, Entresto 49-51 twice daily, and metoprolol 100 mg daily.  Hydralazine has been increased to 50 mg 3 times daily.  He has no complaints for me this morning.  Latest echocardiogram from yesterday shows EF 31 to 35%, moderate to severely dilated left ventricle, along with moderate concentric LVH, mildly dilated left atrium, and no hemodynamically significant valvular disease.  11/26  Was transferred to us on the floor overnight creatinine 4.18 ultrasound unremarkable cardiomyopathy EF 31% cardiology on board history of cocaine abuse felt nonischemic cardiomyopathy remains on hydralazine and Imdur Entresto Toprol-XL Norvasc consider LifeVest on discharge  11/27  As before appreciate cardiology and renal    Physical Exam on Discharge:  /84 (BP Location: Left arm, Patient Position: Sitting)   Pulse 86   Temp 97.4 °F (36.3 °C) (Oral)   Resp 16   Ht 188 cm (74\")   Wt 93.5 kg (206 lb 3.2 oz)   SpO2 99%   BMI 26.47 kg/m²   Physical Exam  HENT:      Head: Normocephalic.      Nose: Nose normal.   Eyes:      Pupils: Pupils are equal, round, and reactive to light.   Cardiovascular:      Rate and Rhythm: Normal rate.   Pulmonary:      Effort: Pulmonary effort is normal.   Abdominal:      General: Abdomen is flat.   Musculoskeletal:      Cervical back: Normal range of motion.   Neurological:      Mental Status: He is alert.           Condition on Discharge: Stable    Discharge " Disposition:  Home or Self Care    Discharge Medications:     Discharge Medications        New Medications        Instructions Start Date   aspirin 81 MG EC tablet   81 mg, Oral, Daily      furosemide 20 MG tablet  Commonly known as: Lasix   20 mg, Oral, 2 Times Daily             Changes to Medications        Instructions Start Date   amLODIPine 10 MG tablet  Commonly known as: NORVASC  What changed:   medication strength  how much to take  additional instructions   10 mg, Oral, Daily   Start Date: November 28, 2024     hydrALAZINE 25 MG tablet  Commonly known as: APRESOLINE  What changed: Another medication with the same name was added. Make sure you understand how and when to take each.   25 mg, Oral, 3 Times Daily      hydrALAZINE 100 MG tablet  Commonly known as: APRESOLINE  What changed: You were already taking a medication with the same name, and this prescription was added. Make sure you understand how and when to take each.   100 mg, Oral, 3 Times Daily      isosorbide dinitrate 20 MG tablet  Commonly known as: ISORDIL  What changed: Another medication with the same name was added. Make sure you understand how and when to take each.   20 mg, Oral, 3 Times Daily      isosorbide dinitrate 40 MG tablet  Commonly known as: ISORDIL  What changed: You were already taking a medication with the same name, and this prescription was added. Make sure you understand how and when to take each.   40 mg, Oral, 3 Times Daily             Continue These Medications        Instructions Start Date   Entresto 49-51 MG tablet  Generic drug: sacubitril-valsartan   1 tablet, Oral, 2 Times Daily      levETIRAcetam 500 MG tablet  Commonly known as: KEPPRA   500 mg, Oral, 2 Times Daily      metoprolol succinate  MG 24 hr tablet  Commonly known as: TOPROL-XL   100 mg, Oral, Daily      Potassium 99 MG tablet   1 tablet, Oral, Daily                   Discharge Diet:     Activity at Discharge:     Follow-up Appointments:   No future  appointments.    Test Results Pending at Discharge: no    Electronically signed by Jenna Ospina MD, 11/27/24, 11:37 CST.    Time: 45 minutes.         Electronically signed by Jenna Ospina MD at 11/27/24 3271

## 2024-11-29 NOTE — PROGRESS NOTES
Enter Query Response Below      Query Response: unable to determine             If applicable, please update the problem list.     Patient: José Miguel Webb        : 1995  Account: [id number]           Admit Date: 2024        How to Respond to this query:       a. Click New Note     b. Answer query within the yellow box.                c. Update the Problem List, if applicable.      If you have any questions about this query contact me at: [email or web address]     ,    Patient is diagnosed per Nephrology note with Acute kidney injury and Unknown baseline chronic kidney disease.  H&P documents 2 doses of Bumex 1mg given at outside facility prior to transfer to this facility and states patient has creatinine 5.29 on .  Creatinine levels this stay:     4.95      4.13     4.18     4.13  Creatinine level within 48 hours of admission/transfer date () is unknown.    Urine output (weight 96.8kg):   - (3402-4884) 425ml + 375ml + 325ml + 700ml + 525ml + 500ml + (7812-9544) 1100ml + 900ml + 1000ml + 400ml + (9514-7366) 800ml + 700ml   - (2201-4386) 250ml + 300ml + 500ml + 300ml + (2448-1210) 350ml + 600ml + 550ml + (1757-2208) 350ml + 200ml   - (3006-9161) 400ml + 250ml + one unmeasured amount + (3575-0211) 600ml + 550ml + 625ml   - (0454) 1000ml  Treatment included IV fluids.    After study, was acute kidney injury (MINDY) clinically supported during this admission?    Acute kidney injury (MINDY) was supported with additional clinical indicators:____________  Acute kidney injury (MINDY) was not supported  Other- specify_____________  Unable to determine     MINDY is defined by KDIGO as any of the following:  Increase in creatinine > 0.3 mg/dl within 48 hours or less; or   Increase in creatinine level to > 1.5x baseline (historical or measure), which is known or presumed to have occurred within the prior 7 days   Urine volume <0.5 ml/kg/h for 6  hours.  Reference article: http://www.kdigo.org/clinical_practice_guidelines/pdf/KDIGO%20AKI%20Guideline.pdf (as published in [employer] Supplements, The Official Journal of the International Society of Nephrology, vol. 2, issue 1, March 2012.)    By submitting this query, we are merely seeking further clarification of documentation to accurately reflect all conditions that you are monitoring, evaluating, treating or that extend the hospitalization or utilize additional resources of care. Please utilize your independent clinical judgment when addressing the question(s) above.     This query and your response, once completed, will be entered into the legal medical record.    Sincerely,  Joi HEATH, RN  Clinical Documentation Integrity Program   [email or web address]

## 2024-12-03 ENCOUNTER — READMISSION MANAGEMENT (OUTPATIENT)
Dept: CALL CENTER | Facility: HOSPITAL | Age: 29
End: 2024-12-03
Payer: MEDICAID

## 2024-12-03 NOTE — OUTREACH NOTE
Medical Week 1 Survey      Flowsheet Row Responses   [facility] [place] facility patient discharged from? [place]   Does the patient have one of the following disease processes/diagnoses(primary or secondary)? Other   Week 1 attempt successful? No   Unsuccessful attempts Attempt 1            Angelique Hopkins Registered Nurse

## 2024-12-05 ENCOUNTER — TELEPHONE (OUTPATIENT)
Dept: CARDIOLOGY | Facility: CLINIC | Age: 29
End: 2024-12-05

## 2024-12-17 ENCOUNTER — READMISSION MANAGEMENT (OUTPATIENT)
Dept: CALL CENTER | Facility: HOSPITAL | Age: 29
End: 2024-12-17
Payer: MEDICAID

## 2024-12-17 NOTE — OUTREACH NOTE
Medical Week 3 Survey      Flowsheet Row Responses   [facility] patient discharged from? [place]   Does the patient have one of the following disease processes/diagnoses(primary or secondary)? Other   Week 3 attempt successful? Yes   Call start time 1703   Call end time 1707   Discharge diagnosis Hypertensive emergency   Person spoke with today (if not patient) and relationship pt   Meds reviewed with patient/caregiver? Yes   Is the patient having any side effects they believe may be caused by any medication additions or changes? No   Does the patient have all medications ordered at discharge? Yes   Is the patient taking all medications as directed (includes completed medication regime)? Yes   Does the patient have a primary care provider?  Yes   Does the patient have an appointment with their PCP within 7 days of discharge? No   Nursing Interventions Educated patient on importance of making appointment, Advised patient to make appointment   Has the patient kept scheduled appointments due by today? N/A   Psychosocial issues? No   Did the patient receive a copy of their discharge instructions? Yes   Nursing interventions Reviewed instructions with patient   What is the patient's perception of their health status since discharge? Improving   Is the patient/caregiver able to teach back signs and symptoms related to disease process for when to call PCP? Yes   Is the patient/caregiver able to teach back signs and symptoms related to disease process for when to call 911? Yes   Is the patient/caregiver able to teach back the hierarchy of who to call/visit for symptoms/problems? PCP, Specialist, Home health nurse, Urgent Care, ED, 911 Yes   If the patient is a current smoker, are they able to teach back resources for cessation? Not a smoker   Week 3 Call Completed? Yes   Graduated Yes   Is the patient interested in additional calls from an ambulatory ? No   Would this patient benefit from a Referral to Amb  Social Work? No   Wrap up additional comments Pt states he is doing better, and needs battery for BP monitor. Educated pt on normal parameters of a BP, and when to call cardiologist office. Pt verbalized understanding.   Call end time 1707            Lety CHURCH - Registered Nurse

## 2024-12-18 ENCOUNTER — TELEPHONE (OUTPATIENT)
Dept: CARDIOLOGY | Facility: CLINIC | Age: 29
End: 2024-12-18

## 2024-12-18 NOTE — TELEPHONE ENCOUNTER
Name: YESICA    Relationship: SELF    Best Callback Number: [facility] [place]     Incoming call to the Hub, requesting to  Reschedule their HFU appointment on 12.18.24.     Per Hub workflow, further review is needed before the task can be completed.    Result of Call: Transferred to [facility] at the [facility] TOKLD TO RESCHEDULE.

## 2025-06-21 ENCOUNTER — HOSPITAL ENCOUNTER (INPATIENT)
Facility: HOSPITAL | Age: 30
LOS: 3 days | Discharge: LEFT BEFORE DISCHARGE | End: 2025-06-24
Attending: INTERNAL MEDICINE | Admitting: INTERNAL MEDICINE
Payer: MEDICAID

## 2025-06-21 DIAGNOSIS — N18.9 ACUTE ON CHRONIC RENAL INSUFFICIENCY: ICD-10-CM

## 2025-06-21 DIAGNOSIS — N28.9 ACUTE ON CHRONIC RENAL INSUFFICIENCY: ICD-10-CM

## 2025-06-21 DIAGNOSIS — K92.2 UPPER GASTROINTESTINAL BLEED: Primary | ICD-10-CM

## 2025-06-21 LAB
ABO GROUP BLD: NORMAL
ALBUMIN SERPL-MCNC: 2.6 G/DL (ref 3.5–5.2)
ALBUMIN/GLOB SERPL: 1.4 G/DL
ALP SERPL-CCNC: 70 U/L (ref 39–117)
ALT SERPL W P-5'-P-CCNC: 42 U/L (ref 1–41)
AMPHET+METHAMPHET UR QL: NEGATIVE
AMPHETAMINES UR QL: NEGATIVE
ANION GAP SERPL CALCULATED.3IONS-SCNC: 16 MMOL/L (ref 5–15)
APTT PPP: 31.4 SECONDS (ref 24.5–36)
AST SERPL-CCNC: 16 U/L (ref 1–40)
BARBITURATES UR QL SCN: NEGATIVE
BASOPHILS # BLD AUTO: 0.03 10*3/MM3 (ref 0–0.2)
BASOPHILS NFR BLD AUTO: 0.4 % (ref 0–1.5)
BENZODIAZ UR QL SCN: NEGATIVE
BILIRUB SERPL-MCNC: 2.2 MG/DL (ref 0–1.2)
BLD GP AB SCN SERPL QL: NEGATIVE
BUN SERPL-MCNC: 110.8 MG/DL (ref 6–20)
BUN/CREAT SERPL: 19.8 (ref 7–25)
BUPRENORPHINE SERPL-MCNC: NEGATIVE NG/ML
CALCIUM SPEC-SCNC: 7.6 MG/DL (ref 8.6–10.5)
CANNABINOIDS SERPL QL: POSITIVE
CHLORIDE SERPL-SCNC: 100 MMOL/L (ref 98–107)
CO2 SERPL-SCNC: 23 MMOL/L (ref 22–29)
COCAINE UR QL: POSITIVE
CREAT SERPL-MCNC: 5.6 MG/DL (ref 0.76–1.27)
DEPRECATED RDW RBC AUTO: 46.9 FL (ref 37–54)
EGFRCR SERPLBLD CKD-EPI 2021: 13.3 ML/MIN/1.73
EOSINOPHIL # BLD AUTO: 0.01 10*3/MM3 (ref 0–0.4)
EOSINOPHIL NFR BLD AUTO: 0.1 % (ref 0.3–6.2)
ERYTHROCYTE [DISTWIDTH] IN BLOOD BY AUTOMATED COUNT: 17.2 % (ref 12.3–15.4)
FENTANYL UR-MCNC: NEGATIVE NG/ML
GLOBULIN UR ELPH-MCNC: 1.8 GM/DL
GLUCOSE SERPL-MCNC: 121 MG/DL (ref 65–99)
HCT VFR BLD AUTO: 18.8 % (ref 37.5–51)
HGB BLD-MCNC: 6.3 G/DL (ref 13–17.7)
IMM GRANULOCYTES # BLD AUTO: 0.09 10*3/MM3 (ref 0–0.05)
IMM GRANULOCYTES NFR BLD AUTO: 1.2 % (ref 0–0.5)
INR PPP: 1.55 (ref 0.91–1.09)
LIPASE SERPL-CCNC: 35 U/L (ref 13–60)
LYMPHOCYTES # BLD AUTO: 0.77 10*3/MM3 (ref 0.7–3.1)
LYMPHOCYTES NFR BLD AUTO: 10.2 % (ref 19.6–45.3)
MAGNESIUM SERPL-MCNC: 2 MG/DL (ref 1.6–2.6)
MCH RBC QN AUTO: 25.5 PG (ref 26.6–33)
MCHC RBC AUTO-ENTMCNC: 33.5 G/DL (ref 31.5–35.7)
MCV RBC AUTO: 76.1 FL (ref 79–97)
METHADONE UR QL SCN: NEGATIVE
MONOCYTES # BLD AUTO: 0.46 10*3/MM3 (ref 0.1–0.9)
MONOCYTES NFR BLD AUTO: 6.1 % (ref 5–12)
NEUTROPHILS NFR BLD AUTO: 6.17 10*3/MM3 (ref 1.7–7)
NEUTROPHILS NFR BLD AUTO: 82 % (ref 42.7–76)
NRBC BLD AUTO-RTO: 0.5 /100 WBC (ref 0–0.2)
OPIATES UR QL: NEGATIVE
OXYCODONE UR QL SCN: NEGATIVE
PCP UR QL SCN: NEGATIVE
PHOSPHATE SERPL-MCNC: 5.5 MG/DL (ref 2.5–4.5)
PLATELET # BLD AUTO: 118 10*3/MM3 (ref 140–450)
PMV BLD AUTO: 11.5 FL (ref 6–12)
POTASSIUM SERPL-SCNC: 3.7 MMOL/L (ref 3.5–5.2)
PROT SERPL-MCNC: 4.4 G/DL (ref 6–8.5)
PROTHROMBIN TIME: 19.5 SECONDS (ref 11.8–14.8)
RBC # BLD AUTO: 2.47 10*6/MM3 (ref 4.14–5.8)
RH BLD: POSITIVE
SODIUM SERPL-SCNC: 139 MMOL/L (ref 136–145)
T&S EXPIRATION DATE: NORMAL
TRICYCLICS UR QL SCN: NEGATIVE
WBC NRBC COR # BLD AUTO: 7.53 10*3/MM3 (ref 3.4–10.8)

## 2025-06-21 PROCEDURE — 86900 BLOOD TYPING SEROLOGIC ABO: CPT

## 2025-06-21 PROCEDURE — P9016 RBC LEUKOCYTES REDUCED: HCPCS

## 2025-06-21 PROCEDURE — 86900 BLOOD TYPING SEROLOGIC ABO: CPT | Performed by: PHYSICIAN ASSISTANT

## 2025-06-21 PROCEDURE — 85730 THROMBOPLASTIN TIME PARTIAL: CPT | Performed by: PHYSICIAN ASSISTANT

## 2025-06-21 PROCEDURE — 86920 COMPATIBILITY TEST SPIN: CPT

## 2025-06-21 PROCEDURE — 86850 RBC ANTIBODY SCREEN: CPT | Performed by: PHYSICIAN ASSISTANT

## 2025-06-21 PROCEDURE — 83735 ASSAY OF MAGNESIUM: CPT | Performed by: PHYSICIAN ASSISTANT

## 2025-06-21 PROCEDURE — 25010000002 LEVETRIRACETAM PER 10 MG: Performed by: PHYSICIAN ASSISTANT

## 2025-06-21 PROCEDURE — 86901 BLOOD TYPING SEROLOGIC RH(D): CPT | Performed by: PHYSICIAN ASSISTANT

## 2025-06-21 PROCEDURE — 93005 ELECTROCARDIOGRAM TRACING: CPT | Performed by: PHYSICIAN ASSISTANT

## 2025-06-21 PROCEDURE — 85025 COMPLETE CBC W/AUTO DIFF WBC: CPT | Performed by: PHYSICIAN ASSISTANT

## 2025-06-21 PROCEDURE — 85610 PROTHROMBIN TIME: CPT | Performed by: PHYSICIAN ASSISTANT

## 2025-06-21 PROCEDURE — 99254 IP/OBS CNSLTJ NEW/EST MOD 60: CPT | Performed by: INTERNAL MEDICINE

## 2025-06-21 PROCEDURE — 25810000003 SODIUM CHLORIDE 0.9 % SOLUTION: Performed by: PHYSICIAN ASSISTANT

## 2025-06-21 PROCEDURE — 93010 ELECTROCARDIOGRAM REPORT: CPT | Performed by: HOSPITALIST

## 2025-06-21 PROCEDURE — 80053 COMPREHEN METABOLIC PANEL: CPT | Performed by: PHYSICIAN ASSISTANT

## 2025-06-21 PROCEDURE — 80307 DRUG TEST PRSMV CHEM ANLYZR: CPT | Performed by: PHYSICIAN ASSISTANT

## 2025-06-21 PROCEDURE — 84100 ASSAY OF PHOSPHORUS: CPT | Performed by: PHYSICIAN ASSISTANT

## 2025-06-21 PROCEDURE — 36430 TRANSFUSION BLD/BLD COMPNT: CPT

## 2025-06-21 PROCEDURE — 25010000002 METOCLOPRAMIDE PER 10 MG: Performed by: INTERNAL MEDICINE

## 2025-06-21 PROCEDURE — 83690 ASSAY OF LIPASE: CPT | Performed by: PHYSICIAN ASSISTANT

## 2025-06-21 PROCEDURE — 25010000002 OCTREOTIDE PER 25 MCG: Performed by: INTERNAL MEDICINE

## 2025-06-21 RX ORDER — SODIUM CHLORIDE 0.9 % (FLUSH) 0.9 %
10 SYRINGE (ML) INJECTION AS NEEDED
Status: DISCONTINUED | OUTPATIENT
Start: 2025-06-21 | End: 2025-06-24 | Stop reason: HOSPADM

## 2025-06-21 RX ORDER — SODIUM CHLORIDE 0.9 % (FLUSH) 0.9 %
10 SYRINGE (ML) INJECTION EVERY 12 HOURS SCHEDULED
Status: DISCONTINUED | OUTPATIENT
Start: 2025-06-21 | End: 2025-06-24 | Stop reason: HOSPADM

## 2025-06-21 RX ORDER — ONDANSETRON 2 MG/ML
4 INJECTION INTRAMUSCULAR; INTRAVENOUS EVERY 6 HOURS PRN
Status: DISCONTINUED | OUTPATIENT
Start: 2025-06-21 | End: 2025-06-24 | Stop reason: HOSPADM

## 2025-06-21 RX ORDER — METOCLOPRAMIDE HYDROCHLORIDE 5 MG/ML
10 INJECTION INTRAMUSCULAR; INTRAVENOUS EVERY 8 HOURS
Status: DISCONTINUED | OUTPATIENT
Start: 2025-06-22 | End: 2025-06-22

## 2025-06-21 RX ORDER — ACETAMINOPHEN 650 MG/1
650 SUPPOSITORY RECTAL EVERY 4 HOURS PRN
Status: DISCONTINUED | OUTPATIENT
Start: 2025-06-21 | End: 2025-06-24 | Stop reason: HOSPADM

## 2025-06-21 RX ORDER — SODIUM CHLORIDE 9 MG/ML
75 INJECTION, SOLUTION INTRAVENOUS CONTINUOUS
Status: DISCONTINUED | OUTPATIENT
Start: 2025-06-21 | End: 2025-06-21

## 2025-06-21 RX ORDER — ACETAMINOPHEN 325 MG/1
650 TABLET ORAL EVERY 4 HOURS PRN
Status: DISCONTINUED | OUTPATIENT
Start: 2025-06-21 | End: 2025-06-24 | Stop reason: HOSPADM

## 2025-06-21 RX ORDER — LEVETIRACETAM 500 MG/5ML
500 INJECTION, SOLUTION, CONCENTRATE INTRAVENOUS EVERY 12 HOURS SCHEDULED
Status: DISCONTINUED | OUTPATIENT
Start: 2025-06-21 | End: 2025-06-24 | Stop reason: HOSPADM

## 2025-06-21 RX ORDER — METOCLOPRAMIDE HYDROCHLORIDE 5 MG/ML
10 INJECTION INTRAMUSCULAR; INTRAVENOUS EVERY 6 HOURS
Status: DISCONTINUED | OUTPATIENT
Start: 2025-06-21 | End: 2025-06-22

## 2025-06-21 RX ORDER — PANTOPRAZOLE SODIUM 40 MG/10ML
40 INJECTION, POWDER, LYOPHILIZED, FOR SOLUTION INTRAVENOUS EVERY 12 HOURS SCHEDULED
Status: DISCONTINUED | OUTPATIENT
Start: 2025-06-22 | End: 2025-06-24 | Stop reason: HOSPADM

## 2025-06-21 RX ORDER — SODIUM CHLORIDE 9 MG/ML
40 INJECTION, SOLUTION INTRAVENOUS AS NEEDED
Status: DISCONTINUED | OUTPATIENT
Start: 2025-06-21 | End: 2025-06-24 | Stop reason: HOSPADM

## 2025-06-21 RX ORDER — CHLORHEXIDINE GLUCONATE 500 MG/1
1 CLOTH TOPICAL ONCE
Status: COMPLETED | OUTPATIENT
Start: 2025-06-21 | End: 2025-06-21

## 2025-06-21 RX ORDER — PANTOPRAZOLE SODIUM 40 MG/10ML
40 INJECTION, POWDER, LYOPHILIZED, FOR SOLUTION INTRAVENOUS ONCE
Status: COMPLETED | OUTPATIENT
Start: 2025-06-21 | End: 2025-06-21

## 2025-06-21 RX ORDER — CHLORHEXIDINE GLUCONATE 500 MG/1
1 CLOTH TOPICAL EVERY 24 HOURS
Status: DISCONTINUED | OUTPATIENT
Start: 2025-06-22 | End: 2025-06-23

## 2025-06-21 RX ORDER — SODIUM CHLORIDE 9 MG/ML
100 INJECTION, SOLUTION INTRAVENOUS CONTINUOUS
Status: DISCONTINUED | OUTPATIENT
Start: 2025-06-21 | End: 2025-06-21

## 2025-06-21 RX ADMIN — METOCLOPRAMIDE HYDROCHLORIDE 10 MG: 5 INJECTION INTRAMUSCULAR; INTRAVENOUS at 22:18

## 2025-06-21 RX ADMIN — PANTOPRAZOLE SODIUM 40 MG: 40 INJECTION, POWDER, FOR SOLUTION INTRAVENOUS at 21:11

## 2025-06-21 RX ADMIN — Medication 1 APPLICATION: at 21:11

## 2025-06-21 RX ADMIN — LEVETIRACETAM 500 MG: 100 INJECTION, SOLUTION, CONCENTRATE INTRAVENOUS at 22:36

## 2025-06-21 RX ADMIN — CHLORHEXIDINE GLUCONATE 1 APPLICATION: 500 CLOTH TOPICAL at 21:12

## 2025-06-21 RX ADMIN — SODIUM CHLORIDE 75 ML/HR: 9 INJECTION, SOLUTION INTRAVENOUS at 21:12

## 2025-06-21 RX ADMIN — OCTREOTIDE ACETATE 50 MCG/HR: 500 INJECTION, SOLUTION INTRAVENOUS; SUBCUTANEOUS at 21:12

## 2025-06-21 RX ADMIN — Medication 10 ML: at 21:12

## 2025-06-22 ENCOUNTER — ANESTHESIA (OUTPATIENT)
Dept: PERIOP | Facility: HOSPITAL | Age: 30
End: 2025-06-22
Payer: MEDICAID

## 2025-06-22 ENCOUNTER — ANESTHESIA EVENT (OUTPATIENT)
Dept: PERIOP | Facility: HOSPITAL | Age: 30
End: 2025-06-22
Payer: MEDICAID

## 2025-06-22 LAB
ANION GAP SERPL CALCULATED.3IONS-SCNC: 13 MMOL/L (ref 5–15)
BASOPHILS # BLD AUTO: 0.02 10*3/MM3 (ref 0–0.2)
BASOPHILS NFR BLD AUTO: 0.3 % (ref 0–1.5)
BUN SERPL-MCNC: 108 MG/DL (ref 6–20)
BUN/CREAT SERPL: 18.3 (ref 7–25)
CA-I BLD-MCNC: 4.05 MG/DL (ref 4.6–5.4)
CALCIUM SPEC-SCNC: 8 MG/DL (ref 8.6–10.5)
CHLORIDE SERPL-SCNC: 103 MMOL/L (ref 98–107)
CO2 SERPL-SCNC: 26 MMOL/L (ref 22–29)
CREAT SERPL-MCNC: 5.89 MG/DL (ref 0.76–1.27)
DEPRECATED RDW RBC AUTO: 51.5 FL (ref 37–54)
DEPRECATED RDW RBC AUTO: 57.8 FL (ref 37–54)
EGFRCR SERPLBLD CKD-EPI 2021: 12.5 ML/MIN/1.73
EOSINOPHIL # BLD AUTO: 0.04 10*3/MM3 (ref 0–0.4)
EOSINOPHIL NFR BLD AUTO: 0.6 % (ref 0.3–6.2)
ERYTHROCYTE [DISTWIDTH] IN BLOOD BY AUTOMATED COUNT: 17.4 % (ref 12.3–15.4)
ERYTHROCYTE [DISTWIDTH] IN BLOOD BY AUTOMATED COUNT: 17.8 % (ref 12.3–15.4)
GLUCOSE SERPL-MCNC: 87 MG/DL (ref 65–99)
HCT VFR BLD AUTO: 25.8 % (ref 37.5–51)
HCT VFR BLD AUTO: 26.1 % (ref 37.5–51)
HCT VFR BLD AUTO: 31.4 % (ref 37.5–51)
HGB BLD-MCNC: 8.7 G/DL (ref 13–17.7)
HGB BLD-MCNC: 8.7 G/DL (ref 13–17.7)
HGB BLD-MCNC: 9.6 G/DL (ref 13–17.7)
IMM GRANULOCYTES # BLD AUTO: 0.02 10*3/MM3 (ref 0–0.05)
IMM GRANULOCYTES NFR BLD AUTO: 0.3 % (ref 0–0.5)
LYMPHOCYTES # BLD AUTO: 1.48 10*3/MM3 (ref 0.7–3.1)
LYMPHOCYTES NFR BLD AUTO: 21.2 % (ref 19.6–45.3)
Lab: ABNORMAL
MCH RBC QN AUTO: 27 PG (ref 26.6–33)
MCH RBC QN AUTO: 27.5 PG (ref 26.6–33)
MCHC RBC AUTO-ENTMCNC: 30.6 G/DL (ref 31.5–35.7)
MCHC RBC AUTO-ENTMCNC: 33.7 G/DL (ref 31.5–35.7)
MCV RBC AUTO: 81.6 FL (ref 79–97)
MCV RBC AUTO: 88.5 FL (ref 79–97)
MONOCYTES # BLD AUTO: 0.72 10*3/MM3 (ref 0.1–0.9)
MONOCYTES NFR BLD AUTO: 10.3 % (ref 5–12)
NEUTROPHILS NFR BLD AUTO: 4.7 10*3/MM3 (ref 1.7–7)
NEUTROPHILS NFR BLD AUTO: 67.3 % (ref 42.7–76)
NRBC BLD AUTO-RTO: 0.6 /100 WBC (ref 0–0.2)
PLATELET # BLD AUTO: 105 10*3/MM3 (ref 140–450)
PLATELET # BLD AUTO: 117 10*3/MM3 (ref 140–450)
PMV BLD AUTO: 11.1 FL (ref 6–12)
PMV BLD AUTO: 11.7 FL (ref 6–12)
POTASSIUM SERPL-SCNC: 3.6 MMOL/L (ref 3.5–5.2)
POTASSIUM SERPL-SCNC: 3.7 MMOL/L (ref 3.5–5.2)
QT INTERVAL: 410 MS
QTC INTERVAL: 457 MS
RBC # BLD AUTO: 3.16 10*6/MM3 (ref 4.14–5.8)
RBC # BLD AUTO: 3.55 10*6/MM3 (ref 4.14–5.8)
SODIUM SERPL-SCNC: 142 MMOL/L (ref 136–145)
WBC NRBC COR # BLD AUTO: 6.98 10*3/MM3 (ref 3.4–10.8)
WBC NRBC COR # BLD AUTO: 8.58 10*3/MM3 (ref 3.4–10.8)

## 2025-06-22 PROCEDURE — 25010000002 LEVETRIRACETAM PER 10 MG: Performed by: PHYSICIAN ASSISTANT

## 2025-06-22 PROCEDURE — 86900 BLOOD TYPING SEROLOGIC ABO: CPT

## 2025-06-22 PROCEDURE — 43255 EGD CONTROL BLEEDING ANY: CPT | Performed by: INTERNAL MEDICINE

## 2025-06-22 PROCEDURE — 84132 ASSAY OF SERUM POTASSIUM: CPT | Performed by: INTERNAL MEDICINE

## 2025-06-22 PROCEDURE — 85014 HEMATOCRIT: CPT | Performed by: NURSE PRACTITIONER

## 2025-06-22 PROCEDURE — 25010000002 ONDANSETRON PER 1 MG: Performed by: NURSE ANESTHETIST, CERTIFIED REGISTERED

## 2025-06-22 PROCEDURE — 25810000003 SODIUM CHLORIDE 0.9 % SOLUTION: Performed by: NURSE ANESTHETIST, CERTIFIED REGISTERED

## 2025-06-22 PROCEDURE — 25010000002 LIDOCAINE PF 2% 2 % SOLUTION: Performed by: NURSE ANESTHETIST, CERTIFIED REGISTERED

## 2025-06-22 PROCEDURE — P9016 RBC LEUKOCYTES REDUCED: HCPCS

## 2025-06-22 PROCEDURE — 25010000002 METOCLOPRAMIDE PER 10 MG: Performed by: INTERNAL MEDICINE

## 2025-06-22 PROCEDURE — 82330 ASSAY OF CALCIUM: CPT

## 2025-06-22 PROCEDURE — 85025 COMPLETE CBC W/AUTO DIFF WBC: CPT | Performed by: PHYSICIAN ASSISTANT

## 2025-06-22 PROCEDURE — 25010000002 POTASSIUM CHLORIDE 10 MEQ/100ML SOLUTION: Performed by: INTERNAL MEDICINE

## 2025-06-22 PROCEDURE — 25010000002 OCTREOTIDE PER 25 MCG: Performed by: INTERNAL MEDICINE

## 2025-06-22 PROCEDURE — 85027 COMPLETE CBC AUTOMATED: CPT | Performed by: INTERNAL MEDICINE

## 2025-06-22 PROCEDURE — 0W3P8ZZ CONTROL BLEEDING IN GASTROINTESTINAL TRACT, VIA NATURAL OR ARTIFICIAL OPENING ENDOSCOPIC: ICD-10-PCS | Performed by: INTERNAL MEDICINE

## 2025-06-22 PROCEDURE — 36430 TRANSFUSION BLD/BLD COMPNT: CPT

## 2025-06-22 PROCEDURE — 25010000002 HYDRALAZINE PER 20 MG: Performed by: NURSE PRACTITIONER

## 2025-06-22 PROCEDURE — 85018 HEMOGLOBIN: CPT | Performed by: NURSE PRACTITIONER

## 2025-06-22 PROCEDURE — 80048 BASIC METABOLIC PNL TOTAL CA: CPT | Performed by: PHYSICIAN ASSISTANT

## 2025-06-22 PROCEDURE — 25010000002 EPINEPHRINE 1 MG/10ML SOLUTION PREFILLED SYRINGE: Performed by: INTERNAL MEDICINE

## 2025-06-22 PROCEDURE — 25010000002 PROPOFOL 10 MG/ML EMULSION: Performed by: NURSE ANESTHETIST, CERTIFIED REGISTERED

## 2025-06-22 PROCEDURE — 3E0G8GC INTRODUCTION OF OTHER THERAPEUTIC SUBSTANCE INTO UPPER GI, VIA NATURAL OR ARTIFICIAL OPENING ENDOSCOPIC: ICD-10-PCS | Performed by: INTERNAL MEDICINE

## 2025-06-22 DEVICE — DEV CLIP ENDO RESOLUTION360 CONTRL ROT 235CM: Type: IMPLANTABLE DEVICE | Site: STOMACH | Status: FUNCTIONAL

## 2025-06-22 RX ORDER — POTASSIUM CHLORIDE 7.45 MG/ML
10 INJECTION INTRAVENOUS
Status: COMPLETED | OUTPATIENT
Start: 2025-06-22 | End: 2025-06-22

## 2025-06-22 RX ORDER — HYDRALAZINE HYDROCHLORIDE 20 MG/ML
10 INJECTION INTRAMUSCULAR; INTRAVENOUS EVERY 6 HOURS PRN
Status: DISCONTINUED | OUTPATIENT
Start: 2025-06-22 | End: 2025-06-22

## 2025-06-22 RX ORDER — OXYCODONE AND ACETAMINOPHEN 10; 325 MG/1; MG/1
1 TABLET ORAL EVERY 4 HOURS PRN
Refills: 0 | Status: CANCELLED | OUTPATIENT
Start: 2025-06-22

## 2025-06-22 RX ORDER — ONDANSETRON 2 MG/ML
INJECTION INTRAMUSCULAR; INTRAVENOUS AS NEEDED
Status: DISCONTINUED | OUTPATIENT
Start: 2025-06-22 | End: 2025-06-22 | Stop reason: SURG

## 2025-06-22 RX ORDER — IBUPROFEN 600 MG/1
600 TABLET, FILM COATED ORAL EVERY 6 HOURS PRN
Status: CANCELLED | OUTPATIENT
Start: 2025-06-22

## 2025-06-22 RX ORDER — SUCCINYLCHOLINE/SOD CL,ISO/PF 200MG/10ML
SYRINGE (ML) INTRAVENOUS AS NEEDED
Status: DISCONTINUED | OUTPATIENT
Start: 2025-06-22 | End: 2025-06-22 | Stop reason: SURG

## 2025-06-22 RX ORDER — ONDANSETRON 2 MG/ML
4 INJECTION INTRAMUSCULAR; INTRAVENOUS
Status: CANCELLED | OUTPATIENT
Start: 2025-06-22

## 2025-06-22 RX ORDER — SODIUM CHLORIDE 0.9 % (FLUSH) 0.9 %
3-10 SYRINGE (ML) INJECTION AS NEEDED
Status: CANCELLED | OUTPATIENT
Start: 2025-06-22

## 2025-06-22 RX ORDER — HYDROMORPHONE HYDROCHLORIDE 1 MG/ML
0.5 INJECTION, SOLUTION INTRAMUSCULAR; INTRAVENOUS; SUBCUTANEOUS
Refills: 0 | Status: CANCELLED | OUTPATIENT
Start: 2025-06-22

## 2025-06-22 RX ORDER — SODIUM CHLORIDE 0.9 % (FLUSH) 0.9 %
3 SYRINGE (ML) INJECTION EVERY 12 HOURS SCHEDULED
Status: CANCELLED | OUTPATIENT
Start: 2025-06-22

## 2025-06-22 RX ORDER — LIDOCAINE HYDROCHLORIDE 20 MG/ML
INJECTION, SOLUTION EPIDURAL; INFILTRATION; INTRACAUDAL; PERINEURAL AS NEEDED
Status: DISCONTINUED | OUTPATIENT
Start: 2025-06-22 | End: 2025-06-22 | Stop reason: SURG

## 2025-06-22 RX ORDER — HYDRALAZINE HYDROCHLORIDE 20 MG/ML
20 INJECTION INTRAMUSCULAR; INTRAVENOUS EVERY 6 HOURS PRN
Status: DISCONTINUED | OUTPATIENT
Start: 2025-06-22 | End: 2025-06-24 | Stop reason: HOSPADM

## 2025-06-22 RX ORDER — ACETAMINOPHEN 500 MG
1000 TABLET ORAL ONCE
Status: CANCELLED | OUTPATIENT
Start: 2025-06-22 | End: 2025-06-22

## 2025-06-22 RX ORDER — MIDAZOLAM HYDROCHLORIDE 2 MG/2ML
1 INJECTION, SOLUTION INTRAMUSCULAR; INTRAVENOUS
Status: CANCELLED | OUTPATIENT
Start: 2025-06-22

## 2025-06-22 RX ORDER — SODIUM CHLORIDE 9 MG/ML
40 INJECTION, SOLUTION INTRAVENOUS AS NEEDED
Status: CANCELLED | OUTPATIENT
Start: 2025-06-22 | End: 2025-06-23

## 2025-06-22 RX ORDER — FLUMAZENIL 0.1 MG/ML
0.2 INJECTION INTRAVENOUS AS NEEDED
Status: CANCELLED | OUTPATIENT
Start: 2025-06-22

## 2025-06-22 RX ORDER — ROCURONIUM BROMIDE 10 MG/ML
INJECTION, SOLUTION INTRAVENOUS AS NEEDED
Status: DISCONTINUED | OUTPATIENT
Start: 2025-06-22 | End: 2025-06-22 | Stop reason: SURG

## 2025-06-22 RX ORDER — LABETALOL HYDROCHLORIDE 5 MG/ML
5 INJECTION, SOLUTION INTRAVENOUS
Status: CANCELLED | OUTPATIENT
Start: 2025-06-22

## 2025-06-22 RX ORDER — FENTANYL CITRATE 50 UG/ML
50 INJECTION, SOLUTION INTRAMUSCULAR; INTRAVENOUS
Status: CANCELLED | OUTPATIENT
Start: 2025-06-22

## 2025-06-22 RX ORDER — PROPOFOL 10 MG/ML
VIAL (ML) INTRAVENOUS AS NEEDED
Status: DISCONTINUED | OUTPATIENT
Start: 2025-06-22 | End: 2025-06-22 | Stop reason: SURG

## 2025-06-22 RX ORDER — HYDRALAZINE HYDROCHLORIDE 20 MG/ML
10 INJECTION INTRAMUSCULAR; INTRAVENOUS ONCE
Status: COMPLETED | OUTPATIENT
Start: 2025-06-22 | End: 2025-06-22

## 2025-06-22 RX ORDER — SODIUM CHLORIDE 9 MG/ML
INJECTION, SOLUTION INTRAVENOUS CONTINUOUS PRN
Status: DISCONTINUED | OUTPATIENT
Start: 2025-06-22 | End: 2025-06-22 | Stop reason: SURG

## 2025-06-22 RX ORDER — SODIUM CHLORIDE, SODIUM LACTATE, POTASSIUM CHLORIDE, CALCIUM CHLORIDE 600; 310; 30; 20 MG/100ML; MG/100ML; MG/100ML; MG/100ML
100 INJECTION, SOLUTION INTRAVENOUS CONTINUOUS
Status: CANCELLED | OUTPATIENT
Start: 2025-06-22 | End: 2025-06-23

## 2025-06-22 RX ORDER — NALOXONE HCL 0.4 MG/ML
0.04 VIAL (ML) INJECTION AS NEEDED
Status: CANCELLED | OUTPATIENT
Start: 2025-06-22

## 2025-06-22 RX ADMIN — POTASSIUM CHLORIDE 10 MEQ: 7.46 INJECTION, SOLUTION INTRAVENOUS at 12:44

## 2025-06-22 RX ADMIN — LEVETIRACETAM 500 MG: 100 INJECTION, SOLUTION, CONCENTRATE INTRAVENOUS at 22:02

## 2025-06-22 RX ADMIN — SODIUM CHLORIDE: 9 INJECTION, SOLUTION INTRAVENOUS at 07:27

## 2025-06-22 RX ADMIN — HYDRALAZINE HYDROCHLORIDE 10 MG: 20 INJECTION INTRAMUSCULAR; INTRAVENOUS at 10:07

## 2025-06-22 RX ADMIN — Medication 1 APPLICATION: at 08:44

## 2025-06-22 RX ADMIN — POTASSIUM CHLORIDE 10 MEQ: 7.46 INJECTION, SOLUTION INTRAVENOUS at 11:11

## 2025-06-22 RX ADMIN — PANTOPRAZOLE SODIUM 40 MG: 40 INJECTION, POWDER, FOR SOLUTION INTRAVENOUS at 22:07

## 2025-06-22 RX ADMIN — LIDOCAINE HYDROCHLORIDE 10 MG: 20 INJECTION, SOLUTION EPIDURAL; INFILTRATION; INTRACAUDAL; PERINEURAL at 07:32

## 2025-06-22 RX ADMIN — OCTREOTIDE ACETATE 50 MCG/HR: 500 INJECTION, SOLUTION INTRAVENOUS; SUBCUTANEOUS at 05:30

## 2025-06-22 RX ADMIN — HYDRALAZINE HYDROCHLORIDE 10 MG: 20 INJECTION INTRAMUSCULAR; INTRAVENOUS at 11:08

## 2025-06-22 RX ADMIN — Medication 10 ML: at 22:07

## 2025-06-22 RX ADMIN — ONDANSETRON 4 MG: 2 INJECTION INTRAMUSCULAR; INTRAVENOUS at 07:48

## 2025-06-22 RX ADMIN — ROCURONIUM BROMIDE 10 MG: 10 INJECTION, SOLUTION INTRAVENOUS at 07:32

## 2025-06-22 RX ADMIN — SACUBITRIL AND VALSARTAN 1 TABLET: 49; 51 TABLET, FILM COATED ORAL at 21:59

## 2025-06-22 RX ADMIN — Medication 1 APPLICATION: at 22:01

## 2025-06-22 RX ADMIN — Medication 120 MG: at 07:32

## 2025-06-22 RX ADMIN — PROPOFOL 200 MG: 10 INJECTION, EMULSION INTRAVENOUS at 07:32

## 2025-06-22 RX ADMIN — PANTOPRAZOLE SODIUM 40 MG: 40 INJECTION, POWDER, FOR SOLUTION INTRAVENOUS at 08:43

## 2025-06-22 RX ADMIN — CHLORHEXIDINE GLUCONATE 1 APPLICATION: 500 CLOTH TOPICAL at 05:29

## 2025-06-22 RX ADMIN — METOCLOPRAMIDE HYDROCHLORIDE 10 MG: 5 INJECTION INTRAMUSCULAR; INTRAVENOUS at 05:28

## 2025-06-22 RX ADMIN — LEVETIRACETAM 500 MG: 100 INJECTION, SOLUTION, CONCENTRATE INTRAVENOUS at 08:44

## 2025-06-22 RX ADMIN — HYDRALAZINE HYDROCHLORIDE 20 MG: 20 INJECTION INTRAMUSCULAR; INTRAVENOUS at 17:04

## 2025-06-22 NOTE — ANESTHESIA PROCEDURE NOTES
Airway  Reason: elective    Date/Time: 6/22/2025 7:33 AM    General Information and Staff    Patient location during procedure: OR  CRNA/CAA: Herbert Angela CRNA    Indications and Patient Condition  Indications for airway management: airway protection    Preoxygenated: yes    Mask difficulty assessment: 1 - vent by mask    Final Airway Details    Final airway type: endotracheal airway      Successful airway: ETT  Cuffed: yes   Successful intubation technique: direct laryngoscopy  Adjuncts used in placement: intubating stylet  Endotracheal tube insertion site: oral  Blade: Fields  Blade size: 2  ETT size (mm): 7.0  Cormack-Lehane Classification: grade IIa - partial view of glottis  Placement verified by: capnometry   Cuff volume (mL): 7  Measured from: lips  ETT/EBT  to lips (cm): 22  Number of attempts at approach: 1  Assessment: lips, teeth, and gum same as pre-op and atraumatic intubation

## 2025-06-22 NOTE — H&P
[facility] Intensivist Services  HISTORY AND PHYSICAL    Date of Admission: 6/21/2025  Primary Care Physician: He Ortega MD    Subjective   Primary Historian: Patient    Chief Complaint: Hematemesis    History of Present Illness  Patient is 30-year-old male with PMH of CHF, HTN, CKD, seizure disorder who was transferred from [facility] for upper GI bleed. He reportedly not feeling well for the past several days and was seen in the ED at [place]. From what I understand, Hgb was unremarkable at that time. Yesterday he began experiencing non-bloody emesis followed by multiple episodes of bloody emesis today.  He reportedly had large-volume hematemesis in the ED.  Hgb 5.2 today, received 2 units PRBCs and 1 FFP.  Protonix 80 mg IV given in started on octreotide drip.  Patient states he has been compliant with taking Plavix, last dose was yesterday.  Denies any NSAIDs or EtOH use.  Patient states she uses marijuana occasionally and smokes cigarettes, but denies any other drug use.  Patient reports dark stools, denies hematochezia. No reported fever, chills, abdominal pain, chest pain, shortness of breath.  With patient needing GI evaluation, he required transfer to this facility for further management.      Review of Systems   Constitutional:  Negative for chills and fever.   Respiratory:  Negative for shortness of breath.    Cardiovascular:  Negative for chest pain.   Gastrointestinal:  Positive for nausea and vomiting. Negative for abdominal pain, blood in stool and diarrhea.        Dark stools per pt   Neurological:  Negative for syncope.   All other systems reviewed and are negative.     Otherwise complete ROS reviewed and negative except as mentioned in the HPI.    Past Medical History:   Past Medical History:   Diagnosis Date    Cardiomegaly     Congenital fusion of carpal bone     HTN (hypertension)     Seizure      Past Surgical History:  Past Surgical  History:   Procedure Laterality Date    ELBOW PROCEDURE       Social History:  reports that he has been smoking cigarettes. He has never used smokeless tobacco. He reports current alcohol use. He reports current drug use. Drugs: Marijuana and Cocaine(coke).    Family History: family history includes Heart disease in his mother; Hypertension in his mother; No Known Problems in his father.     Allergies:  Allergies   Allergen Reactions    Penicillins Anaphylaxis       Medications:  Prior to Admission medications    Medication Sig Start Date End Date Taking? Authorizing Provider   amLODIPine (NORVASC) 10 MG tablet Take 1 tablet by mouth Daily. 11/28/24   Jenna Ospina MD   aspirin 81 MG EC tablet Take 1 tablet by mouth Daily. 11/27/24   Jenna Ospina MD   furosemide (Lasix) 20 MG tablet Take 1 tablet by mouth 2 (Two) Times a Day. 11/27/24   Jenna Ospina MD   hydrALAZINE (APRESOLINE) 100 MG tablet Take 1 tablet by mouth 3 (Three) Times a Day. 11/27/24   Jenna Ospina MD   hydrALAZINE (APRESOLINE) 25 MG tablet Take 1 tablet by mouth 3 (Three) Times a Day. 3/21/24   Guille Aguirre MD   isosorbide dinitrate (ISORDIL) 10 MG tablet Take 4 tablets by mouth 3 (Three) Times a Day. 11/27/24   Jenna Ospina MD   isosorbide dinitrate (ISORDIL) 20 MG tablet Take 1 tablet by mouth 3 (Three) Times a Day. 3/21/24   Guille Aguirre MD   levETIRAcetam (KEPPRA) 500 MG tablet Take 1 tablet by mouth 2 (Two) Times a Day.    ProviderAkanksha MD   metoprolol succinate XL (TOPROL-XL) 100 MG 24 hr tablet Take 1 tablet by mouth Daily. 3/21/24   Guille Aguirre MD   Potassium 99 MG tablet Take 1 tablet by mouth Daily.    ProviderAkanksha MD   sacubitril-valsartan (Entresto) 49-51 MG tablet Take 1 tablet by mouth 2 (Two) Times a Day. 3/21/24   Guille Aguirre MD     I have utilized all available immediate resources to obtain, update, or review the patient's current medications (including all prescriptions,  "over-the-counter products, herbals, cannabis/cannabidiol products, and vitamin/mineral/dietary (nutritional) supplements).    Objective     Vital Signs: /100   Pulse 77   Temp 99 °F (37.2 °C) (Oral)   Resp 16   Ht 188 cm (74\")   Wt 85.7 kg (188 lb 15 oz)   SpO2 96%   BMI 24.26 kg/m²   Physical Exam  Vitals and nursing note reviewed.   HENT:      Head: Normocephalic and atraumatic.      Mouth/Throat:      Mouth: Mucous membranes are moist.   Eyes:      Extraocular Movements: Extraocular movements intact.      Conjunctiva/sclera: Conjunctivae normal.   Cardiovascular:      Rate and Rhythm: Normal rate and regular rhythm.   Pulmonary:      Effort: Pulmonary effort is normal. No respiratory distress.      Breath sounds: No wheezing or rales.   Abdominal:      General: Abdomen is flat. There is no distension.      Palpations: Abdomen is soft.      Tenderness: There is no abdominal tenderness.   Musculoskeletal:         General: Normal range of motion.      Cervical back: Normal range of motion and neck supple.      Right lower leg: No edema.      Left lower leg: No edema.   Skin:     General: Skin is warm and dry.   Neurological:      General: No focal deficit present.      Mental Status: He is alert and oriented to person, place, and time.      Cranial Nerves: No cranial nerve deficit.   Psychiatric:         Mood and Affect: Mood normal.         Thought Content: Thought content normal.          Results Reviewed:  Lab Results (last 24 hours)       Procedure Component Value Units Date/Time    Comprehensive Metabolic Panel [[id number]]  (Abnormal) Collected: 06/21/25 2101    Specimen: Blood Updated: 06/21/25 2137     Glucose 121 mg/dL      .8 mg/dL      Creatinine 5.60 mg/dL      Sodium 139 mmol/L      Potassium 3.7 mmol/L      Chloride 100 mmol/L      CO2 23.0 mmol/L      Calcium 7.6 mg/dL      Total Protein 4.4 g/dL      Albumin 2.6 g/dL      ALT (SGPT) 42 U/L      AST (SGOT) 16 U/L      Alkaline " Phosphatase 70 U/L      Total Bilirubin 2.2 mg/dL      Globulin 1.8 gm/dL      A/G Ratio 1.4 g/dL      BUN/Creatinine Ratio 19.8     Anion Gap 16.0 mmol/L      eGFR 13.3 mL/min/1.73     Narrative:      GFR Categories in Chronic Kidney Disease (CKD)              GFR Category          GFR (mL/min/1.73)    Interpretation  G1                    90 or greater        Normal or high (1)  G2                    60-89                Mild decrease (1)  G3a                   45-59                Mild to moderate decrease  G3b                   30-44                Moderate to severe decrease  G4                    15-29                Severe decrease  G5                    14 or less           Kidney failure    (1)In the absence of evidence of kidney disease, neither GFR category G1 or G2 fulfill the criteria for CKD.    eGFR calculation 2021 CKD-EPI creatinine equation, which does not include race as a factor    Magnesium [[id number]]  (Normal) Collected: 06/21/25 2101    Specimen: Blood Updated: 06/21/25 2137     Magnesium 2.0 mg/dL     Lipase [[id number]]  (Normal) Collected: 06/21/25 2101    Specimen: Blood Updated: 06/21/25 2137     Lipase 35 U/L     Phosphorus [[id number]]  (Abnormal) Collected: 06/21/25 2101    Specimen: Blood Updated: 06/21/25 2137     Phosphorus 5.5 mg/dL     CBC Auto Differential [[id number]]  (Abnormal) Collected: 06/21/25 2101    Specimen: Blood Updated: 06/21/25 2130     WBC 7.53 10*3/mm3      RBC 2.47 10*6/mm3      Hemoglobin 6.3 g/dL      Hematocrit 18.8 %      MCV 76.1 fL      MCH 25.5 pg      MCHC 33.5 g/dL      RDW 17.2 %      RDW-SD 46.9 fl      MPV 11.5 fL      Platelets 118 10*3/mm3      Neutrophil % 82.0 %      Lymphocyte % 10.2 %      Monocyte % 6.1 %      Eosinophil % 0.1 %      Basophil % 0.4 %      Immature Grans % 1.2 %      Neutrophils, Absolute 6.17 10*3/mm3      Lymphocytes, Absolute 0.77 10*3/mm3      Monocytes, Absolute 0.46 10*3/mm3      Eosinophils, Absolute 0.01 10*3/mm3       Basophils, Absolute 0.03 10*3/mm3      Immature Grans, Absolute 0.09 10*3/mm3      nRBC 0.5 /100 WBC     Protime-INR [[id number]]  (Abnormal) Collected: 06/21/25 2101    Specimen: Blood Updated: 06/21/25 2128     Protime 19.5 Seconds      INR 1.55    aPTT [[id number]]  (Normal) Collected: 06/21/25 2101    Specimen: Blood Updated: 06/21/25 2128     PTT 31.4 seconds     Narrative:      PTT = The equivalent PTT values for the therapeutic range of heparin levels at 0.3 to 0.7 U/ml are 77 - 99 seconds.    Fentanyl, Urine - Urine, Clean Catch [[id number]]  (Normal) Collected: 06/21/25 2101    Specimen: Urine, Clean Catch Updated: 06/21/25 2121     Fentanyl, Urine Negative    Narrative:      Negative Threshold:      Fentanyl 5 ng/mL     The normal value for the drug tested is negative. This report includes final unconfirmed screening results to be used for medical treatment purposes only. Unconfirmed results must not be used for non-medical purposes such as employment or legal testing. Clinical consideration should be applied to any drug of abuse test, particularly when unconfirmed results are used.           Urine Drug Screen - Urine, Clean Catch [[id number]]  (Abnormal) Collected: 06/21/25 2101    Specimen: Urine, Clean Catch Updated: 06/21/25 2120     THC, Screen, Urine Positive     Phencyclidine (PCP), Urine Negative     Cocaine Screen, Urine Positive     Methamphetamine, Ur Negative     Opiate Screen Negative     Amphetamine Screen, Urine Negative     Benzodiazepine Screen, Urine Negative     Tricyclic Antidepressants Screen Negative     Methadone Screen, Urine Negative     Barbiturates Screen, Urine Negative     Oxycodone Screen, Urine Negative     Buprenorphine, Screen, Urine Negative    Narrative:      Cutoff For Drugs Screened:    Amphetamines               500 ng/ml  Barbiturates               200 ng/ml  Benzodiazepines            150 ng/ml  Cocaine                    150 ng/ml  Methadone                  200  ng/ml  Opiates                    100 ng/ml  Phencyclidine               25 ng/ml  THC                         50 ng/ml  Methamphetamine            500 ng/ml  Tricyclic Antidepressants  300 ng/ml  Oxycodone                  100 ng/ml  Buprenorphine               10 ng/ml    The normal value for all drugs tested is negative. This report includes unconfirmed screening results, with the cutoff values listed, to be used for medical treatment purposes only.  Unconfirmed results must not be used for non-medical purposes such as employment or legal testing.  Clinical consideration should be applied to any drug of abuse test, particularly when unconfirmed results are used.               No radiology results for the last day    Result Review:  I have personally reviewed the results from the time of this admission to 6/21/2025 22:07 CDT and agree with these findings:  [x]  Laboratory list / accordion  []  Microbiology  []  Radiology  []  EKG/Telemetry   [x]  Cardiology/Vascular   []  Pathology  [x]  Old records  []  Other:  Most notable findings include: Hgb 6.3, Hct 18.8, Cr 5.6, UDS +cocaine and THC      I have personally reviewed and interpreted the radiology studies and ECG obtained at time of admission.     Assessment / Plan   Assessment:   Active Hospital Problems    Diagnosis     **Upper gastrointestinal bleed      30-year-old male transferred from [facility] for upper GI bleed.  Patient is on Plavix, reportedly took last dose yesterday.  Hgb 5.2 at outside hospital, received 2 units PRBCs.  Transferred to this facility for GI evaluation and further management.    Upper GI bleed  Acute blood loss anemia  - Hgb 5.2 prior to transfer  - Received 2 units PRBCs and 1 FFP  - Repeat Hgb 6.3  - 2 units PRBCs ordered  - Monitor Hgb, transfuse if Hgb<7  - Protonix 80 mg IV x1 given, will give additional 40 mg IV tonight  - Protonix 40 mg q12 hr  - Continue Octreotide drip  - Hold anticoagulation  - Reglan 10 mg q8  hr per GI  - GI consulted, appreciate assistance    Non-ischemic cardiomyopathy  HFrEF  - Echo 11/2024 with EF 30-35%  - Gentle IV hydration  - Hold Entresto for now  - EKG shows sinus rhythm, occasional PVCs, Qtc 457 ms, rate 75 bpm    CKD, stage IV  - Baseline Cr ~4.0 (Nov 2024)  - Cr 6.49 at outside hospital  - Monitor renal function and UOP  - Consider nephrology consult    Hypertension  - Takes Hydralazine at home  - Hold antihypertensives for now  - Consider prn Hydralazine if needed    Seizure disorder  - Continue Keppra 500 mg BID  - Give IV Keppra for now, will change to PO when appropriate    Diet: NPO for now  DVT PPx: SCDs, no anticoagulants 2/2 GI bleed  GI prophylaxis: Protonix  Antibiotics: None  CODE STATUS: Full code  Next of kin: Jesika Webb, mother ([phone])      I provided 65 minutes of total critical care time. Due to the high probability of clinically significant, life-threatening deterioration, the patient required my direct and personal management. The critical care time does not include time spent on separately billable procedures.    Electronically signed by Tacho Suarez PA-C on 6/21/2025 at 22:07 CDT

## 2025-06-22 NOTE — NURSING NOTE
Patient transferred from CCU to 365. No complaints of pain at time of transfer. Patient on clear liquid diet at this time. Patient safety to be maintained this shift, continue to monitor and report abnormal to provider.

## 2025-06-22 NOTE — CONSULTS
[facility] Gastroenterology  Inpatient Consult Note  Today's date:  06/21/25    José Miguel Webb  1995       Referring Provider: Gopi Gonsales MD  Primary Physician: He Ortega MD     Date of Admission: 6/21/2025  Date of Service:  06/21/25    Reason for Consultation/Chief Complaint:   GI bleed    History of present illness:    José Miguel is a 31 y/o male transferred to [facility] from emergency room at [facility]. He presented to the emergency room with hematemesis. He reports having several episodes of non-bloody emesis yesterday and then a large volume bloody emesis today. Hgb in the emergency room was 5.2, which was significant drop from his baseline Hgb. He was found to be hypotensive in the emergency room. He received at least 1 unit of PRBC and 1 unit of FFP there. He was transferred to ICU here for further management. Currently hemodynamically stable. He is not requiring vasopressors at this time. Repeat Hgb is 6.3. He has not had any hematemesis or hematochezia here. Past medical history significant for chronic kidney disease and CHF. He is on Plavix.     Past Medical History:   Diagnosis Date    Cardiomegaly     Congenital fusion of carpal bone     HTN (hypertension)     Seizure          Past Surgical History:   Procedure Laterality Date    ELBOW PROCEDURE            Allergies   Allergen Reactions    Penicillins Anaphylaxis         Social History     Tobacco Use    Smoking status: Some Days     Current packs/day: 0.50     Types: Cigarettes    Smokeless tobacco: Never   Substance Use Topics    Alcohol use: Yes          Family History   Problem Relation Age of Onset    Hypertension Mother     Heart disease Mother     No Known Problems Father          Medications Prior to Admission   Medication Sig Dispense Refill Last Dose/Taking    amLODIPine (NORVASC) 10 MG tablet Take 1 tablet by mouth Daily. 30 tablet 0     aspirin 81 MG EC tablet Take 1 tablet by mouth Daily. 30  tablet 0     furosemide (Lasix) 20 MG tablet Take 1 tablet by mouth 2 (Two) Times a Day. 60 tablet 0     hydrALAZINE (APRESOLINE) 100 MG tablet Take 1 tablet by mouth 3 (Three) Times a Day. 90 tablet 0     hydrALAZINE (APRESOLINE) 25 MG tablet Take 1 tablet by mouth 3 (Three) Times a Day. 270 tablet 3     isosorbide dinitrate (ISORDIL) 10 MG tablet Take 4 tablets by mouth 3 (Three) Times a Day. 360 tablet 0     isosorbide dinitrate (ISORDIL) 20 MG tablet Take 1 tablet by mouth 3 (Three) Times a Day. 270 tablet 3     levETIRAcetam (KEPPRA) 500 MG tablet Take 1 tablet by mouth 2 (Two) Times a Day.       metoprolol succinate XL (TOPROL-XL) 100 MG 24 hr tablet Take 1 tablet by mouth Daily. 90 tablet 3     Potassium 99 MG tablet Take 1 tablet by mouth Daily.       sacubitril-valsartan (Entresto) 49-51 MG tablet Take 1 tablet by mouth 2 (Two) Times a Day. 180 tablet 3              Review of Systems:  Constitutional: No unexpected weight change, no fatigue, no unexplained fever, no sweats or chills.   HEENT: No icteric sclera.  No hearing or visual deficits.  No sore throat.  No chronic nasal discharge.  Pulmonary: No chronic cough.  No hemoptysis.  No shortness of breath.  Cardiovascular: No chest pain.  No palpitations.  No shortness of breath.  Gastrointestinal: As above.  Musculoskeletal/extremities: No peripheral edema.  No cyanosis.  No claudications.  No back pain.  Genitourinary: No dysuria.  No blood in stool.  No urethral discharges.  Neurologic: No seizures.  No headaches.  No dizziness.  No gait problems.  Skin: No rash.  No icterus.  Mental: No psychosis.  No confusions.  No hallucinations.      Physical Exam:  Temp:  [99 °F (37.2 °C)] 99 °F (37.2 °C)  Heart Rate:  [72-77] 77  Resp:  [16] 16  BP: (139-152)/() 152/100    General:   HEENT: Nonicteric sclerae.  Moist oral mucosa.  PERRLA.  EOMI.  Clear pharynx.  Lungs: Clear to auscultation bilaterally.  No wheezing, rales or rhonchi.  Heart: Regular rate  and rhythm.  Normal S1 and S2, no S3, S4 or murmur.  Abdomen: Soft, nondistended, nontender to palpation, with normoactive bowel sounds, no hepatosplenomegaly, no palpable masses.  Extremities: No cyanosis, edema or pulse deficits.  Skin: No rash or jaundice.      Results Review:  Lab Results (last 24 hours)       Procedure Component Value Units Date/Time    Urine Drug Screen - Urine, Clean Catch [[id number]]  (Abnormal) Collected: 06/21/25 2101    Specimen: Urine, Clean Catch Updated: 06/21/25 2120     THC, Screen, Urine Positive     Phencyclidine (PCP), Urine Negative     Cocaine Screen, Urine Positive     Methamphetamine, Ur Negative     Opiate Screen Negative     Amphetamine Screen, Urine Negative     Benzodiazepine Screen, Urine Negative     Tricyclic Antidepressants Screen Negative     Methadone Screen, Urine Negative     Barbiturates Screen, Urine Negative     Oxycodone Screen, Urine Negative     Buprenorphine, Screen, Urine Negative    Narrative:      Cutoff For Drugs Screened:    Amphetamines               500 ng/ml  Barbiturates               200 ng/ml  Benzodiazepines            150 ng/ml  Cocaine                    150 ng/ml  Methadone                  200 ng/ml  Opiates                    100 ng/ml  Phencyclidine               25 ng/ml  THC                         50 ng/ml  Methamphetamine            500 ng/ml  Tricyclic Antidepressants  300 ng/ml  Oxycodone                  100 ng/ml  Buprenorphine               10 ng/ml    The normal value for all drugs tested is negative. This report includes unconfirmed screening results, with the cutoff values listed, to be used for medical treatment purposes only.  Unconfirmed results must not be used for non-medical purposes such as employment or legal testing.  Clinical consideration should be applied to any drug of abuse test, particularly when unconfirmed results are used.      Fentanyl, Urine - Urine, Clean Catch [[id number]] Collected: 06/21/25 2101     Specimen: Urine, Clean Catch Updated: 06/21/25 2107    CBC Auto Differential [[id number]] Collected: 06/21/25 2101    Specimen: Blood Updated: 06/21/25 2107    Protime-INR [[id number]] Collected: 06/21/25 2101    Specimen: Blood Updated: 06/21/25 2107    aPTT [[id number]] Collected: 06/21/25 2101    Specimen: Blood Updated: 06/21/25 2107    Comprehensive Metabolic Panel [[id number]] Collected: 06/21/25 2101    Specimen: Blood Updated: 06/21/25 2107    Magnesium [[id number]] Collected: 06/21/25 2101    Specimen: Blood Updated: 06/21/25 2107    Lipase [[id number]] Collected: 06/21/25 2101    Specimen: Blood Updated: 06/21/25 2107    Phosphorus [[id number]] Collected: 06/21/25 2101    Specimen: Blood Updated: 06/21/25 2107              Radiology Review:  Imaging Results (Last 72 Hours)       ** No results found for the last 72 hours. **            Impression:  -GI bleed with hematemesis.    -Anemia with acute drop in hgb, secondary to GI bleed.    -History of chronic kidney disease.     -History of CHF. On Plavix.    Plan:  -Will give another dose of IV Protonix now and then continue 40 mg every 12 hours. Will give one dose of Reglan 10 mg now. Hold Plavix. Recommend transfusing an additional 2 units of PRBC now. He is on schedule for EGD at 7 am tomorrow. If he has further episodes of hematemesis or something changes overnight, will plan for emergent EGD.       Kevin Miranda MD  06/21/25   21:21 CDT

## 2025-06-22 NOTE — PLAN OF CARE
Goal Outcome Evaluation:  Plan of Care Reviewed With: patient        Progress: improving  Outcome Evaluation: VSS. Hypertensive at times requiring prn antihypertensives. No current s/s of active bleeding. Pt. did have EGD today and ulcer noted. Interventions = clips x4 placed and epi. Is tolerating clear liquids. Adequate UOP. Denies pain. Serial H/H's continue. No longer requiring critical care and transferred to room 365. Mother notified.

## 2025-06-22 NOTE — PROGRESS NOTES
[facility] Intensivist Services  INPATIENT PROGRESS NOTE    Patient Name: José Miguel Webb  Date of Admission: 6/21/2025  Today's Date: 06/22/25  Length of Stay: 1  Primary Care Physician: He Ortega MD    ICU Summary   30-year-old male with chronic systolic heart failure, CKD stage IV, and seizure disorder admitted to ICU at [facility] in transfer from [facility] for acute upper GI bleed.  Hemoglobin 5.2 on arrival to ER on 6/21.  Received 3 units packed red blood cells, 1 FFP.  Was started on PPI octreotide.  He is on Plavix chronically.    Interval update:  6/22  Patient underwent EGD this morning with gastroenterology.  Found ulcer in gastric fundus which was clipped and sprayed with epinephrine.  No active bleeding was seen at that time.  Hemoglobin has improved to 8.7 posttransfusion.  Patient alert, no distress.  Blood pressure heart rate stable.    Review of Systems   All pertinent negatives and positives are as above. All other systems have been reviewed and are negative unless otherwise stated.     Objective    Temp:  [98 °F (36.7 °C)-99 °F (37.2 °C)] 98.1 °F (36.7 °C)  Heart Rate:  [] 82  Resp:  [11-22] 14  BP: (129-179)/() 160/89  Physical Exam  Nursing note reviewed.   Constitutional:       General: He is not in acute distress.  Eyes:      Pupils: Pupils are equal, round, and reactive to light.   Cardiovascular:      Rate and Rhythm: Normal rate and regular rhythm.      Pulses: Normal pulses.      Heart sounds: Normal heart sounds.   Pulmonary:      Effort: Pulmonary effort is normal.      Breath sounds: Normal breath sounds.   Abdominal:      General: There is no distension.      Palpations: Abdomen is soft.   Musculoskeletal:         General: Normal range of motion.   Skin:     General: Skin is warm and dry.      Capillary Refill: Capillary refill takes less than 2 seconds.   Neurological:      General: No focal  deficit present.      Mental Status: He is alert and oriented to person, place, and time.         Results Review:  Lab Results (last 24 hours)       Procedure Component Value Units Date/Time    Basic Metabolic Panel [[id number]]  (Abnormal) Collected: 06/22/25 0518    Specimen: Blood Updated: 06/22/25 0554     Glucose 87 mg/dL      .0 mg/dL      Creatinine 5.89 mg/dL      Sodium 142 mmol/L      Potassium 3.6 mmol/L      Chloride 103 mmol/L      CO2 26.0 mmol/L      Calcium 8.0 mg/dL      BUN/Creatinine Ratio 18.3     Anion Gap 13.0 mmol/L      eGFR 12.5 mL/min/1.73     Narrative:      GFR Categories in Chronic Kidney Disease (CKD)              GFR Category          GFR (mL/min/1.73)    Interpretation  G1                    90 or greater        Normal or high (1)  G2                    60-89                Mild decrease (1)  G3a                   45-59                Mild to moderate decrease  G3b                   30-44                Moderate to severe decrease  G4                    15-29                Severe decrease  G5                    14 or less           Kidney failure    (1)In the absence of evidence of kidney disease, neither GFR category G1 or G2 fulfill the criteria for CKD.    eGFR calculation 2021 CKD-EPI creatinine equation, which does not include race as a factor    CBC & Differential [[id number]]  (Abnormal) Collected: 06/22/25 0518    Specimen: Blood Updated: 06/22/25 0540    Narrative:      The following orders were created for panel order CBC & Differential.  Procedure                               Abnormality         Status                     ---------                               -----------         ------                     CBC Auto Differential[[id number]]        Abnormal            Final result                 Please view results for these tests on the individual orders.    CBC Auto Differential [[id number]]  (Abnormal) Collected: 06/22/25 0518    Specimen: Blood Updated: 06/22/25  0540     WBC 6.98 10*3/mm3      RBC 3.16 10*6/mm3      Hemoglobin 8.7 g/dL      Hematocrit 25.8 %      MCV 81.6 fL      MCH 27.5 pg      MCHC 33.7 g/dL      RDW 17.4 %      RDW-SD 51.5 fl      MPV 11.1 fL      Platelets 105 10*3/mm3      Neutrophil % 67.3 %      Lymphocyte % 21.2 %      Monocyte % 10.3 %      Eosinophil % 0.6 %      Basophil % 0.3 %      Immature Grans % 0.3 %      Neutrophils, Absolute 4.70 10*3/mm3      Lymphocytes, Absolute 1.48 10*3/mm3      Monocytes, Absolute 0.72 10*3/mm3      Eosinophils, Absolute 0.04 10*3/mm3      Basophils, Absolute 0.02 10*3/mm3      Immature Grans, Absolute 0.02 10*3/mm3      nRBC 0.6 /100 WBC     Calcium, Ionized [[id number]]  (Abnormal) Collected: 06/22/25 0518    Specimen: Blood Updated: 06/22/25 0519     Ionized Calcium 4.05 mg/dL      Comment: 84 Value below reference range        Collected by [id number]     Comment: Meter: [id number]     :  Kathy Herrera RRT       Comprehensive Metabolic Panel [[id number]]  (Abnormal) Collected: 06/21/25 2101    Specimen: Blood Updated: 06/21/25 2137     Glucose 121 mg/dL      .8 mg/dL      Creatinine 5.60 mg/dL      Sodium 139 mmol/L      Potassium 3.7 mmol/L      Chloride 100 mmol/L      CO2 23.0 mmol/L      Calcium 7.6 mg/dL      Total Protein 4.4 g/dL      Albumin 2.6 g/dL      ALT (SGPT) 42 U/L      AST (SGOT) 16 U/L      Alkaline Phosphatase 70 U/L      Total Bilirubin 2.2 mg/dL      Globulin 1.8 gm/dL      A/G Ratio 1.4 g/dL      BUN/Creatinine Ratio 19.8     Anion Gap 16.0 mmol/L      eGFR 13.3 mL/min/1.73     Narrative:      GFR Categories in Chronic Kidney Disease (CKD)              GFR Category          GFR (mL/min/1.73)    Interpretation  G1                    90 or greater        Normal or high (1)  G2                    60-89                Mild decrease (1)  G3a                   45-59                Mild to moderate decrease  G3b                   30-44                Moderate to severe  decrease  G4                    15-29                Severe decrease  G5                    14 or less           Kidney failure    (1)In the absence of evidence of kidney disease, neither GFR category G1 or G2 fulfill the criteria for CKD.    eGFR calculation 2021 CKD-EPI creatinine equation, which does not include race as a factor    Magnesium [[id number]]  (Normal) Collected: 06/21/25 2101    Specimen: Blood Updated: 06/21/25 2137     Magnesium 2.0 mg/dL     Lipase [[id number]]  (Normal) Collected: 06/21/25 2101    Specimen: Blood Updated: 06/21/25 2137     Lipase 35 U/L     Phosphorus [[id number]]  (Abnormal) Collected: 06/21/25 2101    Specimen: Blood Updated: 06/21/25 2137     Phosphorus 5.5 mg/dL     CBC Auto Differential [[id number]]  (Abnormal) Collected: 06/21/25 2101    Specimen: Blood Updated: 06/21/25 2130     WBC 7.53 10*3/mm3      RBC 2.47 10*6/mm3      Hemoglobin 6.3 g/dL      Hematocrit 18.8 %      MCV 76.1 fL      MCH 25.5 pg      MCHC 33.5 g/dL      RDW 17.2 %      RDW-SD 46.9 fl      MPV 11.5 fL      Platelets 118 10*3/mm3      Neutrophil % 82.0 %      Lymphocyte % 10.2 %      Monocyte % 6.1 %      Eosinophil % 0.1 %      Basophil % 0.4 %      Immature Grans % 1.2 %      Neutrophils, Absolute 6.17 10*3/mm3      Lymphocytes, Absolute 0.77 10*3/mm3      Monocytes, Absolute 0.46 10*3/mm3      Eosinophils, Absolute 0.01 10*3/mm3      Basophils, Absolute 0.03 10*3/mm3      Immature Grans, Absolute 0.09 10*3/mm3      nRBC 0.5 /100 WBC     Protime-INR [[id number]]  (Abnormal) Collected: 06/21/25 2101    Specimen: Blood Updated: 06/21/25 2128     Protime 19.5 Seconds      INR 1.55    aPTT [[id number]]  (Normal) Collected: 06/21/25 2101    Specimen: Blood Updated: 06/21/25 2128     PTT 31.4 seconds     Narrative:      PTT = The equivalent PTT values for the therapeutic range of heparin levels at 0.3 to 0.7 U/ml are 77 - 99 seconds.    Fentanyl, Urine - Urine, Clean Catch [[id number]]  (Normal) Collected: 06/21/25  2101    Specimen: Urine, Clean Catch Updated: 06/21/25 2121     Fentanyl, Urine Negative    Narrative:      Negative Threshold:      Fentanyl 5 ng/mL     The normal value for the drug tested is negative. This report includes final unconfirmed screening results to be used for medical treatment purposes only. Unconfirmed results must not be used for non-medical purposes such as employment or legal testing. Clinical consideration should be applied to any drug of abuse test, particularly when unconfirmed results are used.           Urine Drug Screen - Urine, Clean Catch [[id number]]  (Abnormal) Collected: 06/21/25 2101    Specimen: Urine, Clean Catch Updated: 06/21/25 2120     THC, Screen, Urine Positive     Phencyclidine (PCP), Urine Negative     Cocaine Screen, Urine Positive     Methamphetamine, Ur Negative     Opiate Screen Negative     Amphetamine Screen, Urine Negative     Benzodiazepine Screen, Urine Negative     Tricyclic Antidepressants Screen Negative     Methadone Screen, Urine Negative     Barbiturates Screen, Urine Negative     Oxycodone Screen, Urine Negative     Buprenorphine, Screen, Urine Negative    Narrative:      Cutoff For Drugs Screened:    Amphetamines               500 ng/ml  Barbiturates               200 ng/ml  Benzodiazepines            150 ng/ml  Cocaine                    150 ng/ml  Methadone                  200 ng/ml  Opiates                    100 ng/ml  Phencyclidine               25 ng/ml  THC                         50 ng/ml  Methamphetamine            500 ng/ml  Tricyclic Antidepressants  300 ng/ml  Oxycodone                  100 ng/ml  Buprenorphine               10 ng/ml    The normal value for all drugs tested is negative. This report includes unconfirmed screening results, with the cutoff values listed, to be used for medical treatment purposes only.  Unconfirmed results must not be used for non-medical purposes such as employment or legal testing.  Clinical consideration should be  applied to any drug of abuse test, particularly when unconfirmed results are used.               No radiology results for the last day    Result Review:  I have personally reviewed the results from the time of this admission to 6/22/2025 09:00 CDT and agree with these findings:  [x]  Laboratory list / accordion  []  Microbiology  [x]  Radiology  [x]  EKG/Telemetry   []  Cardiology/Vascular   []  Pathology  []  Old records  [x]  Other:  Outside records reviewed  I have reviewed the patient's current medications.     Assessment/Plan   Assessment  Active Hospital Problems    Diagnosis     **Upper gastrointestinal bleed    30-year-old male with acute upper GI bleed, now status post EGD with clipping of gastric ulcer.  In ICU for critical care management    Acute upper GI bleed, acute blood loss anemia  - Status post EGD with clipping of ulcer as above  - Last hemoglobin 8.7 after 3 units packed red blood cells  - Continue to monitor serial H&H  - Continue PPI twice daily  - N.p.o. for now except for ice chips and meds per GI recs, possible clear liquid diet tonight if no bleeding  - Continue to hold Plavix this time, will investigate why patient is on Plavix as he states he has nonischemic cardiomyopathy    Chronic medical problems:  - Chronic systolic heart failure/nonischemic cardiomyopathy: Hold GDMT for now given acute bleeding, reinitiate once he remains hemodynamically stable  - CKD stage IV: Monitor daily metabolic panel, strict I&O. Will consult nephrology as unsure of outpatient follow up and BUN and Cr above previous baseline  - Seizure: Continue home dose Keppra    VTE: SCDs  GI: PPI  NTN: N.p.o.  Abx: N/A  Lines/Tubes: Peripheral IV  CODE STATUS: Full resuscitation    Disposition: Continue critical care management immediately post EGD. Eval for floor this afternoon.     50 minutes minimum spent on patient, MDM high     This time included obtaining a history; examining the patient; pulse oximetry; ordering  and review of studies; arranging urgent treatment with development of a management plan; evaluation of patient's response to treatment; frequent reassessment; and, discussions with other providers.     Please see rest of the note for further information on patient assessment, MDM, and treatment.     Part of this note may be an electronic transcription/translation of spoken language to printed text using the Dragon dictation system      Electronically signed by SUKHDEV Downey on 6/22/2025 at 09:09 CDT    Addendum: Pt remained hemodynamically stable. H&H cont ot improve. Deemed stable for medical floor. Report given to Dr Hannah with hospital medicine. Pt accepted to his service line under the care of Dr De Oliveira. Transferring to rm [id number].

## 2025-06-22 NOTE — ANESTHESIA POSTPROCEDURE EVALUATION
Patient: José Miguel Webb    Procedure Summary       Date: 06/22/25 Room / Location:  [facility] OR  /  [facility] OR    Anesthesia Start: 0727 Anesthesia Stop: 0809    Procedure: ESOPHAGOGASTRODUODENOSCOPY Diagnosis:       Upper gastrointestinal bleed      (Upper gastrointestinal bleed [K92.2])    Surgeons: Kevin Miranda MD Provider: Herbert Angela CRNA    Anesthesia Type: general ASA Status: 3 - Emergent            Anesthesia Type: general    Vitals  Vitals Value Taken Time   /89 06/22/25 08:20   Temp 98.1 °F (36.7 °C) 06/22/25 08:04   Pulse 80 06/22/25 08:20   Resp 20 06/22/25 08:20   SpO2 99 % 06/22/25 08:20           Post Anesthesia Care and Evaluation      Comments: Discharged per PACU Criteria

## 2025-06-22 NOTE — ANESTHESIA PREPROCEDURE EVALUATION
Anesthesia Evaluation     NPO Solid Status: Waived due to emergency  NPO Liquid Status: Waived due to emergency           Airway   Mallampati: II  TM distance: >3 FB  No difficulty expected  Dental      Pulmonary    (+) a smoker Current,  Cardiovascular     (+) hypertension, CHF       Neuro/Psych  (+) seizures, psychiatric history  GI/Hepatic/Renal/Endo    (+) GI bleeding upper active bleeding, renal disease-    Musculoskeletal     Abdominal    Substance History   (+) drug use     OB/GYN          Other                      Anesthesia Plan    ASA 3 - emergent     general   Rapid sequence  intravenous induction     Anesthetic plan, risks, benefits, and alternatives have been provided, discussed and informed consent has been obtained with: patient.    CODE STATUS:    Code Status (Patient has no pulse and is not breathing): CPR (Attempt to Resuscitate)  Medical Interventions (Patient has pulse or is breathing): Full Support  Level Of Support Discussed With: Patient

## 2025-06-22 NOTE — PLAN OF CARE
Goal Outcome Evaluation:  Plan of Care Reviewed With: patient        Progress: no change  Outcome Evaluation: A/O. NSR. RA. Voiding w urinal. 2u PRBC given tonight. EGD today         Problem: Adult Inpatient Plan of Care  Goal: Plan of Care Review  Outcome: Progressing  Flowsheets (Taken 6/22/2025 0617)  Progress: no change  Outcome Evaluation: A/O. NSR. RA. Voiding w urinal. 2u PRBC given tonight. EGD today  Plan of Care Reviewed With: patient  Goal: Patient-Specific Goal (Individualized)  Outcome: Progressing  Goal: Absence of Hospital-Acquired Illness or Injury  Outcome: Progressing  Intervention: Identify and Manage Fall Risk  Recent Flowsheet Documentation  Taken 6/22/2025 0600 by Anna Chavez RN  Safety Promotion/Fall Prevention: safety round/check completed  Taken 6/22/2025 0400 by Anna Chavez RN  Safety Promotion/Fall Prevention: safety round/check completed  Taken 6/22/2025 0300 by Anna Chavez RN  Safety Promotion/Fall Prevention: safety round/check completed  Taken 6/22/2025 0200 by Anna Chavez RN  Safety Promotion/Fall Prevention: safety round/check completed  Taken 6/22/2025 0100 by Anna Chavez RN  Safety Promotion/Fall Prevention: safety round/check completed  Taken 6/22/2025 0000 by Anna Chavez RN  Safety Promotion/Fall Prevention: safety round/check completed  Taken 6/21/2025 2300 by Anna Chavez RN  Safety Promotion/Fall Prevention: safety round/check completed  Taken 6/21/2025 2200 by Anna Chavez RN  Safety Promotion/Fall Prevention: safety round/check completed  Taken 6/21/2025 2100 by Anna Chavez RN  Safety Promotion/Fall Prevention: safety round/check completed  Taken 6/21/2025 2000 by Anna Chavez RN  Safety Promotion/Fall Prevention: safety round/check completed  Intervention: Prevent Skin Injury  Recent Flowsheet Documentation  Taken 6/22/2025 0400 by Anna Chavez RN  Skin Protection: silicone foam dressing in place  Taken 6/22/2025  0300 by Anna Chavez RN  Body Position:   position changed independently   supine  Taken 6/22/2025 0100 by Anna Chavez RN  Body Position:   position changed independently   supine  Taken 6/22/2025 0000 by Anna Chavez RN  Skin Protection: silicone foam dressing in place  Taken 6/21/2025 2300 by Anna Chavez RN  Body Position:   position changed independently   side-lying   right  Taken 6/21/2025 2100 by Anna Chavez RN  Body Position:   position changed independently   side-lying   left  Taken 6/21/2025 2000 by Anna Chavez RN  Skin Protection:   silicone foam dressing in place   pulse oximeter probe site changed  Intervention: Prevent and Manage VTE (Venous Thromboembolism) Risk  Recent Flowsheet Documentation  Taken 6/22/2025 0400 by Anna Chavez RN  VTE Prevention/Management:   bilateral   SCDs (sequential compression devices) on  Taken 6/22/2025 0000 by Anna Chavez RN  VTE Prevention/Management:   bilateral   SCDs (sequential compression devices) on  Taken 6/21/2025 2000 by Anna Chavez RN  VTE Prevention/Management:   bilateral   SCDs (sequential compression devices) on  Intervention: Prevent Infection  Recent Flowsheet Documentation  Taken 6/22/2025 0400 by Anna Chavez RN  Infection Prevention: hand hygiene promoted  Taken 6/22/2025 0000 by Anna Chavez RN  Infection Prevention: hand hygiene promoted  Taken 6/21/2025 2000 by Anna Chavez RN  Infection Prevention: hand hygiene promoted  Goal: Optimal Comfort and Wellbeing  Outcome: Progressing  Intervention: Provide Person-Centered Care  Recent Flowsheet Documentation  Taken 6/22/2025 0400 by Anna Chavez RN  Trust Relationship/Rapport: care explained  Taken 6/22/2025 0000 by Anna Chavez RN  Trust Relationship/Rapport: care explained  Taken 6/21/2025 2000 by Anna Chavez RN  Trust Relationship/Rapport: care explained  Goal: Readiness for Transition of Care  Outcome:  Progressing  Intervention: Mutually Develop Transition Plan  Recent Flowsheet Documentation  Taken 6/21/2025 4452 by Anna Chavez, RN  Transportation Anticipated: family or friend will provide  Patient/Family Anticipated Services at Transition: none  Patient/Family Anticipates Transition to: home with family  Taken 6/21/2025 2252 by Anna Chavez, RN  Equipment Currently Used at Home: none

## 2025-06-22 NOTE — PROGRESS NOTES
Gastroenterology Note:  Patient presented to hospital with upper GI bleed with hematemesis and acute drop in hgb. Hgb on admission here was 6.3 after receiving at least 1 unit of PRBC at outside hospital. He received 2 additional units here, with hgb improved to 8.7 this morning. EGD this morning showed an ulcer in the gastric fundus. Treated with clip placement and epinephrine injection. No active bleeding.     Ok to discontinue Octreotide. Continue Pantoprazole 40 mg every 12 hours. Keep NPO except for ice chips for now. If no further bleeding, can advance to clear liquids this evening. Monitor hgb/hct and transfuse as needed.

## 2025-06-23 ENCOUNTER — APPOINTMENT (OUTPATIENT)
Dept: ULTRASOUND IMAGING | Facility: HOSPITAL | Age: 30
End: 2025-06-23
Payer: MEDICAID

## 2025-06-23 PROBLEM — D64.9 ACUTE ON CHRONIC ANEMIA: Status: ACTIVE | Noted: 2025-06-23

## 2025-06-23 LAB
ANION GAP SERPL CALCULATED.3IONS-SCNC: 13 MMOL/L (ref 5–15)
BASOPHILS # BLD AUTO: 0.05 10*3/MM3 (ref 0–0.2)
BASOPHILS NFR BLD AUTO: 0.7 % (ref 0–1.5)
BH BB BLOOD EXPIRATION DATE: NORMAL
BH BB BLOOD EXPIRATION DATE: NORMAL
BH BB BLOOD TYPE BARCODE: 8400
BH BB BLOOD TYPE BARCODE: 8400
BH BB DISPENSE STATUS: NORMAL
BH BB DISPENSE STATUS: NORMAL
BH BB PRODUCT CODE: NORMAL
BH BB PRODUCT CODE: NORMAL
BH BB UNIT NUMBER: NORMAL
BH BB UNIT NUMBER: NORMAL
BUN SERPL-MCNC: 95.3 MG/DL (ref 6–20)
BUN/CREAT SERPL: 18.2 (ref 7–25)
CALCIUM SPEC-SCNC: 8.1 MG/DL (ref 8.6–10.5)
CHLORIDE SERPL-SCNC: 103 MMOL/L (ref 98–107)
CO2 SERPL-SCNC: 26 MMOL/L (ref 22–29)
CREAT SERPL-MCNC: 5.24 MG/DL (ref 0.76–1.27)
CREAT UR-MCNC: 72.8 MG/DL
CROSSMATCH INTERPRETATION: NORMAL
CROSSMATCH INTERPRETATION: NORMAL
DEPRECATED RDW RBC AUTO: 53.4 FL (ref 37–54)
EGFRCR SERPLBLD CKD-EPI 2021: 14.4 ML/MIN/1.73
EOSINOPHIL # BLD AUTO: 0.15 10*3/MM3 (ref 0–0.4)
EOSINOPHIL NFR BLD AUTO: 2.1 % (ref 0.3–6.2)
EOSINOPHIL SPEC QL MICRO: 2 % EOS/100 CELLS (ref 0–0)
ERYTHROCYTE [DISTWIDTH] IN BLOOD BY AUTOMATED COUNT: 17.4 % (ref 12.3–15.4)
FERRITIN SERPL-MCNC: 67.52 NG/ML (ref 30–400)
GLUCOSE SERPL-MCNC: 81 MG/DL (ref 65–99)
HCT VFR BLD AUTO: 22.3 % (ref 37.5–51)
HCT VFR BLD AUTO: 22.7 % (ref 37.5–51)
HCT VFR BLD AUTO: 23 % (ref 37.5–51)
HCT VFR BLD AUTO: 23.6 % (ref 37.5–51)
HCT VFR BLD AUTO: 25.8 % (ref 37.5–51)
HGB BLD-MCNC: 7.1 G/DL (ref 13–17.7)
HGB BLD-MCNC: 7.3 G/DL (ref 13–17.7)
HGB BLD-MCNC: 7.3 G/DL (ref 13–17.7)
HGB BLD-MCNC: 7.6 G/DL (ref 13–17.7)
HGB BLD-MCNC: 7.9 G/DL (ref 13–17.7)
IMM GRANULOCYTES # BLD AUTO: 0.03 10*3/MM3 (ref 0–0.05)
IMM GRANULOCYTES NFR BLD AUTO: 0.4 % (ref 0–0.5)
IRON 24H UR-MRATE: 19 MCG/DL (ref 59–158)
IRON SATN MFR SERPL: 6 % (ref 20–50)
LYMPHOCYTES # BLD AUTO: 1.72 10*3/MM3 (ref 0.7–3.1)
LYMPHOCYTES NFR BLD AUTO: 24 % (ref 19.6–45.3)
MCH RBC QN AUTO: 27.7 PG (ref 26.6–33)
MCHC RBC AUTO-ENTMCNC: 32.7 G/DL (ref 31.5–35.7)
MCV RBC AUTO: 84.5 FL (ref 79–97)
MONOCYTES # BLD AUTO: 0.6 10*3/MM3 (ref 0.1–0.9)
MONOCYTES NFR BLD AUTO: 8.4 % (ref 5–12)
NEUTROPHILS NFR BLD AUTO: 4.61 10*3/MM3 (ref 1.7–7)
NEUTROPHILS NFR BLD AUTO: 64.4 % (ref 42.7–76)
NRBC BLD AUTO-RTO: 0 /100 WBC (ref 0–0.2)
OSMOLALITY UR: 431 MOSM/KG (ref 50–1400)
PLATELET # BLD AUTO: 112 10*3/MM3 (ref 140–450)
PMV BLD AUTO: 11.5 FL (ref 6–12)
POTASSIUM SERPL-SCNC: 3.5 MMOL/L (ref 3.5–5.2)
PROT ?TM UR-MCNC: 60.4 MG/DL
RBC # BLD AUTO: 2.64 10*6/MM3 (ref 4.14–5.8)
SODIUM SERPL-SCNC: 142 MMOL/L (ref 136–145)
SODIUM UR-SCNC: 27 MMOL/L
TIBC SERPL-MCNC: 310 MCG/DL (ref 298–536)
TRANSFERRIN SERPL-MCNC: 208 MG/DL (ref 200–360)
UNIT  ABO: NORMAL
UNIT  ABO: NORMAL
UNIT  RH: NORMAL
UNIT  RH: NORMAL
URATE SERPL-MCNC: 13.1 MG/DL (ref 3.4–7)
WBC NRBC COR # BLD AUTO: 7.16 10*3/MM3 (ref 3.4–10.8)

## 2025-06-23 PROCEDURE — 84466 ASSAY OF TRANSFERRIN: CPT | Performed by: INTERNAL MEDICINE

## 2025-06-23 PROCEDURE — 99232 SBSQ HOSP IP/OBS MODERATE 35: CPT | Performed by: INTERNAL MEDICINE

## 2025-06-23 PROCEDURE — 84156 ASSAY OF PROTEIN URINE: CPT | Performed by: CLINICAL NURSE SPECIALIST

## 2025-06-23 PROCEDURE — 80048 BASIC METABOLIC PNL TOTAL CA: CPT | Performed by: PHYSICIAN ASSISTANT

## 2025-06-23 PROCEDURE — 25810000003 SODIUM CHLORIDE 0.9 % SOLUTION: Performed by: NURSE PRACTITIONER

## 2025-06-23 PROCEDURE — 25010000002 LEVETRIRACETAM PER 10 MG: Performed by: PHYSICIAN ASSISTANT

## 2025-06-23 PROCEDURE — 87205 SMEAR GRAM STAIN: CPT | Performed by: CLINICAL NURSE SPECIALIST

## 2025-06-23 PROCEDURE — 83935 ASSAY OF URINE OSMOLALITY: CPT | Performed by: CLINICAL NURSE SPECIALIST

## 2025-06-23 PROCEDURE — 84300 ASSAY OF URINE SODIUM: CPT | Performed by: CLINICAL NURSE SPECIALIST

## 2025-06-23 PROCEDURE — 82728 ASSAY OF FERRITIN: CPT | Performed by: INTERNAL MEDICINE

## 2025-06-23 PROCEDURE — 85014 HEMATOCRIT: CPT | Performed by: NURSE PRACTITIONER

## 2025-06-23 PROCEDURE — 82570 ASSAY OF URINE CREATININE: CPT | Performed by: CLINICAL NURSE SPECIALIST

## 2025-06-23 PROCEDURE — 85018 HEMOGLOBIN: CPT | Performed by: NURSE PRACTITIONER

## 2025-06-23 PROCEDURE — 85025 COMPLETE CBC W/AUTO DIFF WBC: CPT | Performed by: PHYSICIAN ASSISTANT

## 2025-06-23 PROCEDURE — 83540 ASSAY OF IRON: CPT | Performed by: INTERNAL MEDICINE

## 2025-06-23 PROCEDURE — 84550 ASSAY OF BLOOD/URIC ACID: CPT | Performed by: NURSE PRACTITIONER

## 2025-06-23 RX ORDER — HYDRALAZINE HYDROCHLORIDE 25 MG/1
25 TABLET, FILM COATED ORAL EVERY 8 HOURS SCHEDULED
Status: DISCONTINUED | OUTPATIENT
Start: 2025-06-23 | End: 2025-06-24 | Stop reason: HOSPADM

## 2025-06-23 RX ORDER — SODIUM CHLORIDE 9 MG/ML
75 INJECTION, SOLUTION INTRAVENOUS CONTINUOUS
Status: DISPENSED | OUTPATIENT
Start: 2025-06-23 | End: 2025-06-24

## 2025-06-23 RX ORDER — ALLOPURINOL 100 MG/1
200 TABLET ORAL DAILY
Status: DISCONTINUED | OUTPATIENT
Start: 2025-06-23 | End: 2025-06-24 | Stop reason: HOSPADM

## 2025-06-23 RX ORDER — CALCITRIOL 0.25 UG/1
0.25 CAPSULE, LIQUID FILLED ORAL DAILY
Status: DISCONTINUED | OUTPATIENT
Start: 2025-06-23 | End: 2025-06-24 | Stop reason: HOSPADM

## 2025-06-23 RX ORDER — HYDRALAZINE HYDROCHLORIDE 50 MG/1
100 TABLET, FILM COATED ORAL 3 TIMES DAILY
Status: ON HOLD | COMMUNITY
End: 2025-06-26

## 2025-06-23 RX ORDER — FUROSEMIDE 40 MG/1
40 TABLET ORAL DAILY
Status: ON HOLD | COMMUNITY
End: 2025-06-26

## 2025-06-23 RX ORDER — SODIUM CHLORIDE 9 MG/ML
75 INJECTION, SOLUTION INTRAVENOUS CONTINUOUS
Status: DISCONTINUED | OUTPATIENT
Start: 2025-06-23 | End: 2025-06-23

## 2025-06-23 RX ORDER — METOPROLOL SUCCINATE 25 MG/1
25 TABLET, EXTENDED RELEASE ORAL
Status: DISCONTINUED | OUTPATIENT
Start: 2025-06-23 | End: 2025-06-24 | Stop reason: HOSPADM

## 2025-06-23 RX ADMIN — PANTOPRAZOLE SODIUM 40 MG: 40 INJECTION, POWDER, FOR SOLUTION INTRAVENOUS at 09:51

## 2025-06-23 RX ADMIN — SACUBITRIL AND VALSARTAN 1 TABLET: 49; 51 TABLET, FILM COATED ORAL at 09:51

## 2025-06-23 RX ADMIN — CALCITRIOL CAPSULES 0.25 MCG 0.25 MCG: 0.25 CAPSULE ORAL at 16:02

## 2025-06-23 RX ADMIN — Medication 10 ML: at 09:51

## 2025-06-23 RX ADMIN — PANTOPRAZOLE SODIUM 40 MG: 40 INJECTION, POWDER, FOR SOLUTION INTRAVENOUS at 21:20

## 2025-06-23 RX ADMIN — LEVETIRACETAM 500 MG: 100 INJECTION, SOLUTION, CONCENTRATE INTRAVENOUS at 21:20

## 2025-06-23 RX ADMIN — Medication 1 APPLICATION: at 09:51

## 2025-06-23 RX ADMIN — LEVETIRACETAM 500 MG: 100 INJECTION, SOLUTION, CONCENTRATE INTRAVENOUS at 11:02

## 2025-06-23 RX ADMIN — HYDRALAZINE HYDROCHLORIDE 25 MG: 25 TABLET ORAL at 21:20

## 2025-06-23 RX ADMIN — ALLOPURINOL 200 MG: 100 TABLET ORAL at 16:02

## 2025-06-23 RX ADMIN — SODIUM CHLORIDE 75 ML/HR: 9 INJECTION, SOLUTION INTRAVENOUS at 09:58

## 2025-06-23 RX ADMIN — Medication 1 APPLICATION: at 21:20

## 2025-06-23 RX ADMIN — METOPROLOL SUCCINATE 25 MG: 25 TABLET, EXTENDED RELEASE ORAL at 18:10

## 2025-06-23 NOTE — PROGRESS NOTES
[facility] Gastroenterology  Inpatient Progress Note  Today's date:  06/23/25    José Miguel Webb  1995       Reason for Follow Up:   GI bleed    Subjective:   No new issues overnight. No hematemesis. Hgb slight drop overnight.      Current Facility-Administered Medications:     acetaminophen (TYLENOL) tablet 650 mg, 650 mg, Oral, Q4H PRN **OR** acetaminophen (TYLENOL) suppository 650 mg, 650 mg, Rectal, Q4H PRN, Tacho Suarez PA-C    Chlorhexidine Gluconate Cloth 2 % pads 1 Application, 1 Application, Topical, Q24H, Tacho Suarez PA-C, 1 Application at 06/22/25 0529    hydrALAZINE (APRESOLINE) injection 20 mg, 20 mg, Intravenous, Q6H PRN, Wily Rios APRN, 20 mg at 06/22/25 1704    levETIRAcetam (KEPPRA) injection 500 mg, 500 mg, Intravenous, Q12H, Tacho Suarez PA-C, 500 mg at 06/23/25 1102    mupirocin (BACTROBAN) 2 % nasal ointment 1 Application, 1 Application, Each Nare, BID, Tacho Suarez PA-C, 1 Application at 06/23/25 0951    ondansetron (ZOFRAN) injection 4 mg, 4 mg, Intravenous, Q6H PRN, Tacho Suarez PA-C    pantoprazole (PROTONIX) injection 40 mg, 40 mg, Intravenous, Q12H, Kevin Miranda MD, 40 mg at 06/23/25 0951    [Held by provider] sacubitril-valsartan (ENTRESTO) 49-51 MG tablet 1 tablet, 1 tablet, Oral, BID, Wily Rios APRN, 1 tablet at 06/23/25 0951    sodium chloride 0.9 % flush 10 mL, 10 mL, Intravenous, Q12H, Tacho Suarez PA-C, 10 mL at 06/23/25 0951    sodium chloride 0.9 % flush 10 mL, 10 mL, Intravenous, PRN, Tacho Suarez PA-C    sodium chloride 0.9 % infusion 40 mL, 40 mL, Intravenous, PRN, Tacho Suarez PA-C    sodium chloride 0.9 % infusion, 75 mL/hr, Intravenous, Continuous, Dario Fernandez, SUKHDEV, Last Rate: 75 mL/hr at 06/23/25 0958, 75 mL/hr at 06/23/25 0958      Vital Signs:  Temp:  [97.5 °F (36.4 °C)-98.4 °F (36.9 °C)] 98.4 °F (36.9 °C)  Heart Rate:  [74-97] 74  Resp:  [16-18] 16  BP:  (137-164)/() 144/90    Physical Exam:  General: no acute distress, alert and oriented  HEENT: perrl, no sclera icterus  CV: rrr, no murmur  Resp: lungs clear to auscultation, no wheeze or rhonchi  Abd: soft, nontender, nondistended, no rebound our guarding  Skin: no rash, no jaundice     Results Review:   I have reviewed all of the patient's current test results    Results from last 7 days   Lab Units 06/23/25  1246 06/23/25  0614 06/22/25  2352 06/22/25  1752 06/22/25  1220 06/22/25  0518   WBC 10*3/mm3  --  7.16  --   --  8.58 6.98   HEMOGLOBIN g/dL 7.3* 7.3* 7.6*   < > 9.6* 8.7*   HEMATOCRIT % 23.6* 22.3* 23.0*   < > 31.4* 25.8*   PLATELETS 10*3/mm3  --  112*  --   --  117* 105*    < > = values in this interval not displayed.       Results from last 7 days   Lab Units 06/23/25  0614 06/22/25  1753 06/22/25  0518 06/21/25  2101   SODIUM mmol/L 142  --  142 139   POTASSIUM mmol/L 3.5 3.7 3.6 3.7   CHLORIDE mmol/L 103  --  103 100   CO2 mmol/L 26.0  --  26.0 23.0   BUN mg/dL 95.3*  --  108.0* 110.8*   CREATININE mg/dL 5.24*  --  5.89* 5.60*   CALCIUM mg/dL 8.1*  --  8.0* 7.6*   BILIRUBIN mg/dL  --   --   --  2.2*   ALK PHOS U/L  --   --   --  70   ALT (SGPT) U/L  --   --   --  42*   AST (SGOT) U/L  --   --   --  16   GLUCOSE mg/dL 81  --  87 121*       Results from last 7 days   Lab Units 06/21/25  2101   INR  1.55*         Impression:  -Upper GI bleed secondary to gastric ulcer.    -Gastric ulcer with ulcer in the gastric fundus.    Plan:  -Continue Pantoprazole 40 mg twice daily.    -Ok to advance to soft diet.     -Monitor hgb/hct. Transfuse as needed.      Kevin Miranda MD  06/23/25  13:45 CDT

## 2025-06-23 NOTE — PAYOR COMM NOTE
"José Miguel Webb (30 y.o. Male)  [id number]  admit   6/21   [facility] phone    fax            Date of Birth   1995    Social Security Number       Address   [address] [place] [address]    Home Phone   [phone]    MRN   [id number]       Congregation   [employer]    Marital Status   Single                            Admission Date   6/21/2025    Admission Type   Urgent    Admitting Provider   Gopi Gonsales MD    Attending Provider   Jose De Oliveira DO    Department, Room/Bed   [facility] 3C, 365/1       Discharge Date       Discharge Disposition       Discharge Destination                                 Attending Provider: Jose De Oliveira DO    Allergies: Penicillins    Isolation: None   Infection: None   Code Status: CPR    Ht: 188 cm (74\")   Wt: 80.3 kg (177 lb)    Admission Cmt: None   Principal Problem: Upper gastrointestinal bleed [K92.2]                   Active Insurance as of 6/21/2025       Primary Coverage       Payor Plan Insurance Group Employer/Plan Group    [place] [id number]       Payor Plan Address Payor Plan Phone Number Payor Plan Fax Number Effective Dates    [employer] [address] [address] [phone]  1/1/2025 - None Entered    [facility] [id number]         Subscriber Name Subscriber Birth Date Member ID       JOSÉ MIGUEL WEBB 1995 [id number]                     Emergency Contacts        (Rel.) Home Phone Work Phone Mobile Phone    Jesika Webb (Mother) [phone] -- [phone]                 History & Physical        Tacho Suarez PA-C at 06/21/25 2023       Attestation signed by Gopi Gonsales MD at 06/22/25 1408      I have reviewed this documentation and agree.                          [facility] Intensivist Services  HISTORY AND PHYSICAL    Date of Admission: 6/21/2025  Primary Care Physician: He Ortega, " MD    Subjective   Primary Historian: Patient    Chief Complaint: Hematemesis    History of Present Illness  Patient is 30-year-old male with PMH of CHF, HTN, CKD, seizure disorder who was transferred from [facility] for upper GI bleed. He reportedly not feeling well for the past several days and was seen in the ED at [place]. From what I understand, Hgb was unremarkable at that time. Yesterday he began experiencing non-bloody emesis followed by multiple episodes of bloody emesis today.  He reportedly had large-volume hematemesis in the ED.  Hgb 5.2 today, received 2 units PRBCs and 1 FFP.  Protonix 80 mg IV given in started on octreotide drip.  Patient states he has been compliant with taking Plavix, last dose was yesterday.  Denies any NSAIDs or EtOH use.  Patient states she uses marijuana occasionally and smokes cigarettes, but denies any other drug use.  Patient reports dark stools, denies hematochezia. No reported fever, chills, abdominal pain, chest pain, shortness of breath.  With patient needing GI evaluation, he required transfer to this facility for further management.      Review of Systems   Constitutional:  Negative for chills and fever.   Respiratory:  Negative for shortness of breath.    Cardiovascular:  Negative for chest pain.   Gastrointestinal:  Positive for nausea and vomiting. Negative for abdominal pain, blood in stool and diarrhea.        Dark stools per pt   Neurological:  Negative for syncope.   All other systems reviewed and are negative.     Otherwise complete ROS reviewed and negative except as mentioned in the HPI.    Past Medical History:   Past Medical History:   Diagnosis Date    Cardiomegaly     Congenital fusion of carpal bone     HTN (hypertension)     Seizure      Past Surgical History:  Past Surgical History:   Procedure Laterality Date    ELBOW PROCEDURE       Social History:  reports that he has been smoking cigarettes. He has never used smokeless tobacco. He  reports current alcohol use. He reports current drug use. Drugs: Marijuana and Cocaine(coke).    Family History: family history includes Heart disease in his mother; Hypertension in his mother; No Known Problems in his father.     Allergies:  Allergies   Allergen Reactions    Penicillins Anaphylaxis       Medications:  Prior to Admission medications    Medication Sig Start Date End Date Taking? Authorizing Provider   amLODIPine (NORVASC) 10 MG tablet Take 1 tablet by mouth Daily. 11/28/24   Jenna Ospina MD   aspirin 81 MG EC tablet Take 1 tablet by mouth Daily. 11/27/24   Jenna Ospina MD   furosemide (Lasix) 20 MG tablet Take 1 tablet by mouth 2 (Two) Times a Day. 11/27/24   Jenna Ospina MD   hydrALAZINE (APRESOLINE) 100 MG tablet Take 1 tablet by mouth 3 (Three) Times a Day. 11/27/24   Jenna Ospina MD   hydrALAZINE (APRESOLINE) 25 MG tablet Take 1 tablet by mouth 3 (Three) Times a Day. 3/21/24   Guille Aguirre MD   isosorbide dinitrate (ISORDIL) 10 MG tablet Take 4 tablets by mouth 3 (Three) Times a Day. 11/27/24   Jenna Ospina MD   isosorbide dinitrate (ISORDIL) 20 MG tablet Take 1 tablet by mouth 3 (Three) Times a Day. 3/21/24   Guille Aguirre MD   levETIRAcetam (KEPPRA) 500 MG tablet Take 1 tablet by mouth 2 (Two) Times a Day.    ProviderAkanksha MD   metoprolol succinate XL (TOPROL-XL) 100 MG 24 hr tablet Take 1 tablet by mouth Daily. 3/21/24   Guille Aguirre MD   Potassium 99 MG tablet Take 1 tablet by mouth Daily.    ProviderAkanksha MD   sacubitril-valsartan (Entresto) 49-51 MG tablet Take 1 tablet by mouth 2 (Two) Times a Day. 3/21/24   Guille Aguirre MD     I have utilized all available immediate resources to obtain, update, or review the patient's current medications (including all prescriptions, over-the-counter products, herbals, cannabis/cannabidiol products, and vitamin/mineral/dietary (nutritional) supplements).    Objective     Vital Signs: /100   Pulse 77    "Temp 99 °F (37.2 °C) (Oral)   Resp 16   Ht 188 cm (74\")   Wt 85.7 kg (188 lb 15 oz)   SpO2 96%   BMI 24.26 kg/m²   Physical Exam  Vitals and nursing note reviewed.   HENT:      Head: Normocephalic and atraumatic.      Mouth/Throat:      Mouth: Mucous membranes are moist.   Eyes:      Extraocular Movements: Extraocular movements intact.      Conjunctiva/sclera: Conjunctivae normal.   Cardiovascular:      Rate and Rhythm: Normal rate and regular rhythm.   Pulmonary:      Effort: Pulmonary effort is normal. No respiratory distress.      Breath sounds: No wheezing or rales.   Abdominal:      General: Abdomen is flat. There is no distension.      Palpations: Abdomen is soft.      Tenderness: There is no abdominal tenderness.   Musculoskeletal:         General: Normal range of motion.      Cervical back: Normal range of motion and neck supple.      Right lower leg: No edema.      Left lower leg: No edema.   Skin:     General: Skin is warm and dry.   Neurological:      General: No focal deficit present.      Mental Status: He is alert and oriented to person, place, and time.      Cranial Nerves: No cranial nerve deficit.   Psychiatric:         Mood and Affect: Mood normal.         Thought Content: Thought content normal.          Results Reviewed:  Lab Results (last 24 hours)       Procedure Component Value Units Date/Time    Comprehensive Metabolic Panel [[id number]]  (Abnormal) Collected: 06/21/25 2101    Specimen: Blood Updated: 06/21/25 2137     Glucose 121 mg/dL      .8 mg/dL      Creatinine 5.60 mg/dL      Sodium 139 mmol/L      Potassium 3.7 mmol/L      Chloride 100 mmol/L      CO2 23.0 mmol/L      Calcium 7.6 mg/dL      Total Protein 4.4 g/dL      Albumin 2.6 g/dL      ALT (SGPT) 42 U/L      AST (SGOT) 16 U/L      Alkaline Phosphatase 70 U/L      Total Bilirubin 2.2 mg/dL      Globulin 1.8 gm/dL      A/G Ratio 1.4 g/dL      BUN/Creatinine Ratio 19.8     Anion Gap 16.0 mmol/L      eGFR 13.3 mL/min/1.73  "    Narrative:      GFR Categories in Chronic Kidney Disease (CKD)              GFR Category          GFR (mL/min/1.73)    Interpretation  G1                    90 or greater        Normal or high (1)  G2                    60-89                Mild decrease (1)  G3a                   45-59                Mild to moderate decrease  G3b                   30-44                Moderate to severe decrease  G4                    15-29                Severe decrease  G5                    14 or less           Kidney failure    (1)In the absence of evidence of kidney disease, neither GFR category G1 or G2 fulfill the criteria for CKD.    eGFR calculation 2021 CKD-EPI creatinine equation, which does not include race as a factor    Magnesium [[id number]]  (Normal) Collected: 06/21/25 2101    Specimen: Blood Updated: 06/21/25 2137     Magnesium 2.0 mg/dL     Lipase [[id number]]  (Normal) Collected: 06/21/25 2101    Specimen: Blood Updated: 06/21/25 2137     Lipase 35 U/L     Phosphorus [[id number]]  (Abnormal) Collected: 06/21/25 2101    Specimen: Blood Updated: 06/21/25 2137     Phosphorus 5.5 mg/dL     CBC Auto Differential [[id number]]  (Abnormal) Collected: 06/21/25 2101    Specimen: Blood Updated: 06/21/25 2130     WBC 7.53 10*3/mm3      RBC 2.47 10*6/mm3      Hemoglobin 6.3 g/dL      Hematocrit 18.8 %      MCV 76.1 fL      MCH 25.5 pg      MCHC 33.5 g/dL      RDW 17.2 %      RDW-SD 46.9 fl      MPV 11.5 fL      Platelets 118 10*3/mm3      Neutrophil % 82.0 %      Lymphocyte % 10.2 %      Monocyte % 6.1 %      Eosinophil % 0.1 %      Basophil % 0.4 %      Immature Grans % 1.2 %      Neutrophils, Absolute 6.17 10*3/mm3      Lymphocytes, Absolute 0.77 10*3/mm3      Monocytes, Absolute 0.46 10*3/mm3      Eosinophils, Absolute 0.01 10*3/mm3      Basophils, Absolute 0.03 10*3/mm3      Immature Grans, Absolute 0.09 10*3/mm3      nRBC 0.5 /100 WBC     Protime-INR [[id number]]  (Abnormal) Collected: 06/21/25 2101    Specimen: Blood  Updated: 06/21/25 2128     Protime 19.5 Seconds      INR 1.55    aPTT [[id number]]  (Normal) Collected: 06/21/25 2101    Specimen: Blood Updated: 06/21/25 2128     PTT 31.4 seconds     Narrative:      PTT = The equivalent PTT values for the therapeutic range of heparin levels at 0.3 to 0.7 U/ml are 77 - 99 seconds.    Fentanyl, Urine - Urine, Clean Catch [[id number]]  (Normal) Collected: 06/21/25 2101    Specimen: Urine, Clean Catch Updated: 06/21/25 2121     Fentanyl, Urine Negative    Narrative:      Negative Threshold:      Fentanyl 5 ng/mL     The normal value for the drug tested is negative. This report includes final unconfirmed screening results to be used for medical treatment purposes only. Unconfirmed results must not be used for non-medical purposes such as employment or legal testing. Clinical consideration should be applied to any drug of abuse test, particularly when unconfirmed results are used.           Urine Drug Screen - Urine, Clean Catch [[id number]]  (Abnormal) Collected: 06/21/25 2101    Specimen: Urine, Clean Catch Updated: 06/21/25 2120     THC, Screen, Urine Positive     Phencyclidine (PCP), Urine Negative     Cocaine Screen, Urine Positive     Methamphetamine, Ur Negative     Opiate Screen Negative     Amphetamine Screen, Urine Negative     Benzodiazepine Screen, Urine Negative     Tricyclic Antidepressants Screen Negative     Methadone Screen, Urine Negative     Barbiturates Screen, Urine Negative     Oxycodone Screen, Urine Negative     Buprenorphine, Screen, Urine Negative    Narrative:      Cutoff For Drugs Screened:    Amphetamines               500 ng/ml  Barbiturates               200 ng/ml  Benzodiazepines            150 ng/ml  Cocaine                    150 ng/ml  Methadone                  200 ng/ml  Opiates                    100 ng/ml  Phencyclidine               25 ng/ml  THC                         50 ng/ml  Methamphetamine            500 ng/ml  Tricyclic Antidepressants  300  ng/ml  Oxycodone                  100 ng/ml  Buprenorphine               10 ng/ml    The normal value for all drugs tested is negative. This report includes unconfirmed screening results, with the cutoff values listed, to be used for medical treatment purposes only.  Unconfirmed results must not be used for non-medical purposes such as employment or legal testing.  Clinical consideration should be applied to any drug of abuse test, particularly when unconfirmed results are used.               No radiology results for the last day    Result Review:  I have personally reviewed the results from the time of this admission to 6/21/2025 22:07 CDT and agree with these findings:  [x]  Laboratory list / accordion  []  Microbiology  []  Radiology  []  EKG/Telemetry   [x]  Cardiology/Vascular   []  Pathology  [x]  Old records  []  Other:  Most notable findings include: Hgb 6.3, Hct 18.8, Cr 5.6, UDS +cocaine and THC      I have personally reviewed and interpreted the radiology studies and ECG obtained at time of admission.     Assessment / Plan   Assessment:   Active Hospital Problems    Diagnosis     **Upper gastrointestinal bleed      30-year-old male transferred from [facility] for upper GI bleed.  Patient is on Plavix, reportedly took last dose yesterday.  Hgb 5.2 at outside hospital, received 2 units PRBCs.  Transferred to this facility for GI evaluation and further management.    Upper GI bleed  Acute blood loss anemia  - Hgb 5.2 prior to transfer  - Received 2 units PRBCs and 1 FFP  - Repeat Hgb 6.3  - 2 units PRBCs ordered  - Monitor Hgb, transfuse if Hgb<7  - Protonix 80 mg IV x1 given, will give additional 40 mg IV tonight  - Protonix 40 mg q12 hr  - Continue Octreotide drip  - Hold anticoagulation  - Reglan 10 mg q8 hr per GI  - GI consulted, appreciate assistance    Non-ischemic cardiomyopathy  HFrEF  - Echo 11/2024 with EF 30-35%  - Gentle IV hydration  - Hold Entresto for now  - EKG shows sinus  rhythm, occasional PVCs, Qtc 457 ms, rate 75 bpm    CKD, stage IV  - Baseline Cr ~4.0 (Nov 2024)  - Cr 6.49 at outside hospital  - Monitor renal function and UOP  - Consider nephrology consult    Hypertension  - Takes Hydralazine at home  - Hold antihypertensives for now  - Consider prn Hydralazine if needed    Seizure disorder  - Continue Keppra 500 mg BID  - Give IV Keppra for now, will change to PO when appropriate    Diet: NPO for now  DVT PPx: SCDs, no anticoagulants 2/2 GI bleed  GI prophylaxis: Protonix  Antibiotics: None  CODE STATUS: Full code  Next of kin: Jesika Webb, mother ([phone])      I provided 65 minutes of total critical care time. Due to the high probability of clinically significant, life-threatening deterioration, the patient required my direct and personal management. The critical care time does not include time spent on separately billable procedures.    Electronically signed by Tacho Suarez PA-C on 6/21/2025 at 22:07 CDT                Electronically signed by Gopi Gonsales MD at 06/22/25 1408       Emergency Department Notes    No notes of this type exist for this encounter.       Vital Signs (last 3 days)       Date/Time Temp Temp src Pulse Resp BP Patient Position SpO2    06/23/25 1023 98.4 (36.9) Oral 74 16 144/90 Lying 100    06/23/25 0748 97.5 (36.4) Oral 76 16 139/80 Lying 100    06/23/25 0349 97.9 (36.6) Oral 84 16 137/74 Lying 98    06/22/25 2254 98 (36.7) Oral 88 16 139/82 Lying 100    06/22/25 2002 97.8 (36.6) Oral 97 16 164/103 Lying 97    06/22/25 1744 98.2 (36.8) Oral 88 18 158/94 Lying 98    06/22/25 1700 -- -- 84 -- 154/104 -- 100    06/22/25 1648 -- -- 90 -- 163/110 -- --    06/22/25 1600 -- -- 88 -- 160/103 -- 97    06/22/25 1530 -- -- 89 17 149/91 Lying 100    06/22/25 1500 -- -- 91 -- 158/101 -- 100    06/22/25 1430 -- -- 91 -- 153/99 -- 99    06/22/25 1400 -- -- 88 17 153/96 Lying 100    06/22/25 1330 -- -- 90 -- 150/97 -- 100    06/22/25 1300 -- -- 86 19  130/78 Lying 100    06/22/25 1230 -- -- 88 -- 159/99 -- 100    06/22/25 1200 98 (36.7) Oral 88 -- 161/90 Lying 98    06/22/25 1145 -- -- 90 -- 161/92 -- 98    06/22/25 1115 -- -- 80 -- 158/94 -- 100    06/22/25 1100 -- -- 83 -- 165/96 Lying 100    06/22/25 1045 -- -- 83 15 163/103 Lying 100    06/22/25 1030 -- -- 82 -- 164/102 -- 100    06/22/25 1015 -- -- 82 -- 165/108 -- 97    06/22/25 1000 -- -- 80 -- 156/105 -- 99    06/22/25 0955 -- -- 82 -- 154/102 -- 100    06/22/25 0946 -- -- 79 -- 170/103 -- 99    06/22/25 0930 -- -- 79 -- 171/106 -- 99    06/22/25 0915 -- -- 81 -- 168/106 -- 99    06/22/25 0900 -- -- 79 -- 161/95 -- 100    06/22/25 0845 -- -- 79 -- 150/89 -- 100    06/22/25 0831 -- -- 82 14 160/89 -- 100    06/22/25 0820 -- -- 80 20 159/89 -- 99    06/22/25 0815 -- -- 83 22 154/136  -- 100    BP: patient moving at coughing at 06/22/25 0815    06/22/25 0810 -- -- 100 16 179/95 -- 100    06/22/25 0805 -- -- 80 20 173/101 -- 100    06/22/25 0804 98.1 (36.7) Temporal 79 20 165/102 Lying 100    06/22/25 0700 -- -- 87 -- 148/86 -- 100    06/22/25 0600 -- -- 80 -- 150/97 -- 99    06/22/25 0545 -- -- 78 -- 154/100 -- 99    06/22/25 0530 -- -- 81 -- 148/88 -- 100    06/22/25 0500 -- -- 83 -- 155/95 -- 99    06/22/25 0445 -- -- 82 -- 156/93 -- 99    06/22/25 0430 -- -- 83 -- 158/94 -- 100    06/22/25 0415 -- -- 81 -- 152/100 -- 98    06/22/25 0400 98.2 (36.8) Oral 84 14 150/92 Lying 100    06/22/25 0345 -- -- 80 -- 151/94 -- 100    06/22/25 0330 -- -- 81 -- 153/99 -- 100    06/22/25 0315 -- -- 78 -- 153/95 -- 99    06/22/25 0300 -- -- 79 -- 156/101 -- 100    06/22/25 0245 -- -- 80 -- 155/86 -- 99    06/22/25 0230 -- -- 79 -- 150/93 -- 99    06/22/25 0215 -- -- 79 -- 151/90 -- 100    06/22/25 0200 -- -- 80 -- 152/95 -- 100    06/22/25 0155 98.1 (36.7) Oral 78 12 153/97 -- 98    06/22/25 0145 -- -- 77 -- 146/90 -- 99    06/22/25 0140 98.6 (37) Axillary -- 14 -- -- --    06/22/25 0130 -- -- 81 -- 159/99 -- 100     06/22/25 0122 98.8 (37.1) Axillary 82 12 155/97 -- 99    06/22/25 0100 98.9 (37.2) Oral 82 15 154/97 -- 100    06/22/25 0015 -- -- 84 -- 157/93 -- 98    06/22/25 0000 98.2 (36.8) Oral 84 11 156/97 Lying 99    06/21/25 2345 -- -- 80 -- 156/93 -- 99    06/21/25 2330 -- -- 82 -- 154/94 -- 98    06/21/25 2315 -- -- 80 -- 152/92 -- 100    06/21/25 2307 -- -- 81 -- 151/93 -- 98    06/21/25 2245 -- -- 77 -- 151/82 -- 98    06/21/25 2240 98.1 (36.7) Oral 80 15 151/90 -- 100    06/21/25 2230 98 (36.7) Oral 97 14 149/91 -- 100    06/21/25 2215 -- -- 82 -- 129/71 -- 99    06/21/25 2200 98.3 (36.8) Oral 85 20 132/70 -- 100    06/21/25 2145 -- -- 73 -- -- -- 100    06/21/25 2130 -- -- 75 -- 147/95 -- 100    06/21/25 2115 -- -- 75 -- 144/88 -- 100    06/21/25 2100 -- -- 89 -- 153/84 -- --    06/21/25 2045 -- -- 77 -- 152/100 -- 96    06/21/25 2030 -- -- 72 -- 150/96 -- 100    06/21/25 2015 99 (37.2) Oral 72 16 139/96 Lying 100          Current Facility-Administered Medications   Medication Dose Route Frequency Provider Last Rate Last Admin    acetaminophen (TYLENOL) tablet 650 mg  650 mg Oral Q4H PRN Tacho Suarez PA-C        Or    acetaminophen (TYLENOL) suppository 650 mg  650 mg Rectal Q4H PRN Tacho Suarez PA-C        Chlorhexidine Gluconate Cloth 2 % pads 1 Application  1 Application Topical Q24H Tacho Suarez PA-C   1 Application at 06/22/25 0529    hydrALAZINE (APRESOLINE) injection 20 mg  20 mg Intravenous Q6H PRN Wily Rios APRN   20 mg at 06/22/25 1704    levETIRAcetam (KEPPRA) injection 500 mg  500 mg Intravenous Q12H Tacho Suarez PA-C   500 mg at 06/23/25 1102    mupirocin (BACTROBAN) 2 % nasal ointment 1 Application  1 Application Each Nare BID Tacho Suarez PA-C   1 Application at 06/23/25 0951    ondansetron (ZOFRAN) injection 4 mg  4 mg Intravenous Q6H PRN Tacho Suarez PA-C        pantoprazole (PROTONIX) injection 40 mg  40 mg Intravenous Q12H Kevin Miranda MD    40 mg at 06/23/25 0951    sacubitril-valsartan (ENTRESTO) 49-51 MG tablet 1 tablet  1 tablet Oral BID Wily Rios APRN   1 tablet at 06/23/25 0951    sodium chloride 0.9 % flush 10 mL  10 mL Intravenous Q12H Tacho Suarez PA-C   10 mL at 06/23/25 0951    sodium chloride 0.9 % flush 10 mL  10 mL Intravenous PRN Tacho Suarez PA-C        sodium chloride 0.9 % infusion 40 mL  40 mL Intravenous PRN Tacho Suarez PA-C        sodium chloride 0.9 % infusion  75 mL/hr Intravenous Continuous Dario Fernandez APRN 75 mL/hr at 06/23/25 0958 75 mL/hr at 06/23/25 0958        Physician Progress Notes (last 48 hours)        Wily Rios APRN at 06/22/25 0859              [facility] Intensivist Services  INPATIENT PROGRESS NOTE    Patient Name: José Miguel Webb  Date of Admission: 6/21/2025  Today's Date: 06/22/25  Length of Stay: 1  Primary Care Physician: He Ortega MD    ICU Summary   30-year-old male with chronic systolic heart failure, CKD stage IV, and seizure disorder admitted to ICU at [facility] in transfer from [facility] for acute upper GI bleed.  Hemoglobin 5.2 on arrival to ER on 6/21.  Received 3 units packed red blood cells, 1 FFP.  Was started on PPI octreotide.  He is on Plavix chronically.    Interval update:  6/22  Patient underwent EGD this morning with gastroenterology.  Found ulcer in gastric fundus which was clipped and sprayed with epinephrine.  No active bleeding was seen at that time.  Hemoglobin has improved to 8.7 posttransfusion.  Patient alert, no distress.  Blood pressure heart rate stable.    Review of Systems   All pertinent negatives and positives are as above. All other systems have been reviewed and are negative unless otherwise stated.     Objective    Temp:  [98 °F (36.7 °C)-99 °F (37.2 °C)] 98.1 °F (36.7 °C)  Heart Rate:  [] 82  Resp:  [11-22] 14  BP: (129-179)/()  160/89  Physical Exam  Nursing note reviewed.   Constitutional:       General: He is not in acute distress.  Eyes:      Pupils: Pupils are equal, round, and reactive to light.   Cardiovascular:      Rate and Rhythm: Normal rate and regular rhythm.      Pulses: Normal pulses.      Heart sounds: Normal heart sounds.   Pulmonary:      Effort: Pulmonary effort is normal.      Breath sounds: Normal breath sounds.   Abdominal:      General: There is no distension.      Palpations: Abdomen is soft.   Musculoskeletal:         General: Normal range of motion.   Skin:     General: Skin is warm and dry.      Capillary Refill: Capillary refill takes less than 2 seconds.   Neurological:      General: No focal deficit present.      Mental Status: He is alert and oriented to person, place, and time.         Results Review:  Lab Results (last 24 hours)       Procedure Component Value Units Date/Time    Basic Metabolic Panel [[id number]]  (Abnormal) Collected: 06/22/25 0518    Specimen: Blood Updated: 06/22/25 0554     Glucose 87 mg/dL      .0 mg/dL      Creatinine 5.89 mg/dL      Sodium 142 mmol/L      Potassium 3.6 mmol/L      Chloride 103 mmol/L      CO2 26.0 mmol/L      Calcium 8.0 mg/dL      BUN/Creatinine Ratio 18.3     Anion Gap 13.0 mmol/L      eGFR 12.5 mL/min/1.73     Narrative:      GFR Categories in Chronic Kidney Disease (CKD)              GFR Category          GFR (mL/min/1.73)    Interpretation  G1                    90 or greater        Normal or high (1)  G2                    60-89                Mild decrease (1)  G3a                   45-59                Mild to moderate decrease  G3b                   30-44                Moderate to severe decrease  G4                    15-29                Severe decrease  G5                    14 or less           Kidney failure    (1)In the absence of evidence of kidney disease, neither GFR category G1 or G2 fulfill the criteria for CKD.    eGFR calculation 2021  CKD-EPI creatinine equation, which does not include race as a factor    CBC & Differential [[id number]]  (Abnormal) Collected: 06/22/25 0518    Specimen: Blood Updated: 06/22/25 0540    Narrative:      The following orders were created for panel order CBC & Differential.  Procedure                               Abnormality         Status                     ---------                               -----------         ------                     CBC Auto Differential[[id number]]        Abnormal            Final result                 Please view results for these tests on the individual orders.    CBC Auto Differential [[id number]]  (Abnormal) Collected: 06/22/25 0518    Specimen: Blood Updated: 06/22/25 0540     WBC 6.98 10*3/mm3      RBC 3.16 10*6/mm3      Hemoglobin 8.7 g/dL      Hematocrit 25.8 %      MCV 81.6 fL      MCH 27.5 pg      MCHC 33.7 g/dL      RDW 17.4 %      RDW-SD 51.5 fl      MPV 11.1 fL      Platelets 105 10*3/mm3      Neutrophil % 67.3 %      Lymphocyte % 21.2 %      Monocyte % 10.3 %      Eosinophil % 0.6 %      Basophil % 0.3 %      Immature Grans % 0.3 %      Neutrophils, Absolute 4.70 10*3/mm3      Lymphocytes, Absolute 1.48 10*3/mm3      Monocytes, Absolute 0.72 10*3/mm3      Eosinophils, Absolute 0.04 10*3/mm3      Basophils, Absolute 0.02 10*3/mm3      Immature Grans, Absolute 0.02 10*3/mm3      nRBC 0.6 /100 WBC     Calcium, Ionized [[id number]]  (Abnormal) Collected: 06/22/25 0518    Specimen: Blood Updated: 06/22/25 0519     Ionized Calcium 4.05 mg/dL      Comment: 84 Value below reference range        Collected by [id number]     Comment: Meter: [id number]     :  Kathy Herrera RRT       Comprehensive Metabolic Panel [[id number]]  (Abnormal) Collected: 06/21/25 2101    Specimen: Blood Updated: 06/21/25 2137     Glucose 121 mg/dL      .8 mg/dL      Creatinine 5.60 mg/dL      Sodium 139 mmol/L      Potassium 3.7 mmol/L      Chloride 100 mmol/L      CO2 23.0 mmol/L      Calcium  7.6 mg/dL      Total Protein 4.4 g/dL      Albumin 2.6 g/dL      ALT (SGPT) 42 U/L      AST (SGOT) 16 U/L      Alkaline Phosphatase 70 U/L      Total Bilirubin 2.2 mg/dL      Globulin 1.8 gm/dL      A/G Ratio 1.4 g/dL      BUN/Creatinine Ratio 19.8     Anion Gap 16.0 mmol/L      eGFR 13.3 mL/min/1.73     Narrative:      GFR Categories in Chronic Kidney Disease (CKD)              GFR Category          GFR (mL/min/1.73)    Interpretation  G1                    90 or greater        Normal or high (1)  G2                    60-89                Mild decrease (1)  G3a                   45-59                Mild to moderate decrease  G3b                   30-44                Moderate to severe decrease  G4                    15-29                Severe decrease  G5                    14 or less           Kidney failure    (1)In the absence of evidence of kidney disease, neither GFR category G1 or G2 fulfill the criteria for CKD.    eGFR calculation 2021 CKD-EPI creatinine equation, which does not include race as a factor    Magnesium [[id number]]  (Normal) Collected: 06/21/25 2101    Specimen: Blood Updated: 06/21/25 2137     Magnesium 2.0 mg/dL     Lipase [[id number]]  (Normal) Collected: 06/21/25 2101    Specimen: Blood Updated: 06/21/25 2137     Lipase 35 U/L     Phosphorus [[id number]]  (Abnormal) Collected: 06/21/25 2101    Specimen: Blood Updated: 06/21/25 2137     Phosphorus 5.5 mg/dL     CBC Auto Differential [[id number]]  (Abnormal) Collected: 06/21/25 2101    Specimen: Blood Updated: 06/21/25 2130     WBC 7.53 10*3/mm3      RBC 2.47 10*6/mm3      Hemoglobin 6.3 g/dL      Hematocrit 18.8 %      MCV 76.1 fL      MCH 25.5 pg      MCHC 33.5 g/dL      RDW 17.2 %      RDW-SD 46.9 fl      MPV 11.5 fL      Platelets 118 10*3/mm3      Neutrophil % 82.0 %      Lymphocyte % 10.2 %      Monocyte % 6.1 %      Eosinophil % 0.1 %      Basophil % 0.4 %      Immature Grans % 1.2 %      Neutrophils, Absolute 6.17 10*3/mm3       Lymphocytes, Absolute 0.77 10*3/mm3      Monocytes, Absolute 0.46 10*3/mm3      Eosinophils, Absolute 0.01 10*3/mm3      Basophils, Absolute 0.03 10*3/mm3      Immature Grans, Absolute 0.09 10*3/mm3      nRBC 0.5 /100 WBC     Protime-INR [[id number]]  (Abnormal) Collected: 06/21/25 2101    Specimen: Blood Updated: 06/21/25 2128     Protime 19.5 Seconds      INR 1.55    aPTT [[id number]]  (Normal) Collected: 06/21/25 2101    Specimen: Blood Updated: 06/21/25 2128     PTT 31.4 seconds     Narrative:      PTT = The equivalent PTT values for the therapeutic range of heparin levels at 0.3 to 0.7 U/ml are 77 - 99 seconds.    Fentanyl, Urine - Urine, Clean Catch [[id number]]  (Normal) Collected: 06/21/25 2101    Specimen: Urine, Clean Catch Updated: 06/21/25 2121     Fentanyl, Urine Negative    Narrative:      Negative Threshold:      Fentanyl 5 ng/mL     The normal value for the drug tested is negative. This report includes final unconfirmed screening results to be used for medical treatment purposes only. Unconfirmed results must not be used for non-medical purposes such as employment or legal testing. Clinical consideration should be applied to any drug of abuse test, particularly when unconfirmed results are used.           Urine Drug Screen - Urine, Clean Catch [[id number]]  (Abnormal) Collected: 06/21/25 2101    Specimen: Urine, Clean Catch Updated: 06/21/25 2120     THC, Screen, Urine Positive     Phencyclidine (PCP), Urine Negative     Cocaine Screen, Urine Positive     Methamphetamine, Ur Negative     Opiate Screen Negative     Amphetamine Screen, Urine Negative     Benzodiazepine Screen, Urine Negative     Tricyclic Antidepressants Screen Negative     Methadone Screen, Urine Negative     Barbiturates Screen, Urine Negative     Oxycodone Screen, Urine Negative     Buprenorphine, Screen, Urine Negative    Narrative:      Cutoff For Drugs Screened:    Amphetamines               500 ng/ml  Barbiturates               200  ng/ml  Benzodiazepines            150 ng/ml  Cocaine                    150 ng/ml  Methadone                  200 ng/ml  Opiates                    100 ng/ml  Phencyclidine               25 ng/ml  THC                         50 ng/ml  Methamphetamine            500 ng/ml  Tricyclic Antidepressants  300 ng/ml  Oxycodone                  100 ng/ml  Buprenorphine               10 ng/ml    The normal value for all drugs tested is negative. This report includes unconfirmed screening results, with the cutoff values listed, to be used for medical treatment purposes only.  Unconfirmed results must not be used for non-medical purposes such as employment or legal testing.  Clinical consideration should be applied to any drug of abuse test, particularly when unconfirmed results are used.               No radiology results for the last day    Result Review:  I have personally reviewed the results from the time of this admission to 6/22/2025 09:00 CDT and agree with these findings:  [x]  Laboratory list / accordion  []  Microbiology  [x]  Radiology  [x]  EKG/Telemetry   []  Cardiology/Vascular   []  Pathology  []  Old records  [x]  Other:  Outside records reviewed  I have reviewed the patient's current medications.     Assessment/Plan   Assessment  Active Hospital Problems    Diagnosis     **Upper gastrointestinal bleed    30-year-old male with acute upper GI bleed, now status post EGD with clipping of gastric ulcer.  In ICU for critical care management    Acute upper GI bleed, acute blood loss anemia  - Status post EGD with clipping of ulcer as above  - Last hemoglobin 8.7 after 3 units packed red blood cells  - Continue to monitor serial H&H  - Continue PPI twice daily  - N.p.o. for now except for ice chips and meds per GI recs, possible clear liquid diet tonight if no bleeding  - Continue to hold Plavix this time, will investigate why patient is on Plavix as he states he has nonischemic cardiomyopathy    Chronic medical  problems:  - Chronic systolic heart failure/nonischemic cardiomyopathy: Hold GDMT for now given acute bleeding, reinitiate once he remains hemodynamically stable  - CKD stage IV: Monitor daily metabolic panel, strict I&O. Will consult nephrology as unsure of outpatient follow up and BUN and Cr above previous baseline  - Seizure: Continue home dose Keppra    VTE: SCDs  GI: PPI  NTN: N.p.o.  Abx: N/A  Lines/Tubes: Peripheral IV  CODE STATUS: Full resuscitation    Disposition: Continue critical care management immediately post EGD. Eval for floor this afternoon.     50 minutes minimum spent on patient, MDM high     This time included obtaining a history; examining the patient; pulse oximetry; ordering and review of studies; arranging urgent treatment with development of a management plan; evaluation of patient's response to treatment; frequent reassessment; and, discussions with other providers.     Please see rest of the note for further information on patient assessment, MDM, and treatment.     Part of this note may be an electronic transcription/translation of spoken language to printed text using the Dragon dictation system      Electronically signed by SUKHDEV Downey on 6/22/2025 at 09:09 CDT    Addendum: Pt remained hemodynamically stable. H&H cont ot improve. Deemed stable for medical floor. Report given to Dr Hannah with hospital medicine. Pt accepted to his service line under the care of Dr De Oliveira. Transferring to  365.    Electronically signed by Wily Rios APRN at 06/22/25 1631       Kevin Miranda MD at 06/22/25 0801          Gastroenterology Note:  Patient presented to hospital with upper GI bleed with hematemesis and acute drop in hgb. Hgb on admission here was 6.3 after receiving at least 1 unit of PRBC at outside hospital. He received 2 additional units here, with hgb improved to 8.7 this morning. EGD this morning showed an ulcer in the gastric fundus. Treated with clip placement  and epinephrine injection. No active bleeding.     Ok to discontinue Octreotide. Continue Pantoprazole 40 mg every 12 hours. Keep NPO except for ice chips for now. If no further bleeding, can advance to clear liquids this evening. Monitor hgb/hct and transfuse as needed.     Electronically signed by Kevin Miranda MD at 06/22/25 0804          Consult Notes (last 48 hours)        Dario Fernandez APRN at 06/23/25 0916        Consult Orders    1. Inpatient Nephrology Consult [[id number]] ordered by Wily Rios APRN at 06/22/25 1535                 Nephrology ([facility]) Consult Note      Patient:  José Miguel Webb  YOB: 1995  Date of Service: 6/23/2025  MRN: [id number]   Acct: [id number]   Primary Care Physician: He Ortega MD  Advance Directive:   Code Status and Medical Interventions: CPR (Attempt to Resuscitate); Full Support   Ordered at: 06/21/25 2017     Code Status (Patient has no pulse and is not breathing):    CPR (Attempt to Resuscitate)     Medical Interventions (Patient has pulse or is breathing):    Full Support     Level Of Support Discussed With:    Patient     Admit Date: 6/21/2025       Hospital Day: 2  Referring Provider: Kevin Miranda MD      Patient personally seen and examined.  Complete chart including Consults, Notes, Operative Reports, Labs, Cardiology, and Radiology studies reviewed as able.        Subjective:  José Miguel Webb is a 30 y.o. male for whom we were consulted for evaluation and treatment of acute kidney injury. Baseline chronic kidney disease stage 4, baseline creatinine approximately 4.0. Has followed with our providers on a sporadic basis over the years, typically no-shows for his office visits. Last nephrology contact with patient was during inpatient admission in November 2024. History of hypertension, CHF, seizure disorder.  Presented to [facility] on 6/21 with hematemesis. Hemoglobin was 5.2 and he was given PRBC  and FFP transfusion. Creatinine reportedly 6.49.  Transferred to [facility] for GI evaluation. Required additional PRBC after arrival to [facility]. Had EGD on 6/22 which showed gastric ulcer. Hemoglobin had improved to 9.6 after three units PRBC but back down to 7.3 this morning. Creatinine has shown some minimal improvement, down to 5.24 on 6/23 labs.  Currently patient is awake and alert. No complaint aside from wanting to eat breakfast. Denies n/v/d so far today. No changes in urination, no dysuria or hematuria. Urine output nonoliguric. No NSAID use.     Allergies:  Penicillins    Home Meds:  Medications Prior to Admission   Medication Sig Dispense Refill Last Dose/Taking    aspirin 81 MG EC tablet Take 1 tablet by mouth Daily. 30 tablet 0 Past Week    furosemide (Lasix) 20 MG tablet Take 1 tablet by mouth 2 (Two) Times a Day. (Patient taking differently: Take 1 tablet by mouth Daily.) 60 tablet 0 Past Week    hydrALAZINE (APRESOLINE) 100 MG tablet Take 1 tablet by mouth 3 (Three) Times a Day. 90 tablet 0 Past Week    levETIRAcetam (KEPPRA) 500 MG tablet Take 1 tablet by mouth 2 (Two) Times a Day.   Past Week    metoprolol succinate XL (TOPROL-XL) 100 MG 24 hr tablet Take 1 tablet by mouth Daily. 90 tablet 3 Past Month    sacubitril-valsartan (Entresto) 49-51 MG tablet Take 1 tablet by mouth 2 (Two) Times a Day. 180 tablet 3 Past Week    Potassium 99 MG tablet Take 1 tablet by mouth Daily.          Medicines:  Current Facility-Administered Medications   Medication Dose Route Frequency Provider Last Rate Last Admin    acetaminophen (TYLENOL) tablet 650 mg  650 mg Oral Q4H PRN Tacho Suarez PA-C        Or    acetaminophen (TYLENOL) suppository 650 mg  650 mg Rectal Q4H PRN Tacho Suarez PA-C        Calcium Replacement - Follow Nurse / BPA Driven Protocol   Not Applicable PRTacho Bajwa PA-C        Chlorhexidine Gluconate Cloth 2 % pads 1 Application  1 Application Topical Q24H  Tacho Suarez PA-C   1 Application at 06/22/25 0529    hydrALAZINE (APRESOLINE) injection 20 mg  20 mg Intravenous Q6H PRN Wily Rios APRN   20 mg at 06/22/25 1704    levETIRAcetam (KEPPRA) injection 500 mg  500 mg Intravenous Q12H Tacho Suarez PA-C   500 mg at 06/22/25 2202    Magnesium Standard Dose Replacement - Follow Nurse / BPA Driven Protocol   Not Applicable PRN Tacho Suarez PA-C        mupirocin (BACTROBAN) 2 % nasal ointment 1 Application  1 Application Each Nare BID Tacho Suarez PA-C   1 Application at 06/22/25 2201    ondansetron (ZOFRAN) injection 4 mg  4 mg Intravenous Q6H PRN Tacho Suarez PA-C        pantoprazole (PROTONIX) injection 40 mg  40 mg Intravenous Q12H Kevin Miranda MD   40 mg at 06/22/25 2207    Phosphorus Replacement - Follow Nurse / BPA Driven Protocol   Not Applicable PRN Tacho Suarez PA-C        Potassium Replacement - Follow Nurse / BPA Driven Protocol   Not Applicable PRTacho Bajwa PA-C        sacubitril-valsartan (ENTRESTO) 49-51 MG tablet 1 tablet  1 tablet Oral BID Wily Rios APRN   1 tablet at 06/22/25 2159    sodium chloride 0.9 % flush 10 mL  10 mL Intravenous Q12H Tacho Suarez PA-C   10 mL at 06/22/25 2207    sodium chloride 0.9 % flush 10 mL  10 mL Intravenous PRN Tacho Suarez PA-C        sodium chloride 0.9 % infusion 40 mL  40 mL Intravenous PRN Tacho Suarez PA-C           Past Medical History:  Past Medical History:   Diagnosis Date    Cardiomegaly     CHF (congestive heart failure)     Congenital fusion of carpal bone     HTN (hypertension)     Seizure        Past Surgical History:  Past Surgical History:   Procedure Laterality Date    ELBOW PROCEDURE         Family History  Family History   Problem Relation Age of Onset    Hypertension Mother     Heart disease Mother     No Known Problems Father        Social History  Social History     Socioeconomic History    Marital  status: Single   Tobacco Use    Smoking status: Some Days     Current packs/day: 0.50     Types: Cigarettes    Smokeless tobacco: Never   Vaping Use    Vaping status: Never Used   Substance and Sexual Activity    Alcohol use: Yes    Drug use: Yes     Types: Marijuana, Cocaine(coke)    Sexual activity: Defer         Review of Systems:  History obtained from chart review and the patient  General ROS: No fever or chills  Respiratory ROS: No cough, shortness of breath, wheezing  Cardiovascular ROS: No chest pain or palpitations  Gastrointestinal ROS: No abdominal pain or melena  Genito-Urinary ROS: No dysuria or hematuria  Psych ROS: No anxiety and depression  14 point ROS reviewed with the patient and negative except as noted above and in the HPI unless unable to obtain.      Objective:  Patient Vitals for the past 24 hrs:   BP Temp Temp src Pulse Resp SpO2 Weight   06/23/25 0748 139/80 97.5 °F (36.4 °C) Oral 76 16 100 % --   06/23/25 0700 -- -- -- -- -- -- 80.3 kg (177 lb)   06/23/25 0349 137/74 97.9 °F (36.6 °C) Oral 84 16 98 % --   06/22/25 2254 139/82 98 °F (36.7 °C) Oral 88 16 100 % --   06/22/25 2002 (!) 164/103 97.8 °F (36.6 °C) Oral 97 16 97 % --   06/22/25 1744 158/94 98.2 °F (36.8 °C) Oral 88 18 98 % --   06/22/25 1700 (!) 154/104 -- -- 84 -- 100 % --   06/22/25 1648 (!) 163/110 -- -- 90 -- -- --   06/22/25 1600 (!) 160/103 -- -- 88 -- 97 % --   06/22/25 1530 149/91 -- -- 89 17 100 % --   06/22/25 1500 (!) 158/101 -- -- 91 -- 100 % --   06/22/25 1430 153/99 -- -- 91 -- 99 % --   06/22/25 1400 153/96 -- -- 88 17 100 % --   06/22/25 1330 150/97 -- -- 90 -- 100 % --   06/22/25 1300 130/78 -- -- 86 19 100 % --   06/22/25 1230 159/99 -- -- 88 -- 100 % --   06/22/25 1200 161/90 98 °F (36.7 °C) Oral 88 -- 98 % --   06/22/25 1145 161/92 -- -- 90 -- 98 % --   06/22/25 1115 158/94 -- -- 80 -- 100 % --   06/22/25 1100 165/96 -- -- 83 -- 100 % --   06/22/25 1045 (!) 163/103 -- -- 83 15 100 % --   06/22/25 1030 (!)  "164/102 -- -- 82 -- 100 % --   06/22/25 1015 (!) 165/108 -- -- 82 -- 97 % --   06/22/25 1000 (!) 156/105 -- -- 80 -- 99 % --   06/22/25 0955 (!) 154/102 -- -- 82 -- 100 % --   06/22/25 0946 (!) 170/103 -- -- 79 -- 99 % --   06/22/25 0930 (!) 171/106 -- -- 79 -- 99 % --       Intake/Output Summary (Last 24 hours) at 6/23/2025 0916  Last data filed at 6/23/2025 0100  Gross per 24 hour   Intake 600 ml   Output 725 ml   Net -125 ml     General: awake/alert   Chest:  clear to auscultation bilaterally without respiratory distress  CVS: regular rate and rhythm  Abdominal: soft, nontender, positive bowel sounds  Extremities: no cyanosis or edema  Skin: warm and dry without rash      Labs:  Results from last 7 days   Lab Units 06/23/25  0614 06/22/25  2352 06/22/25  1752 06/22/25  1220 06/22/25  0518   WBC 10*3/mm3 7.16  --   --  8.58 6.98   HEMOGLOBIN g/dL 7.3* 7.6* 8.7* 9.6* 8.7*   HEMATOCRIT % 22.3* 23.0* 26.1* 31.4* 25.8*   PLATELETS 10*3/mm3 112*  --   --  117* 105*         Results from last 7 days   Lab Units 06/23/25  0614 06/22/25  1753 06/22/25  0518 06/21/25  2101   SODIUM mmol/L 142  --  142 139   POTASSIUM mmol/L 3.5 3.7 3.6 3.7   CHLORIDE mmol/L 103  --  103 100   CO2 mmol/L 26.0  --  26.0 23.0   BUN mg/dL 95.3*  --  108.0* 110.8*   CREATININE mg/dL 5.24*  --  5.89* 5.60*   CALCIUM mg/dL 8.1*  --  8.0* 7.6*   EGFR mL/min/1.73 14.4*  --  12.5* 13.3*   BILIRUBIN mg/dL  --   --   --  2.2*   ALK PHOS U/L  --   --   --  70   ALT (SGPT) U/L  --   --   --  42*   AST (SGOT) U/L  --   --   --  16   GLUCOSE mg/dL 81  --  87 121*       Radiology:   Imaging Results (Last 72 Hours)       ** No results found for the last 72 hours. **            Culture:  No results found for: \"BLOODCX\", \"URINECX\", \"WOUNDCX\", \"MRSACX\", \"RESPCX\", \"STOOLCX\"      Assessment    Acute kidney injury  Baseline chronic kidney disease stage 4  Hypertension  Seizure disorder  GI bleed--s/p EGD on 6/22  Anemia of CKD and acute GI blood loss--s/p multiple " unit PRBC    Plan:   Renal US pending  Will begin some gentle IV fluids as patient has been NPO for majority of admission so far. Once tolerating regular PO intake will wean fluids  Agree with PRBC to keep Hgb > 7  Further assessment and plan pending Dr Hammer's evaluation of patient      Thank you for the consult, we appreciate the opportunity to provide care to your patients.  Feel free to contact me if I can be of any further assistance.      SUKHDEV Blair  6/23/2025  09:16 CDT    Electronically signed by Dario Fernandez APRN at 06/23/25 0932       Kevin Miranda MD at 06/21/25 2120                  [facility] Gastroenterology  Inpatient Consult Note  Today's date:  06/21/25    José Miguel Webb  1995       Referring Provider: Gopi Gonsales MD  Primary Physician: He Ortega MD     Date of Admission: 6/21/2025  Date of Service:  06/21/25    Reason for Consultation/Chief Complaint:   GI bleed    History of present illness:    José Miguel is a 31 y/o male transferred to [facility] from emergency room at [facility]. He presented to the emergency room with hematemesis. He reports having several episodes of non-bloody emesis yesterday and then a large volume bloody emesis today. Hgb in the emergency room was 5.2, which was significant drop from his baseline Hgb. He was found to be hypotensive in the emergency room. He received at least 1 unit of PRBC and 1 unit of FFP there. He was transferred to ICU here for further management. Currently hemodynamically stable. He is not requiring vasopressors at this time. Repeat Hgb is 6.3. He has not had any hematemesis or hematochezia here. Past medical history significant for chronic kidney disease and CHF. He is on Plavix.     Past Medical History:   Diagnosis Date    Cardiomegaly     Congenital fusion of carpal bone     HTN (hypertension)     Seizure          Past Surgical History:   Procedure Laterality Date    ELBOW PROCEDURE             Allergies   Allergen Reactions    Penicillins Anaphylaxis         Social History     Tobacco Use    Smoking status: Some Days     Current packs/day: 0.50     Types: Cigarettes    Smokeless tobacco: Never   Substance Use Topics    Alcohol use: Yes          Family History   Problem Relation Age of Onset    Hypertension Mother     Heart disease Mother     No Known Problems Father          Medications Prior to Admission   Medication Sig Dispense Refill Last Dose/Taking    amLODIPine (NORVASC) 10 MG tablet Take 1 tablet by mouth Daily. 30 tablet 0     aspirin 81 MG EC tablet Take 1 tablet by mouth Daily. 30 tablet 0     furosemide (Lasix) 20 MG tablet Take 1 tablet by mouth 2 (Two) Times a Day. 60 tablet 0     hydrALAZINE (APRESOLINE) 100 MG tablet Take 1 tablet by mouth 3 (Three) Times a Day. 90 tablet 0     hydrALAZINE (APRESOLINE) 25 MG tablet Take 1 tablet by mouth 3 (Three) Times a Day. 270 tablet 3     isosorbide dinitrate (ISORDIL) 10 MG tablet Take 4 tablets by mouth 3 (Three) Times a Day. 360 tablet 0     isosorbide dinitrate (ISORDIL) 20 MG tablet Take 1 tablet by mouth 3 (Three) Times a Day. 270 tablet 3     levETIRAcetam (KEPPRA) 500 MG tablet Take 1 tablet by mouth 2 (Two) Times a Day.       metoprolol succinate XL (TOPROL-XL) 100 MG 24 hr tablet Take 1 tablet by mouth Daily. 90 tablet 3     Potassium 99 MG tablet Take 1 tablet by mouth Daily.       sacubitril-valsartan (Entresto) 49-51 MG tablet Take 1 tablet by mouth 2 (Two) Times a Day. 180 tablet 3              Review of Systems:  Constitutional: No unexpected weight change, no fatigue, no unexplained fever, no sweats or chills.   HEENT: No icteric sclera.  No hearing or visual deficits.  No sore throat.  No chronic nasal discharge.  Pulmonary: No chronic cough.  No hemoptysis.  No shortness of breath.  Cardiovascular: No chest pain.  No palpitations.  No shortness of breath.  Gastrointestinal: As above.  Musculoskeletal/extremities: No  peripheral edema.  No cyanosis.  No claudications.  No back pain.  Genitourinary: No dysuria.  No blood in stool.  No urethral discharges.  Neurologic: No seizures.  No headaches.  No dizziness.  No gait problems.  Skin: No rash.  No icterus.  Mental: No psychosis.  No confusions.  No hallucinations.      Physical Exam:  Temp:  [99 °F (37.2 °C)] 99 °F (37.2 °C)  Heart Rate:  [72-77] 77  Resp:  [16] 16  BP: (139-152)/() 152/100    General:   HEENT: Nonicteric sclerae.  Moist oral mucosa.  PERRLA.  EOMI.  Clear pharynx.  Lungs: Clear to auscultation bilaterally.  No wheezing, rales or rhonchi.  Heart: Regular rate and rhythm.  Normal S1 and S2, no S3, S4 or murmur.  Abdomen: Soft, nondistended, nontender to palpation, with normoactive bowel sounds, no hepatosplenomegaly, no palpable masses.  Extremities: No cyanosis, edema or pulse deficits.  Skin: No rash or jaundice.      Results Review:  Lab Results (last 24 hours)       Procedure Component Value Units Date/Time    Urine Drug Screen - Urine, Clean Catch [[id number]]  (Abnormal) Collected: 06/21/25 2101    Specimen: Urine, Clean Catch Updated: 06/21/25 2120     THC, Screen, Urine Positive     Phencyclidine (PCP), Urine Negative     Cocaine Screen, Urine Positive     Methamphetamine, Ur Negative     Opiate Screen Negative     Amphetamine Screen, Urine Negative     Benzodiazepine Screen, Urine Negative     Tricyclic Antidepressants Screen Negative     Methadone Screen, Urine Negative     Barbiturates Screen, Urine Negative     Oxycodone Screen, Urine Negative     Buprenorphine, Screen, Urine Negative    Narrative:      Cutoff For Drugs Screened:    Amphetamines               500 ng/ml  Barbiturates               200 ng/ml  Benzodiazepines            150 ng/ml  Cocaine                    150 ng/ml  Methadone                  200 ng/ml  Opiates                    100 ng/ml  Phencyclidine               25 ng/ml  THC                         50 ng/ml  Methamphetamine             500 ng/ml  Tricyclic Antidepressants  300 ng/ml  Oxycodone                  100 ng/ml  Buprenorphine               10 ng/ml    The normal value for all drugs tested is negative. This report includes unconfirmed screening results, with the cutoff values listed, to be used for medical treatment purposes only.  Unconfirmed results must not be used for non-medical purposes such as employment or legal testing.  Clinical consideration should be applied to any drug of abuse test, particularly when unconfirmed results are used.      Fentanyl, Urine - Urine, Clean Catch [[id number]] Collected: 06/21/25 2101    Specimen: Urine, Clean Catch Updated: 06/21/25 2107    CBC Auto Differential [[id number]] Collected: 06/21/25 2101    Specimen: Blood Updated: 06/21/25 2107    Protime-INR [[id number]] Collected: 06/21/25 2101    Specimen: Blood Updated: 06/21/25 2107    aPTT [[id number]] Collected: 06/21/25 2101    Specimen: Blood Updated: 06/21/25 2107    Comprehensive Metabolic Panel [[id number]] Collected: 06/21/25 2101    Specimen: Blood Updated: 06/21/25 2107    Magnesium [[id number]] Collected: 06/21/25 2101    Specimen: Blood Updated: 06/21/25 2107    Lipase [[id number]] Collected: 06/21/25 2101    Specimen: Blood Updated: 06/21/25 2107    Phosphorus [[id number]] Collected: 06/21/25 2101    Specimen: Blood Updated: 06/21/25 2107              Radiology Review:  Imaging Results (Last 72 Hours)       ** No results found for the last 72 hours. **            Impression:  -GI bleed with hematemesis.    -Anemia with acute drop in hgb, secondary to GI bleed.    -History of chronic kidney disease.     -History of CHF. On Plavix.    Plan:  -Will give another dose of IV Protonix now and then continue 40 mg every 12 hours. Will give one dose of Reglan 10 mg now. Hold Plavix. Recommend transfusing an additional 2 units of PRBC now. He is on schedule for EGD at 7 am tomorrow. If he has further episodes of hematemesis or something changes  overnight, will plan for emergent EGD.       Kevin Miranda MD  06/21/25   21:21 CDT     Electronically signed by Kevin Miranda MD at 06/21/25 2202 6/22   2u PRBC given tonight. EGD today     . Hypertensive at times requiring prn antihypertensives. No current s/s of active bleeding. Pt. did have EGD today and ulcer noted. Interventions = clips x4 placed and epi. Is tolerating clear liquids. Adequate UOP. Denies pain. Serial H/H's continue.     PRN 6/22  Adrenalin epinephrine  x1    appresoline iv x2

## 2025-06-23 NOTE — PROGRESS NOTES
Nutrition Services    Patient Name:  José Miguel Webb  YOB: 1995  MRN: [id number]  Admit Date:  6/21/2025    NPO & CLD x 4 days. Pt may benefit from early [enteral/parenteral] feeding if appropriate with POC goals. If desired, RD available for recommendations. Nutrition following per protocol.   Electronically signed by:  Ai Bhatti RDN, CELENA  06/23/25 08:22 CDT

## 2025-06-23 NOTE — PROGRESS NOTES
[facility] Medicine Services  INPATIENT PROGRESS NOTE    Patient Name: José Miguel Webb  Date of Admission: 6/21/2025  Today's Date: 06/23/25  Length of Stay: 2  Primary Care Physician: He Ortega MD    Subjective   Chief Complaint: f/u gib, anemia, renal failure    HPI   Patient seen resting comfortably in bed on room air.  Laying flat on my arrival asleep.  Awoken easily to voice.  Says he feels well.  Denies shortness of breath.  Denies nausea.  No chest pain.  Has not seen any blood today.        Review of Systems   All pertinent negatives and positives are as above. All other systems have been reviewed and are negative unless otherwise stated.     Objective    Temp:  [97.5 °F (36.4 °C)-98.4 °F (36.9 °C)] 98.4 °F (36.9 °C)  Heart Rate:  [74-97] 74  Resp:  [16-19] 16  BP: (130-164)/() 144/90  Physical Exam  GEN: Awake, alert, interactive, in NAD  HEENT:  PERRLA, EOMI, Anicteric, Trachea midline  Lungs: no wheezing/rales/rhonchi  Heart: RRR, +S1/s2, no rub  ABD: soft, nt/nd, +BS, no guarding/rebound  Extremities: atraumatic, no cyanosis, no pitting edema  Neuro: AAOx3, no focal deficits  Psych: normal mood & affect        Results Review:  I have reviewed the labs, radiology results, and diagnostic studies.    Laboratory Data:   Results from last 7 days   Lab Units 06/23/25  0614 06/22/25  2352 06/22/25  1752 06/22/25  1220 06/22/25  0518   WBC 10*3/mm3 7.16  --   --  8.58 6.98   HEMOGLOBIN g/dL 7.3* 7.6* 8.7* 9.6* 8.7*   HEMATOCRIT % 22.3* 23.0* 26.1* 31.4* 25.8*   PLATELETS 10*3/mm3 112*  --   --  117* 105*        Results from last 7 days   Lab Units 06/23/25  0614 06/22/25  1753 06/22/25  0518 06/21/25  2101   SODIUM mmol/L 142  --  142 139   POTASSIUM mmol/L 3.5 3.7 3.6 3.7   CHLORIDE mmol/L 103  --  103 100   CO2 mmol/L 26.0  --  26.0 23.0   BUN mg/dL 95.3*  --  108.0* 110.8*   CREATININE mg/dL 5.24*  --  5.89* 5.60*   CALCIUM mg/dL 8.1*  --  8.0* 7.6*   BILIRUBIN  "mg/dL  --   --   --  2.2*   ALK PHOS U/L  --   --   --  70   ALT (SGPT) U/L  --   --   --  42*   AST (SGOT) U/L  --   --   --  16   GLUCOSE mg/dL 81  --  87 121*       Culture Data:   No results found for: \"BLOODCX\", \"URINECX\", \"WOUNDCX\", \"MRSACX\", \"RESPCX\", \"STOOLCX\"    Radiology Data:   Imaging Results (Last 24 Hours)       ** No results found for the last 24 hours. **            I have reviewed the patient's current medications.     Assessment/Plan   Assessment  Active Hospital Problems    Diagnosis     **Upper gastrointestinal bleed     Acute on chronic anemia     Cocaine abuse     Medically noncompliant     Chronic systolic (congestive) heart failure     NICM (nonischemic cardiomyopathy)     Essential hypertension        Treatment Plan  #1 UGIB -found to have gastric ulcer on EGD that was clipped.  H&H fairly stable this a.m.  No obvious signs of bleeding.  On PPI twice daily.  Tolerating diet.  Appreciate GI assistance.    #2 acute on chronic anemia -chronic anemia in the setting of renal disease with acute blood loss in the setting of #1 above.  He received 3 units of blood and 1 of FFP since arrival.  Hemoglobin slowly drifted down last evening to 7 6.  Is been fairly flat today at 7.3.  Monitor for signs of bleeding.  Check an iron profile on previous labs.    #3 CKD 4 -currently with creatinine of 5 and GFR 14.  May be borderline dialysis patient but states he still makes urine.  Nephrology seeing the patient.    #4 ischemic cardiomyopathy -patient appears euvolemic.  Patient with a history of low EF CHF and noncompliant with follow-up meds.  Last echo in 2024 showed EF 31 to 35%.  Has not seen cards in over a year on what I can tell.  He has still been taking his meds he states however because cardiology was feeling them.  He is on metoprolol XL, hydralazine, Entresto.  So far here he is only been getting Entresto.  Given his renal dysfunction we will DC that medication.  He was cocaine positive on " arrival without was several days ago.  No signs of current use effects.  Will add back Toprol-XL at a smaller dose of 25 daily.  Will add hydralazine 25 every 8.  Monitor blood pressure.  Patient is euvolemic.  Of note I did discuss with patient that if he is getting continue his metoprolol he has to be abstinent of cocaine.  Long-term this is not the best medication if patient can comply.    #5 essential hypertension -monitor with changes above and adjust as needed.    #6 cocaine use -discussed cessation.        Medical Decision Making  Number and Complexity of problems: 2 acute, 1 acute on chronic, multiple chronic  Differential Diagnosis: as above    Conditions and Status        New to me, appears to be improving, monitor for bleeding     MDM Data  External documents reviewed: outpatient cardiology note from 3/21/24 by Dr. Aguirre  Cardiac tracing (EKG, telemetry) interpretation: none  Radiology interpretation: reports reviewed  Labs reviewed: as above  Any tests that were considered but not ordered: [email or web address]     Decision rules/scores evaluated (example [id number], Wells, etc): none     Discussed with: patient, nursing, nephrology     Care Planning  Shared decision making: Patient apprised of current labs, vitals, imaging and treatment plan.  They are agreeable with proceeding with plans as discussed.    Code status and discussions: full code    Disposition  Social Determinants of Health that impact treatment or disposition: none  I expect the patient to be discharged to home potentially when improved. Monitor for bleeding. Monitor for renal function and decision on dialysis initiation.          Electronically signed by Jose De Oliveira DO, 06/23/25, 12:45 CDT.

## 2025-06-23 NOTE — CONSULTS
Nephrology ([facility]) Consult Note      Patient:  José Miguel Webb  YOB: 1995  Date of Service: 6/23/2025  MRN: [id number]   Acct: [id number]   Primary Care Physician: He Ortega MD  Advance Directive:   Code Status and Medical Interventions: CPR (Attempt to Resuscitate); Full Support   Ordered at: 06/21/25 2017     Code Status (Patient has no pulse and is not breathing):    CPR (Attempt to Resuscitate)     Medical Interventions (Patient has pulse or is breathing):    Full Support     Level Of Support Discussed With:    Patient     Admit Date: 6/21/2025       Hospital Day: 2  Referring Provider: Kevin Miranda MD      Patient personally seen and examined.  Complete chart including Consults, Notes, Operative Reports, Labs, Cardiology, and Radiology studies reviewed as able.        Subjective:  José Miguel Webb is a 30 y.o. male for whom we were consulted for evaluation and treatment of acute kidney injury. Baseline chronic kidney disease stage 4, baseline creatinine approximately 4.0. Has followed with our providers on a sporadic basis over the years, typically no-shows for his office visits. Last nephrology contact with patient was during inpatient admission in November 2024. History of hypertension, CHF, seizure disorder.  Presented to [facility] on 6/21 with hematemesis. Hemoglobin was 5.2 and he was given PRBC and FFP transfusion. Creatinine reportedly 6.49.  Transferred to [facility] for GI evaluation. Required additional PRBC after arrival to [facility]. Had EGD on 6/22 which showed gastric ulcer. Hemoglobin had improved to 9.6 after three units PRBC but back down to 7.3 this morning. Creatinine has shown some minimal improvement, down to 5.24 on 6/23 labs.  Currently patient is awake and alert. No complaint aside from wanting to eat breakfast. Denies n/v/d so far today. No changes in urination, no dysuria or hematuria. Urine output nonoliguric. No  NSAID use.     Allergies:  Penicillins    Home Meds:  Medications Prior to Admission   Medication Sig Dispense Refill Last Dose/Taking    aspirin 81 MG EC tablet Take 1 tablet by mouth Daily. 30 tablet 0 Past Week    furosemide (Lasix) 20 MG tablet Take 1 tablet by mouth 2 (Two) Times a Day. (Patient taking differently: Take 1 tablet by mouth Daily.) 60 tablet 0 Past Week    hydrALAZINE (APRESOLINE) 100 MG tablet Take 1 tablet by mouth 3 (Three) Times a Day. 90 tablet 0 Past Week    levETIRAcetam (KEPPRA) 500 MG tablet Take 1 tablet by mouth 2 (Two) Times a Day.   Past Week    metoprolol succinate XL (TOPROL-XL) 100 MG 24 hr tablet Take 1 tablet by mouth Daily. 90 tablet 3 Past Month    sacubitril-valsartan (Entresto) 49-51 MG tablet Take 1 tablet by mouth 2 (Two) Times a Day. 180 tablet 3 Past Week    Potassium 99 MG tablet Take 1 tablet by mouth Daily.          Medicines:  Current Facility-Administered Medications   Medication Dose Route Frequency Provider Last Rate Last Admin    acetaminophen (TYLENOL) tablet 650 mg  650 mg Oral Q4H PRN Tacho Suarez PA-C        Or    acetaminophen (TYLENOL) suppository 650 mg  650 mg Rectal Q4H PRN Tacho Suarez PA-C        Calcium Replacement - Follow Nurse / BPA Driven Protocol   Not Applicable PRN Tacho Suarez PA-C        Chlorhexidine Gluconate Cloth 2 % pads 1 Application  1 Application Topical Q24H Tacho Suarez PA-C   1 Application at 06/22/25 0529    hydrALAZINE (APRESOLINE) injection 20 mg  20 mg Intravenous Q6H PRN Wily Rios APRN   20 mg at 06/22/25 1704    levETIRAcetam (KEPPRA) injection 500 mg  500 mg Intravenous Q12H Tacho Suarez PA-C   500 mg at 06/22/25 2202    Magnesium Standard Dose Replacement - Follow Nurse / BPA Driven Protocol   Not Applicable PRN Tacho Suarez PA-C        mupirocin (BACTROBAN) 2 % nasal ointment 1 Application  1 Application Each Nare BID Tacho Suarez PA-C   1 Application at  06/22/25 2201    ondansetron (ZOFRAN) injection 4 mg  4 mg Intravenous Q6H PRN Tacho Suarez PA-C        pantoprazole (PROTONIX) injection 40 mg  40 mg Intravenous Q12H Kevin Miranda MD   40 mg at 06/22/25 2207    Phosphorus Replacement - Follow Nurse / BPA Driven Protocol   Not Applicable PRN Tacho Suarez PA-C        Potassium Replacement - Follow Nurse / BPA Driven Protocol   Not Applicable PRN Tacho Suarez PA-C        sacubitril-valsartan (ENTRESTO) 49-51 MG tablet 1 tablet  1 tablet Oral BID Wily Rios APRN   1 tablet at 06/22/25 2159    sodium chloride 0.9 % flush 10 mL  10 mL Intravenous Q12H Tacho Suarez PA-C   10 mL at 06/22/25 2207    sodium chloride 0.9 % flush 10 mL  10 mL Intravenous PRN Tacho Suarez PA-C        sodium chloride 0.9 % infusion 40 mL  40 mL Intravenous PRN Tacho Suarez PA-C           Past Medical History:  Past Medical History:   Diagnosis Date    Cardiomegaly     CHF (congestive heart failure)     Congenital fusion of carpal bone     HTN (hypertension)     Seizure        Past Surgical History:  Past Surgical History:   Procedure Laterality Date    ELBOW PROCEDURE         Family History  Family History   Problem Relation Age of Onset    Hypertension Mother     Heart disease Mother     No Known Problems Father        Social History  Social History     Socioeconomic History    Marital status: Single   Tobacco Use    Smoking status: Some Days     Current packs/day: 0.50     Types: Cigarettes    Smokeless tobacco: Never   Vaping Use    Vaping status: Never Used   Substance and Sexual Activity    Alcohol use: Yes    Drug use: Yes     Types: Marijuana, Cocaine(coke)    Sexual activity: Defer         Review of Systems:  History obtained from chart review and the patient  General ROS: No fever or chills  Respiratory ROS: No cough, shortness of breath, wheezing  Cardiovascular ROS: No chest pain or palpitations  Gastrointestinal ROS: No  abdominal pain or melena  Genito-Urinary ROS: No dysuria or hematuria  Psych ROS: No anxiety and depression  14 point ROS reviewed with the patient and negative except as noted above and in the HPI unless unable to obtain.      Objective:  Patient Vitals for the past 24 hrs:   BP Temp Temp src Pulse Resp SpO2 Weight   06/23/25 0748 139/80 97.5 °F (36.4 °C) Oral 76 16 100 % --   06/23/25 0700 -- -- -- -- -- -- 80.3 kg (177 lb)   06/23/25 0349 137/74 97.9 °F (36.6 °C) Oral 84 16 98 % --   06/22/25 2254 139/82 98 °F (36.7 °C) Oral 88 16 100 % --   06/22/25 2002 (!) 164/103 97.8 °F (36.6 °C) Oral 97 16 97 % --   06/22/25 1744 158/94 98.2 °F (36.8 °C) Oral 88 18 98 % --   06/22/25 1700 (!) 154/104 -- -- 84 -- 100 % --   06/22/25 1648 (!) 163/110 -- -- 90 -- -- --   06/22/25 1600 (!) 160/103 -- -- 88 -- 97 % --   06/22/25 1530 149/91 -- -- 89 17 100 % --   06/22/25 1500 (!) 158/101 -- -- 91 -- 100 % --   06/22/25 1430 153/99 -- -- 91 -- 99 % --   06/22/25 1400 153/96 -- -- 88 17 100 % --   06/22/25 1330 150/97 -- -- 90 -- 100 % --   06/22/25 1300 130/78 -- -- 86 19 100 % --   06/22/25 1230 159/99 -- -- 88 -- 100 % --   06/22/25 1200 161/90 98 °F (36.7 °C) Oral 88 -- 98 % --   06/22/25 1145 161/92 -- -- 90 -- 98 % --   06/22/25 1115 158/94 -- -- 80 -- 100 % --   06/22/25 1100 165/96 -- -- 83 -- 100 % --   06/22/25 1045 (!) 163/103 -- -- 83 15 100 % --   06/22/25 1030 (!) 164/102 -- -- 82 -- 100 % --   06/22/25 1015 (!) 165/108 -- -- 82 -- 97 % --   06/22/25 1000 (!) 156/105 -- -- 80 -- 99 % --   06/22/25 0955 (!) 154/102 -- -- 82 -- 100 % --   06/22/25 0946 (!) 170/103 -- -- 79 -- 99 % --   06/22/25 0930 (!) 171/106 -- -- 79 -- 99 % --       Intake/Output Summary (Last 24 hours) at 6/23/2025 0916  Last data filed at 6/23/2025 0100  Gross per 24 hour   Intake 600 ml   Output 725 ml   Net -125 ml     General: awake/alert   Chest:  clear to auscultation bilaterally without respiratory distress  CVS: regular rate and  "rhythm  Abdominal: soft, nontender, positive bowel sounds  Extremities: no cyanosis or edema  Skin: warm and dry without rash      Labs:  Results from last 7 days   Lab Units 06/23/25  0614 06/22/25  2352 06/22/25  1752 06/22/25  1220 06/22/25  0518   WBC 10*3/mm3 7.16  --   --  8.58 6.98   HEMOGLOBIN g/dL 7.3* 7.6* 8.7* 9.6* 8.7*   HEMATOCRIT % 22.3* 23.0* 26.1* 31.4* 25.8*   PLATELETS 10*3/mm3 112*  --   --  117* 105*         Results from last 7 days   Lab Units 06/23/25  0614 06/22/25  1753 06/22/25  0518 06/21/25  2101   SODIUM mmol/L 142  --  142 139   POTASSIUM mmol/L 3.5 3.7 3.6 3.7   CHLORIDE mmol/L 103  --  103 100   CO2 mmol/L 26.0  --  26.0 23.0   BUN mg/dL 95.3*  --  108.0* 110.8*   CREATININE mg/dL 5.24*  --  5.89* 5.60*   CALCIUM mg/dL 8.1*  --  8.0* 7.6*   EGFR mL/min/1.73 14.4*  --  12.5* 13.3*   BILIRUBIN mg/dL  --   --   --  2.2*   ALK PHOS U/L  --   --   --  70   ALT (SGPT) U/L  --   --   --  42*   AST (SGOT) U/L  --   --   --  16   GLUCOSE mg/dL 81  --  87 121*       Radiology:   Imaging Results (Last 72 Hours)       ** No results found for the last 72 hours. **            Culture:  No results found for: \"BLOODCX\", \"URINECX\", \"WOUNDCX\", \"MRSACX\", \"RESPCX\", \"STOOLCX\"      Assessment    Acute kidney injury  Baseline chronic kidney disease stage 4  Hypertension  Seizure disorder  GI bleed--s/p EGD on 6/22  Anemia of CKD and acute GI blood loss--s/p multiple unit PRBC    Plan:   Renal US pending  Will begin some gentle IV fluids as patient has been NPO for majority of admission so far. Once tolerating regular PO intake will wean fluids  Agree with PRBC to keep Hgb > 7  Further assessment and plan pending Dr Hammer's evaluation of patient      Thank you for the consult, we appreciate the opportunity to provide care to your patients.  Feel free to contact me if I can be of any further assistance.      Dario Fernandez, APRN  6/23/2025  09:16 CDT  "

## 2025-06-24 VITALS
WEIGHT: 186.8 LBS | DIASTOLIC BLOOD PRESSURE: 86 MMHG | HEIGHT: 74 IN | HEART RATE: 94 BPM | TEMPERATURE: 97.9 F | BODY MASS INDEX: 23.97 KG/M2 | SYSTOLIC BLOOD PRESSURE: 143 MMHG | RESPIRATION RATE: 16 BRPM | OXYGEN SATURATION: 100 %

## 2025-06-24 LAB
25(OH)D3 SERPL-MCNC: <6 NG/ML (ref 30–100)
ALBUMIN SERPL-MCNC: 3 G/DL (ref 3.5–5.2)
ALBUMIN/GLOB SERPL: 1.5 G/DL
ALP SERPL-CCNC: 58 U/L (ref 39–117)
ALT SERPL W P-5'-P-CCNC: 24 U/L (ref 1–41)
ANION GAP SERPL CALCULATED.3IONS-SCNC: 10 MMOL/L (ref 5–15)
AST SERPL-CCNC: 26 U/L (ref 1–40)
BASOPHILS # BLD AUTO: 0.03 10*3/MM3 (ref 0–0.2)
BASOPHILS NFR BLD AUTO: 0.4 % (ref 0–1.5)
BILIRUB SERPL-MCNC: 0.8 MG/DL (ref 0–1.2)
BUN SERPL-MCNC: 75.4 MG/DL (ref 6–20)
BUN/CREAT SERPL: 15.2 (ref 7–25)
CALCIUM SPEC-SCNC: 7.6 MG/DL (ref 8.6–10.5)
CHLORIDE SERPL-SCNC: 103 MMOL/L (ref 98–107)
CO2 SERPL-SCNC: 22 MMOL/L (ref 22–29)
CREAT SERPL-MCNC: 4.97 MG/DL (ref 0.76–1.27)
DEPRECATED RDW RBC AUTO: 53.9 FL (ref 37–54)
EGFRCR SERPLBLD CKD-EPI 2021: 15.4 ML/MIN/1.73
EOSINOPHIL # BLD AUTO: 0.23 10*3/MM3 (ref 0–0.4)
EOSINOPHIL NFR BLD AUTO: 3.1 % (ref 0.3–6.2)
ERYTHROCYTE [DISTWIDTH] IN BLOOD BY AUTOMATED COUNT: 17.4 % (ref 12.3–15.4)
GLOBULIN UR ELPH-MCNC: 2 GM/DL
GLUCOSE SERPL-MCNC: 99 MG/DL (ref 65–99)
HCT VFR BLD AUTO: 22.3 % (ref 37.5–51)
HGB BLD-MCNC: 7 G/DL (ref 13–17.7)
IMM GRANULOCYTES # BLD AUTO: 0.02 10*3/MM3 (ref 0–0.05)
IMM GRANULOCYTES NFR BLD AUTO: 0.3 % (ref 0–0.5)
LYMPHOCYTES # BLD AUTO: 1.65 10*3/MM3 (ref 0.7–3.1)
LYMPHOCYTES NFR BLD AUTO: 22.5 % (ref 19.6–45.3)
MAGNESIUM SERPL-MCNC: 1.9 MG/DL (ref 1.6–2.6)
MCH RBC QN AUTO: 27 PG (ref 26.6–33)
MCHC RBC AUTO-ENTMCNC: 31.4 G/DL (ref 31.5–35.7)
MCV RBC AUTO: 86.1 FL (ref 79–97)
MONOCYTES # BLD AUTO: 0.59 10*3/MM3 (ref 0.1–0.9)
MONOCYTES NFR BLD AUTO: 8 % (ref 5–12)
NEUTROPHILS NFR BLD AUTO: 4.81 10*3/MM3 (ref 1.7–7)
NEUTROPHILS NFR BLD AUTO: 65.7 % (ref 42.7–76)
NRBC BLD AUTO-RTO: 0 /100 WBC (ref 0–0.2)
PHOSPHATE SERPL-MCNC: 3.8 MG/DL (ref 2.5–4.5)
PLATELET # BLD AUTO: 117 10*3/MM3 (ref 140–450)
PMV BLD AUTO: 11.5 FL (ref 6–12)
POTASSIUM SERPL-SCNC: 4.4 MMOL/L (ref 3.5–5.2)
PROT SERPL-MCNC: 5 G/DL (ref 6–8.5)
PTH-INTACT SERPL-MCNC: 286.9 PG/ML (ref 15–65)
RBC # BLD AUTO: 2.59 10*6/MM3 (ref 4.14–5.8)
SODIUM SERPL-SCNC: 135 MMOL/L (ref 136–145)
WBC NRBC COR # BLD AUTO: 7.33 10*3/MM3 (ref 3.4–10.8)

## 2025-06-24 PROCEDURE — 85025 COMPLETE CBC W/AUTO DIFF WBC: CPT | Performed by: PHYSICIAN ASSISTANT

## 2025-06-24 PROCEDURE — 82306 VITAMIN D 25 HYDROXY: CPT | Performed by: NURSE PRACTITIONER

## 2025-06-24 PROCEDURE — 99221 1ST HOSP IP/OBS SF/LOW 40: CPT | Performed by: NURSE PRACTITIONER

## 2025-06-24 PROCEDURE — 80053 COMPREHEN METABOLIC PANEL: CPT | Performed by: NURSE PRACTITIONER

## 2025-06-24 PROCEDURE — 83735 ASSAY OF MAGNESIUM: CPT | Performed by: NURSE PRACTITIONER

## 2025-06-24 PROCEDURE — 99232 SBSQ HOSP IP/OBS MODERATE 35: CPT | Performed by: INTERNAL MEDICINE

## 2025-06-24 PROCEDURE — 86901 BLOOD TYPING SEROLOGIC RH(D): CPT

## 2025-06-24 PROCEDURE — 86920 COMPATIBILITY TEST SPIN: CPT

## 2025-06-24 PROCEDURE — 84100 ASSAY OF PHOSPHORUS: CPT | Performed by: NURSE PRACTITIONER

## 2025-06-24 PROCEDURE — 86900 BLOOD TYPING SEROLOGIC ABO: CPT

## 2025-06-24 PROCEDURE — 25010000002 LEVETRIRACETAM PER 10 MG: Performed by: PHYSICIAN ASSISTANT

## 2025-06-24 PROCEDURE — 83970 ASSAY OF PARATHORMONE: CPT | Performed by: NURSE PRACTITIONER

## 2025-06-24 RX ORDER — HYOSCYAMINE SULFATE 0.38 MG/1
375 TABLET, EXTENDED RELEASE ORAL ONCE AS NEEDED
Status: DISCONTINUED | OUTPATIENT
Start: 2025-06-24 | End: 2025-06-24 | Stop reason: HOSPADM

## 2025-06-24 RX ORDER — CLINDAMYCIN PHOSPHATE 900 MG/50ML
900 INJECTION, SOLUTION INTRAVENOUS ONCE
OUTPATIENT
Start: 2025-06-24 | End: 2025-06-24

## 2025-06-24 RX ADMIN — CALCITRIOL CAPSULES 0.25 MCG 0.25 MCG: 0.25 CAPSULE ORAL at 08:17

## 2025-06-24 RX ADMIN — METOPROLOL SUCCINATE 25 MG: 25 TABLET, EXTENDED RELEASE ORAL at 08:17

## 2025-06-24 RX ADMIN — HYDRALAZINE HYDROCHLORIDE 25 MG: 25 TABLET ORAL at 06:18

## 2025-06-24 RX ADMIN — PANTOPRAZOLE SODIUM 40 MG: 40 INJECTION, POWDER, FOR SOLUTION INTRAVENOUS at 08:24

## 2025-06-24 RX ADMIN — ALLOPURINOL 200 MG: 100 TABLET ORAL at 08:17

## 2025-06-24 RX ADMIN — LEVETIRACETAM 500 MG: 100 INJECTION, SOLUTION, CONCENTRATE INTRAVENOUS at 08:26

## 2025-06-24 RX ADMIN — Medication 1 APPLICATION: at 08:17

## 2025-06-24 NOTE — PAYOR COMM NOTE
"REF:  [id number]     [facility]  FAX  [phone]     José Miguel Webb (30 y.o. Male)       Date of Birth   1995    Social Security Number       Address   [address] [place] [address]    Home Phone   [phone]    MRN   [id number]       Roman Catholic   Confucianist    Marital Status   Single                            Admission Date   6/21/2025    Admission Type   Urgent    Admitting Provider   Gopi Gonsales MD    Attending Provider       Department, Room/Bed   [facility] 3C, 365/1       Discharge Date   6/24/2025    Discharge Disposition   Left Against Medical Advice    Discharge Destination                                 Attending Provider: (none)   Allergies: Penicillins    Isolation: None   Infection: None   Code Status: Prior    Ht: 188 cm (74\")   Wt: 84.7 kg (186 lb 12.8 oz)    Admission Cmt: None   Principal Problem: Upper gastrointestinal bleed [K92.2]                   Active Insurance as of 6/21/2025       Primary Coverage       Payor Plan Insurance Group Employer/Plan Group    [place] [id number]       Payor Plan Address Payor Plan Phone Number Payor Plan Fax Number Effective Dates    [employer] [address] [address] [phone]  1/1/2025 - None Entered    Formerly [facility] [id number]         Subscriber Name Subscriber Birth Date Member ID       JOSÉ MIGUEL WEBB 1995 [id number]                     Emergency Contacts        (Rel.) Home Phone Work Phone Mobile Phone    Jesika Webb (Mother) [phone] -- [phone]                 Discharge Summary        Jose De Oliveira DO at 06/24/25 0943                [facility] Medicine Services  AMA SUMMARY       Date of Admission: 6/21/2025  Date of AMA:  6/24/2025  Primary Care Physician: He Ortega MD    Presenting Problem/History of Present Illness:  hematemesis    Final AMA Diagnoses:  Active Hospital Problems    Diagnosis     **Upper " gastrointestinal bleed     Acute on chronic anemia     Cocaine abuse     Medically noncompliant     Chronic systolic (congestive) heart failure     NICM (nonischemic cardiomyopathy)     Essential hypertension        Consults:   Dr. Miranda, GI  Dr. Hammer, nephrology  Dr. Posey, vascular surgery  Dr. Gonsales, icu    Procedures Performed:   EGD on 6/22    Pertinent Test Results:     Imaging Results (All)       None          LAB RESULTS:      Lab 06/24/25  0658 06/23/25  2310 06/23/25  1834 06/23/25  1246 06/23/25  0614 06/22/25  1752 06/22/25  1220 06/22/25  0518 06/21/25  2101   WBC 7.33  --   --   --  7.16  --  8.58 6.98 7.53   HEMOGLOBIN 7.0* 7.9* 7.1* 7.3* 7.3*   < > 9.6* 8.7* 6.3*   HEMATOCRIT 22.3* 25.8* 22.7* 23.6* 22.3*   < > 31.4* 25.8* 18.8*   PLATELETS 117*  --   --   --  112*  --  117* 105* 118*   NEUTROS ABS 4.81  --   --   --  4.61  --   --  4.70 6.17   IMMATURE GRANS (ABS) 0.02  --   --   --  0.03  --   --  0.02 0.09*   LYMPHS ABS 1.65  --   --   --  1.72  --   --  1.48 0.77   MONOS ABS 0.59  --   --   --  0.60  --   --  0.72 0.46   EOS ABS 0.23  --   --   --  0.15  --   --  0.04 0.01   MCV 86.1  --   --   --  84.5  --  88.5 81.6 76.1*   PROTIME  --   --   --   --   --   --   --   --  19.5*   APTT  --   --   --   --   --   --   --   --  31.4    < > = values in this interval not displayed.         Lab 06/24/25  0526 06/23/25  0614 06/22/25  1753 06/22/25  0518 06/21/25  2101   SODIUM 135* 142  --  142 139   POTASSIUM 4.4 3.5 3.7 3.6 3.7   CHLORIDE 103 103  --  103 100   CO2 22.0 26.0  --  26.0 23.0   ANION GAP 10.0 13.0  --  13.0 16.0*   BUN 75.4* 95.3*  --  108.0* 110.8*   CREATININE 4.97* 5.24*  --  5.89* 5.60*   EGFR 15.4* 14.4*  --  12.5* 13.3*   GLUCOSE 99 81  --  87 121*   CALCIUM 7.6* 8.1*  --  8.0* 7.6*   IONIZED CALCIUM  --   --   --  4.05*  --    MAGNESIUM 1.9  --   --   --  2.0   PHOSPHORUS 3.8  --   --   --  5.5*         Lab 06/24/25  0526 06/21/25 2101   TOTAL PROTEIN 5.0* 4.4*   ALBUMIN  3.0* 2.6*   GLOBULIN 2.0 1.8   ALT (SGPT) 24 42*   AST (SGOT) 26 16   BILIRUBIN 0.8 2.2*   ALK PHOS 58 70   LIPASE  --  35         Lab 06/21/25  2101   PROTIME 19.5*   INR 1.55*             Lab 06/23/25  1246 06/21/25  2101   IRON 19*  --    IRON SATURATION (TSAT) 6*  --    TIBC 310  --    TRANSFERRIN 208  --    FERRITIN 67.52  --    ABO TYPING  --  AB   RH TYPING  --  Positive   ANTIBODY SCREEN  --  Negative         Brief Urine Lab Results  (Last result in the past 365 days)        Color   Clarity   Blood   Leuk Est   Nitrite   Protein   CREAT   Urine HCG        06/23/25 0059             72.8               Microbiology Results (last 10 days)       Procedure Component Value - Date/Time    Eosinophil Smear - Urine, Urine, Clean Catch [[id number]]  (Abnormal) Collected: 06/23/25 0059    Lab Status: Final result Specimen: Urine, Clean Catch Updated: 06/23/25 1414     Eosinophil Smear 2 % EOS/100 Cells             Hospital Course:   Patient is a 30-year-old male with a history of low EF CHF, hypertension, CKD 4, seizure disorder, cocaine abuse was transferred from [facility] on 6/21 for upper GI bleed.  Reportedly had not been feeling well for several days and then began experiencing nonbloody emesis followed by multiple episodes of bloody emesis.  Reportedly at outside facility had a large volume of hematemesis.  Hemoglobin was 5.2.  He was given Protonix bolus and started on octreotide drip.  He was transferred here to our ICU.  In total in the first day he received 3 units of PRBCs and 1 FFP.  GI was consulted and took the patient for EGD where they found him to have a gastric ulcer which was injected and clipped.  He also had some erythematous mucosa and gastritis.  He was continued on twice daily PPI.  Sent back to floor.  Monitored with slight drift in H&H overnight.  Transferred out to the floor where he continued be on room air and seem overall fairly stable.  H&H was continuing to drift.  His renal  "functions also in a dialysis range failure with a GFR around 14-15.  Nephrology recommended the patient to start on dialysis.  He was agreeable.  This a.m. his hemoglobin was back down to 7.  No signs of bleeding overnight.  1 unit PRBC was ordered and I went to see the patient.  I noted vascular had seen him and planned for placing dialysis catheter.  However on my way to see the patient his nurse grabbed me and said the patient wanted leave against medical vice.  I went to the room and evaluated the patient.  Discussed care with him.  He is currently stable.  On room air.  No acute complaints.  Denies dizziness or chest pain.  Again no bleeding seen.  We discussed his need for transfusion today and then dialysis catheter to initiate dialysis.  He tells me he understands all that but is just done with being at the hospital and wants to go home.  I explained to him the risk of bodily harm and death.  He says he understands.  He tells me the first thing you can do was call his heart and kidney doctors for follow-ups after discharge.  I explained to him that this ends essentially in 1 of 3 ways.  He gets out of here and calls the specialist and they tell him to come back to the hospital.  He starts to feel bad and comes back to the hospital on his own. Or he goes home, dies and never makes it back to the hospital.  I said either way his options are essentially come back to the hospital or die and he says he understands and regardless wants to leave.  Patient is alert and oriented.  I have no right to detain him at this time against his will.  He is not suicidal.  Patient signed AMA paper and left against advice.      Physical Exam on Discharge:  /86 (BP Location: Left arm, Patient Position: Lying)   Pulse 94   Temp 97.9 °F (36.6 °C) (Oral)   Resp 16   Ht 188 cm (74\")   Wt 84.7 kg (186 lb 12.8 oz)   SpO2 100%   BMI 23.98 kg/m²   Physical Exam  GEN: Awake, alert, interactive, in NAD  HEENT:  JORDAN PETTY, " Anicteric, Trachea midline  Lungs: no wheezing/rales/rhonchi  Heart: RRR, +S1/s2, no rub  ABD: soft, nt/nd, +BS, no guarding/rebound  Extremities: atraumatic, no cyanosis, no pitting edema  Neuro: AAOx3, no focal deficits  Psych: normal mood & affect    Condition on AMA: stable    Discharge Disposition:  AMA    This patient has current or prior documentation of an left ventricular ejection fraction (LVEF) of less than or equal to 40%.    This patient is not prescribed or taking ACE/ARB due to renal disease    The patient was prescribed already taking bisoprolol, carvedilol, or sustained release metoprolol succinate.       Test Results Pending at Discharge: none    Electronically signed by Jose De Oliveira DO, 06/24/25, 09:43 CDT.    Time: 32 minutes.           Electronically signed by Jose De Oliveira DO at 06/24/25 1250       Discharge Order (From admission, onward)      None

## 2025-06-24 NOTE — DISCHARGE SUMMARY
[facility] Medicine Services  AMA SUMMARY       Date of Admission: 6/21/2025  Date of AMA:  6/24/2025  Primary Care Physician: He Ortega MD    Presenting Problem/History of Present Illness:  hematemesis    Final AMA Diagnoses:  Active Hospital Problems    Diagnosis     **Upper gastrointestinal bleed     Acute on chronic anemia     Cocaine abuse     Medically noncompliant     Chronic systolic (congestive) heart failure     NICM (nonischemic cardiomyopathy)     Essential hypertension        Consults:   Dr. Miranda, GI  Dr. Hammer, nephrology  Dr. Posey, vascular surgery  Dr. Gonsales, icu    Procedures Performed:   EGD on 6/22    Pertinent Test Results:     Imaging Results (All)       None          LAB RESULTS:      Lab 06/24/25  0658 06/23/25  2310 06/23/25  1834 06/23/25  1246 06/23/25  0614 06/22/25  1752 06/22/25  1220 06/22/25  0518 06/21/25  2101   WBC 7.33  --   --   --  7.16  --  8.58 6.98 7.53   HEMOGLOBIN 7.0* 7.9* 7.1* 7.3* 7.3*   < > 9.6* 8.7* 6.3*   HEMATOCRIT 22.3* 25.8* 22.7* 23.6* 22.3*   < > 31.4* 25.8* 18.8*   PLATELETS 117*  --   --   --  112*  --  117* 105* 118*   NEUTROS ABS 4.81  --   --   --  4.61  --   --  4.70 6.17   IMMATURE GRANS (ABS) 0.02  --   --   --  0.03  --   --  0.02 0.09*   LYMPHS ABS 1.65  --   --   --  1.72  --   --  1.48 0.77   MONOS ABS 0.59  --   --   --  0.60  --   --  0.72 0.46   EOS ABS 0.23  --   --   --  0.15  --   --  0.04 0.01   MCV 86.1  --   --   --  84.5  --  88.5 81.6 76.1*   PROTIME  --   --   --   --   --   --   --   --  19.5*   APTT  --   --   --   --   --   --   --   --  31.4    < > = values in this interval not displayed.         Lab 06/24/25  0526 06/23/25  0614 06/22/25  1753 06/22/25  0518 06/21/25  2101   SODIUM 135* 142  --  142 139   POTASSIUM 4.4 3.5 3.7 3.6 3.7   CHLORIDE 103 103  --  103 100   CO2 22.0 26.0  --  26.0 23.0   ANION GAP 10.0 13.0  --  13.0 16.0*   BUN 75.4* 95.3*  --  108.0* 110.8*   CREATININE  4.97* 5.24*  --  5.89* 5.60*   EGFR 15.4* 14.4*  --  12.5* 13.3*   GLUCOSE 99 81  --  87 121*   CALCIUM 7.6* 8.1*  --  8.0* 7.6*   IONIZED CALCIUM  --   --   --  4.05*  --    MAGNESIUM 1.9  --   --   --  2.0   PHOSPHORUS 3.8  --   --   --  5.5*         Lab 06/24/25  0526 06/21/25  2101   TOTAL PROTEIN 5.0* 4.4*   ALBUMIN 3.0* 2.6*   GLOBULIN 2.0 1.8   ALT (SGPT) 24 42*   AST (SGOT) 26 16   BILIRUBIN 0.8 2.2*   ALK PHOS 58 70   LIPASE  --  35         Lab 06/21/25  2101   PROTIME 19.5*   INR 1.55*             Lab 06/23/25  1246 06/21/25  2101   IRON 19*  --    IRON SATURATION (TSAT) 6*  --    TIBC 310  --    TRANSFERRIN 208  --    FERRITIN 67.52  --    ABO TYPING  --  AB   RH TYPING  --  Positive   ANTIBODY SCREEN  --  Negative         Brief Urine Lab Results  (Last result in the past 365 days)        Color   Clarity   Blood   Leuk Est   Nitrite   Protein   CREAT   Urine HCG        06/23/25 0059             72.8               Microbiology Results (last 10 days)       Procedure Component Value - Date/Time    Eosinophil Smear - Urine, Urine, Clean Catch [[id number]]  (Abnormal) Collected: 06/23/25 0059    Lab Status: Final result Specimen: Urine, Clean Catch Updated: 06/23/25 1414     Eosinophil Smear 2 % EOS/100 Cells             Hospital Course:   Patient is a 30-year-old male with a history of low EF CHF, hypertension, CKD 4, seizure disorder, cocaine abuse was transferred from [facility] on 6/21 for upper GI bleed.  Reportedly had not been feeling well for several days and then began experiencing nonbloody emesis followed by multiple episodes of bloody emesis.  Reportedly at outside facility had a large volume of hematemesis.  Hemoglobin was 5.2.  He was given Protonix bolus and started on octreotide drip.  He was transferred here to our ICU.  In total in the first day he received 3 units of PRBCs and 1 FFP.  GI was consulted and took the patient for EGD where they found him to have a gastric ulcer  which was injected and clipped.  He also had some erythematous mucosa and gastritis.  He was continued on twice daily PPI.  Sent back to floor.  Monitored with slight drift in H&H overnight.  Transferred out to the floor where he continued be on room air and seem overall fairly stable.  H&H was continuing to drift.  His renal functions also in a dialysis range failure with a GFR around 14-15.  Nephrology recommended the patient to start on dialysis.  He was agreeable.  This a.m. his hemoglobin was back down to 7.  No signs of bleeding overnight.  1 unit PRBC was ordered and I went to see the patient.  I noted vascular had seen him and planned for placing dialysis catheter.  However on my way to see the patient his nurse grabbed me and said the patient wanted leave against medical vice.  I went to the room and evaluated the patient.  Discussed care with him.  He is currently stable.  On room air.  No acute complaints.  Denies dizziness or chest pain.  Again no bleeding seen.  We discussed his need for transfusion today and then dialysis catheter to initiate dialysis.  He tells me he understands all that but is just done with being at the hospital and wants to go home.  I explained to him the risk of bodily harm and death.  He says he understands.  He tells me the first thing you can do was call his heart and kidney doctors for follow-ups after discharge.  I explained to him that this ends essentially in 1 of 3 ways.  He gets out of here and calls the specialist and they tell him to come back to the hospital.  He starts to feel bad and comes back to the hospital on his own. Or he goes home, dies and never makes it back to the hospital.  I said either way his options are essentially come back to the hospital or die and he says he understands and regardless wants to leave.  Patient is alert and oriented.  I have no right to detain him at this time against his will.  He is not suicidal.  Patient signed AMA paper and left  "against advice.      Physical Exam on Discharge:  /86 (BP Location: Left arm, Patient Position: Lying)   Pulse 94   Temp 97.9 °F (36.6 °C) (Oral)   Resp 16   Ht 188 cm (74\")   Wt 84.7 kg (186 lb 12.8 oz)   SpO2 100%   BMI 23.98 kg/m²   Physical Exam  GEN: Awake, alert, interactive, in NAD  HEENT:  PERRLA, EOMI, Anicteric, Trachea midline  Lungs: no wheezing/rales/rhonchi  Heart: RRR, +S1/s2, no rub  ABD: soft, nt/nd, +BS, no guarding/rebound  Extremities: atraumatic, no cyanosis, no pitting edema  Neuro: AAOx3, no focal deficits  Psych: normal mood & affect    Condition on AMA: stable    Discharge Disposition:  AMA    This patient has current or prior documentation of an left ventricular ejection fraction (LVEF) of less than or equal to 40%.    This patient is not prescribed or taking ACE/ARB due to renal disease    The patient was prescribed already taking bisoprolol, carvedilol, or sustained release metoprolol succinate.       Test Results Pending at Discharge: none    Electronically signed by Jose De Oliveira DO, 06/24/25, 09:43 CDT.    Time: 32 minutes.         "

## 2025-06-24 NOTE — PROGRESS NOTES
Nephrology ([facility]) Progress Note      Patient:  José Miguel Webb  YOB: 1995  Date of Service: 6/24/2025  MRN: [id number]   Acct: [id number]   Primary Care Physician: He Ortega MD  Advance Directive:   Code Status and Medical Interventions: CPR (Attempt to Resuscitate); Full Support   Ordered at: 06/21/25 2017     Code Status (Patient has no pulse and is not breathing):    CPR (Attempt to Resuscitate)     Medical Interventions (Patient has pulse or is breathing):    Full Support     Level Of Support Discussed With:    Patient     Admit Date: 6/21/2025       Hospital Day: 3  Referring Provider: Kevin Miranda MD      Patient personally seen and examined.  Complete chart including Consults, Notes, Operative Reports, Labs, Cardiology, and Radiology studies reviewed as able.        Subjective:  José Miguel Webb is a 30 y.o. male for whom we were consulted for evaluation and treatment of acute kidney injury. Baseline chronic kidney disease stage 4, baseline creatinine approximately 4.0. Has followed with our providers on a sporadic basis over the years, typically no-shows for his office visits. Last nephrology contact with patient was during inpatient admission in November 2024. History of hypertension, CHF, seizure disorder. Presented to [facility] on 6/21 with hematemesis. Hemoglobin was 5.2 and he was given PRBC and FFP transfusion. Creatinine reportedly 6.49. Transferred to [facility] for GI evaluation. Required additional PRBC after arrival to [facility]. Had EGD on 6/22 which showed gastric ulcer. Hemoglobin had improved to 9.6 after three units PRBC but back down to 7.3 on 6/23.  Denied n/v/d at time of initial nephrology exam. No changes in urination, no dysuria or hematuria. Urine output nonoliguric. No NSAID use.     Today is awake and alert. No complaint. Urine output nonoliguric. Renal function improving. Hemoglobin 7.0 on AM  labs    Allergies:  Penicillins    Home Meds:  Medications Prior to Admission   Medication Sig Dispense Refill Last Dose/Taking    aspirin 81 MG EC tablet Take 1 tablet by mouth Daily. 30 tablet 0 Past Week    furosemide (LASIX) 40 MG tablet Take 1 tablet by mouth Daily.   Taking    hydrALAZINE (APRESOLINE) 50 MG tablet Take 2 tablets by mouth 3 (Three) Times a Day.   Taking    levETIRAcetam (KEPPRA) 500 MG tablet Take 1 tablet by mouth 2 (Two) Times a Day.   Past Week    metoprolol succinate XL (TOPROL-XL) 100 MG 24 hr tablet Take 1 tablet by mouth Daily. 90 tablet 3 Past Month    Potassium 99 MG tablet Take 1 tablet by mouth Daily.   Taking    sacubitril-valsartan (Entresto) 49-51 MG tablet Take 1 tablet by mouth 2 (Two) Times a Day. 180 tablet 3 Past Week       Medicines:  Current Facility-Administered Medications   Medication Dose Route Frequency Provider Last Rate Last Admin    acetaminophen (TYLENOL) tablet 650 mg  650 mg Oral Q4H PRN Tacho Suarez PA-C        Or    acetaminophen (TYLENOL) suppository 650 mg  650 mg Rectal Q4H PRN Tacho Suarez PA-C        allopurinol (ZYLOPRIM) tablet 200 mg  200 mg Oral Daily Durga Hammer MD   200 mg at 06/24/25 0817    calcitriol (ROCALTROL) capsule 0.25 mcg  0.25 mcg Oral Daily Durga Hammer MD   0.25 mcg at 06/24/25 0817    hydrALAZINE (APRESOLINE) injection 20 mg  20 mg Intravenous Q6H PRN Wily Rios APRN   20 mg at 06/22/25 1704    hydrALAZINE (APRESOLINE) tablet 25 mg  25 mg Oral Q8H Jose De Oliveira DO   25 mg at 06/24/25 0618    hyoscyamine (LEVBID) 12 hr tablet 375 mcg  375 mcg Oral Once PRN Lesa Gonzalez MD        levETIRAcetam (KEPPRA) injection 500 mg  500 mg Intravenous Q12H Tacho Suarez PA-C   500 mg at 06/24/25 0826    metoprolol succinate XL (TOPROL-XL) 24 hr tablet 25 mg  25 mg Oral Q24H Jose De Oliveira DO   25 mg at 06/24/25 0817    mupirocin (BACTROBAN) 2 % nasal ointment 1 Application  1 Application  Each Nare BID Tacho Suarez PA-C   1 Application at 06/24/25 0817    ondansetron (ZOFRAN) injection 4 mg  4 mg Intravenous Q6H PRN Tacho Suarez PA-C        pantoprazole (PROTONIX) injection 40 mg  40 mg Intravenous Q12H Kevin Miranda MD   40 mg at 06/24/25 0824    [Held by provider] sacubitril-valsartan (ENTRESTO) 49-51 MG tablet 1 tablet  1 tablet Oral BID Wily Rios APRN   1 tablet at 06/23/25 0951    sodium chloride 0.9 % flush 10 mL  10 mL Intravenous Q12H Tacho Suarez PA-C   10 mL at 06/23/25 0951    sodium chloride 0.9 % flush 10 mL  10 mL Intravenous PRN Tacho Suarez PA-C        sodium chloride 0.9 % infusion 40 mL  40 mL Intravenous PRN Tacho Suarez PA-C           Past Medical History:  Past Medical History:   Diagnosis Date    Cardiomegaly     CHF (congestive heart failure)     Congenital fusion of carpal bone     HTN (hypertension)     Seizure        Past Surgical History:  Past Surgical History:   Procedure Laterality Date    ELBOW PROCEDURE      ENDOSCOPY N/A 6/22/2025    Procedure: ESOPHAGOGASTRODUODENOSCOPY;  Surgeon: Kevin Miranda MD;  Location: [facility];  Service: Gastroenterology;  Laterality: N/A;  preop; GI bleed   postop; gastric ulcer ; esophagitis       Family History  Family History   Problem Relation Age of Onset    Hypertension Mother     Heart disease Mother     No Known Problems Father        Social History  Social History     Socioeconomic History    Marital status: Single   Tobacco Use    Smoking status: Some Days     Current packs/day: 0.50     Types: Cigarettes    Smokeless tobacco: Never   Vaping Use    Vaping status: Never Used   Substance and Sexual Activity    Alcohol use: Yes    Drug use: Yes     Types: Marijuana, Cocaine(coke)    Sexual activity: Defer       Review of Systems:  History obtained from chart review and the patient  General ROS: No fever or chills  Respiratory ROS: No cough, shortness of breath,  wheezing  Cardiovascular ROS: No chest pain or palpitations  Gastrointestinal ROS: No abdominal pain or melena  Genito-Urinary ROS: No dysuria or hematuria  Psych ROS: No anxiety and depression  14 point ROS reviewed with the patient and negative except as noted above and in the HPI unless unable to obtain.    Objective:  Patient Vitals for the past 24 hrs:   BP Temp Temp src Pulse Resp SpO2 Weight   06/24/25 0803 143/86 97.9 °F (36.6 °C) Oral 94 16 100 % --   06/24/25 0650 -- -- -- -- -- -- 84.7 kg (186 lb 12.8 oz)   06/24/25 0618 141/85 -- -- 83 -- -- --   06/24/25 0310 144/90 98.1 °F (36.7 °C) Oral 90 16 98 % --   06/23/25 2252 140/89 98 °F (36.7 °C) Oral 86 16 98 % --   06/23/25 1939 135/79 98.1 °F (36.7 °C) Oral 81 16 100 % --   06/23/25 1429 141/79 98.9 °F (37.2 °C) Oral 85 16 100 % --   06/23/25 1023 144/90 98.4 °F (36.9 °C) Oral 74 16 100 % --       Intake/Output Summary (Last 24 hours) at 6/24/2025 1015  Last data filed at 6/24/2025 0620  Gross per 24 hour   Intake 600 ml   Output 650 ml   Net -50 ml     General: awake/alert   Chest:  clear to auscultation bilaterally without respiratory distress  CVS: regular rate and rhythm  Abdominal: soft, nontender, positive bowel sounds  Extremities: no cyanosis or edema  Skin: warm and dry without rash      Labs:  Results from last 7 days   Lab Units 06/24/25  0658 06/23/25  2310 06/23/25  1834 06/23/25  1246 06/23/25  0614 06/22/25  1752 06/22/25  1220   WBC 10*3/mm3 7.33  --   --   --  7.16  --  8.58   HEMOGLOBIN g/dL 7.0* 7.9* 7.1*   < > 7.3*   < > 9.6*   HEMATOCRIT % 22.3* 25.8* 22.7*   < > 22.3*   < > 31.4*   PLATELETS 10*3/mm3 117*  --   --   --  112*  --  117*    < > = values in this interval not displayed.         Results from last 7 days   Lab Units 06/24/25  0526 06/23/25  0614 06/22/25  1753 06/22/25  0518 06/21/25  2101   SODIUM mmol/L 135* 142  --  142 139   POTASSIUM mmol/L 4.4 3.5 3.7 3.6 3.7   CHLORIDE mmol/L 103 103  --  103 100   CO2 mmol/L 22.0  "26.0  --  26.0 23.0   BUN mg/dL 75.4* 95.3*  --  108.0* 110.8*   CREATININE mg/dL 4.97* 5.24*  --  5.89* 5.60*   CALCIUM mg/dL 7.6* 8.1*  --  8.0* 7.6*   EGFR mL/min/1.73 15.4* 14.4*  --  12.5* 13.3*   BILIRUBIN mg/dL 0.8  --   --   --  2.2*   ALK PHOS U/L 58  --   --   --  70   ALT (SGPT) U/L 24  --   --   --  42*   AST (SGOT) U/L 26  --   --   --  16   GLUCOSE mg/dL 99 81  --  87 121*       Radiology:   Imaging Results (Last 72 Hours)       ** No results found for the last 72 hours. **            Culture:  No results found for: \"BLOODCX\", \"URINECX\", \"WOUNDCX\", \"MRSACX\", \"RESPCX\", \"STOOLCX\"      Assessment    Acute kidney injury--improving  Baseline chronic kidney disease stage 4  Hypertension  Seizure disorder  GI bleed--s/p EGD on 6/22  Anemia of CKD and acute GI blood loss--s/p multiple unit PRBC    Plan:   Patient has agreed to start dialysis  Vascular surgery has evaluated patient and are planning to place permcat on 6/25      Dario Fernandez, APRN  6/24/2025  10:15 CDT    "

## 2025-06-24 NOTE — PLAN OF CARE
Goal Outcome Evaluation:  Plan of Care Reviewed With: patient        Progress: no change  Outcome Evaluation: Pt resting well after midnight. No c/o pain. IVF infusing. Up ad charli, ambulating in hallways. Voiding. Poor understanding of situation at hand. Safety maintained.

## 2025-06-24 NOTE — NURSING NOTE
Patient requesting to leave AMA. Dr. De Oliveira spoke with patient. Paperwork signed and IV removed. Charge nurse and House Supervisor aware. Patient requesting a ride, but unable to set up transport for patient when going AMA. Patient directed to main lobby to wait on possible ride from family/friend.

## 2025-06-24 NOTE — PROGRESS NOTES
[facility]  Inpatient Progress Note  Today's date:  06/24/25    José Miguel Webb  1995       Reason for Follow Up:   GI bleed    Subjective:   No further overt bleeding.  Patient leaving hospital AMA.      Current Facility-Administered Medications:     acetaminophen (TYLENOL) tablet 650 mg, 650 mg, Oral, Q4H PRN **OR** acetaminophen (TYLENOL) suppository 650 mg, 650 mg, Rectal, Q4H PRN, Tacho Suarez PA-C    allopurinol (ZYLOPRIM) tablet 200 mg, 200 mg, Oral, Daily, Durga Hammer MD, 200 mg at 06/24/25 0817    calcitriol (ROCALTROL) capsule 0.25 mcg, 0.25 mcg, Oral, Daily, Durga Hammer MD, 0.25 mcg at 06/24/25 0817    hydrALAZINE (APRESOLINE) injection 20 mg, 20 mg, Intravenous, Q6H PRN, Wily Rios APRN, 20 mg at 06/22/25 1704    hydrALAZINE (APRESOLINE) tablet 25 mg, 25 mg, Oral, Q8H, Jose De Oliveira DO, 25 mg at 06/24/25 0618    hyoscyamine (LEVBID) 12 hr tablet 375 mcg, 375 mcg, Oral, Once PRN, Lesa Gonzalez MD    levETIRAcetam (KEPPRA) injection 500 mg, 500 mg, Intravenous, Q12H, Tacho Suarez PA-C, 500 mg at 06/24/25 0826    metoprolol succinate XL (TOPROL-XL) 24 hr tablet 25 mg, 25 mg, Oral, Q24H, Jose De Oliveira DO, 25 mg at 06/24/25 0817    mupirocin (BACTROBAN) 2 % nasal ointment 1 Application, 1 Application, Each Nare, BID, Tacho Suarez PA-C, 1 Application at 06/24/25 0817    ondansetron (ZOFRAN) injection 4 mg, 4 mg, Intravenous, Q6H PRN, Tacho Suarez PA-C    pantoprazole (PROTONIX) injection 40 mg, 40 mg, Intravenous, Q12H, Kevin Miranda MD, 40 mg at 06/24/25 0824    [Held by provider] sacubitril-valsartan (ENTRESTO) 49-51 MG tablet 1 tablet, 1 tablet, Oral, BID, Wily Rios, APRN, 1 tablet at 06/23/25 0951    sodium chloride 0.9 % flush 10 mL, 10 mL, Intravenous, Q12H, Tacho Suarez PA-C, 10 mL at 06/23/25 0951    sodium chloride 0.9 % flush 10 mL, 10 mL, Intravenous, PRN, Tacho Suarez  SHAHIDA    sodium chloride 0.9 % infusion 40 mL, 40 mL, Intravenous, PRN, Tacho Suarez PA-C      Vital Signs:  Temp:  [97.9 °F (36.6 °C)-98.9 °F (37.2 °C)] 97.9 °F (36.6 °C)  Heart Rate:  [74-94] 94  Resp:  [16] 16  BP: (135-144)/(79-90) 143/86    Physical Exam:  General: no acute distress, alert and oriented  HEENT: perrl, no sclera icterus  CV: rrr, no murmur  Resp: lungs clear to auscultation, no wheeze or rhonchi  Abd: soft, nontender, nondistended, no rebound our guarding  Skin: no rash, no jaundice     Results Review:   I have reviewed all of the patient's current test results    Results from last 7 days   Lab Units 06/24/25  0658 06/23/25  2310 06/23/25  1834 06/23/25  1246 06/23/25  0614 06/22/25  1752 06/22/25  1220   WBC 10*3/mm3 7.33  --   --   --  7.16  --  8.58   HEMOGLOBIN g/dL 7.0* 7.9* 7.1*   < > 7.3*   < > 9.6*   HEMATOCRIT % 22.3* 25.8* 22.7*   < > 22.3*   < > 31.4*   PLATELETS 10*3/mm3 117*  --   --   --  112*  --  117*    < > = values in this interval not displayed.       Results from last 7 days   Lab Units 06/24/25  0526 06/23/25  0614 06/22/25  1753 06/22/25  0518 06/21/25  2101   SODIUM mmol/L 135* 142  --  142 139   POTASSIUM mmol/L 4.4 3.5 3.7 3.6 3.7   CHLORIDE mmol/L 103 103  --  103 100   CO2 mmol/L 22.0 26.0  --  26.0 23.0   BUN mg/dL 75.4* 95.3*  --  108.0* 110.8*   CREATININE mg/dL 4.97* 5.24*  --  5.89* 5.60*   CALCIUM mg/dL 7.6* 8.1*  --  8.0* 7.6*   BILIRUBIN mg/dL 0.8  --   --   --  2.2*   ALK PHOS U/L 58  --   --   --  70   ALT (SGPT) U/L 24  --   --   --  42*   AST (SGOT) U/L 26  --   --   --  16   GLUCOSE mg/dL 99 81  --  87 121*       Results from last 7 days   Lab Units 06/21/25  2101   INR  1.55*       Impression:  -Upper GI bleed secondary to gastric ulcer. EGD 6/22 with medium sized ulcer in the gastric fundus, s/p clip x 4 and epinephrine injection.    -Anemia, secondary to GI bleed, in setting of chronic kidney disease. On Plavix.     Plan:  -Recommend continuing  PPI twice daily.    -Nothing further to add from GI at this time.    Kevin Miranda MD  06/24/25  09:31 CDT

## 2025-06-24 NOTE — CONSULTS
José Miguel Webb  [id number]  [id number]  365/1  Jose De Oliveira, DO  6/21/2025    Reason for consultation: permcath placement      HPI: José Miguel Webb is a 30 y.o. male who presented from outside facility on 621 with hematemesis.  His hemoglobin was 5.2 and creatinine reportedly 6.49 at that time.  Transferred to [facility] for GI evaluation.  He did have an EGD showing a gastric ulcer.  Kidney function has not significantly improved with IV fluids.  Hemoglobin down to 7 today from 7.9 yesterday.  We have been consulted for PermCath placement to begin dialysis.    Past Medical History:   Diagnosis Date    Cardiomegaly     CHF (congestive heart failure)     Congenital fusion of carpal bone     HTN (hypertension)     Seizure        Past Surgical History:   Procedure Laterality Date    ELBOW PROCEDURE      ENDOSCOPY N/A 6/22/2025    Procedure: ESOPHAGOGASTRODUODENOSCOPY;  Surgeon: Kevin Miranda MD;  Location: [facility];  Service: Gastroenterology;  Laterality: N/A;  preop; GI bleed   postop; gastric ulcer ; esophagitis       Family History   Problem Relation Age of Onset    Hypertension Mother     Heart disease Mother     No Known Problems Father        Social History     Socioeconomic History    Marital status: Single   Tobacco Use    Smoking status: Some Days     Current packs/day: 0.50     Types: Cigarettes    Smokeless tobacco: Never   Vaping Use    Vaping status: Never Used   Substance and Sexual Activity    Alcohol use: Yes    Drug use: Yes     Types: Marijuana, Cocaine(coke)    Sexual activity: Defer       Allergies   Allergen Reactions    Penicillins Anaphylaxis       Hospital Medications (active)         Dose Frequency Start End    acetaminophen (TYLENOL) suppository 650 mg 650 mg Every 4 Hours PRN 6/21/2025 --    Admin Instructions: If given for fever, use fever parameter: fever greater than 100.4 °F  Based on patient request - if ordered for moderate or severe pain, provider allows for administration of a  "medication prescribed for a lower pain scale.    Do not exceed 4 grams of acetaminophen in a 24 hr period. Max dose of 2gm for AST/ALT greater than 120 units/L.    If given for pain, use the following pain scale:   Mild Pain = Pain Score of 1-3, CPOT 1-2  Moderate Pain = Pain Score of 4-6, CPOT 3-4  Severe Pain = Pain Score of 7-10, CPOT 5-8    Route: Rectal    Cosign for Ordering: Accepted by Gopi Gonsales MD on 6/22/2025  6:19 AM    Linked Group 1: Placed in \"Or\" Linked Group        acetaminophen (TYLENOL) tablet 650 mg 650 mg Every 4 Hours PRN 6/21/2025 --    Admin Instructions: If given for fever, use fever parameter: fever greater than 100.4 °F  Based on patient request - if ordered for moderate or severe pain, provider allows for administration of a medication prescribed for a lower pain scale.    Do not exceed 4 grams of acetaminophen in a 24 hr period. Max dose of 2gm for AST/ALT greater than 120 units/L.    If given for pain, use the following pain scale:   Mild Pain = Pain Score of 1-3, CPOT 1-2  Moderate Pain = Pain Score of 4-6, CPOT 3-4  Severe Pain = Pain Score of 7-10, CPOT 5-8    Route: Oral    Cosign for Ordering: Accepted by Gopi Gonsales MD on 6/22/2025  6:19 AM    Linked Group 1: Placed in \"Or\" Linked Group        allopurinol (ZYLOPRIM) tablet 200 mg 200 mg Daily 6/23/2025 --    Admin Instructions: ([facility]) Take with food if GI upset occurs.    Route: Oral    calcitriol (ROCALTROL) capsule 0.25 mcg 0.25 mcg Daily 6/23/2025 --    Route: Oral    hydrALAZINE (APRESOLINE) injection 20 mg 20 mg Every 6 Hours PRN 6/22/2025 --    Admin Instructions: Hold for SBP less than 100, DBP less than 60.  Caution: Look alike/sound alike drug alert    Route: Intravenous    hydrALAZINE (APRESOLINE) tablet 25 mg 25 mg Every 8 Hours Scheduled 6/23/2025 --    Admin Instructions: Hold for SBP less than 100, DBP less than 60.  Caution: Look alike/sound alike drug alert    Route: Oral    hyoscyamine (LEVBID) 12 hr " tablet 375 mcg 375 mcg Once As Needed 6/24/2025 --    Admin Instructions: Do not crush or chew the capsules or tablets. The drug may not work as designed if the capsule or tablet is crushed or chewed. Swallow whole.    Route: Oral    levETIRAcetam (KEPPRA) injection 500 mg 500 mg Every 12 Hours Scheduled 6/21/2025 --    Admin Instructions: Caution: Look alike/sound alike drug alert.    *When giving as IV push: Do not exceed 1000 mg/min.*    Route: Intravenous    Cosign for Ordering: Accepted by Gopi Gonsales MD on 6/22/2025  6:19 AM    metoprolol succinate XL (TOPROL-XL) 24 hr tablet 25 mg 25 mg Every 24 Hours Scheduled 6/23/2025 --    Admin Instructions: Hold for SBP less than 100, DBP less than 60, or heart rate less than 50    Do not crush or chew the capsules or tablets. The drug may not work as designed if the capsule or tablet is crushed or chewed. Swallow whole.  Do not crush or chew.    Route: Oral    mupirocin (BACTROBAN) 2 % nasal ointment 1 Application 1 Application 2 Times Daily 6/21/2025 6/26/2025    Admin Instructions: Begin on day 1 of ICU admission and administer for 5 days, even if patient transferred out of critical care.    MUPIROCIN APPLICATION:  1. Place patient's bed at 30 degrees, if tolerated.  2. Wash your hands with warm soapy water or use hand  .  3. Open the tube of mupirocin 2%.  4. Squeeze about 0.5 g (blueberry-size) of mupirocin from the  tube onto a sterile applicator or a cotton swab.  5. Apply the swab directly into nostril. Ensure coating of the  sides of the nostril.  6. Repeat with second sterile applicator for other nostril.  7. Gently press the sides of the nostrils together and massage  gently for 60 seconds.    ([facility])    Route: Each Nare    Cosign for Ordering: Accepted by Gopi Gonsales MD on 6/22/2025  6:19 AM    ondansetron (ZOFRAN) injection 4 mg 4 mg Every 6 Hours PRN 6/21/2025 --    Admin Instructions: If BOTH ondansetron (ZOFRAN) & promethazine  (PHENERGAN) Ordered, Use ondansetron First & THEN promethazine IF ondansetron Ineffective.    Route: Intravenous    Cosign for Ordering: Accepted by Gopi Gonsales MD on 6/22/2025  6:19 AM    pantoprazole (PROTONIX) injection 40 mg 40 mg Every 12 Hours Scheduled 6/22/2025 --    Admin Instructions: Dilute with 10 mL of 0.9% NaCl and give IV push over 2 minutes.    Route: Intravenous    Cosign for Ordering: Accepted by Gopi Gonsales MD on 6/22/2025  6:19 AM    sacubitril-valsartan (ENTRESTO) 49-51 MG tablet 1 tablet ([Held by provider] since 6/23/2025 12:42 PM) 1 tablet 2 Times Daily 6/22/2025 --    Route: Oral    sodium chloride 0.9 % flush 10 mL 10 mL Every 12 Hours Scheduled 6/21/2025 --    Route: Intravenous    Cosign for Ordering: Accepted by Gopi Gonsales MD on 6/22/2025  6:19 AM    sodium chloride 0.9 % flush 10 mL 10 mL As Needed 6/21/2025 --    Route: Intravenous    Cosign for Ordering: Accepted by Gopi Gonsales MD on 6/22/2025  6:19 AM    sodium chloride 0.9 % infusion 40 mL 40 mL As Needed 6/21/2025 --    Admin Instructions: Following administration of an IV intermittent medication, flush line with 40mL NS at 100mL/hr.    Route: Intravenous    Cosign for Ordering: Accepted by Gopi Gonsales MD on 6/22/2025  6:19 AM    sodium chloride 0.9 % infusion 75 mL/hr Continuous 6/23/2025 6/24/2025    Route: Intravenous            Review of Systems   Constitutional: Negative.    HENT: Negative.     Eyes: Negative.    Respiratory: Negative.     Cardiovascular: Negative.    Gastrointestinal: Negative.    Endocrine: Negative.    Genitourinary: Negative.    Musculoskeletal: Negative.    Skin: Negative.    Allergic/Immunologic: Negative.    Neurological: Negative.    Hematological: Negative.    Psychiatric/Behavioral: Negative.     All other systems reviewed and are negative.      /85 (BP Location: Left arm, Patient Position: Lying)   Pulse 83   Temp 98.1 °F (36.7 °C) (Oral)   Resp 16   Ht 188 cm  "(74\")   Wt 84.7 kg (186 lb 12.8 oz)   SpO2 98%   BMI 23.98 kg/m²    Physical Exam  Vitals and nursing note reviewed.   Constitutional:       Appearance: He is well-developed.   HENT:      Head: Normocephalic and atraumatic.   Eyes:      General: No scleral icterus.     Pupils: Pupils are equal, round, and reactive to light.   Neck:      Thyroid: No thyromegaly.   Cardiovascular:      Rate and Rhythm: Normal rate and regular rhythm.      Heart sounds: Normal heart sounds.   Pulmonary:      Effort: Pulmonary effort is normal.      Breath sounds: Normal breath sounds.   Abdominal:      General: Bowel sounds are normal.      Palpations: Abdomen is soft.   Musculoskeletal:         General: Normal range of motion.      Cervical back: Normal range of motion and neck supple.   Skin:     General: Skin is warm and dry.   Neurological:      Mental Status: He is alert and oriented to person, place, and time.   Psychiatric:         Behavior: Behavior normal.         Thought Content: Thought content normal.         Judgment: Judgment normal.         Laboratory Data:  Results from last 7 days   Lab Units 06/24/25  0658 06/23/25  2310 06/23/25  1834 06/23/25  1246 06/23/25  0614 06/22/25  1752 06/22/25  1220   WBC 10*3/mm3 7.33  --   --   --  7.16  --  8.58   HEMOGLOBIN g/dL 7.0* 7.9* 7.1*   < > 7.3*   < > 9.6*   HEMATOCRIT % 22.3* 25.8* 22.7*   < > 22.3*   < > 31.4*   PLATELETS 10*3/mm3 117*  --   --   --  112*  --  117*    < > = values in this interval not displayed.       Results from last 7 days   Lab Units 06/24/25  0526 06/23/25  0614 06/22/25  1753 06/22/25  0518 06/21/25  2101   SODIUM mmol/L 135* 142  --  142 139   POTASSIUM mmol/L 4.4 3.5 3.7 3.6 3.7   CHLORIDE mmol/L 103 103  --  103 100   CO2 mmol/L 22.0 26.0  --  26.0 23.0   BUN mg/dL 75.4* 95.3*  --  108.0* 110.8*   CREATININE mg/dL 4.97* 5.24*  --  5.89* 5.60*   CALCIUM mg/dL 7.6* 8.1*  --  8.0* 7.6*   BILIRUBIN mg/dL 0.8  --   --   --  2.2*   ALK PHOS U/L 58  --  "  --   --  70   ALT (SGPT) U/L 24  --   --   --  42*   AST (SGOT) U/L 26  --   --   --  16   GLUCOSE mg/dL 99 81  --  87 121*     Results from last 7 days   Lab Units 06/21/25  2101   PROTIME Seconds 19.5*   INR  1.55*   APTT seconds 31.4         Diagnostic Data:  Imaging Results (Last 24 Hours)       ** No results found for the last 24 hours. **            Impression:    Upper gastrointestinal bleed    Essential hypertension    Chronic systolic (congestive) heart failure    NICM (nonischemic cardiomyopathy)    Cocaine abuse    Medically noncompliant    Acute on chronic anemia  Renal failure    Plan: After thoroughly evaluating José Miguel Webb, I believe the best course of action is to proceed with Permcath placement tomorrow with Dr. Posey.  The risks and benefits were explained at great length to the patient which include but are not limited to bleeding, infection, vessel damage, nerve damage and pneumothorax. The patient understands the risks and wishes for me to proceed.     Thank you for allowing me to participate in the care of your patient.  Please do not hesitate to call with any questions or concerns.    Susana Layton, SUKHDEV   Vascular Surgery  [phone]

## 2025-06-25 ENCOUNTER — HOSPITAL ENCOUNTER (INPATIENT)
Facility: HOSPITAL | Age: 30
LOS: 9 days | Discharge: HOME | End: 2025-07-04
Attending: FAMILY MEDICINE | Admitting: HOSPITALIST
Payer: MEDICAID

## 2025-06-25 ENCOUNTER — APPOINTMENT (OUTPATIENT)
Dept: GENERAL RADIOLOGY | Facility: HOSPITAL | Age: 30
End: 2025-06-25
Payer: MEDICAID

## 2025-06-25 DIAGNOSIS — N18.6 END STAGE RENAL DISEASE: ICD-10-CM

## 2025-06-25 DIAGNOSIS — I50.22 CHRONIC SYSTOLIC (CONGESTIVE) HEART FAILURE: ICD-10-CM

## 2025-06-25 DIAGNOSIS — I21.4 NON-STEMI (NON-ST ELEVATED MYOCARDIAL INFARCTION): ICD-10-CM

## 2025-06-25 DIAGNOSIS — I16.0 HYPERTENSIVE URGENCY: ICD-10-CM

## 2025-06-25 DIAGNOSIS — I50.9 OTHER CONGESTIVE HEART FAILURE: Primary | ICD-10-CM

## 2025-06-25 PROBLEM — I16.9 HYPERTENSIVE CRISIS: Status: ACTIVE | Noted: 2024-11-24

## 2025-06-25 LAB
ALBUMIN SERPL-MCNC: 3.3 G/DL (ref 3.5–5.2)
ALBUMIN/GLOB SERPL: 1.2 G/DL
ALP SERPL-CCNC: 71 U/L (ref 39–117)
ALT SERPL W P-5'-P-CCNC: 22 U/L (ref 1–41)
AMPHET+METHAMPHET UR QL: NEGATIVE
AMPHETAMINES UR QL: NEGATIVE
ANION GAP SERPL CALCULATED.3IONS-SCNC: 13 MMOL/L (ref 5–15)
APTT PPP: 32.9 SECONDS (ref 24.5–36)
AST SERPL-CCNC: 23 U/L (ref 1–40)
BACTERIA UR QL AUTO: NORMAL /HPF
BARBITURATES UR QL SCN: NEGATIVE
BASOPHILS # BLD AUTO: 0.03 10*3/MM3 (ref 0–0.2)
BASOPHILS NFR BLD AUTO: 0.4 % (ref 0–1.5)
BENZODIAZ UR QL SCN: NEGATIVE
BH BB BLOOD EXPIRATION DATE: NORMAL
BH BB BLOOD TYPE BARCODE: 8400
BH BB DISPENSE STATUS: NORMAL
BH BB PRODUCT CODE: NORMAL
BH BB UNIT NUMBER: NORMAL
BILIRUB SERPL-MCNC: 0.7 MG/DL (ref 0–1.2)
BILIRUB UR QL STRIP: NEGATIVE
BUN SERPL-MCNC: 64.8 MG/DL (ref 6–20)
BUN/CREAT SERPL: 16.2 (ref 7–25)
BUPRENORPHINE SERPL-MCNC: NEGATIVE NG/ML
CALCIUM SPEC-SCNC: 8.5 MG/DL (ref 8.6–10.5)
CANNABINOIDS SERPL QL: POSITIVE
CHLORIDE SERPL-SCNC: 102 MMOL/L (ref 98–107)
CK SERPL-CCNC: 186 U/L (ref 20–200)
CLARITY UR: CLEAR
CO2 SERPL-SCNC: 23 MMOL/L (ref 22–29)
COCAINE UR QL: POSITIVE
COLOR UR: YELLOW
CREAT SERPL-MCNC: 3.99 MG/DL (ref 0.76–1.27)
CROSSMATCH INTERPRETATION: NORMAL
D-LACTATE SERPL-SCNC: 1.1 MMOL/L (ref 0.5–2)
DEPRECATED RDW RBC AUTO: 56.8 FL (ref 37–54)
EGFRCR SERPLBLD CKD-EPI 2021: 20 ML/MIN/1.73
EOSINOPHIL # BLD AUTO: 0.04 10*3/MM3 (ref 0–0.4)
EOSINOPHIL NFR BLD AUTO: 0.5 % (ref 0.3–6.2)
ERYTHROCYTE [DISTWIDTH] IN BLOOD BY AUTOMATED COUNT: 18.2 % (ref 12.3–15.4)
ETHANOL UR QL: <0.01 %
FENTANYL UR-MCNC: NEGATIVE NG/ML
GEN 5 1HR TROPONIN T REFLEX: 639 NG/L
GLOBULIN UR ELPH-MCNC: 2.7 GM/DL
GLUCOSE SERPL-MCNC: 112 MG/DL (ref 65–99)
GLUCOSE UR STRIP-MCNC: ABNORMAL MG/DL
HCT VFR BLD AUTO: 24.8 % (ref 37.5–51)
HGB BLD-MCNC: 7.6 G/DL (ref 13–17.7)
HGB UR QL STRIP.AUTO: NEGATIVE
HYALINE CASTS UR QL AUTO: NORMAL /LPF
IMM GRANULOCYTES # BLD AUTO: 0.03 10*3/MM3 (ref 0–0.05)
IMM GRANULOCYTES NFR BLD AUTO: 0.4 % (ref 0–0.5)
INR PPP: 1.11 (ref 0.91–1.09)
KETONES UR QL STRIP: NEGATIVE
LEUKOCYTE ESTERASE UR QL STRIP.AUTO: NEGATIVE
LYMPHOCYTES # BLD AUTO: 0.81 10*3/MM3 (ref 0.7–3.1)
LYMPHOCYTES NFR BLD AUTO: 10.9 % (ref 19.6–45.3)
MAGNESIUM SERPL-MCNC: 2.2 MG/DL (ref 1.6–2.6)
MCH RBC QN AUTO: 27.2 PG (ref 26.6–33)
MCHC RBC AUTO-ENTMCNC: 30.6 G/DL (ref 31.5–35.7)
MCV RBC AUTO: 88.9 FL (ref 79–97)
METHADONE UR QL SCN: NEGATIVE
MONOCYTES # BLD AUTO: 0.37 10*3/MM3 (ref 0.1–0.9)
MONOCYTES NFR BLD AUTO: 5 % (ref 5–12)
NEUTROPHILS NFR BLD AUTO: 6.16 10*3/MM3 (ref 1.7–7)
NEUTROPHILS NFR BLD AUTO: 82.8 % (ref 42.7–76)
NITRITE UR QL STRIP: NEGATIVE
NRBC BLD AUTO-RTO: 0 /100 WBC (ref 0–0.2)
NT-PROBNP SERPL-MCNC: ABNORMAL PG/ML (ref 0–450)
OPIATES UR QL: POSITIVE
OXYCODONE UR QL SCN: NEGATIVE
PCP UR QL SCN: NEGATIVE
PH UR STRIP.AUTO: 6 [PH] (ref 5–8)
PLATELET # BLD AUTO: 131 10*3/MM3 (ref 140–450)
PMV BLD AUTO: 11.2 FL (ref 6–12)
POTASSIUM SERPL-SCNC: 4.1 MMOL/L (ref 3.5–5.2)
PROCALCITONIN SERPL-MCNC: 0.19 NG/ML (ref 0–0.25)
PROT SERPL-MCNC: 6 G/DL (ref 6–8.5)
PROT UR QL STRIP: ABNORMAL
PROTHROMBIN TIME: 14.9 SECONDS (ref 11.8–14.8)
RBC # BLD AUTO: 2.79 10*6/MM3 (ref 4.14–5.8)
RBC # UR STRIP: NORMAL /HPF
REF LAB TEST METHOD: NORMAL
SODIUM SERPL-SCNC: 138 MMOL/L (ref 136–145)
SP GR UR STRIP: 1.02 (ref 1–1.03)
SQUAMOUS #/AREA URNS HPF: NORMAL /HPF
TRICYCLICS UR QL SCN: NEGATIVE
TROPONIN T % DELTA: 22
TROPONIN T NUMERIC DELTA: 117 NG/L
TROPONIN T SERPL HS-MCNC: 522 NG/L
UNIT  ABO: NORMAL
UNIT  RH: NORMAL
UROBILINOGEN UR QL STRIP: ABNORMAL
WBC # UR STRIP: NORMAL /HPF
WBC NRBC COR # BLD AUTO: 7.44 10*3/MM3 (ref 3.4–10.8)

## 2025-06-25 PROCEDURE — 82077 ASSAY SPEC XCP UR&BREATH IA: CPT

## 2025-06-25 PROCEDURE — 99285 EMERGENCY DEPT VISIT HI MDM: CPT | Performed by: FAMILY MEDICINE

## 2025-06-25 PROCEDURE — 84484 ASSAY OF TROPONIN QUANT: CPT | Performed by: FAMILY MEDICINE

## 2025-06-25 PROCEDURE — 80053 COMPREHEN METABOLIC PANEL: CPT | Performed by: FAMILY MEDICINE

## 2025-06-25 PROCEDURE — 85025 COMPLETE CBC W/AUTO DIFF WBC: CPT | Performed by: FAMILY MEDICINE

## 2025-06-25 PROCEDURE — 93005 ELECTROCARDIOGRAM TRACING: CPT | Performed by: FAMILY MEDICINE

## 2025-06-25 PROCEDURE — 80307 DRUG TEST PRSMV CHEM ANLYZR: CPT

## 2025-06-25 PROCEDURE — 82550 ASSAY OF CK (CPK): CPT | Performed by: FAMILY MEDICINE

## 2025-06-25 PROCEDURE — 85730 THROMBOPLASTIN TIME PARTIAL: CPT | Performed by: FAMILY MEDICINE

## 2025-06-25 PROCEDURE — 25010000002 LABETALOL 5 MG/ML SOLUTION

## 2025-06-25 PROCEDURE — 81001 URINALYSIS AUTO W/SCOPE: CPT

## 2025-06-25 PROCEDURE — 25010000002 HYDRALAZINE PER 20 MG

## 2025-06-25 PROCEDURE — 83880 ASSAY OF NATRIURETIC PEPTIDE: CPT | Performed by: FAMILY MEDICINE

## 2025-06-25 PROCEDURE — 36415 COLL VENOUS BLD VENIPUNCTURE: CPT

## 2025-06-25 PROCEDURE — 85610 PROTHROMBIN TIME: CPT | Performed by: FAMILY MEDICINE

## 2025-06-25 PROCEDURE — 84145 PROCALCITONIN (PCT): CPT | Performed by: FAMILY MEDICINE

## 2025-06-25 PROCEDURE — 71045 X-RAY EXAM CHEST 1 VIEW: CPT

## 2025-06-25 PROCEDURE — 83735 ASSAY OF MAGNESIUM: CPT | Performed by: FAMILY MEDICINE

## 2025-06-25 PROCEDURE — 25010000002 NITROGLYCERIN 200 MCG/ML SOLUTION: Performed by: FAMILY MEDICINE

## 2025-06-25 PROCEDURE — 83605 ASSAY OF LACTIC ACID: CPT | Performed by: FAMILY MEDICINE

## 2025-06-25 PROCEDURE — 25010000002 LABETALOL 5 MG/ML SOLUTION: Performed by: FAMILY MEDICINE

## 2025-06-25 PROCEDURE — 93010 ELECTROCARDIOGRAM REPORT: CPT | Performed by: EMERGENCY MEDICINE

## 2025-06-25 RX ORDER — ACETAMINOPHEN 500 MG
1000 TABLET ORAL EVERY 6 HOURS PRN
Status: DISCONTINUED | OUTPATIENT
Start: 2025-06-25 | End: 2025-07-04 | Stop reason: HOSPADM

## 2025-06-25 RX ORDER — SODIUM CHLORIDE 0.9 % (FLUSH) 0.9 %
10 SYRINGE (ML) INJECTION AS NEEDED
Status: DISCONTINUED | OUTPATIENT
Start: 2025-06-25 | End: 2025-07-04 | Stop reason: HOSPADM

## 2025-06-25 RX ORDER — LEVETIRACETAM 500 MG/1
500 TABLET ORAL 2 TIMES DAILY
Status: DISCONTINUED | OUTPATIENT
Start: 2025-06-25 | End: 2025-07-04 | Stop reason: HOSPADM

## 2025-06-25 RX ORDER — NITROGLYCERIN 0.4 MG/1
0.4 TABLET SUBLINGUAL
Status: DISCONTINUED | OUTPATIENT
Start: 2025-06-25 | End: 2025-07-04 | Stop reason: HOSPADM

## 2025-06-25 RX ORDER — ASPIRIN 81 MG/1
81 TABLET ORAL DAILY
Status: DISCONTINUED | OUTPATIENT
Start: 2025-06-26 | End: 2025-07-04 | Stop reason: HOSPADM

## 2025-06-25 RX ORDER — LABETALOL HYDROCHLORIDE 5 MG/ML
10 INJECTION, SOLUTION INTRAVENOUS ONCE
Status: COMPLETED | OUTPATIENT
Start: 2025-06-25 | End: 2025-06-25

## 2025-06-25 RX ORDER — SODIUM CHLORIDE 0.9 % (FLUSH) 0.9 %
10 SYRINGE (ML) INJECTION EVERY 12 HOURS SCHEDULED
Status: DISCONTINUED | OUTPATIENT
Start: 2025-06-25 | End: 2025-07-04 | Stop reason: HOSPADM

## 2025-06-25 RX ORDER — LABETALOL HYDROCHLORIDE 5 MG/ML
10 INJECTION, SOLUTION INTRAVENOUS ONCE
Status: COMPLETED | OUTPATIENT
Start: 2025-06-26 | End: 2025-06-25

## 2025-06-25 RX ORDER — PANTOPRAZOLE SODIUM 40 MG/10ML
40 INJECTION, POWDER, LYOPHILIZED, FOR SOLUTION INTRAVENOUS EVERY 12 HOURS SCHEDULED
Status: DISCONTINUED | OUTPATIENT
Start: 2025-06-25 | End: 2025-07-01

## 2025-06-25 RX ORDER — HYDRALAZINE HYDROCHLORIDE 20 MG/ML
10 INJECTION INTRAMUSCULAR; INTRAVENOUS EVERY 6 HOURS PRN
Status: DISCONTINUED | OUTPATIENT
Start: 2025-06-25 | End: 2025-07-04 | Stop reason: HOSPADM

## 2025-06-25 RX ORDER — SODIUM CHLORIDE 9 MG/ML
40 INJECTION, SOLUTION INTRAVENOUS AS NEEDED
Status: DISCONTINUED | OUTPATIENT
Start: 2025-06-25 | End: 2025-07-04 | Stop reason: HOSPADM

## 2025-06-25 RX ORDER — NITROGLYCERIN 20 MG/100ML
5-200 INJECTION INTRAVENOUS
Status: DISCONTINUED | OUTPATIENT
Start: 2025-06-25 | End: 2025-06-26

## 2025-06-25 RX ORDER — METOPROLOL SUCCINATE 25 MG/1
25 TABLET, EXTENDED RELEASE ORAL ONCE
Status: COMPLETED | OUTPATIENT
Start: 2025-06-25 | End: 2025-06-25

## 2025-06-25 RX ORDER — METOPROLOL SUCCINATE 100 MG/1
100 TABLET, EXTENDED RELEASE ORAL DAILY
Status: DISCONTINUED | OUTPATIENT
Start: 2025-06-26 | End: 2025-06-28

## 2025-06-25 RX ORDER — PANTOPRAZOLE SODIUM 40 MG/1
40 TABLET, DELAYED RELEASE ORAL
Status: DISCONTINUED | OUTPATIENT
Start: 2025-06-25 | End: 2025-06-25

## 2025-06-25 RX ADMIN — NITROGLYCERIN 30 MCG/MIN: 20 INJECTION INTRAVENOUS at 22:36

## 2025-06-25 RX ADMIN — ACETAMINOPHEN 1000 MG: 500 TABLET, FILM COATED ORAL at 23:43

## 2025-06-25 RX ADMIN — LABETALOL HYDROCHLORIDE 10 MG: 5 INJECTION, SOLUTION INTRAVENOUS at 19:50

## 2025-06-25 RX ADMIN — METOPROLOL SUCCINATE 25 MG: 25 TABLET, EXTENDED RELEASE ORAL at 19:49

## 2025-06-25 RX ADMIN — PANTOPRAZOLE SODIUM 40 MG: 40 INJECTION, POWDER, FOR SOLUTION INTRAVENOUS at 20:07

## 2025-06-25 RX ADMIN — NITROGLYCERIN 10 MCG/MIN: 20 INJECTION INTRAVENOUS at 17:59

## 2025-06-25 RX ADMIN — Medication 10 ML: at 20:08

## 2025-06-25 RX ADMIN — LEVETIRACETAM 500 MG: 500 TABLET, FILM COATED ORAL at 20:06

## 2025-06-25 RX ADMIN — LABETALOL HYDROCHLORIDE 10 MG: 5 INJECTION, SOLUTION INTRAVENOUS at 23:21

## 2025-06-25 RX ADMIN — HYDRALAZINE HYDROCHLORIDE 10 MG: 20 INJECTION INTRAMUSCULAR; INTRAVENOUS at 22:02

## 2025-06-25 NOTE — ED PROVIDER NOTES
HPI:     Patient is a 30-year-old -American male who is a transfer from [facility] with a non-STEMI chest pain.  Patient has end-stage renal disease and was supposed to be getting a permacath for dialysis.  He was recently admitted and left AGAINST MEDICAL ADVICE.  Patient has a past history of congestive heart failure.      REVIEW OF SYSTEMS  CONSTITUTIONAL:  No complaints of fever, chills,or weakness  EYES:  No complaints of discharge   ENT: No complaints of sore throat or ear pain  CARDIOVASCULAR: Positive for chest pressure now resolved after nitroglycerin drip.  Also with hypertension.    RESPIRATORY:  No complaints of cough or shortness of breath  GI:  No complaints of abdominal pain, nausea, vomiting, or diarrhea  MUSCULOSKELETAL:  No complaints of back pain  SKIN:  No complaints of rash  NEUROLOGIC:  No complaints of headache, focal weakness, or sensory changes  ENDOCRINE:  No complaints of polyuria or polydipsia  LYMPHATIC:  No complaints of swollen glands  GENITOURINARY: No complaints of urinary frequency or hematuria        PAST MEDICAL HISTORY  Past Medical History:   Diagnosis Date    Cardiomegaly     CHF (congestive heart failure)     Congenital fusion of carpal bone     HTN (hypertension)     Non-STEMI (non-ST elevated myocardial infarction) 6/25/2025    Seizure        FAMILY HISTORY  Family History   Problem Relation Age of Onset    Hypertension Mother     Heart disease Mother     No Known Problems Father        SOCIAL HISTORY  Social History     Socioeconomic History    Marital status: Single   Tobacco Use    Smoking status: Some Days     Current packs/day: 0.50     Types: Cigarettes    Smokeless tobacco: Never   Vaping Use    Vaping status: Never Used   Substance and Sexual Activity    Alcohol use: Yes    Drug use: Yes     Types: Marijuana, Cocaine(coke)    Sexual activity: Defer       IMMUNIZATION HISTORY  Deferred to primary care physician.    SURGICAL HISTORY  Past  "Surgical History:   Procedure Laterality Date    ELBOW PROCEDURE      ENDOSCOPY N/A 6/22/2025    Procedure: ESOPHAGOGASTRODUODENOSCOPY;  Surgeon: Kevin Miranda MD;  Location: [facility];  Service: Gastroenterology;  Laterality: N/A;  preop; GI bleed   postop; gastric ulcer ; esophagitis       CURRENT MEDICATIONS    Current Facility-Administered Medications:     hydrALAZINE (APRESOLINE) injection 10 mg, 10 mg, Intravenous, Q6H PRN, Lesa Gonzalez MD    nitroglycerin (TRIDIL) 200 mcg/ml infusion, 5-200 mcg/min, Intravenous, Titrated, Jason Baker Jr., MD, Last Rate: 6 mL/hr at 06/25/25 1946, 20 mcg/min at 06/25/25 1946    pantoprazole (PROTONIX) injection 40 mg, 40 mg, Intravenous, Q12H, Lesa Gonzalez MD    Current Outpatient Medications:     aspirin 81 MG EC tablet, Take 1 tablet by mouth Daily., Disp: 30 tablet, Rfl: 0    furosemide (LASIX) 40 MG tablet, Take 1 tablet by mouth Daily., Disp: , Rfl:     hydrALAZINE (APRESOLINE) 50 MG tablet, Take 2 tablets by mouth 3 (Three) Times a Day., Disp: , Rfl:     levETIRAcetam (KEPPRA) 500 MG tablet, Take 1 tablet by mouth 2 (Two) Times a Day., Disp: , Rfl:     metoprolol succinate XL (TOPROL-XL) 100 MG 24 hr tablet, Take 1 tablet by mouth Daily., Disp: 90 tablet, Rfl: 3    Potassium 99 MG tablet, Take 1 tablet by mouth Daily., Disp: , Rfl:     sacubitril-valsartan (Entresto) 49-51 MG tablet, Take 1 tablet by mouth 2 (Two) Times a Day., Disp: 180 tablet, Rfl: 3    ALLERGIES  Allergies   Allergen Reactions    Amoxicillin Anaphylaxis    Penicillins Anaphylaxis           Cardiac exam    VITAL SIGNS:  BP (!) 187/135   Pulse 103   Temp 97.7 °F (36.5 °C) (Oral)   Resp 16   Ht 188 cm (74\")   Wt 84.4 kg (186 lb)   SpO2 100%   BMI 23.88 kg/m²     Constitutional: Patient is alert and in no distress.  Patient with no active discomfort.    ENT: There is a normal pharynx with no acute erythema or exudate and oral mucosa is moist.  Nose is clear with no drainage.  " Tympanic membranes intact and nonerythemic    Respiratory: Patient is clear to auscultation bilaterally with no wheezing or rhonchi.  Chest wall is nontender.  There are no external lesions on the chest.  There is no crepitance    Cardiovascular: S1-S2 with a diasone murmur    Abdomen: Soft, nontender, and  bowel sounds are normal in all 4 quadrants.  There is no rebound or guarding noted.  There is no abdominal distention or hepatosplenomegaly.    Genitourinary: Patient is voiding appropriately.    Integument: No acute lesions noted and color appears to be normal.    Ami Coma Scale: Total score 15    Neurological: Patient is alert and oriented x4 and no acute findings noted.  Speech is fluent and cognition is normal.  No evidence of acute CVA.  Cranial nerves II through XII intact.  Patient with normal motor function as well as reflexes and sensation        RADIOLOGY/PROCEDURES      XR Chest 1 View   Final Result   1.  Marked enlargement of the cardiac silhouette.   2.  Lungs are clear.           This report was signed and finalized on 6/25/2025 5:47 PM by Dr. Juan Miguel Aparicio MD.                 FUTURE APPOINTMENTS     No future appointments.       EKG (reviewed and interpreted by me): Normal sinus rhythm. No acute ischemia. Heart rate 97 with no acute ST segment elevation or depression noted      HEART SCORE     Patient history  1      Moderately suspicious (1 point)    2   ECG       Nonspecific repolarization disturbance (1 point)   Normal (0 points)  3   Patient age       Less than 45 (0 points)  4   Risk factors (Hypercholesterolemia, Hypertension, diabetes, smoking, obesity)     More than 3 risk factors or atherosclerosis history (2 points)    5   Troponin     3 times higher than normal or more (2 points)      6     TOTAL RISK NUMBER: 6    The three risk categories are described below:  Heart score MACE risk Recommendation  0 - 3 Low (1.7%) Discharge can be an option.  4 - 6 Intermediate  (20.3%) Clinical observation and further investigations.  7 - 10 High (72.2%) Immediate invasive treatment        COURSE & MEDICAL DECISION MAKING       Patient's partial differential diagnosis can include:    Unstable angina, angina, non-STEMI, arrhythmia, pneumothorax, pulmonary embolism, electrolyte abnormality,  Prinzmetal angina, costochondritis pleurisy, pleural effusion, pulmonary edema, panic attack, esophageal spasm, GERD, gastritis, chest spasms, and others    CBC, CMP, troponin, chest x-ray, EKG procalcitonin, magnesium, CK, lactate,     Troponin elevated x 2 patient's blood    Discussed case with hospitalist Dr. Gonzalez who will admit the patient to telemetry under his medical care.    Patient's level of risk: Moderate        CRITICAL CARE    CRITICAL CARE: No    CRITICAL CARE TIME: None      Recent Results (from the past 24 hours)   Prepare RBC, 1 Units    Collection Time: 06/25/25  6:04 AM   Result Value Ref Range    Product Code [id number]     Unit Number [id number]     UNIT  ABO AB     UNIT  RH POS     Crossmatch Interpretation Compatible     Dispense Status RE     Blood Expiration Date [id number]     Blood Type Barcode 8400    ECG 12 Lead Chest Pain    Collection Time: 06/25/25  5:42 PM   Result Value Ref Range    QT Interval 354 ms    QTC Interval 449 ms   Comprehensive Metabolic Panel    Collection Time: 06/25/25  5:56 PM    Specimen: Blood   Result Value Ref Range    Glucose 112 (H) 65 - 99 mg/dL    BUN 64.8 (H) 6.0 - 20.0 mg/dL    Creatinine 3.99 (H) 0.76 - 1.27 mg/dL    Sodium 138 136 - 145 mmol/L    Potassium 4.1 3.5 - 5.2 mmol/L    Chloride 102 98 - 107 mmol/L    CO2 23.0 22.0 - 29.0 mmol/L    Calcium 8.5 (L) 8.6 - 10.5 mg/dL    Total Protein 6.0 6.0 - 8.5 g/dL    Albumin 3.3 (L) 3.5 - 5.2 g/dL    ALT (SGPT) 22 1 - 41 U/L    AST (SGOT) 23 1 - 40 U/L    Alkaline Phosphatase 71 39 - 117 U/L    Total Bilirubin 0.7 0.0 - 1.2 mg/dL    Globulin 2.7 gm/dL    A/G Ratio 1.2 g/dL    BUN/Creatinine  Ratio 16.2 7.0 - 25.0    Anion Gap 13.0 5.0 - 15.0 mmol/L    eGFR 20.0 (L) >60.0 mL/min/1.73   Protime-INR    Collection Time: 06/25/25  5:56 PM    Specimen: Blood   Result Value Ref Range    Protime 14.9 (H) 11.8 - 14.8 Seconds    INR 1.11 (H) 0.91 - 1.09   aPTT    Collection Time: 06/25/25  5:56 PM    Specimen: Blood   Result Value Ref Range    PTT 32.9 24.5 - 36.0 seconds   BNP    Collection Time: 06/25/25  5:56 PM    Specimen: Blood   Result Value Ref Range    proBNP 13,331.0 (H) 0.0 - 450.0 pg/mL   High Sensitivity Troponin T    Collection Time: 06/25/25  5:56 PM    Specimen: Blood   Result Value Ref Range    HS Troponin T 522 (C) <22 ng/L   Lactic Acid, Plasma    Collection Time: 06/25/25  5:56 PM    Specimen: Blood   Result Value Ref Range    Lactate 1.1 0.5 - 2.0 mmol/L   Procalcitonin    Collection Time: 06/25/25  5:56 PM    Specimen: Blood   Result Value Ref Range    Procalcitonin 0.19 0.00 - 0.25 ng/mL   CK    Collection Time: 06/25/25  5:56 PM    Specimen: Blood   Result Value Ref Range    Creatine Kinase 186 20 - 200 U/L   Magnesium    Collection Time: 06/25/25  5:56 PM    Specimen: Blood   Result Value Ref Range    Magnesium 2.2 1.6 - 2.6 mg/dL   CBC Auto Differential    Collection Time: 06/25/25  5:56 PM    Specimen: Blood   Result Value Ref Range    WBC 7.44 3.40 - 10.80 10*3/mm3    RBC 2.79 (L) 4.14 - 5.80 10*6/mm3    Hemoglobin 7.6 (L) 13.0 - 17.7 g/dL    Hematocrit 24.8 (L) 37.5 - 51.0 %    MCV 88.9 79.0 - 97.0 fL    MCH 27.2 26.6 - 33.0 pg    MCHC 30.6 (L) 31.5 - 35.7 g/dL    RDW 18.2 (H) 12.3 - 15.4 %    RDW-SD 56.8 (H) 37.0 - 54.0 fl    MPV 11.2 6.0 - 12.0 fL    Platelets 131 (L) 140 - 450 10*3/mm3    Neutrophil % 82.8 (H) 42.7 - 76.0 %    Lymphocyte % 10.9 (L) 19.6 - 45.3 %    Monocyte % 5.0 5.0 - 12.0 %    Eosinophil % 0.5 0.3 - 6.2 %    Basophil % 0.4 0.0 - 1.5 %    Immature Grans % 0.4 0.0 - 0.5 %    Neutrophils, Absolute 6.16 1.70 - 7.00 10*3/mm3    Lymphocytes, Absolute 0.81 0.70 - 3.10  10*3/mm3    Monocytes, Absolute 0.37 0.10 - 0.90 10*3/mm3    Eosinophils, Absolute 0.04 0.00 - 0.40 10*3/mm3    Basophils, Absolute 0.03 0.00 - 0.20 10*3/mm3    Immature Grans, Absolute 0.03 0.00 - 0.05 10*3/mm3    nRBC 0.0 0.0 - 0.2 /100 WBC   High Sensitivity Troponin T 1Hr    Collection Time: 06/25/25  7:19 PM    Specimen: Arm, Right; Blood   Result Value Ref Range    HS Troponin T 639 (C) <22 ng/L    Troponin T Numeric Delta 117 (C) Abnormal if >/=3 ng/L    Troponin T % Delta 22 (C) Abnormal if >/= 20%            Old charts were reviewed per [facility] EMR.  Pertinent details are summarized above.  All laboratory, radiologic, and EKG studies that were performed in the Emergency Department were a necessary part of the evaluation needed to exclude unstable or  emergent medical conditions.     Patient was hemodynamically and neurologically stable in the ED.   Pertinent studies were reviewed as above.     The patient received:  Medications   nitroglycerin (TRIDIL) 200 mcg/ml infusion (20 mcg/min Intravenous Rate/Dose Change 6/25/25 1946)   hydrALAZINE (APRESOLINE) injection 10 mg (has no administration in time range)   pantoprazole (PROTONIX) injection 40 mg (has no administration in time range)   labetalol (NORMODYNE,TRANDATE) injection 10 mg (10 mg Intravenous Given 6/25/25 1950)   metoprolol succinate XL (TOPROL-XL) 24 hr tablet 25 mg (25 mg Oral Given 6/25/25 1949)            ED Disposition       ED Disposition   Decision to Admit    Condition   --    Comment   Level of Care: Telemetry [5]   Diagnosis: Non-STEMI (non-ST elevated myocardial infarction) [278204]   Admitting Physician: SANDRA GARCIA [[id number]]   Attending Physician: SANDRA GARCIA [[id number]]   Certification: I Certify That Inpatient Hospital Services Are Medically Necessary For Greater Than 2 Midnights                   Dragon disclaimer:  Part of this note may be an electronic transcription/translation of spoken language to printed text using the Dragon  Dictation System.    I have reviewed the patient’s prescription history via a prescription monitoring program.  This information is consistent with my knowledge of the patient’s controlled substance use history.    Patient evaluated during Coronavirus Pandemic. Isolation practices followed according to [facility] policy.     FINAL IMPRESSION   Diagnosis Plan   1. Other congestive heart failure        2. Non-STEMI (non-ST elevated myocardial infarction)        3. Hypertensive urgency        4. End stage renal disease              MD Samuel Robertson Jr, Thomas Mark Jr., MD  06/25/25 2000

## 2025-06-26 ENCOUNTER — APPOINTMENT (OUTPATIENT)
Dept: CARDIOLOGY | Facility: HOSPITAL | Age: 30
End: 2025-06-26
Payer: MEDICAID

## 2025-06-26 LAB
ABO GROUP BLD: NORMAL
ALBUMIN SERPL-MCNC: 3.1 G/DL (ref 3.5–5.2)
ALBUMIN/GLOB SERPL: 1.2 G/DL
ALP SERPL-CCNC: 66 U/L (ref 39–117)
ALT SERPL W P-5'-P-CCNC: 18 U/L (ref 1–41)
ANION GAP SERPL CALCULATED.3IONS-SCNC: 11 MMOL/L (ref 5–15)
AST SERPL-CCNC: 25 U/L (ref 1–40)
BILIRUB SERPL-MCNC: 0.8 MG/DL (ref 0–1.2)
BLD GP AB SCN SERPL QL: NEGATIVE
BUN SERPL-MCNC: 63.5 MG/DL (ref 6–20)
BUN/CREAT SERPL: 16 (ref 7–25)
CALCIUM SPEC-SCNC: 8.2 MG/DL (ref 8.6–10.5)
CHLORIDE SERPL-SCNC: 101 MMOL/L (ref 98–107)
CO2 SERPL-SCNC: 20 MMOL/L (ref 22–29)
CREAT SERPL-MCNC: 3.96 MG/DL (ref 0.76–1.27)
DEPRECATED RDW RBC AUTO: 56.7 FL (ref 37–54)
EGFRCR SERPLBLD CKD-EPI 2021: 20.2 ML/MIN/1.73
ERYTHROCYTE [DISTWIDTH] IN BLOOD BY AUTOMATED COUNT: 18.1 % (ref 12.3–15.4)
GLOBULIN UR ELPH-MCNC: 2.5 GM/DL
GLUCOSE SERPL-MCNC: 140 MG/DL (ref 65–99)
HAV IGM SERPL QL IA: NORMAL
HBV CORE IGM SERPL QL IA: NORMAL
HBV SURFACE AB SER RIA-ACNC: REACTIVE
HBV SURFACE AG SERPL QL IA: NORMAL
HCT VFR BLD AUTO: 23 % (ref 37.5–51)
HCV AB SER QL: NORMAL
HGB BLD-MCNC: 7 G/DL (ref 13–17.7)
MAGNESIUM SERPL-MCNC: 2.1 MG/DL (ref 1.6–2.6)
MCH RBC QN AUTO: 26.7 PG (ref 26.6–33)
MCHC RBC AUTO-ENTMCNC: 30.4 G/DL (ref 31.5–35.7)
MCV RBC AUTO: 87.8 FL (ref 79–97)
PHOSPHATE SERPL-MCNC: 3.4 MG/DL (ref 2.5–4.5)
PLATELET # BLD AUTO: 123 10*3/MM3 (ref 140–450)
PMV BLD AUTO: 11.3 FL (ref 6–12)
POTASSIUM SERPL-SCNC: 4 MMOL/L (ref 3.5–5.2)
PROT SERPL-MCNC: 5.6 G/DL (ref 6–8.5)
QT INTERVAL: 354 MS
QTC INTERVAL: 449 MS
RBC # BLD AUTO: 2.62 10*6/MM3 (ref 4.14–5.8)
RH BLD: POSITIVE
SODIUM SERPL-SCNC: 132 MMOL/L (ref 136–145)
T&S EXPIRATION DATE: NORMAL
WBC NRBC COR # BLD AUTO: 5.98 10*3/MM3 (ref 3.4–10.8)

## 2025-06-26 PROCEDURE — 85027 COMPLETE CBC AUTOMATED: CPT

## 2025-06-26 PROCEDURE — 86900 BLOOD TYPING SEROLOGIC ABO: CPT

## 2025-06-26 PROCEDURE — 93356 MYOCRD STRAIN IMG SPCKL TRCK: CPT

## 2025-06-26 PROCEDURE — 25010000002 HYDRALAZINE PER 20 MG

## 2025-06-26 PROCEDURE — 93356 MYOCRD STRAIN IMG SPCKL TRCK: CPT | Performed by: INTERNAL MEDICINE

## 2025-06-26 PROCEDURE — 86900 BLOOD TYPING SEROLOGIC ABO: CPT | Performed by: INTERNAL MEDICINE

## 2025-06-26 PROCEDURE — 84100 ASSAY OF PHOSPHORUS: CPT

## 2025-06-26 PROCEDURE — 93306 TTE W/DOPPLER COMPLETE: CPT

## 2025-06-26 PROCEDURE — 25010000002 MORPHINE PER 10 MG

## 2025-06-26 PROCEDURE — P9016 RBC LEUKOCYTES REDUCED: HCPCS

## 2025-06-26 PROCEDURE — 86850 RBC ANTIBODY SCREEN: CPT | Performed by: INTERNAL MEDICINE

## 2025-06-26 PROCEDURE — 86706 HEP B SURFACE ANTIBODY: CPT | Performed by: INTERNAL MEDICINE

## 2025-06-26 PROCEDURE — 36415 COLL VENOUS BLD VENIPUNCTURE: CPT

## 2025-06-26 PROCEDURE — 25510000001 PERFLUTREN 6.52 MG/ML SUSPENSION: Performed by: INTERNAL MEDICINE

## 2025-06-26 PROCEDURE — 25010000002 ONDANSETRON PER 1 MG: Performed by: INTERNAL MEDICINE

## 2025-06-26 PROCEDURE — 80074 ACUTE HEPATITIS PANEL: CPT | Performed by: INTERNAL MEDICINE

## 2025-06-26 PROCEDURE — 93306 TTE W/DOPPLER COMPLETE: CPT | Performed by: INTERNAL MEDICINE

## 2025-06-26 PROCEDURE — 86901 BLOOD TYPING SEROLOGIC RH(D): CPT | Performed by: INTERNAL MEDICINE

## 2025-06-26 PROCEDURE — 80053 COMPREHEN METABOLIC PANEL: CPT

## 2025-06-26 PROCEDURE — 99222 1ST HOSP IP/OBS MODERATE 55: CPT | Performed by: INTERNAL MEDICINE

## 2025-06-26 PROCEDURE — 86923 COMPATIBILITY TEST ELECTRIC: CPT

## 2025-06-26 PROCEDURE — 83735 ASSAY OF MAGNESIUM: CPT

## 2025-06-26 PROCEDURE — 36430 TRANSFUSION BLD/BLD COMPNT: CPT

## 2025-06-26 RX ORDER — DILTIAZEM HYDROCHLORIDE 240 MG/1
240 CAPSULE, COATED, EXTENDED RELEASE ORAL
Status: DISCONTINUED | OUTPATIENT
Start: 2025-06-26 | End: 2025-06-27

## 2025-06-26 RX ORDER — MORPHINE SULFATE 2 MG/ML
1 INJECTION, SOLUTION INTRAMUSCULAR; INTRAVENOUS ONCE AS NEEDED
Status: COMPLETED | OUTPATIENT
Start: 2025-06-26 | End: 2025-06-26

## 2025-06-26 RX ORDER — ISOSORBIDE MONONITRATE 30 MG/1
30 TABLET, EXTENDED RELEASE ORAL
Status: DISCONTINUED | OUTPATIENT
Start: 2025-06-26 | End: 2025-06-27

## 2025-06-26 RX ORDER — HYOSCYAMINE SULFATE 0.38 MG/1
375 TABLET, EXTENDED RELEASE ORAL ONCE AS NEEDED
Status: COMPLETED | OUTPATIENT
Start: 2025-06-26 | End: 2025-06-26

## 2025-06-26 RX ORDER — HYDRALAZINE HYDROCHLORIDE 25 MG/1
25 TABLET, FILM COATED ORAL EVERY 8 HOURS SCHEDULED
Status: DISCONTINUED | OUTPATIENT
Start: 2025-06-26 | End: 2025-06-27

## 2025-06-26 RX ORDER — ONDANSETRON 2 MG/ML
4 INJECTION INTRAMUSCULAR; INTRAVENOUS EVERY 6 HOURS PRN
Status: DISCONTINUED | OUTPATIENT
Start: 2025-06-26 | End: 2025-07-04 | Stop reason: HOSPADM

## 2025-06-26 RX ADMIN — HYDRALAZINE HYDROCHLORIDE 10 MG: 20 INJECTION INTRAMUSCULAR; INTRAVENOUS at 11:03

## 2025-06-26 RX ADMIN — HYDRALAZINE HYDROCHLORIDE 25 MG: 25 TABLET ORAL at 15:43

## 2025-06-26 RX ADMIN — HYOSCYAMINE SULFATE 375 MCG: 0.38 TABLET, EXTENDED RELEASE ORAL at 23:34

## 2025-06-26 RX ADMIN — ASPIRIN 81 MG: 81 TABLET, COATED ORAL at 08:39

## 2025-06-26 RX ADMIN — Medication 10 ML: at 21:06

## 2025-06-26 RX ADMIN — ISOSORBIDE MONONITRATE 30 MG: 30 TABLET, EXTENDED RELEASE ORAL at 11:03

## 2025-06-26 RX ADMIN — MORPHINE SULFATE 1 MG: 2 INJECTION, SOLUTION INTRAMUSCULAR; INTRAVENOUS at 22:11

## 2025-06-26 RX ADMIN — DILTIAZEM HYDROCHLORIDE 240 MG: 240 CAPSULE, EXTENDED RELEASE ORAL at 12:39

## 2025-06-26 RX ADMIN — LEVETIRACETAM 500 MG: 500 TABLET, FILM COATED ORAL at 08:39

## 2025-06-26 RX ADMIN — PANTOPRAZOLE SODIUM 40 MG: 40 INJECTION, POWDER, FOR SOLUTION INTRAVENOUS at 08:40

## 2025-06-26 RX ADMIN — PERFLUTREN 9.78 MG: 6.52 INJECTION, SUSPENSION INTRAVENOUS at 09:53

## 2025-06-26 RX ADMIN — LEVETIRACETAM 500 MG: 500 TABLET, FILM COATED ORAL at 21:06

## 2025-06-26 RX ADMIN — HYDRALAZINE HYDROCHLORIDE 25 MG: 25 TABLET ORAL at 21:06

## 2025-06-26 RX ADMIN — Medication 10 ML: at 08:40

## 2025-06-26 RX ADMIN — METOPROLOL SUCCINATE 100 MG: 100 TABLET, EXTENDED RELEASE ORAL at 08:40

## 2025-06-26 RX ADMIN — PANTOPRAZOLE SODIUM 40 MG: 40 INJECTION, POWDER, FOR SOLUTION INTRAVENOUS at 21:06

## 2025-06-26 RX ADMIN — ONDANSETRON 4 MG: 2 INJECTION INTRAMUSCULAR; INTRAVENOUS at 08:40

## 2025-06-26 NOTE — CONSULTS
LOS: 1 day   Patient Care Team:  He Ortega MD as PCP - General (Internal Medicine)    Chief Complaint: Shortness of breath     Subjective    José Miguel Webb is a 30 y.o. male who is being seen in evaluation  Patient has hypertensive nephrosclerosis  Has had chronic renal disease  Currently has end-stage renal disease and awaiting vascular access and starting dialysis  Complains of chest pain  This is constant  Can go on for hours to days  This is nonpositional nonpleuritic  Can radiate to the left arm  Troponin initially 520 2 repeat 639  Hemoglobin of 7 with hematocrit of 23  No presyncope or syncope  No orthopnea  No paroxysmal nocturnal dyspnea  He has known history of cocaine, marijuana abuse  Has had upper GI bleeding  There have been major compliance issues  In past he has just left AGAINST MEDICAL ADVICE understanding risk of dying  EKG as below  ECG 12 Lead Chest Pain   Final Result   Test Reason : Chest Pain   Blood Pressure :   */*   mmHG   Vent. Rate :  97 BPM     Atrial Rate :  97 BPM      P-R Int : 170 ms          QRS Dur : 106 ms       QT Int : 354 ms       P-R-T Axes :  54 -34  97 degrees     QTcB Int : 449 ms      Normal sinus rhythm   Left axis deviation   Nonspecific T wave abnormality   Abnormal ECG   When compared with ECG of 21-Jun-2025 21:48,   Premature ventricular complexes are no longer Present   QRS axis Shifted left   Nonspecific T wave abnormality no longer evident in Inferior leads   T wave inversion no longer evident in Anterior leads   Confirmed by Bryan aMciel MD ([id number]) on 6/26/2025 9:02:15 AM      Referred By:            Confirmed By: Bryan Maciel MD      Telemetry Scan   Final Result           Telemetry: no malignant arrhythmia. No significant pauses.    Review of Systems   Constitutional: No chills   Has fatigue   No fever.   HENT: Negative.    Eyes: Negative.    Respiratory: Negative for cough,   No chest wall soreness,   Shortness of breath,   no wheezing, no  stridor.    Cardiovascular: As above  Gastrointestinal: Negative for abdominal distention,  No abdominal pain,   No blood in stool,   No constipation,   No diarrhea,   No nausea   No vomiting.   Endocrine: Negative.    Genitourinary: Negative for difficulty urinating, dysuria, flank pain and hematuria.   Musculoskeletal: Negative.    Skin: Negative for rash and wound.   Allergic/Immunologic: Negative.    Neurological: Negative for dizziness, syncope, weakness,   No light-headedness  No  headaches.   Hematological: Does not bruise/bleed easily.   Psychiatric/Behavioral: Negative for agitation or behavioral problems,   No confusion,   the patient is  nervous/anxious.       History:   Past Medical History:   Diagnosis Date    Cardiomegaly     CHF (congestive heart failure)     Congenital fusion of carpal bone     HTN (hypertension)     Non-STEMI (non-ST elevated myocardial infarction) 6/25/2025    Seizure      Past Surgical History:   Procedure Laterality Date    ELBOW PROCEDURE      ENDOSCOPY N/A 6/22/2025    Procedure: ESOPHAGOGASTRODUODENOSCOPY;  Surgeon: Kevin Miranda MD;  Location: [facility] OR;  Service: Gastroenterology;  Laterality: N/A;  preop; GI bleed   postop; gastric ulcer ; esophagitis     Social History     Socioeconomic History    Marital status: Single   Tobacco Use    Smoking status: Some Days     Current packs/day: 0.50     Types: Cigarettes    Smokeless tobacco: Never   Vaping Use    Vaping status: Never Used   Substance and Sexual Activity    Alcohol use: Yes    Drug use: Yes     Types: Marijuana, Cocaine(coke)    Sexual activity: Defer     Family History   Problem Relation Age of Onset    Hypertension Mother     Heart disease Mother     No Known Problems Father        Labs:  WBC WBC   Date Value Ref Range Status   06/26/2025 5.98 3.40 - 10.80 10*3/mm3 Final   06/25/2025 7.44 3.40 - 10.80 10*3/mm3 Final   06/24/2025 7.33 3.40 - 10.80 10*3/mm3 Final      HGB Hemoglobin   Date Value Ref Range Status    06/26/2025 7.0 (L) 13.0 - 17.7 g/dL Final   06/25/2025 7.6 (L) 13.0 - 17.7 g/dL Final   06/24/2025 7.0 (L) 13.0 - 17.7 g/dL Final   06/23/2025 7.9 (L) 13.0 - 17.7 g/dL Final   06/23/2025 7.1 (L) 13.0 - 17.7 g/dL Final   06/23/2025 7.3 (L) 13.0 - 17.7 g/dL Final      HCT Hematocrit   Date Value Ref Range Status   06/26/2025 23.0 (L) 37.5 - 51.0 % Final   06/25/2025 24.8 (L) 37.5 - 51.0 % Final   06/24/2025 22.3 (L) 37.5 - 51.0 % Final   06/23/2025 25.8 (L) 37.5 - 51.0 % Final   06/23/2025 22.7 (L) 37.5 - 51.0 % Final   06/23/2025 23.6 (L) 37.5 - 51.0 % Final      Platelets Platelets   Date Value Ref Range Status   06/26/2025 123 (L) 140 - 450 10*3/mm3 Final   06/25/2025 131 (L) 140 - 450 10*3/mm3 Final   06/24/2025 117 (L) 140 - 450 10*3/mm3 Final      MCV MCV   Date Value Ref Range Status   06/26/2025 87.8 79.0 - 97.0 fL Final   06/25/2025 88.9 79.0 - 97.0 fL Final   06/24/2025 86.1 79.0 - 97.0 fL Final        Results from last 7 days   Lab Units 06/26/25  0313 06/25/25  1756 06/24/25  0526   SODIUM mmol/L 132* 138 135*   POTASSIUM mmol/L 4.0 4.1 4.4   CHLORIDE mmol/L 101 102 103   CO2 mmol/L 20.0* 23.0 22.0   BUN mg/dL 63.5* 64.8* 75.4*   CREATININE mg/dL 3.96* 3.99* 4.97*   CALCIUM mg/dL 8.2* 8.5* 7.6*   BILIRUBIN mg/dL 0.8 0.7 0.8   ALK PHOS U/L 66 71 58   ALT (SGPT) U/L 18 22 24   AST (SGOT) U/L 25 23 26   GLUCOSE mg/dL 140* 112* 99     Lab Results   Component Value Date    CKTOTAL 186 06/25/2025    TROPONINI 0.06 (H) 04/14/2020    TROPONINT 639 (C) 06/25/2025     PT/INR:    Protime   Date Value Ref Range Status   06/25/2025 14.9 (H) 11.8 - 14.8 Seconds Final   /  INR   Date Value Ref Range Status   06/25/2025 1.11 (H) 0.91 - 1.09 Final       Imaging Results (Last 72 Hours)       Procedure Component Value Units Date/Time    XR Chest 1 View [[id number]] Collected: 06/25/25 1746     Updated: 06/25/25 1750    Narrative:      EXAM/TECHNIQUE: XR CHEST 1 VW-     INDICATION: Chest pain, shortness of breath      COMPARISON: None     FINDINGS:  Marked enlargement of the cardiac silhouette. No pleural effusion or  visible pneumothorax. No focal consolidation. No acute osseous finding.       Impression:      1.  Marked enlargement of the cardiac silhouette.  2.  Lungs are clear.        This report was signed and finalized on 6/25/2025 5:47 PM by Dr. Juan Miguel Aparicio MD.               Objective     Allergies   Allergen Reactions    Amoxicillin Anaphylaxis    Penicillins Anaphylaxis       Medication Review: Performed  Current Facility-Administered Medications   Medication Dose Route Frequency Provider Last Rate Last Admin    acetaminophen (TYLENOL) tablet 1,000 mg  1,000 mg Oral Q6H PRN Lesa Gonzalez MD   1,000 mg at 06/25/25 2343    aspirin EC tablet 81 mg  81 mg Oral Daily Lesa Gonzalez MD   81 mg at 06/26/25 0839    hydrALAZINE (APRESOLINE) injection 10 mg  10 mg Intravenous Q6H PRN Lesa Gonzalez MD   10 mg at 06/25/25 2202    levETIRAcetam (KEPPRA) tablet 500 mg  500 mg Oral BID Lesa Gonzalez MD   500 mg at 06/26/25 0839    metoprolol succinate XL (TOPROL-XL) 24 hr tablet 100 mg  100 mg Oral Daily Lesa Gonzalez MD   100 mg at 06/26/25 0840    nitroglycerin (NITROSTAT) SL tablet 0.4 mg  0.4 mg Sublingual Q5 Min PRN Lesa Gonzalez MD        nitroglycerin (TRIDIL) 200 mcg/ml infusion  5-200 mcg/min Intravenous Titrated Jason Baker Jr., MD 12 mL/hr at 06/25/25 2351 40 mcg/min at 06/25/25 2351    ondansetron (ZOFRAN) injection 4 mg  4 mg Intravenous Q6H PRN Benjamin Dominguez MD   4 mg at 06/26/25 0840    pantoprazole (PROTONIX) injection 40 mg  40 mg Intravenous Q12H Lesa Gonzalez MD   40 mg at 06/26/25 0840    sodium chloride 0.9 % flush 10 mL  10 mL Intravenous Q12H Lesa Gonzalez MD   10 mL at 06/26/25 0840    sodium chloride 0.9 % flush 10 mL  10 mL Intravenous PRN Lesa Gonzalez MD        sodium chloride 0.9 % infusion 40 mL  40 mL Intravenous PRN Lesa Gonzalez MD           Vital Sign Min/Max  "for last 24 hours  Temp  Min: 97.7 °F (36.5 °C)  Max: 98.2 °F (36.8 °C)   BP  Min: 145/93  Max: 202/145   Pulse  Min: 95  Max: 103   Resp  Min: 16  Max: 18   SpO2  Min: 94 %  Max: 100 %   No data recorded   Weight  Min: 84.4 kg (186 lb)  Max: 87.1 kg (192 lb)     Flowsheet Rows      Flowsheet Row First Filed Value   Admission Height 188 cm (74\") Documented at 06/25/2025 1725   Admission Weight 84.4 kg (186 lb) Documented at 06/25/2025 1725            Results for orders placed during the hospital encounter of 11/24/24    Adult Transthoracic Echo Complete W/ Cont if Necessary Per Protocol    Interpretation Summary    Left ventricular systolic function is severely decreased. Left ventricular ejection fraction appears to be 31 - 35%.    The left ventricular cavity is moderate to severely dilated. Left ventricular wall thickness is consistent with moderate concentric hypertrophy.    The findings are consistent with dilated cardiomyopathy.    Mildly reduced right ventricular systolic function noted.    The left atrial cavity is mildly dilated.    No hemodynamically significant valvular disease.      Physical Exam:    General Appearance: Awake, alert, in no acute distress  Eyes: Pupils equal and reactive    Ears: Appear intact with no abnormalities noted  Nose: Nares normal, no drainage  Neck: supple, trachea midline, no carotid bruit and no JVD  Back: no kyphosis present,    Lungs: respirations regular, respirations even and respirations unlabored  Heart: normal S1, S2, no significant murmurs   No gallops or rubs  no rub and no click  Abdomen: normal bowel sounds, no tenderness   Skin: no bleeding, bruising or rash  Extremities: no cyanosis  Psychiatric/Behavioral: Negative for agitation, behavioral problems, confusion, the patient does  appear to be nervous/anxious.       Results Review:   I reviewed the patient's new clinical results.  I reviewed the patient's new imaging results and agree with the interpretation.  I " reviewed the patient's other test results and agree with the interpretation  I personally viewed and interpreted the patient's EKG/Telemetry data    Discussed with patient  Updated patient regarding any new or relevant abnormalities on review of records or any new findings on physical exam.   Mentioned to patient about purpose of visit and desirable health short and long term goals and objectives.     Reviewed available prior notes, consults, prior visits, laboratory findings, radiology and cardiology relevant reports.   Updated chart as applicable.   I have reviewed the patient's medical history in detail and updated the computerized patient record as relevant.          Assessment & Plan       Non-STEMI (non-ST elevated myocardial infarction)    Chronic systolic (congestive) heart failure    Seizure disorder    Congestive heart failure    Hypertensive crisis  Cocaine abuse  Marijuana abuse    Plan    Care plan reviewed and discussed with him  Check echocardiogram  Agree with dialysis  Importance of compliance to medications and diet stressed  Avoidance of all cardiotoxins stressed  Given cocaine abuse recommend no beta-blocker therapy  Discontinue IV nitroglycerin  Start hydralazine orally 25 mg p.o. 3 times daily and uptitrate as necessary  Start Imdur 30 mg p.o. daily and uptitrate as necessary  Telemetry  Deep vein thrombosis prophylaxis/precautions  Appropriate diet, fluid, sodium, caffeine, stimulants intake   Questions were encouraged, asked and answered to the patient's  understanding and satisfaction.  Compliance to diet and medications       Sammy Pizarro MD  06/26/25  09:38 CDT    EMR Dragon/Transcription was used to dictate part of this note

## 2025-06-26 NOTE — H&P
[facility] Medicine Services  HISTORY AND PHYSICAL    Date of Admission: 6/25/2025  Primary Care Physician: He Ortega MD    Subjective   Primary Historian: Patient    Chief Complaint: Chest pain    History of Present Illness  This is a 30-year-old male patient with a medical history of CHF, cardiomegaly, hypertension, CKD , seizures, history of drug use, recent history of peptic ulcer with GI bleed, presenting to the ED for the evaluation of chest pain.  As reported, patient was initially evaluated at [facility] for chest pain and he was given loading dose of aspirin in addition to nitro dose.  Patient was complaining of left-sided chest pain radiating to his left upper extremity, that started around 3 PM.  He denies having shortness of breath or abdominal pain, fever or chills, nausea vomiting or diarrhea.  To note that he was admitted few days ago to our hospital with GI bleed and was found to have peptic ulcer, received an endoscopy, was on PPI twice daily, and received blood transfusions.  He was also evaluated during the hospitalization by the nephrology team, and he was planned for dialysis and permacath placement, however he decided to leave AMA.  Today he mentions that he does not want to leave [place] at this time and he is ready to receive the care.        Review of Systems   Otherwise complete ROS reviewed and negative except as mentioned in the HPI.    Past Medical History:   Past Medical History:   Diagnosis Date    Cardiomegaly     CHF (congestive heart failure)     Congenital fusion of carpal bone     HTN (hypertension)     Non-STEMI (non-ST elevated myocardial infarction) 6/25/2025    Seizure      Past Surgical History:  Past Surgical History:   Procedure Laterality Date    ELBOW PROCEDURE      ENDOSCOPY N/A 6/22/2025    Procedure: ESOPHAGOGASTRODUODENOSCOPY;  Surgeon: Kevin Mirnada MD;  Location: [facility];  Service: Gastroenterology;   "Laterality: N/A;  preop; GI bleed   postop; gastric ulcer ; esophagitis     Social History:  reports that he has been smoking cigarettes. He has never used smokeless tobacco. He reports current alcohol use. He reports current drug use. Drugs: Marijuana and Cocaine(coke).    Family History: family history includes Heart disease in his mother; Hypertension in his mother; No Known Problems in his father.       Allergies:  Allergies   Allergen Reactions    Amoxicillin Anaphylaxis    Penicillins Anaphylaxis       Medications:  Prior to Admission medications    Medication Sig Start Date End Date Taking? Authorizing Provider   aspirin 81 MG EC tablet Take 1 tablet by mouth Daily. 11/27/24   Jenna Ospina MD   furosemide (LASIX) 40 MG tablet Take 1 tablet by mouth Daily.    ProviderAkanksha MD   hydrALAZINE (APRESOLINE) 50 MG tablet Take 2 tablets by mouth 3 (Three) Times a Day.    Akanksha Israel MD   levETIRAcetam (KEPPRA) 500 MG tablet Take 1 tablet by mouth 2 (Two) Times a Day.    Akanksha Israel MD   metoprolol succinate XL (TOPROL-XL) 100 MG 24 hr tablet Take 1 tablet by mouth Daily. 3/21/24   Guille Aguirre MD   Potassium 99 MG tablet Take 1 tablet by mouth Daily.    Akanksha Israel MD   sacubitril-valsartan (Entresto) 49-51 MG tablet Take 1 tablet by mouth 2 (Two) Times a Day. 3/21/24   Guille Aguirre MD     I have utilized all available immediate resources to obtain, update, or review the patient's current medications (including all prescriptions, over-the-counter products, herbals, cannabis/cannabidiol products, and vitamin/mineral/dietary (nutritional) supplements).    Objective     Vital Signs: BP (!) 182/122 (BP Location: Left arm, Patient Position: Sitting)   Pulse 95   Temp 98 °F (36.7 °C) (Oral)   Resp 16   Ht 188 cm (74\")   Wt 87.1 kg (192 lb)   SpO2 100%   BMI 24.65 kg/m²   Physical Exam  Constitutional:       Appearance: He is ill-appearing.   Cardiovascular:      Rate " and Rhythm: Normal rate and regular rhythm.      Pulses: Normal pulses.      Heart sounds: Normal heart sounds. No murmur heard.  Pulmonary:      Effort: Pulmonary effort is normal. No respiratory distress.      Breath sounds: Normal breath sounds. No wheezing or rales.   Abdominal:      General: Bowel sounds are normal. There is no distension.      Palpations: Abdomen is soft.      Tenderness: There is no abdominal tenderness. There is no guarding.   Musculoskeletal:      Right lower leg: Edema present.      Left lower leg: Edema present.   Skin:     General: Skin is warm.   Neurological:      Mental Status: He is alert and oriented to person, place, and time. Mental status is at baseline.              Results Reviewed:  Lab Results (last 24 hours)       Procedure Component Value Units Date/Time    Urinalysis With Microscopic If Indicated (No Culture) - Urine, Clean Catch [[id number]]  (Abnormal) Collected: 06/25/25 1953    Specimen: Urine, Clean Catch Updated: 06/25/25 2010     Color, UA Yellow     Appearance, UA Clear     pH, UA 6.0     Specific Gravity, UA 1.016     Glucose,  mg/dL (Trace)     Ketones, UA Negative     Bilirubin, UA Negative     Blood, UA Negative     Protein, UA >=300 mg/dL (3+)     Leuk Esterase, UA Negative     Nitrite, UA Negative     Urobilinogen, UA 1.0 E.U./dL    Urinalysis, Microscopic Only - Urine, Clean Catch [[id number]] Collected: 06/25/25 1953    Specimen: Urine, Clean Catch Updated: 06/25/25 2010     RBC, UA 0-2 /HPF      WBC, UA 0-2 /HPF      Bacteria, UA None Seen /HPF      Squamous Epithelial Cells, UA 0-2 /HPF      Hyaline Casts, UA None Seen /LPF      Methodology Automated Microscopy    Ethanol [[id number]] Collected: 06/25/25 1919    Specimen: Blood from Arm, Right Updated: 06/25/25 2001     Ethanol % <0.010 %     Narrative:      Not for legal purposes.    High Sensitivity Troponin T 1Hr [[id number]]  (Abnormal) Collected: 06/25/25 1919    Specimen: Blood from Arm, Right  "Updated: 06/25/25 1951     HS Troponin T 639 ng/L      Troponin T Numeric Delta 117 ng/L      Troponin T % Delta 22    Narrative:      High Sensitive Troponin T Reference Range:  <14.0 ng/L- Negative Female for AMI  <22.0 ng/L- Negative Male for AMI  >=14 - Abnormal Female indicating possible myocardial injury.  >=22 - Abnormal Male indicating possible myocardial injury.   Clinicians would have to utilize clinical acumen, EKG, Troponin, and serial changes to determine if it is an Acute Myocardial Infarction or myocardial injury due to an underlying chronic condition.         Procalcitonin [[id number]]  (Normal) Collected: 06/25/25 1756    Specimen: Blood Updated: 06/25/25 1858     Procalcitonin 0.19 ng/mL     Narrative:      As a Marker for Sepsis (Non-Neonates):    1. <0.5 ng/mL represents a low risk of severe sepsis and/or septic shock.  2. >2 ng/mL represents a high risk of severe sepsis and/or septic shock.    As a Marker for Lower Respiratory Tract Infections that require antibiotic therapy:    PCT on Admission    Antibiotic Therapy       6-12 Hrs later    >0.5                Strongly Recommended  >0.25 - <0.5        Recommended   0.1 - 0.25          Discouraged              Remeasure/reassess PCT  <0.1                Strongly Discouraged     Remeasure/reassess PCT    As 28 day mortality risk marker: \"Change in Procalcitonin Result\" (>80% or <=80%) if Day 0 (or Day 1) and Day 4 values are available. Refer to http://www.[employer]-pct-calculator.com    Change in PCT <=80%  A decrease of PCT levels below or equal to 80% defines a positive change in PCT test result representing a higher risk for 28-day all-cause mortality of patients diagnosed with severe sepsis for septic shock.    Change in PCT >80%  A decrease of PCT levels of more than 80% defines a negative change in PCT result representing a lower risk for 28-day all-cause mortality of patients diagnosed with severe sepsis or septic shock.       High Sensitivity " Troponin T [[id number]]  (Abnormal) Collected: 06/25/25 1756    Specimen: Blood Updated: 06/25/25 1855     HS Troponin T 522 ng/L     Narrative:      High Sensitive Troponin T Reference Range:  <14.0 ng/L- Negative Female for AMI  <22.0 ng/L- Negative Male for AMI  >=14 - Abnormal Female indicating possible myocardial injury.  >=22 - Abnormal Male indicating possible myocardial injury.   Clinicians would have to utilize clinical acumen, EKG, Troponin, and serial changes to determine if it is an Acute Myocardial Infarction or myocardial injury due to an underlying chronic condition.         Comprehensive Metabolic Panel [[id number]]  (Abnormal) Collected: 06/25/25 1756    Specimen: Blood Updated: 06/25/25 1853     Glucose 112 mg/dL      BUN 64.8 mg/dL      Creatinine 3.99 mg/dL      Sodium 138 mmol/L      Potassium 4.1 mmol/L      Chloride 102 mmol/L      CO2 23.0 mmol/L      Calcium 8.5 mg/dL      Total Protein 6.0 g/dL      Albumin 3.3 g/dL      ALT (SGPT) 22 U/L      AST (SGOT) 23 U/L      Alkaline Phosphatase 71 U/L      Total Bilirubin 0.7 mg/dL      Globulin 2.7 gm/dL      A/G Ratio 1.2 g/dL      BUN/Creatinine Ratio 16.2     Anion Gap 13.0 mmol/L      eGFR 20.0 mL/min/1.73     Narrative:      GFR Categories in Chronic Kidney Disease (CKD)              GFR Category          GFR (mL/min/1.73)    Interpretation  G1                    90 or greater        Normal or high (1)  G2                    60-89                Mild decrease (1)  G3a                   45-59                Mild to moderate decrease  G3b                   30-44                Moderate to severe decrease  G4                    15-29                Severe decrease  G5                    14 or less           Kidney failure    (1)In the absence of evidence of kidney disease, neither GFR category G1 or G2 fulfill the criteria for CKD.    eGFR calculation 2021 CKD-EPI creatinine equation, which does not include race as a factor    Magnesium  [[id number]]  (Normal) Collected: 06/25/25 1756    Specimen: Blood Updated: 06/25/25 1853     Magnesium 2.2 mg/dL     CK [[id number]]  (Normal) Collected: 06/25/25 1756    Specimen: Blood Updated: 06/25/25 1853     Creatine Kinase 186 U/L     Lactic Acid, Plasma [[id number]]  (Normal) Collected: 06/25/25 1756    Specimen: Blood Updated: 06/25/25 1851     Lactate 1.1 mmol/L     BNP [[id number]]  (Abnormal) Collected: 06/25/25 1756    Specimen: Blood Updated: 06/25/25 1851     proBNP 13,331.0 pg/mL     Narrative:      This assay is used as an aid in the diagnosis of individuals suspected of having heart failure. It can be used as an aid in the diagnosis of acute decompensated heart failure (ADHF) in patients presenting with signs and symptoms of ADHF to the emergency department (ED). In addition, NT-proBNP of <300 pg/mL indicates ADHF is not likely.    Age Range Result Interpretation  NT-proBNP Concentration (pg/mL:      <50             Positive            >450                   Gray                 300-450                    Negative             <300    50-75           Positive            >900                  Gray                300-900                  Negative            <300      >75             Positive            >1800                  Gray                300-1800                  Negative            <300    aPTT [[id number]]  (Normal) Collected: 06/25/25 1756    Specimen: Blood Updated: 06/25/25 1841     PTT 32.9 seconds     Narrative:      PTT = The equivalent PTT values for the therapeutic range of heparin levels at 0.3 to 0.7 U/ml are 77 - 99 seconds.    Protime-INR [[id number]]  (Abnormal) Collected: 06/25/25 1756    Specimen: Blood Updated: 06/25/25 1841     Protime 14.9 Seconds      INR 1.11    CBC & Differential [[id number]]  (Abnormal) Collected: 06/25/25 1756    Specimen: Blood Updated: 06/25/25 1831    Narrative:      The following orders were created for panel order CBC & Differential.  Procedure                                Abnormality         Status                     ---------                               -----------         ------                     CBC Auto Differential[[id number]]        Abnormal            Final result                 Please view results for these tests on the individual orders.    CBC Auto Differential [[id number]]  (Abnormal) Collected: 06/25/25 1756    Specimen: Blood Updated: 06/25/25 1831     WBC 7.44 10*3/mm3      RBC 2.79 10*6/mm3      Hemoglobin 7.6 g/dL      Hematocrit 24.8 %      MCV 88.9 fL      MCH 27.2 pg      MCHC 30.6 g/dL      RDW 18.2 %      RDW-SD 56.8 fl      MPV 11.2 fL      Platelets 131 10*3/mm3      Neutrophil % 82.8 %      Lymphocyte % 10.9 %      Monocyte % 5.0 %      Eosinophil % 0.5 %      Basophil % 0.4 %      Immature Grans % 0.4 %      Neutrophils, Absolute 6.16 10*3/mm3      Lymphocytes, Absolute 0.81 10*3/mm3      Monocytes, Absolute 0.37 10*3/mm3      Eosinophils, Absolute 0.04 10*3/mm3      Basophils, Absolute 0.03 10*3/mm3      Immature Grans, Absolute 0.03 10*3/mm3      nRBC 0.0 /100 WBC           Imaging Results (Last 24 Hours)       Procedure Component Value Units Date/Time    XR Chest 1 View [[id number]] Collected: 06/25/25 1746     Updated: 06/25/25 1750    Narrative:      EXAM/TECHNIQUE: XR CHEST 1 VW-     INDICATION: Chest pain, shortness of breath     COMPARISON: None     FINDINGS:  Marked enlargement of the cardiac silhouette. No pleural effusion or  visible pneumothorax. No focal consolidation. No acute osseous finding.       Impression:      1.  Marked enlargement of the cardiac silhouette.  2.  Lungs are clear.        This report was signed and finalized on 6/25/2025 5:47 PM by Dr. Juan Miguel Aparicio MD.             I have personally reviewed and interpreted the radiology studies and ECG obtained at time of admission.     Assessment / Plan   Assessment:   Active Hospital Problems    Diagnosis     **Non-STEMI (non-ST elevated myocardial infarction)      Hypertensive crisis     Congestive heart failure     Seizure disorder     Chronic systolic (congestive) heart failure        Treatment Plan  The patient will be admitted to my service here at [facility].     Assessment and plan:  #Non-STEMI.  #Hypertensive crisis.  #Recent history of GI bleed, peptic ulcer.  #CKD, approaching end-stage.  #Chronic anemia.    - Admitting to floor with telemetry.  - Patient was given loading aspirin in the other hospital, will continue with baby dose aspirin in the morning.  - Patient placed on nitro drip given his hypertensive crisis and chest pain.  Patient is not having chest pain at the moment.  - Cardiology was not contacted by ED. So, I discussed this case with Ms. Isaacs from cardiology team, who agreed to consult in the morning.  Discussed the concern for anticoagulation especially with the recent GI bleed and gastric ulcer, and was advised to hold off anticoagulation for now.  - Will keep patient n.p.o. after midnight in case of any procedure with cardiology.  - TTE ordered.  - DVT prophylaxis with SCDs for now given recent history of GI bleed.  - Adding UA and drug screen.  - Consulting with nephrology in AM.  - As needed blood pressure medications for hypertensive crisis.      Medical Decision Making  Number and Complexity of problems: 2 acute and moderate  Differential Diagnosis: As above    Conditions and Status        Condition is worsening.     MDM Data  External documents reviewed: Yes  Cardiac tracing (EKG, telemetry) interpretation: Yes  Radiology interpretation: Yes  Labs reviewed: Yes  Any tests that were considered but not ordered: No     Decision rules/scores evaluated (example [id number], Wells, etc): No     Discussed with: Patient and ED provider, and cardiology team     Care Planning  Shared decision making: Discussed the plan with the patient and he was agreeable  Code status and discussions: Full code    Disposition  Social Determinants of  Health that impact treatment or disposition: Not at the moment  Estimated length of stay is 2 to 3 days.     I confirmed that the patient's advanced care plan is present, code status is documented, and a surrogate decision maker is listed in the patient's medical record.       The patient was seen and examined by me on 6/25 at 8 PM.    Electronically signed by Lesa Gonzalez MD, 06/25/25, 21:01 CDT.

## 2025-06-26 NOTE — CONSULTS
Nephrology ([facility]) Consult Note      Patient:  José Miguel Webb  YOB: 1995  Date of Service: 6/26/2025  MRN: [id number]   Acct: [id number]   Primary Care Physician: He Ortega MD  Advance Directive:   Code Status and Medical Interventions: CPR (Attempt to Resuscitate); Full Support   Ordered at: 06/25/25 2000     Code Status (Patient has no pulse and is not breathing):    CPR (Attempt to Resuscitate)     Medical Interventions (Patient has pulse or is breathing):    Full Support     Level Of Support Discussed With:    Patient     Admit Date: 6/25/2025       Hospital Day: 1  Referring Provider: Nicola Redd MD      Patient personally seen and examined.  Complete chart including Consults, Notes, Operative Reports, Labs, Cardiology, and Radiology studies reviewed as able.        Subjective:  José Miguel Webb is a 30 y.o. male for whom we were consulted for evaluation and treatment of chronic kidney disease. Baseline chronic kidney disease stage 4, baseline creatinine approximately 4.0. Has followed with our providers on a sporadic basis over the years, typically no-shows for his office visits. History of hypertension, CHF, seizure disorder. Patient was recently treated at [facility] for GI bleed and MINDY. He was scheduled to have permcath placed and to start hemodialysis but left AMA on 6/24.  Presented to [facility] on 6/25 with chest pain and was transferred to [facility] for cardiology and nephrology evaluation. Creatinine 3.99 when admitted this time. Currently he is awake and alert. No complaint of pain or dyspnea at time of exam. Denies n/v/d. Denies edema. No recent changes in urination. He explains that he is willing to proceed with dialysis if it is needed    Allergies:  Amoxicillin and Penicillins    Home Meds:  Medications Prior to Admission   Medication Sig Dispense Refill Last Dose/Taking    sacubitril-valsartan (Entresto)  49-51 MG tablet Take 1 tablet by mouth 2 (Two) Times a Day. 180 tablet 3        Medicines:  Current Facility-Administered Medications   Medication Dose Route Frequency Provider Last Rate Last Admin    acetaminophen (TYLENOL) tablet 1,000 mg  1,000 mg Oral Q6H PRN Lesa Gonzalez MD   1,000 mg at 06/25/25 2343    aspirin EC tablet 81 mg  81 mg Oral Daily Lesa Gonzalez MD   81 mg at 06/26/25 0839    hydrALAZINE (APRESOLINE) injection 10 mg  10 mg Intravenous Q6H PRN Lesa Gonzalez MD   10 mg at 06/25/25 2202    hydrALAZINE (APRESOLINE) tablet 25 mg  25 mg Oral Q8H Sammy Pizarro MD        isosorbide mononitrate (IMDUR) 24 hr tablet 30 mg  30 mg Oral Q24H Sammy Pizarro MD        levETIRAcetam (KEPPRA) tablet 500 mg  500 mg Oral BID Lesa Gonzalez MD   500 mg at 06/26/25 0839    metoprolol succinate XL (TOPROL-XL) 24 hr tablet 100 mg  100 mg Oral Daily Lesa Gonzalez MD   100 mg at 06/26/25 0840    nitroglycerin (NITROSTAT) SL tablet 0.4 mg  0.4 mg Sublingual Q5 Min PRN Lesa Gonzalez MD        ondansetron (ZOFRAN) injection 4 mg  4 mg Intravenous Q6H PRN Benjamin Dominguez MD   4 mg at 06/26/25 0840    pantoprazole (PROTONIX) injection 40 mg  40 mg Intravenous Q12H Lesa Gonzalez MD   40 mg at 06/26/25 0840    sodium chloride 0.9 % flush 10 mL  10 mL Intravenous Q12H Lesa Gonzalez MD   10 mL at 06/26/25 0840    sodium chloride 0.9 % flush 10 mL  10 mL Intravenous PRN Lesa Gonzalez MD        sodium chloride 0.9 % infusion 40 mL  40 mL Intravenous PRN Lesa Gonzalez MD           Past Medical History:  Past Medical History:   Diagnosis Date    Cardiomegaly     CHF (congestive heart failure)     Congenital fusion of carpal bone     HTN (hypertension)     Non-STEMI (non-ST elevated myocardial infarction) 6/25/2025    Seizure        Past Surgical History:  Past Surgical History:   Procedure Laterality Date    ELBOW PROCEDURE      ENDOSCOPY N/A 6/22/2025    Procedure: ESOPHAGOGASTRODUODENOSCOPY;  Surgeon: Ruth  MD Kevin;  Location: [facility] OR;  Service: Gastroenterology;  Laterality: N/A;  preop; GI bleed   postop; gastric ulcer ; esophagitis       Family History  Family History   Problem Relation Age of Onset    Hypertension Mother     Heart disease Mother     No Known Problems Father        Social History  Social History     Socioeconomic History    Marital status: Single   Tobacco Use    Smoking status: Some Days     Current packs/day: 0.50     Types: Cigarettes    Smokeless tobacco: Never   Vaping Use    Vaping status: Never Used   Substance and Sexual Activity    Alcohol use: Yes    Drug use: Yes     Types: Marijuana, Cocaine(coke)    Sexual activity: Defer         Review of Systems:  History obtained from chart review and the patient  General ROS: No fever or chills  Respiratory ROS: No cough, shortness of breath, wheezing  Cardiovascular ROS: No chest pain or palpitations  Gastrointestinal ROS: No abdominal pain or melena  Genito-Urinary ROS: No dysuria or hematuria  Psych ROS: No anxiety and depression  14 point ROS reviewed with the patient and negative except as noted above and in the HPI unless unable to obtain.      Objective:  Patient Vitals for the past 24 hrs:   BP Temp Temp src Pulse Resp SpO2 Height Weight   06/26/25 0700 153/98 98.2 °F (36.8 °C) Oral 96 18 -- -- --   06/26/25 0422 155/100 98.2 °F (36.8 °C) Oral 97 18 100 % -- --   06/26/25 0204 (!) 162/104 -- -- -- -- -- -- --   06/26/25 0021 145/93 -- -- -- -- -- -- --   06/25/25 2344 (!) 162/108 -- -- 98 18 100 % -- --   06/25/25 2304 (!) 176/106 -- -- -- -- -- -- --   06/25/25 2232 (!) 176/106 -- -- -- -- -- -- --   06/25/25 2158 (!) 188/133 -- -- -- -- -- -- --   06/25/25 2039 (!) 182/122 98 °F (36.7 °C) Oral 95 -- 100 % -- 87.1 kg (192 lb)   06/25/25 2016 (!) 177/110 -- -- 95 -- 100 % -- --   06/25/25 2008 (!) 170/108 98 °F (36.7 °C) -- -- 16 98 % -- --   06/25/25 1946 (!) 187/135 -- -- 103 16 100 % -- --   06/25/25 1835 (!) 171/135 -- -- 102 -- 94  "% -- --   06/25/25 1759 (!) 202/145 -- -- 99 -- -- -- --   06/25/25 1725 (!) 171/134 97.7 °F (36.5 °C) Oral 96 16 100 % 188 cm (74\") 84.4 kg (186 lb)       Intake/Output Summary (Last 24 hours) at 6/26/2025 1021  Last data filed at 6/26/2025 0900  Gross per 24 hour   Intake --   Output 1500 ml   Net -1500 ml     General: awake/alert   Chest:  clear to auscultation bilaterally without respiratory distress  CVS: regular rate and rhythm  Abdominal: soft, nontender, positive bowel sounds  Extremities: no cyanosis or edema  Skin: warm and dry without rash      Labs:  Results from last 7 days   Lab Units 06/26/25 0313 06/25/25 1756 06/24/25  0658   WBC 10*3/mm3 5.98 7.44 7.33   HEMOGLOBIN g/dL 7.0* 7.6* 7.0*   HEMATOCRIT % 23.0* 24.8* 22.3*   PLATELETS 10*3/mm3 123* 131* 117*         Results from last 7 days   Lab Units 06/26/25  0313 06/25/25  1756 06/24/25  0526   SODIUM mmol/L 132* 138 135*   POTASSIUM mmol/L 4.0 4.1 4.4   CHLORIDE mmol/L 101 102 103   CO2 mmol/L 20.0* 23.0 22.0   BUN mg/dL 63.5* 64.8* 75.4*   CREATININE mg/dL 3.96* 3.99* 4.97*   CALCIUM mg/dL 8.2* 8.5* 7.6*   EGFR mL/min/1.73 20.2* 20.0* 15.4*   BILIRUBIN mg/dL 0.8 0.7 0.8   ALK PHOS U/L 66 71 58   ALT (SGPT) U/L 18 22 24   AST (SGOT) U/L 25 23 26   GLUCOSE mg/dL 140* 112* 99       Radiology:   Imaging Results (Last 72 Hours)       Procedure Component Value Units Date/Time    XR Chest 1 View [[id number]] Collected: 06/25/25 1746     Updated: 06/25/25 1750    Narrative:      EXAM/TECHNIQUE: XR CHEST 1 VW-     INDICATION: Chest pain, shortness of breath     COMPARISON: None     FINDINGS:  Marked enlargement of the cardiac silhouette. No pleural effusion or  visible pneumothorax. No focal consolidation. No acute osseous finding.       Impression:      1.  Marked enlargement of the cardiac silhouette.  2.  Lungs are clear.        This report was signed and finalized on 6/25/2025 5:47 PM by Dr. Juan Miguel Aparicio MD.               Culture:  No results " "found for: \"BLOODCX\", \"URINECX\", \"WOUNDCX\", \"MRSACX\", \"RESPCX\", \"STOOLCX\"      Assessment    Chronic kidney disease stage 4  Type 2 diabetes  Hypertension  CHF  Seizure disorder  Anemia  Non-STEMI  Medical noncompliance    Plan:   Renal function has improved compared to the previous admission earlier this week and is currently at his baseline level.   Having said that, it remains likely that he will need to start dialysis in the not too distant future.   Typically we would be able manage a patient approaching ESRD in the outpatient setting, but this may be difficult in this case given the patient's long history of noncompliance and unwillingness to follow up as directed.    Further assessment and plan pending Dr Hammer's evaluation of patient      Thank you for the consult, we appreciate the opportunity to provide care to your patients.  Feel free to contact me if I can be of any further assistance.      Dario Fernandez, APRN  6/26/2025  10:21 CDT  "

## 2025-06-26 NOTE — NURSING NOTE
/104 with Nitro gtt @ 40. Instructed not to titrate up at this time per Dr. Gonzalez. No complaints at this time.

## 2025-06-26 NOTE — PAYOR COMM NOTE
"6/26/25 [facility] [phone]  [phone]    ER ADMIT TO INPATIENT ON 6/25/25, FAXING CLINICAL.          José Miguel Webb (30 y.o. Male)       Date of Birth   1995    Social Security Number       Address   [address] [place] [address]    Home Phone   [phone]    MRN   [id number]       Taoist   Baptism    Marital Status   Single                            Admission Date   6/25/2025    Admission Type   Urgent    Admitting Provider   Benjamin Dominguez MD    Attending Provider   Benjamin Dominguez MD    Department, Room/Bed   [facility] 4B, 448/1       Discharge Date       Discharge Disposition       Discharge Destination                                 Attending Provider: Benjamin Dominguez MD    Allergies: Amoxicillin, Penicillins    Isolation: None   Infection: None   Code Status: CPR    Ht: 188 cm (74\")   Wt: 87.1 kg (192 lb)    Admission Cmt: None   Principal Problem: Non-STEMI (non-ST elevated myocardial infarction) [I21.4]                   Active Insurance as of 6/25/2025       Primary Coverage       Payor Plan Insurance Group Employer/Plan Group    [place] [id number]       Payor Plan Address Payor Plan Phone Number Payor Plan Fax Number Effective Dates    [employer] [address] [address] [phone]  1/1/2025 - None Entered    [facility] [id number]         Subscriber Name Subscriber Birth Date Member ID       JOSÉ MIGUEL WEBB 1995 [id number]                     Emergency Contacts        (Rel.) Home Phone Work Phone Mobile Phone    Jesika Webb (Mother) [phone] -- [phone]          Jason Bakre Jr., MD   Physician  Emergency Medicine     ED Provider Notes      Signed     Date of Service: 06/25/25 1801  Creation Time: 06/25/25 1801     Signed       Expand All Collapse All    HPI:      Patient is a 30-year-old -American male who is a transfer from [facility] " with a non-STEMI chest pain.  Patient has end-stage renal disease and was supposed to be getting a permacath for dialysis.  He was recently admitted and left AGAINST MEDICAL ADVICE.  Patient has a past history of congestive heart failure.        REVIEW OF SYSTEMS  CONSTITUTIONAL:  No complaints of fever, chills,or weakness  EYES:  No complaints of discharge   ENT: No complaints of sore throat or ear pain  CARDIOVASCULAR: Positive for chest pressure now resolved after nitroglycerin drip.  Also with hypertension.    RESPIRATORY:  No complaints of cough or shortness of breath  GI:  No complaints of abdominal pain, nausea, vomiting, or diarrhea  MUSCULOSKELETAL:  No complaints of back pain  SKIN:  No complaints of rash  NEUROLOGIC:  No complaints of headache, focal weakness, or sensory changes  ENDOCRINE:  No complaints of polyuria or polydipsia  LYMPHATIC:  No complaints of swollen glands  GENITOURINARY: No complaints of urinary frequency or hematuria          PAST MEDICAL HISTORY  Medical History        Past Medical History:   Diagnosis Date    Cardiomegaly      CHF (congestive heart failure)      Congenital fusion of carpal bone      HTN (hypertension)      Non-STEMI (non-ST elevated myocardial infarction) 6/25/2025    Seizure              FAMILY HISTORY        Family History   Problem Relation Age of Onset    Hypertension Mother      Heart disease Mother      No Known Problems Father           SOCIAL HISTORY  Social History   Social History            Socioeconomic History    Marital status: Single   Tobacco Use    Smoking status: Some Days       Current packs/day: 0.50       Types: Cigarettes    Smokeless tobacco: Never   Vaping Use    Vaping status: Never Used   Substance and Sexual Activity    Alcohol use: Yes    Drug use: Yes       Types: Marijuana, Cocaine(coke)    Sexual activity: Defer            IMMUNIZATION HISTORY  Deferred to primary care physician.     SURGICAL HISTORY  Surgical History         Past  "Surgical History:   Procedure Laterality Date    ELBOW PROCEDURE        ENDOSCOPY N/A 6/22/2025     Procedure: ESOPHAGOGASTRODUODENOSCOPY;  Surgeon: Kevin Miranda MD;  Location: [facility];  Service: Gastroenterology;  Laterality: N/A;  preop; GI bleed   postop; gastric ulcer ; esophagitis            CURRENT MEDICATIONS    Current Medications      Current Facility-Administered Medications:     hydrALAZINE (APRESOLINE) injection 10 mg, 10 mg, Intravenous, Q6H PRN, Lesa Gonzalez MD    nitroglycerin (TRIDIL) 200 mcg/ml infusion, 5-200 mcg/min, Intravenous, Titrated, Jason Baker Jr., MD, Last Rate: 6 mL/hr at 06/25/25 1946, 20 mcg/min at 06/25/25 1946    pantoprazole (PROTONIX) injection 40 mg, 40 mg, Intravenous, Q12H, Lesa Gonzalez MD     Current Outpatient Medications:     aspirin 81 MG EC tablet, Take 1 tablet by mouth Daily., Disp: 30 tablet, Rfl: 0    furosemide (LASIX) 40 MG tablet, Take 1 tablet by mouth Daily., Disp: , Rfl:     hydrALAZINE (APRESOLINE) 50 MG tablet, Take 2 tablets by mouth 3 (Three) Times a Day., Disp: , Rfl:     levETIRAcetam (KEPPRA) 500 MG tablet, Take 1 tablet by mouth 2 (Two) Times a Day., Disp: , Rfl:     metoprolol succinate XL (TOPROL-XL) 100 MG 24 hr tablet, Take 1 tablet by mouth Daily., Disp: 90 tablet, Rfl: 3    Potassium 99 MG tablet, Take 1 tablet by mouth Daily., Disp: , Rfl:     sacubitril-valsartan (Entresto) 49-51 MG tablet, Take 1 tablet by mouth 2 (Two) Times a Day., Disp: 180 tablet, Rfl: 3        ALLERGIES  Allergies        Allergies   Allergen Reactions    Amoxicillin Anaphylaxis    Penicillins Anaphylaxis                  Cardiac exam     VITAL SIGNS:  BP (!) 187/135   Pulse 103   Temp 97.7 °F (36.5 °C) (Oral)   Resp 16   Ht 188 cm (74\")   Wt 84.4 kg (186 lb)   SpO2 100%   BMI 23.88 kg/m²      Constitutional: Patient is alert and in no distress.  Patient with no active discomfort.     ENT: There is a normal pharynx with no acute erythema or exudate " and oral mucosa is moist.  Nose is clear with no drainage.  Tympanic membranes intact and nonerythemic     Respiratory: Patient is clear to auscultation bilaterally with no wheezing or rhonchi.  Chest wall is nontender.  There are no external lesions on the chest.  There is no crepitance     Cardiovascular: S1-S2 with a diasone murmur     Abdomen: Soft, nontender, and  bowel sounds are normal in all 4 quadrants.  There is no rebound or guarding noted.  There is no abdominal distention or hepatosplenomegaly.     Genitourinary: Patient is voiding appropriately.     Integument: No acute lesions noted and color appears to be normal.     Ami Coma Scale: Total score 15     Neurological: Patient is alert and oriented x4 and no acute findings noted.  Speech is fluent and cognition is normal.  No evidence of acute CVA.  Cranial nerves II through XII intact.  Patient with normal motor function as well as reflexes and sensation           RADIOLOGY/PROCEDURES        XR Chest 1 View   Final Result   1.  Marked enlargement of the cardiac silhouette.   2.  Lungs are clear.           This report was signed and finalized on 6/25/2025 5:47 PM by Dr. Juan Miguel Aparicio MD.                    FUTURE APPOINTMENTS     No future appointments.         EKG (reviewed and interpreted by me): Normal sinus rhythm. No acute ischemia. Heart rate 97 with no acute ST segment elevation or depression noted        HEART SCORE      Patient history  1       Moderately suspicious (1 point)     2   ECG         Nonspecific repolarization disturbance (1 point)   Normal (0 points)  3   Patient age         Less than 45 (0 points)  4   Risk factors (Hypercholesterolemia, Hypertension, diabetes, smoking, obesity)      More than 3 risk factors or atherosclerosis history (2 points)     5   Troponin      3 times higher than normal or more (2 points)        6      TOTAL RISK NUMBER: 6     The three risk categories are described below:  Heart scoreMACE  riskRecommendation  0 - 3Low (1.7%)Discharge can be an option.  4 - 6Intermediate (20.3%)Clinical observation and further investigations.  7 - 10High (72.2%)Immediate invasive treatment           COURSE & MEDICAL DECISION MAKING        Patient's partial differential diagnosis can include:     Unstable angina, angina, non-STEMI, arrhythmia, pneumothorax, pulmonary embolism, electrolyte abnormality,  Prinzmetal angina, costochondritis pleurisy, pleural effusion, pulmonary edema, panic attack, esophageal spasm, GERD, gastritis, chest spasms, and others     CBC, CMP, troponin, chest x-ray, EKG procalcitonin, magnesium, CK, lactate,      Troponin elevated x 2 patient's blood     Discussed case with hospitalist Dr. Gonzalez who will admit the patient to telemetry under his medical care.     Patient's level of risk: Moderate           CRITICAL CARE     CRITICAL CARE: No    CRITICAL CARE TIME: None        Recent Results         Recent Results (from the past 24 hours)   Prepare RBC, 1 Units     Collection Time: 06/25/25  6:04 AM   Result Value Ref Range     Product Code C8663J51       Unit Number [id number]       UNIT  ABO AB       UNIT  RH POS       Crossmatch Interpretation Compatible       Dispense Status RE       Blood Expiration Date [id number]       Blood Type Barcode 8400     ECG 12 Lead Chest Pain     Collection Time: 06/25/25  5:42 PM   Result Value Ref Range     QT Interval 354 ms     QTC Interval 449 ms   Comprehensive Metabolic Panel     Collection Time: 06/25/25  5:56 PM     Specimen: Blood   Result Value Ref Range     Glucose 112 (H) 65 - 99 mg/dL     BUN 64.8 (H) 6.0 - 20.0 mg/dL     Creatinine 3.99 (H) 0.76 - 1.27 mg/dL     Sodium 138 136 - 145 mmol/L     Potassium 4.1 3.5 - 5.2 mmol/L     Chloride 102 98 - 107 mmol/L     CO2 23.0 22.0 - 29.0 mmol/L     Calcium 8.5 (L) 8.6 - 10.5 mg/dL     Total Protein 6.0 6.0 - 8.5 g/dL     Albumin 3.3 (L) 3.5 - 5.2 g/dL     ALT (SGPT) 22 1 - 41 U/L     AST (SGOT) 23 1  - 40 U/L     Alkaline Phosphatase 71 39 - 117 U/L     Total Bilirubin 0.7 0.0 - 1.2 mg/dL     Globulin 2.7 gm/dL     A/G Ratio 1.2 g/dL     BUN/Creatinine Ratio 16.2 7.0 - 25.0     Anion Gap 13.0 5.0 - 15.0 mmol/L     eGFR 20.0 (L) >60.0 mL/min/1.73   Protime-INR     Collection Time: 06/25/25  5:56 PM     Specimen: Blood   Result Value Ref Range     Protime 14.9 (H) 11.8 - 14.8 Seconds     INR 1.11 (H) 0.91 - 1.09   aPTT     Collection Time: 06/25/25  5:56 PM     Specimen: Blood   Result Value Ref Range     PTT 32.9 24.5 - 36.0 seconds   BNP     Collection Time: 06/25/25  5:56 PM     Specimen: Blood   Result Value Ref Range     proBNP 13,331.0 (H) 0.0 - 450.0 pg/mL   High Sensitivity Troponin T     Collection Time: 06/25/25  5:56 PM     Specimen: Blood   Result Value Ref Range     HS Troponin T 522 (C) <22 ng/L   Lactic Acid, Plasma     Collection Time: 06/25/25  5:56 PM     Specimen: Blood   Result Value Ref Range     Lactate 1.1 0.5 - 2.0 mmol/L   Procalcitonin     Collection Time: 06/25/25  5:56 PM     Specimen: Blood   Result Value Ref Range     Procalcitonin 0.19 0.00 - 0.25 ng/mL   CK     Collection Time: 06/25/25  5:56 PM     Specimen: Blood   Result Value Ref Range     Creatine Kinase 186 20 - 200 U/L   Magnesium     Collection Time: 06/25/25  5:56 PM     Specimen: Blood   Result Value Ref Range     Magnesium 2.2 1.6 - 2.6 mg/dL   CBC Auto Differential     Collection Time: 06/25/25  5:56 PM     Specimen: Blood   Result Value Ref Range     WBC 7.44 3.40 - 10.80 10*3/mm3     RBC 2.79 (L) 4.14 - 5.80 10*6/mm3     Hemoglobin 7.6 (L) 13.0 - 17.7 g/dL     Hematocrit 24.8 (L) 37.5 - 51.0 %     MCV 88.9 79.0 - 97.0 fL     MCH 27.2 26.6 - 33.0 pg     MCHC 30.6 (L) 31.5 - 35.7 g/dL     RDW 18.2 (H) 12.3 - 15.4 %     RDW-SD 56.8 (H) 37.0 - 54.0 fl     MPV 11.2 6.0 - 12.0 fL     Platelets 131 (L) 140 - 450 10*3/mm3     Neutrophil % 82.8 (H) 42.7 - 76.0 %     Lymphocyte % 10.9 (L) 19.6 - 45.3 %     Monocyte % 5.0 5.0 -  12.0 %     Eosinophil % 0.5 0.3 - 6.2 %     Basophil % 0.4 0.0 - 1.5 %     Immature Grans % 0.4 0.0 - 0.5 %     Neutrophils, Absolute 6.16 1.70 - 7.00 10*3/mm3     Lymphocytes, Absolute 0.81 0.70 - 3.10 10*3/mm3     Monocytes, Absolute 0.37 0.10 - 0.90 10*3/mm3     Eosinophils, Absolute 0.04 0.00 - 0.40 10*3/mm3     Basophils, Absolute 0.03 0.00 - 0.20 10*3/mm3     Immature Grans, Absolute 0.03 0.00 - 0.05 10*3/mm3     nRBC 0.0 0.0 - 0.2 /100 WBC   High Sensitivity Troponin T 1Hr     Collection Time: 06/25/25  7:19 PM     Specimen: Arm, Right; Blood   Result Value Ref Range     HS Troponin T 639 (C) <22 ng/L     Troponin T Numeric Delta 117 (C) Abnormal if >/=3 ng/L     Troponin T % Delta 22 (C) Abnormal if >/= 20%                  Old charts were reviewed per [facility] EMR.  Pertinent details are summarized above.  All laboratory, radiologic, and EKG studies that were performed in the Emergency Department were a necessary part of the evaluation needed to exclude unstable or  emergent medical conditions.      Patient was hemodynamically and neurologically stable in the ED.   Pertinent studies were reviewed as above.      The patient received:  Medications   nitroglycerin (TRIDIL) 200 mcg/ml infusion (20 mcg/min Intravenous Rate/Dose Change 6/25/25 1946)   hydrALAZINE (APRESOLINE) injection 10 mg (has no administration in time range)   pantoprazole (PROTONIX) injection 40 mg (has no administration in time range)   labetalol (NORMODYNE,TRANDATE) injection 10 mg (10 mg Intravenous Given 6/25/25 1950)   metoprolol succinate XL (TOPROL-XL) 24 hr tablet 25 mg (25 mg Oral Given 6/25/25 1949)            ED Disposition         ED Disposition   Decision to Admit    Condition   --    Comment   Level of Care: Telemetry [5]   Diagnosis: Non-STEMI (non-ST elevated myocardial infarction) [055251]   Admitting Physician: SANDRA GARCIA [[id number]]   Attending Physician: SANDRA GARCIA [[id number]]   Certification: I Certify That Inpatient  Hospital Services Are Medically Necessary For Greater Than 2 Midnights                         Dragon disclaimer:  Part of this note may be an electronic transcription/translation of spoken language to printed text using the Dragon Dictation System.     I have reviewed the patient’s prescription history via a prescription monitoring program.  This information is consistent with my knowledge of the patient’s controlled substance use history.     Patient evaluated during Coronavirus Pandemic. Isolation practices followed according to [facility] policy.      FINAL IMPRESSION    Diagnosis Plan   1. Other congestive heart failure          2. Non-STEMI (non-ST elevated myocardial infarction)          3. Hypertensive urgency          4. End stage renal disease                   MD Samuel Robertson Jr, Thomas Mark Jr., MD  06/25/25 2000                   Lesa Gonzalez MD   Physician  Specialty: Hospitalist     H&P      Addendum     Date of Service: 06/25/25 1942  Creation Time: 06/25/25 1942     Expand All Collapse [facility] Medicine Services  HISTORY AND PHYSICAL     Date of Admission: 6/25/2025  Primary Care Physician: He Ortega MD     Subjective   Primary Historian: Patient     Chief Complaint: Chest pain     History of Present Illness  This is a 30-year-old male patient with a medical history of CHF, cardiomegaly, hypertension, CKD , seizures, history of drug use, recent history of peptic ulcer with GI bleed, presenting to the ED for the evaluation of chest pain.  As reported, patient was initially evaluated at [facility] for chest pain and he was given loading dose of aspirin in addition to nitro dose.  Patient was complaining of left-sided chest pain radiating to his left upper extremity, that started around 3 PM.  He denies having shortness of breath or abdominal pain, fever or chills, nausea vomiting or diarrhea.  To note  that he was admitted few days ago to our hospital with GI bleed and was found to have peptic ulcer, received an endoscopy, was on PPI twice daily, and received blood transfusions.  He was also evaluated during the hospitalization by the nephrology team, and he was planned for dialysis and permacath placement, however he decided to leave AMA.  Today he mentions that he does not want to leave AMA at this time and he is ready to receive the care.           Review of Systems   Otherwise complete ROS reviewed and negative except as mentioned in the HPI.     Past Medical History:   Medical History[]Expand by Default        Past Medical History:   Diagnosis Date    Cardiomegaly      CHF (congestive heart failure)      Congenital fusion of carpal bone      HTN (hypertension)      Non-STEMI (non-ST elevated myocardial infarction) 6/25/2025    Seizure           Past Surgical History:  Surgical History         Past Surgical History:   Procedure Laterality Date    ELBOW PROCEDURE        ENDOSCOPY N/A 6/22/2025     Procedure: ESOPHAGOGASTRODUODENOSCOPY;  Surgeon: Kevin Miranda MD;  Location: [facility];  Service: Gastroenterology;  Laterality: N/A;  preop; GI bleed   postop; gastric ulcer ; esophagitis         Social History:  reports that he has been smoking cigarettes. He has never used smokeless tobacco. He reports current alcohol use. He reports current drug use. Drugs: Marijuana and Cocaine(coke).     Family History: family history includes Heart disease in his mother; Hypertension in his mother; No Known Problems in his father.        Allergies:  Allergies        Allergies   Allergen Reactions    Amoxicillin Anaphylaxis    Penicillins Anaphylaxis            Medications:          Prior to Admission medications    Medication Sig Start Date End Date Taking? Authorizing Provider   aspirin 81 MG EC tablet Take 1 tablet by mouth Daily. 11/27/24     Jenna Ospina MD   furosemide (LASIX) 40 MG tablet Take 1 tablet by mouth  "Daily.       Akanksha Israel MD   hydrALAZINE (APRESOLINE) 50 MG tablet Take 2 tablets by mouth 3 (Three) Times a Day.       Akanksha Israel MD   levETIRAcetam (KEPPRA) 500 MG tablet Take 1 tablet by mouth 2 (Two) Times a Day.       Akanksha Israel MD   metoprolol succinate XL (TOPROL-XL) 100 MG 24 hr tablet Take 1 tablet by mouth Daily. 3/21/24     Guille Aguirre MD   Potassium 99 MG tablet Take 1 tablet by mouth Daily.       Akanksha Israel MD   sacubitril-valsartan (Entresto) 49-51 MG tablet Take 1 tablet by mouth 2 (Two) Times a Day. 3/21/24     Guille Aguirre MD      I have utilized all available immediate resources to obtain, update, or review the patient's current medications (including all prescriptions, over-the-counter products, herbals, cannabis/cannabidiol products, and vitamin/mineral/dietary (nutritional) supplements).     Objective      Vital Signs: BP (!) 182/122 (BP Location: Left arm, Patient Position: Sitting)   Pulse 95   Temp 98 °F (36.7 °C) (Oral)   Resp 16   Ht 188 cm (74\")   Wt 87.1 kg (192 lb)   SpO2 100%   BMI 24.65 kg/m²   Physical Exam  Constitutional:       Appearance: He is ill-appearing.   Cardiovascular:      Rate and Rhythm: Normal rate and regular rhythm.      Pulses: Normal pulses.      Heart sounds: Normal heart sounds. No murmur heard.  Pulmonary:      Effort: Pulmonary effort is normal. No respiratory distress.      Breath sounds: Normal breath sounds. No wheezing or rales.   Abdominal:      General: Bowel sounds are normal. There is no distension.      Palpations: Abdomen is soft.      Tenderness: There is no abdominal tenderness. There is no guarding.   Musculoskeletal:      Right lower leg: Edema present.      Left lower leg: Edema present.   Skin:     General: Skin is warm.   Neurological:      Mental Status: He is alert and oriented to person, place, and time. Mental status is at baseline.                  Results Reviewed:  Lab Results " (last 24 hours)         Procedure Component Value Units Date/Time     Urinalysis With Microscopic If Indicated (No Culture) - Urine, Clean Catch [[id number]]  (Abnormal) Collected: 06/25/25 1953     Specimen: Urine, Clean Catch Updated: 06/25/25 2010       Color, UA Yellow       Appearance, UA Clear       pH, UA 6.0       Specific Gravity, UA 1.016       Glucose,  mg/dL (Trace)       Ketones, UA Negative       Bilirubin, UA Negative       Blood, UA Negative       Protein, UA >=300 mg/dL (3+)       Leuk Esterase, UA Negative       Nitrite, UA Negative       Urobilinogen, UA 1.0 E.U./dL     Urinalysis, Microscopic Only - Urine, Clean Catch [[id number]] Collected: 06/25/25 1953     Specimen: Urine, Clean Catch Updated: 06/25/25 2010       RBC, UA 0-2 /HPF         WBC, UA 0-2 /HPF         Bacteria, UA None Seen /HPF         Squamous Epithelial Cells, UA 0-2 /HPF         Hyaline Casts, UA None Seen /LPF         Methodology Automated Microscopy     Ethanol [[id number]] Collected: 06/25/25 1919     Specimen: Blood from Arm, Right Updated: 06/25/25 2001       Ethanol % <0.010 %       Narrative:       Not for legal purposes.     High Sensitivity Troponin T 1Hr [[id number]]  (Abnormal) Collected: 06/25/25 1919     Specimen: Blood from Arm, Right Updated: 06/25/25 1951       HS Troponin T 639 ng/L         Troponin T Numeric Delta 117 ng/L         Troponin T % Delta 22     Narrative:       High Sensitive Troponin T Reference Range:  <14.0 ng/L- Negative Female for AMI  <22.0 ng/L- Negative Male for AMI  >=14 - Abnormal Female indicating possible myocardial injury.  >=22 - Abnormal Male indicating possible myocardial injury.   Clinicians would have to utilize clinical acumen, EKG, Troponin, and serial changes to determine if it is an Acute Myocardial Infarction or myocardial injury due to an underlying chronic condition.           Procalcitonin [[id number]]  (Normal) Collected: 06/25/25 1756     Specimen: Blood Updated:  "06/25/25 1858       Procalcitonin 0.19 ng/mL       Narrative:       As a Marker for Sepsis (Non-Neonates):     1. <0.5 ng/mL represents a low risk of severe sepsis and/or septic shock.  2. >2 ng/mL represents a high risk of severe sepsis and/or septic shock.     As a Marker for Lower Respiratory Tract Infections that require antibiotic therapy:     PCT on Admission    Antibiotic Therapy       6-12 Hrs later     >0.5                Strongly Recommended  >0.25 - <0.5        Recommended   0.1 - 0.25          Discouraged              Remeasure/reassess PCT  <0.1                Strongly Discouraged     Remeasure/reassess PCT     As 28 day mortality risk marker: \"Change in Procalcitonin Result\" (>80% or <=80%) if Day 0 (or Day 1) and Day 4 values are available. Refer to [email or web address]     Change in PCT <=80%  A decrease of PCT levels below or equal to 80% defines a positive change in PCT test result representing a higher risk for 28-day all-cause mortality of patients diagnosed with severe sepsis for septic shock.     Change in PCT >80%  A decrease of PCT levels of more than 80% defines a negative change in PCT result representing a lower risk for 28-day all-cause mortality of patients diagnosed with severe sepsis or septic shock.         High Sensitivity Troponin T [[id number]]  (Abnormal) Collected: 06/25/25 1756     Specimen: Blood Updated: 06/25/25 1855       HS Troponin T 522 ng/L       Narrative:       High Sensitive Troponin T Reference Range:  <14.0 ng/L- Negative Female for AMI  <22.0 ng/L- Negative Male for AMI  >=14 - Abnormal Female indicating possible myocardial injury.  >=22 - Abnormal Male indicating possible myocardial injury.   Clinicians would have to utilize clinical acumen, EKG, Troponin, and serial changes to determine if it is an Acute Myocardial Infarction or myocardial injury due to an underlying chronic condition.           Comprehensive Metabolic Panel [[id number]]  (Abnormal) " Collected: 06/25/25 1756     Specimen: Blood Updated: 06/25/25 1853       Glucose 112 mg/dL         BUN 64.8 mg/dL         Creatinine 3.99 mg/dL         Sodium 138 mmol/L         Potassium 4.1 mmol/L         Chloride 102 mmol/L         CO2 23.0 mmol/L         Calcium 8.5 mg/dL         Total Protein 6.0 g/dL         Albumin 3.3 g/dL         ALT (SGPT) 22 U/L         AST (SGOT) 23 U/L         Alkaline Phosphatase 71 U/L         Total Bilirubin 0.7 mg/dL         Globulin 2.7 gm/dL         A/G Ratio 1.2 g/dL         BUN/Creatinine Ratio 16.2       Anion Gap 13.0 mmol/L         eGFR 20.0 mL/min/1.73       Narrative:       GFR Categories in Chronic Kidney Disease (CKD)              GFR Category          GFR (mL/min/1.73)    Interpretation  G1                    90 or greater        Normal or high (1)  G2                    60-89                Mild decrease (1)  G3a                   45-59                Mild to moderate decrease  G3b                   30-44                Moderate to severe decrease  G4                    15-29                Severe decrease  G5                    14 or less           Kidney failure     (1)In the absence of evidence of kidney disease, neither GFR category G1 or G2 fulfill the criteria for CKD.     eGFR calculation 2021 CKD-EPI creatinine equation, which does not include race as a factor     Magnesium [[id number]]  (Normal) Collected: 06/25/25 1756     Specimen: Blood Updated: 06/25/25 1853       Magnesium 2.2 mg/dL       CK [[id number]]  (Normal) Collected: 06/25/25 1756     Specimen: Blood Updated: 06/25/25 1853       Creatine Kinase 186 U/L       Lactic Acid, Plasma [[id number]]  (Normal) Collected: 06/25/25 1756     Specimen: Blood Updated: 06/25/25 1851       Lactate 1.1 mmol/L       BNP [[id number]]  (Abnormal) Collected: 06/25/25 1756     Specimen: Blood Updated: 06/25/25 1851       proBNP 13,331.0 pg/mL       Narrative:       This assay is used as an aid in the diagnosis of  individuals suspected of having heart failure. It can be used as an aid in the diagnosis of acute decompensated heart failure (ADHF) in patients presenting with signs and symptoms of ADHF to the emergency department (ED). In addition, NT-proBNP of <300 pg/mL indicates ADHF is not likely.     Age Range         Result Interpretation  NT-proBNP Concentration (pg/mL:        <50             Positive            >450                         Gutierrez                           300-450                           Negative               <300     50-75           Positive            >900                  Gray                300-900                  Negative            <300        >75             Positive            >1800                  Gray                300-1800                  Negative            <300     aPTT [[id number]]  (Normal) Collected: 06/25/25 1756     Specimen: Blood Updated: 06/25/25 1841       PTT 32.9 seconds       Narrative:       PTT = The equivalent PTT values for the therapeutic range of heparin levels at 0.3 to 0.7 U/ml are 77 - 99 seconds.     Protime-INR [[id number]]  (Abnormal) Collected: 06/25/25 1756     Specimen: Blood Updated: 06/25/25 1841       Protime 14.9 Seconds         INR 1.11     CBC & Differential [[id number]]  (Abnormal) Collected: 06/25/25 1756     Specimen: Blood Updated: 06/25/25 1831     Narrative:       The following orders were created for panel order CBC & Differential.  Procedure                               Abnormality         Status                     ---------                               -----------         ------                     CBC Auto Differential[[id number]]        Abnormal            Final result                  Please view results for these tests on the individual orders.     CBC Auto Differential [[id number]]  (Abnormal) Collected: 06/25/25 1756     Specimen: Blood Updated: 06/25/25 1831       WBC 7.44 10*3/mm3         RBC 2.79 10*6/mm3         Hemoglobin 7.6 g/dL          Hematocrit 24.8 %         MCV 88.9 fL         MCH 27.2 pg         MCHC 30.6 g/dL         RDW 18.2 %         RDW-SD 56.8 fl         MPV 11.2 fL         Platelets 131 10*3/mm3         Neutrophil % 82.8 %         Lymphocyte % 10.9 %         Monocyte % 5.0 %         Eosinophil % 0.5 %         Basophil % 0.4 %         Immature Grans % 0.4 %         Neutrophils, Absolute 6.16 10*3/mm3         Lymphocytes, Absolute 0.81 10*3/mm3         Monocytes, Absolute 0.37 10*3/mm3         Eosinophils, Absolute 0.04 10*3/mm3         Basophils, Absolute 0.03 10*3/mm3         Immature Grans, Absolute 0.03 10*3/mm3         nRBC 0.0 /100 WBC               Imaging Results (Last 24 Hours)         Procedure Component Value Units Date/Time     XR Chest 1 View [[id number]] Collected: 06/25/25 1746       Updated: 06/25/25 1750     Narrative:       EXAM/TECHNIQUE: XR CHEST 1 VW-     INDICATION: Chest pain, shortness of breath     COMPARISON: None     FINDINGS:  Marked enlargement of the cardiac silhouette. No pleural effusion or  visible pneumothorax. No focal consolidation. No acute osseous finding.        Impression:       1.  Marked enlargement of the cardiac silhouette.  2.  Lungs are clear.        This report was signed and finalized on 6/25/2025 5:47 PM by Dr. Juan Miguel Aparicio MD.                I have personally reviewed and interpreted the radiology studies and ECG obtained at time of admission.      Assessment / Plan   Assessment:        Active Hospital Problems     Diagnosis      **Non-STEMI (non-ST elevated myocardial infarction)      Hypertensive crisis      Congestive heart failure      Seizure disorder      Chronic systolic (congestive) heart failure           Treatment Plan  The patient will be admitted to my service here at [facility].      Assessment and plan:  #Non-STEMI.  #Hypertensive crisis.  #Recent history of GI bleed, peptic ulcer.  #CKD, approaching end-stage.  #Chronic anemia.     - Admitting to floor with  telemetry.  - Patient was given loading aspirin in the other hospital, will continue with baby dose aspirin in the morning.  - Patient placed on nitro drip given his hypertensive crisis and chest pain.  Patient is not having chest pain at the moment.  - Cardiology was not contacted by ED. So, I discussed this case with Ms. Isaacs from cardiology team, who agreed to consult in the morning.  Discussed the concern for anticoagulation especially with the recent GI bleed and gastric ulcer, and was advised to hold off anticoagulation for now.  - Will keep patient n.p.o. after midnight in case of any procedure with cardiology.  - TTE ordered.  - DVT prophylaxis with SCDs for now given recent history of GI bleed.  - Adding UA and drug screen.  - Consulting with nephrology in AM.  - As needed blood pressure medications for hypertensive crisis.        Medical Decision Making  Number and Complexity of problems: 2 acute and moderate  Differential Diagnosis: As above     Conditions and Status        Condition is worsening.     MDM Data  External documents reviewed: Yes  Cardiac tracing (EKG, telemetry) interpretation: Yes  Radiology interpretation: Yes  Labs reviewed: Yes  Any tests that were considered but not ordered: No     Decision rules/scores evaluated (example [id number], Wells, etc): No     Discussed with: Patient and ED provider, and cardiology team     Care Planning  Shared decision making: Discussed the plan with the patient and he was agreeable  Code status and discussions: Full code     Disposition  Social Determinants of Health that impact treatment or disposition: Not at the moment  Estimated length of stay is 2 to 3 days.      I confirmed that the patient's advanced care plan is present, code status is documented, and a surrogate decision maker is listed in the patient's medical record.         The patient was seen and examined by me on 6/25 at 8 PM.     Electronically signed by Lesa Gonzalez MD, 06/25/25,  21:01 CDT.                Lesa Gonzalez MD   Physician  Specialty: Hospitalist     H&P      Addendum     Date of Service: 06/25/25 1942  Creation Time: 06/25/25 1942     Expand All [facility] Services  HISTORY AND PHYSICAL     Date of Admission: 6/25/2025  Primary Care Physician: He Ortega MD     Subjective   Primary Historian: Patient     Chief Complaint: Chest pain     History of Present Illness  This is a 30-year-old male patient with a medical history of CHF, cardiomegaly, hypertension, CKD , seizures, history of drug use, recent history of peptic ulcer with GI bleed, presenting to the ED for the evaluation of chest pain.  As reported, patient was initially evaluated at [facility] for chest pain and he was given loading dose of aspirin in addition to nitro dose.  Patient was complaining of left-sided chest pain radiating to his left upper extremity, that started around 3 PM.  He denies having shortness of breath or abdominal pain, fever or chills, nausea vomiting or diarrhea.  To note that he was admitted few days ago to our hospital with GI bleed and was found to have peptic ulcer, received an endoscopy, was on PPI twice daily, and received blood transfusions.  He was also evaluated during the hospitalization by the nephrology team, and he was planned for dialysis and permacath placement, however he decided to leave AMA.  Today he mentions that he does not want to leave AMA at this time and he is ready to receive the care.           Review of Systems   Otherwise complete ROS reviewed and negative except as mentioned in the HPI.     Past Medical History:   Medical History[]Expand by Default        Past Medical History:   Diagnosis Date    Cardiomegaly      CHF (congestive heart failure)      Congenital fusion of carpal bone      HTN (hypertension)      Non-STEMI (non-ST elevated myocardial infarction) 6/25/2025    Seizure            Past Surgical History:  Surgical History         Past Surgical History:   Procedure Laterality Date    ELBOW PROCEDURE        ENDOSCOPY N/A 6/22/2025     Procedure: ESOPHAGOGASTRODUODENOSCOPY;  Surgeon: Kevin Miranda MD;  Location: [facility];  Service: Gastroenterology;  Laterality: N/A;  preop; GI bleed   postop; gastric ulcer ; esophagitis         Social History:  reports that he has been smoking cigarettes. He has never used smokeless tobacco. He reports current alcohol use. He reports current drug use. Drugs: Marijuana and Cocaine(coke).     Family History: family history includes Heart disease in his mother; Hypertension in his mother; No Known Problems in his father.        Allergies:  Allergies        Allergies   Allergen Reactions    Amoxicillin Anaphylaxis    Penicillins Anaphylaxis            Medications:          Prior to Admission medications    Medication Sig Start Date End Date Taking? Authorizing Provider   aspirin 81 MG EC tablet Take 1 tablet by mouth Daily. 11/27/24     Jenna Ospina MD   furosemide (LASIX) 40 MG tablet Take 1 tablet by mouth Daily.       ProviderAkanksha MD   hydrALAZINE (APRESOLINE) 50 MG tablet Take 2 tablets by mouth 3 (Three) Times a Day.       ProviderAkanksha MD   levETIRAcetam (KEPPRA) 500 MG tablet Take 1 tablet by mouth 2 (Two) Times a Day.       Akanksha Israel MD   metoprolol succinate XL (TOPROL-XL) 100 MG 24 hr tablet Take 1 tablet by mouth Daily. 3/21/24     Guille Aguirre MD   Potassium 99 MG tablet Take 1 tablet by mouth Daily.       ProviderAkanksha MD   sacubitril-valsartan (Entresto) 49-51 MG tablet Take 1 tablet by mouth 2 (Two) Times a Day. 3/21/24     Guille Aguirre MD      I have utilized all available immediate resources to obtain, update, or review the patient's current medications (including all prescriptions, over-the-counter products, herbals, cannabis/cannabidiol products, and vitamin/mineral/dietary (nutritional)  "supplements).     Objective      Vital Signs: BP (!) 182/122 (BP Location: Left arm, Patient Position: Sitting)   Pulse 95   Temp 98 °F (36.7 °C) (Oral)   Resp 16   Ht 188 cm (74\")   Wt 87.1 kg (192 lb)   SpO2 100%   BMI 24.65 kg/m²   Physical Exam  Constitutional:       Appearance: He is ill-appearing.   Cardiovascular:      Rate and Rhythm: Normal rate and regular rhythm.      Pulses: Normal pulses.      Heart sounds: Normal heart sounds. No murmur heard.  Pulmonary:      Effort: Pulmonary effort is normal. No respiratory distress.      Breath sounds: Normal breath sounds. No wheezing or rales.   Abdominal:      General: Bowel sounds are normal. There is no distension.      Palpations: Abdomen is soft.      Tenderness: There is no abdominal tenderness. There is no guarding.   Musculoskeletal:      Right lower leg: Edema present.      Left lower leg: Edema present.   Skin:     General: Skin is warm.   Neurological:      Mental Status: He is alert and oriented to person, place, and time. Mental status is at baseline.                  Results Reviewed:  Lab Results (last 24 hours)         Procedure Component Value Units Date/Time     Urinalysis With Microscopic If Indicated (No Culture) - Urine, Clean Catch [[id number]]  (Abnormal) Collected: 06/25/25 1953     Specimen: Urine, Clean Catch Updated: 06/25/25 2010       Color, UA Yellow       Appearance, UA Clear       pH, UA 6.0       Specific Gravity, UA 1.016       Glucose,  mg/dL (Trace)       Ketones, UA Negative       Bilirubin, UA Negative       Blood, UA Negative       Protein, UA >=300 mg/dL (3+)       Leuk Esterase, UA Negative       Nitrite, UA Negative       Urobilinogen, UA 1.0 E.U./dL     Urinalysis, Microscopic Only - Urine, Clean Catch [[id number]] Collected: 06/25/25 1953     Specimen: Urine, Clean Catch Updated: 06/25/25 2010       RBC, UA 0-2 /HPF         WBC, UA 0-2 /HPF         Bacteria, UA None Seen /HPF         Squamous Epithelial " "Cells, UA 0-2 /HPF         Hyaline Casts, UA None Seen /LPF         Methodology Automated Microscopy     Ethanol [[id number]] Collected: 06/25/25 1919     Specimen: Blood from Arm, Right Updated: 06/25/25 2001       Ethanol % <0.010 %       Narrative:       Not for legal purposes.     High Sensitivity Troponin T 1Hr [[id number]]  (Abnormal) Collected: 06/25/25 1919     Specimen: Blood from Arm, Right Updated: 06/25/25 1951       HS Troponin T 639 ng/L         Troponin T Numeric Delta 117 ng/L         Troponin T % Delta 22     Narrative:       High Sensitive Troponin T Reference Range:  <14.0 ng/L- Negative Female for AMI  <22.0 ng/L- Negative Male for AMI  >=14 - Abnormal Female indicating possible myocardial injury.  >=22 - Abnormal Male indicating possible myocardial injury.   Clinicians would have to utilize clinical acumen, EKG, Troponin, and serial changes to determine if it is an Acute Myocardial Infarction or myocardial injury due to an underlying chronic condition.           Procalcitonin [[id number]]  (Normal) Collected: 06/25/25 1756     Specimen: Blood Updated: 06/25/25 1858       Procalcitonin 0.19 ng/mL       Narrative:       As a Marker for Sepsis (Non-Neonates):     1. <0.5 ng/mL represents a low risk of severe sepsis and/or septic shock.  2. >2 ng/mL represents a high risk of severe sepsis and/or septic shock.     As a Marker for Lower Respiratory Tract Infections that require antibiotic therapy:     PCT on Admission    Antibiotic Therapy       6-12 Hrs later     >0.5                Strongly Recommended  >0.25 - <0.5        Recommended   0.1 - 0.25          Discouraged              Remeasure/reassess PCT  <0.1                Strongly Discouraged     Remeasure/reassess PCT     As 28 day mortality risk marker: \"Change in Procalcitonin Result\" (>80% or <=80%) if Day 0 (or Day 1) and Day 4 values are available. Refer to http://[email or web address]-pct-[email or web address]     Change in PCT <=80%  A decrease of PCT levels " below or equal to 80% defines a positive change in PCT test result representing a higher risk for 28-day all-cause mortality of patients diagnosed with severe sepsis for septic shock.     Change in PCT >80%  A decrease of PCT levels of more than 80% defines a negative change in PCT result representing a lower risk for 28-day all-cause mortality of patients diagnosed with severe sepsis or septic shock.         High Sensitivity Troponin T [[id number]]  (Abnormal) Collected: 06/25/25 1756     Specimen: Blood Updated: 06/25/25 1855       HS Troponin T 522 ng/L       Narrative:       High Sensitive Troponin T Reference Range:  <14.0 ng/L- Negative Female for AMI  <22.0 ng/L- Negative Male for AMI  >=14 - Abnormal Female indicating possible myocardial injury.  >=22 - Abnormal Male indicating possible myocardial injury.   Clinicians would have to utilize clinical acumen, EKG, Troponin, and serial changes to determine if it is an Acute Myocardial Infarction or myocardial injury due to an underlying chronic condition.           Comprehensive Metabolic Panel [[id number]]  (Abnormal) Collected: 06/25/25 1756     Specimen: Blood Updated: 06/25/25 1853       Glucose 112 mg/dL         BUN 64.8 mg/dL         Creatinine 3.99 mg/dL         Sodium 138 mmol/L         Potassium 4.1 mmol/L         Chloride 102 mmol/L         CO2 23.0 mmol/L         Calcium 8.5 mg/dL         Total Protein 6.0 g/dL         Albumin 3.3 g/dL         ALT (SGPT) 22 U/L         AST (SGOT) 23 U/L         Alkaline Phosphatase 71 U/L         Total Bilirubin 0.7 mg/dL         Globulin 2.7 gm/dL         A/G Ratio 1.2 g/dL         BUN/Creatinine Ratio 16.2       Anion Gap 13.0 mmol/L         eGFR 20.0 mL/min/1.73       Narrative:       GFR Categories in Chronic Kidney Disease (CKD)              GFR Category          GFR (mL/min/1.73)    Interpretation  G1                    90 or greater        Normal or high (1)  G2                    60-89                Mild  decrease (1)  G3a                   45-59                Mild to moderate decrease  G3b                   30-44                Moderate to severe decrease  G4                    15-29                Severe decrease  G5                    14 or less           Kidney failure     (1)In the absence of evidence of kidney disease, neither GFR category G1 or G2 fulfill the criteria for CKD.     eGFR calculation 2021 CKD-EPI creatinine equation, which does not include race as a factor     Magnesium [[id number]]  (Normal) Collected: 06/25/25 1756     Specimen: Blood Updated: 06/25/25 1853       Magnesium 2.2 mg/dL       CK [[id number]]  (Normal) Collected: 06/25/25 1756     Specimen: Blood Updated: 06/25/25 1853       Creatine Kinase 186 U/L       Lactic Acid, Plasma [[id number]]  (Normal) Collected: 06/25/25 1756     Specimen: Blood Updated: 06/25/25 1851       Lactate 1.1 mmol/L       BNP [[id number]]  (Abnormal) Collected: 06/25/25 1756     Specimen: Blood Updated: 06/25/25 1851       proBNP 13,331.0 pg/mL       Narrative:       This assay is used as an aid in the diagnosis of individuals suspected of having heart failure. It can be used as an aid in the diagnosis of acute decompensated heart failure (ADHF) in patients presenting with signs and symptoms of ADHF to the emergency department (ED). In addition, NT-proBNP of <300 pg/mL indicates ADHF is not likely.     Age Range         Result Interpretation  NT-proBNP Concentration (pg/mL:        <50             Positive            >450                         Gutierrez                           300-450                           Negative               <300     50-75           Positive            >900                  Gray                300-900                  Negative            <300        >75             Positive            >1800                  Gray                300-1800                  Negative            <300     aPTT [[id number]]  (Normal) Collected: 06/25/25 1756      Specimen: Blood Updated: 06/25/25 1841       PTT 32.9 seconds       Narrative:       PTT = The equivalent PTT values for the therapeutic range of heparin levels at 0.3 to 0.7 U/ml are 77 - 99 seconds.     Protime-INR [[id number]]  (Abnormal) Collected: 06/25/25 1756     Specimen: Blood Updated: 06/25/25 1841       Protime 14.9 Seconds         INR 1.11     CBC & Differential [[id number]]  (Abnormal) Collected: 06/25/25 1756     Specimen: Blood Updated: 06/25/25 1831     Narrative:       The following orders were created for panel order CBC & Differential.  Procedure                               Abnormality         Status                     ---------                               -----------         ------                     CBC Auto Differential[[id number]]        Abnormal            Final result                  Please view results for these tests on the individual orders.     CBC Auto Differential [[id number]]  (Abnormal) Collected: 06/25/25 1756     Specimen: Blood Updated: 06/25/25 1831       WBC 7.44 10*3/mm3         RBC 2.79 10*6/mm3         Hemoglobin 7.6 g/dL         Hematocrit 24.8 %         MCV 88.9 fL         MCH 27.2 pg         MCHC 30.6 g/dL         RDW 18.2 %         RDW-SD 56.8 fl         MPV 11.2 fL         Platelets 131 10*3/mm3         Neutrophil % 82.8 %         Lymphocyte % 10.9 %         Monocyte % 5.0 %         Eosinophil % 0.5 %         Basophil % 0.4 %         Immature Grans % 0.4 %         Neutrophils, Absolute 6.16 10*3/mm3         Lymphocytes, Absolute 0.81 10*3/mm3         Monocytes, Absolute 0.37 10*3/mm3         Eosinophils, Absolute 0.04 10*3/mm3         Basophils, Absolute 0.03 10*3/mm3         Immature Grans, Absolute 0.03 10*3/mm3         nRBC 0.0 /100 WBC               Imaging Results (Last 24 Hours)         Procedure Component Value Units Date/Time     XR Chest 1 View [[id number]] Collected: 06/25/25 1746       Updated: 06/25/25 1750     Narrative:       EXAM/TECHNIQUE: XR CHEST 1  VW-     INDICATION: Chest pain, shortness of breath     COMPARISON: None     FINDINGS:  Marked enlargement of the cardiac silhouette. No pleural effusion or  visible pneumothorax. No focal consolidation. No acute osseous finding.        Impression:       1.  Marked enlargement of the cardiac silhouette.  2.  Lungs are clear.        This report was signed and finalized on 6/25/2025 5:47 PM by Dr. Juan Miguel Aparicio MD.                I have personally reviewed and interpreted the radiology studies and ECG obtained at time of admission.      Assessment / Plan   Assessment:        Active Hospital Problems     Diagnosis      **Non-STEMI (non-ST elevated myocardial infarction)      Hypertensive crisis      Congestive heart failure      Seizure disorder      Chronic systolic (congestive) heart failure           Treatment Plan  The patient will be admitted to my service here at [facility].      Assessment and plan:  #Non-STEMI.  #Hypertensive crisis.  #Recent history of GI bleed, peptic ulcer.  #CKD, approaching end-stage.  #Chronic anemia.     - Admitting to floor with telemetry.  - Patient was given loading aspirin in the other hospital, will continue with baby dose aspirin in the morning.  - Patient placed on nitro drip given his hypertensive crisis and chest pain.  Patient is not having chest pain at the moment.  - Cardiology was not contacted by ED. So, I discussed this case with Ms. Isaacs from cardiology team, who agreed to consult in the morning.  Discussed the concern for anticoagulation especially with the recent GI bleed and gastric ulcer, and was advised to hold off anticoagulation for now.  - Will keep patient n.p.o. after midnight in case of any procedure with cardiology.  - TTE ordered.  - DVT prophylaxis with SCDs for now given recent history of GI bleed.  - Adding UA and drug screen.  - Consulting with nephrology in AM.  - As needed blood pressure medications for hypertensive crisis.         Medical Decision Making  Number and Complexity of problems: 2 acute and moderate  Differential Diagnosis: As above     Conditions and Status        Condition is worsening.     MDM Data  External documents reviewed: Yes  Cardiac tracing (EKG, telemetry) interpretation: Yes  Radiology interpretation: Yes  Labs reviewed: Yes  Any tests that were considered but not ordered: No     Decision rules/scores evaluated (example [id number], Wells, etc): No     Discussed with: Patient and ED provider, and cardiology team     Care Planning  Shared decision making: Discussed the plan with the patient and he was agreeable  Code status and discussions: Full code     Disposition  Social Determinants of Health that impact treatment or disposition: Not at the moment  Estimated length of stay is 2 to 3 days.      I confirmed that the patient's advanced care plan is present, code status is documented, and a surrogate decision maker is listed in the patient's medical record.         The patient was seen and examined by me on 6/25 at 8 PM.     Electronically signed by Lesa Gonzalez MD, 06/25/25, 21:01 CDT.                   Sammy Pizarro MD   Physician  Cardiology     Consults      Signed     Date of Service: 06/26/25 0938  Creation Time: 06/26/25 0938  Consult Orders   Inpatient Cardiology Consult [[id number]] ordered by Lesa Gonzalez MD at 06/25/25 1953          Signed       Expand All Collapse All        LOS: 1 day   Patient Care Team:  He Ortega MD as PCP - General (Internal Medicine)     Chief Complaint: Shortness of breath     Maria T Webb is a 30 y.o. male who is being seen in evaluation  Patient has hypertensive nephrosclerosis  Has had chronic renal disease  Currently has end-stage renal disease and awaiting vascular access and starting dialysis  Complains of chest pain  This is constant  Can go on for hours to days  This is nonpositional nonpleuritic  Can radiate to the left arm  Troponin initially  520 2 repeat 639  Hemoglobin of 7 with hematocrit of 23  No presyncope or syncope  No orthopnea  No paroxysmal nocturnal dyspnea  He has known history of cocaine, marijuana abuse  Has had upper GI bleeding  There have been major compliance issues  In past he has just left AGAINST MEDICAL ADVICE understanding risk of dying  EKG as below  ECG 12 Lead Chest Pain   Final Result   Test Reason : Chest Pain   Blood Pressure :   */*   mmHG   Vent. Rate :  97 BPM     Atrial Rate :  97 BPM      P-R Int : 170 ms          QRS Dur : 106 ms       QT Int : 354 ms       P-R-T Axes :  54 -34  97 degrees     QTcB Int : 449 ms       Normal sinus rhythm   Left axis deviation   Nonspecific T wave abnormality   Abnormal ECG   When compared with ECG of 21-Jun-2025 21:48,   Premature ventricular complexes are no longer Present   QRS axis Shifted left   Nonspecific T wave abnormality no longer evident in Inferior leads   T wave inversion no longer evident in Anterior leads   Confirmed by Bryan Maciel MD ([id number]) on 6/26/2025 9:02:15 AM       Referred By:            Confirmed By: Bryan Maciel MD       Telemetry Scan   Final Result             Telemetry: no malignant arrhythmia. No significant pauses.     Review of Systems   Constitutional: No chills   Has fatigue   No fever.   HENT: Negative.    Eyes: Negative.    Respiratory: Negative for cough,   No chest wall soreness,   Shortness of breath,   no wheezing, no stridor.    Cardiovascular: As above  Gastrointestinal: Negative for abdominal distention,  No abdominal pain,   No blood in stool,   No constipation,   No diarrhea,   No nausea   No vomiting.   Endocrine: Negative.    Genitourinary: Negative for difficulty urinating, dysuria, flank pain and hematuria.   Musculoskeletal: Negative.    Skin: Negative for rash and wound.   Allergic/Immunologic: Negative.    Neurological: Negative for dizziness, syncope, weakness,   No light-headedness  No  headaches.   Hematological: Does not  bruise/bleed easily.   Psychiatric/Behavioral: Negative for agitation or behavioral problems,   No confusion,   the patient is  nervous/anxious.        History:   Medical History        Past Medical History:   Diagnosis Date    Cardiomegaly      CHF (congestive heart failure)      Congenital fusion of carpal bone      HTN (hypertension)      Non-STEMI (non-ST elevated myocardial infarction) 6/25/2025    Seizure           Surgical History         Past Surgical History:   Procedure Laterality Date    ELBOW PROCEDURE        ENDOSCOPY N/A 6/22/2025     Procedure: ESOPHAGOGASTRODUODENOSCOPY;  Surgeon: Kevin Miranda MD;  [facility] OR;  Service: Gastroenterology;  Laterality: N/A;  preop; GI bleed   postop; gastric ulcer ; esophagitis         Social History   Social History            Socioeconomic History    Marital status: Single   Tobacco Use    Smoking status: Some Days       Current packs/day: 0.50       Types: Cigarettes    Smokeless tobacco: Never   Vaping Use    Vaping status: Never Used   Substance and Sexual Activity    Alcohol use: Yes    Drug use: Yes       Types: Marijuana, Cocaine(coke)    Sexual activity: Defer               Family History   Problem Relation Age of Onset    Hypertension Mother      Heart disease Mother      No Known Problems Father           Labs:  WBC       WBC   Date Value Ref Range Status   06/26/2025 5.98 3.40 - 10.80 10*3/mm3 Final   06/25/2025 7.44 3.40 - 10.80 10*3/mm3 Final   06/24/2025 7.33 3.40 - 10.80 10*3/mm3 Final      HGB       Hemoglobin   Date Value Ref Range Status   06/26/2025 7.0 (L) 13.0 - 17.7 g/dL Final   06/25/2025 7.6 (L) 13.0 - 17.7 g/dL Final   06/24/2025 7.0 (L) 13.0 - 17.7 g/dL Final   06/23/2025 7.9 (L) 13.0 - 17.7 g/dL Final   06/23/2025 7.1 (L) 13.0 - 17.7 g/dL Final   06/23/2025 7.3 (L) 13.0 - 17.7 g/dL Final      HCT       Hematocrit   Date Value Ref Range Status   06/26/2025 23.0 (L) 37.5 - 51.0 % Final   06/25/2025 24.8 (L) 37.5 - 51.0 % Final    06/24/2025 22.3 (L) 37.5 - 51.0 % Final   06/23/2025 25.8 (L) 37.5 - 51.0 % Final   06/23/2025 22.7 (L) 37.5 - 51.0 % Final   06/23/2025 23.6 (L) 37.5 - 51.0 % Final      Platelets       Platelets   Date Value Ref Range Status   06/26/2025 123 (L) 140 - 450 10*3/mm3 Final   06/25/2025 131 (L) 140 - 450 10*3/mm3 Final   06/24/2025 117 (L) 140 - 450 10*3/mm3 Final      MCV       MCV   Date Value Ref Range Status   06/26/2025 87.8 79.0 - 97.0 fL Final   06/25/2025 88.9 79.0 - 97.0 fL Final   06/24/2025 86.1 79.0 - 97.0 fL Final                Results from last 7 days   Lab Units 06/26/25  0313 06/25/25  1756 06/24/25  0526   SODIUM mmol/L 132* 138 135*   POTASSIUM mmol/L 4.0 4.1 4.4   CHLORIDE mmol/L 101 102 103   CO2 mmol/L 20.0* 23.0 22.0   BUN mg/dL 63.5* 64.8* 75.4*   CREATININE mg/dL 3.96* 3.99* 4.97*   CALCIUM mg/dL 8.2* 8.5* 7.6*   BILIRUBIN mg/dL 0.8 0.7 0.8   ALK PHOS U/L 66 71 58   ALT (SGPT) U/L 18 22 24   AST (SGOT) U/L 25 23 26   GLUCOSE mg/dL 140* 112* 99            Lab Results   Component Value Date     CKTOTAL 186 06/25/2025     TROPONINI 0.06 (H) 04/14/2020     TROPONINT 639 (C) 06/25/2025      PT/INR:          Protime   Date Value Ref Range Status   06/25/2025 14.9 (H) 11.8 - 14.8 Seconds Final   /        INR   Date Value Ref Range Status   06/25/2025 1.11 (H) 0.91 - 1.09 Final         Imaging Results (Last 72 Hours)         Procedure Component Value Units Date/Time     XR Chest 1 View [[id number]] Collected: 06/25/25 1746       Updated: 06/25/25 1750     Narrative:       EXAM/TECHNIQUE: XR CHEST 1 VW-     INDICATION: Chest pain, shortness of breath     COMPARISON: None     FINDINGS:  Marked enlargement of the cardiac silhouette. No pleural effusion or  visible pneumothorax. No focal consolidation. No acute osseous finding.        Impression:       1.  Marked enlargement of the cardiac silhouette.  2.  Lungs are clear.        This report was signed and finalized on 6/25/2025 5:47 PM by Dr. Bullard  TANVI Aparicio MD.                   Objective[]Expand by Default  Allergies        Allergies   Allergen Reactions    Amoxicillin Anaphylaxis    Penicillins Anaphylaxis            Medication Review: Performed  Current Medications             Current Facility-Administered Medications   Medication Dose Route Frequency Provider Last Rate Last Admin    acetaminophen (TYLENOL) tablet 1,000 mg  1,000 mg Oral Q6H PRN Lesa Gonzalez MD   1,000 mg at 06/25/25 2343    aspirin EC tablet 81 mg  81 mg Oral Daily Lesa Gonzalez MD   81 mg at 06/26/25 0839    hydrALAZINE (APRESOLINE) injection 10 mg  10 mg Intravenous Q6H PRN Lesa Gonzalez MD   10 mg at 06/25/25 2202    levETIRAcetam (KEPPRA) tablet 500 mg  500 mg Oral BID Lesa Gonzalez MD   500 mg at 06/26/25 0839    metoprolol succinate XL (TOPROL-XL) 24 hr tablet 100 mg  100 mg Oral Daily Lesa Gonzalez MD   100 mg at 06/26/25 0840    nitroglycerin (NITROSTAT) SL tablet 0.4 mg  0.4 mg Sublingual Q5 Min PRN Lesa Gonzalez MD        nitroglycerin (TRIDIL) 200 mcg/ml infusion  5-200 mcg/min Intravenous Titrated Jason Baker Jr., MD 12 mL/hr at 06/25/25 2351 40 mcg/min at 06/25/25 2351    ondansetron (ZOFRAN) injection 4 mg  4 mg Intravenous Q6H PRN Benjamin Dominguez MD   4 mg at 06/26/25 0840    pantoprazole (PROTONIX) injection 40 mg  40 mg Intravenous Q12H Lesa Gonzalez MD   40 mg at 06/26/25 0840    sodium chloride 0.9 % flush 10 mL  10 mL Intravenous Q12H Lesa Gonzalez MD   10 mL at 06/26/25 0840    sodium chloride 0.9 % flush 10 mL  10 mL Intravenous PRN Lesa Gonzalez MD        sodium chloride 0.9 % infusion 40 mL  40 mL Intravenous PRN Lesa Gonzalez MD                Vital Sign Min/Max for last 24 hours  Temp  Min: 97.7 °F (36.5 °C)  Max: 98.2 °F (36.8 °C)   BP  Min: 145/93  Max: 202/145   Pulse  Min: 95  Max: 103   Resp  Min: 16  Max: 18   SpO2  Min: 94 %  Max: 100 %   No data recorded   Weight  Min: 84.4 kg (186 lb)  Max: 87.1 kg (192 lb)     "  Flowsheet Rows       Flowsheet Row First Filed Value   Admission Height 188 cm (74\") Documented at 06/25/2025 1725   Admission Weight 84.4 kg (186 lb) Documented at 06/25/2025 1725                Results for orders placed during the hospital encounter of 11/24/24     Adult Transthoracic Echo Complete W/ Cont if Necessary Per Protocol     Interpretation Summary    Left ventricular systolic function is severely decreased. Left ventricular ejection fraction appears to be 31 - 35%.    The left ventricular cavity is moderate to severely dilated. Left ventricular wall thickness is consistent with moderate concentric hypertrophy.    The findings are consistent with dilated cardiomyopathy.    Mildly reduced right ventricular systolic function noted.    The left atrial cavity is mildly dilated.    No hemodynamically significant valvular disease.        Physical Exam:     General Appearance: Awake, alert, in no acute distress  Eyes: Pupils equal and reactive    Ears: Appear intact with no abnormalities noted  Nose: Nares normal, no drainage  Neck: supple, trachea midline, no carotid bruit and no JVD  Back: no kyphosis present,    Lungs: respirations regular, respirations even and respirations unlabored  Heart: normal S1, S2, no significant murmurs   No gallops or rubs  no rub and no click  Abdomen: normal bowel sounds, no tenderness   Skin: no bleeding, bruising or rash  Extremities: no cyanosis  Psychiatric/Behavioral: Negative for agitation, behavioral problems, confusion, the patient does  appear to be nervous/anxious.        Results Review:   I reviewed the patient's new clinical results.  I reviewed the patient's new imaging results and agree with the interpretation.  I reviewed the patient's other test results and agree with the interpretation  I personally viewed and interpreted the patient's EKG/Telemetry data     Discussed with patient  Updated patient regarding any new or relevant abnormalities on review of " records or any new findings on physical exam.   Mentioned to patient about purpose of visit and desirable health short and long term goals and objectives.      Reviewed available prior notes, consults, prior visits, laboratory findings, radiology and cardiology relevant reports.   Updated chart as applicable.   I have reviewed the patient's medical history in detail and updated the computerized patient record as relevant.             Assessment & Plan    Non-STEMI (non-ST elevated myocardial infarction)    Chronic systolic (congestive) heart failure    Seizure disorder    Congestive heart failure    Hypertensive crisis  Cocaine abuse  Marijuana abuse     Plan     Care plan reviewed and discussed with him  Check echocardiogram  Agree with dialysis  Importance of compliance to medications and diet stressed  Avoidance of all cardiotoxins stressed  Given cocaine abuse recommend no beta-blocker therapy  Discontinue IV nitroglycerin  Start hydralazine orally 25 mg p.o. 3 times daily and uptitrate as necessary  Start Imdur 30 mg p.o. daily and uptitrate as necessary  Telemetry  Deep vein thrombosis prophylaxis/precautions  Appropriate diet, fluid, sodium, caffeine, stimulants intake   Questions were encouraged, asked and answered to the patient's  understanding and satisfaction.  Compliance to diet and medications         Sammy Pizarro MD  06/26/25  09:38 CDT     EMR Dragon/Transcription was used to dictate part of this note                   Dario Fernandez, APRN   Nurse Practitioner  Nephrology     Consults      Cosign Needed     Date of Service: 06/26/25 1021  Creation Time: 06/26/25 1021  Consult Orders   Inpatient Nephrology Consult [[id number]] ordered by Lesa Gonzalez MD at 06/25/25 1953          Cosign Needed       Expand All Collapse All    Nephrology ([facility]) Consult Note        Patient:  José Miguel Webb  YOB: 1995  Date of Service: 6/26/2025  MRN: [id number]         Acct: [id number]      Primary Care Physician: He Ortega MD  Advance Directive:       Code Status and Medical Interventions: CPR (Attempt to Resuscitate); Full Support   Ordered at: 06/25/25 2000     Code Status (Patient has no pulse and is not breathing):     CPR (Attempt to Resuscitate)     Medical Interventions (Patient has pulse or is breathing):     Full Support     Level Of Support Discussed With:     Patient      Admit Date: 6/25/2025                        Hospital Day: 1  Referring Provider: Nicola Redd MD        Patient personally seen and examined.  Complete chart including Consults, Notes, Operative Reports, Labs, Cardiology, and Radiology studies reviewed as able.           Subjective:  José Miguel Webb is a 30 y.o. male for whom we were consulted for evaluation and treatment of chronic kidney disease. Baseline chronic kidney disease stage 4, baseline creatinine approximately 4.0. Has followed with our providers on a sporadic basis over the years, typically no-shows for his office visits. History of hypertension, CHF, seizure disorder. Patient was recently treated at [facility] for GI bleed and MINDY. He was scheduled to have permcath placed and to start hemodialysis but left AMA on 6/24.  Presented to [facility] on 6/25 with chest pain and was transferred to [facility] for cardiology and nephrology evaluation. Creatinine 3.99 when admitted this time. Currently he is awake and alert. No complaint of pain or dyspnea at time of exam. Denies n/v/d. Denies edema. No recent changes in urination. He explains that he is willing to proceed with dialysis if it is needed     Allergies:  Amoxicillin and Penicillins     Home Meds:  Prescriptions Prior to Admission           Medications Prior to Admission   Medication Sig Dispense Refill Last Dose/Taking    sacubitril-valsartan (Entresto) 49-51 MG tablet Take 1 tablet by mouth 2 (Two) Times a Day. 180 tablet 3               Medicines:  Current Medications             Current Facility-Administered Medications   Medication Dose Route Frequency Provider Last Rate Last Admin    acetaminophen (TYLENOL) tablet 1,000 mg  1,000 mg Oral Q6H PRN Lesa Gonzalez MD   1,000 mg at 06/25/25 2343    aspirin EC tablet 81 mg  81 mg Oral Daily Lesa Gonzalez MD   81 mg at 06/26/25 0839    hydrALAZINE (APRESOLINE) injection 10 mg  10 mg Intravenous Q6H PRN Lesa Gonzalez MD   10 mg at 06/25/25 2202    hydrALAZINE (APRESOLINE) tablet 25 mg  25 mg Oral Q8H Sammy Pizarro MD        isosorbide mononitrate (IMDUR) 24 hr tablet 30 mg  30 mg Oral Q24H Sammy Pizarro MD        levETIRAcetam (KEPPRA) tablet 500 mg  500 mg Oral BID Lesa Gonzalez MD   500 mg at 06/26/25 0839    metoprolol succinate XL (TOPROL-XL) 24 hr tablet 100 mg  100 mg Oral Daily Lesa Gonzalez MD   100 mg at 06/26/25 0840    nitroglycerin (NITROSTAT) SL tablet 0.4 mg  0.4 mg Sublingual Q5 Min PRN Lesa Gonzalez MD        ondansetron (ZOFRAN) injection 4 mg  4 mg Intravenous Q6H PRN Benjamin Dominguez MD   4 mg at 06/26/25 0840    pantoprazole (PROTONIX) injection 40 mg  40 mg Intravenous Q12H Lesa Gonzalez MD   40 mg at 06/26/25 0840    sodium chloride 0.9 % flush 10 mL  10 mL Intravenous Q12H Lesa Gonzalez MD   10 mL at 06/26/25 0840    sodium chloride 0.9 % flush 10 mL  10 mL Intravenous PRN Lesa Gonzalez MD        sodium chloride 0.9 % infusion 40 mL  40 mL Intravenous PRN Lesa Gonzalez MD                Past Medical History:  Medical History        Past Medical History:   Diagnosis Date    Cardiomegaly      CHF (congestive heart failure)      Congenital fusion of carpal bone      HTN (hypertension)      Non-STEMI (non-ST elevated myocardial infarction) 6/25/2025    Seizure              Past Surgical History:  Surgical History         Past Surgical History:   Procedure Laterality Date    ELBOW PROCEDURE        ENDOSCOPY N/A 6/22/2025     Procedure:  ESOPHAGOGASTRODUODENOSCOPY;  Surgeon: Kevin Miranda MD;  Location: [facility];  Service: Gastroenterology;  Laterality: N/A;  preop; GI bleed   postop; gastric ulcer ; esophagitis            Family History        Family History   Problem Relation Age of Onset    Hypertension Mother      Heart disease Mother      No Known Problems Father           Social History  Social History   Social History            Socioeconomic History    Marital status: Single   Tobacco Use    Smoking status: Some Days       Current packs/day: 0.50       Types: Cigarettes    Smokeless tobacco: Never   Vaping Use    Vaping status: Never Used   Substance and Sexual Activity    Alcohol use: Yes    Drug use: Yes       Types: Marijuana, Cocaine(coke)    Sexual activity: Defer               Review of Systems:  History obtained from chart review and the patient  General ROS: No fever or chills  Respiratory ROS: No cough, shortness of breath, wheezing  Cardiovascular ROS: No chest pain or palpitations  Gastrointestinal ROS: No abdominal pain or melena  Genito-Urinary ROS: No dysuria or hematuria  Psych ROS: No anxiety and depression  14 point ROS reviewed with the patient and negative except as noted above and in the HPI unless unable to obtain.        Objective:  Patient Vitals for the past 24 hrs:    BP Temp Temp src Pulse Resp SpO2 Height Weight   06/26/25 0700 153/98 98.2 °F (36.8 °C) Oral 96 18 -- -- --   06/26/25 0422 155/100 98.2 °F (36.8 °C) Oral 97 18 100 % -- --   06/26/25 0204 (!) 162/104 -- -- -- -- -- -- --   06/26/25 0021 145/93 -- -- -- -- -- -- --   06/25/25 2344 (!) 162/108 -- -- 98 18 100 % -- --   06/25/25 2304 (!) 176/106 -- -- -- -- -- -- --   06/25/25 2232 (!) 176/106 -- -- -- -- -- -- --   06/25/25 2158 (!) 188/133 -- -- -- -- -- -- --   06/25/25 2039 (!) 182/122 98 °F (36.7 °C) Oral 95 -- 100 % -- 87.1 kg (192 lb)   06/25/25 2016 (!) 177/110 -- -- 95 -- 100 % -- --   06/25/25 2008 (!) 170/108 98 °F (36.7 °C) -- -- 16 98 %  "-- --   06/25/25 1946 (!) 187/135 -- -- 103 16 100 % -- --   06/25/25 1835 (!) 171/135 -- -- 102 -- 94 % -- --   06/25/25 1759 (!) 202/145 -- -- 99 -- -- -- --   06/25/25 1725 (!) 171/134 97.7 °F (36.5 °C) Oral 96 16 100 % 188 cm (74\") 84.4 kg (186 lb)         Intake/Output Summary (Last 24 hours) at 6/26/2025 1021  Last data filed at 6/26/2025 0900      Gross per 24 hour   Intake --   Output 1500 ml   Net -1500 ml      General: awake/alert   Chest:  clear to auscultation bilaterally without respiratory distress  CVS: regular rate and rhythm  Abdominal: soft, nontender, positive bowel sounds  Extremities: no cyanosis or edema  Skin: warm and dry without rash        Labs:         Results from last 7 days   Lab Units 06/26/25  0313 06/25/25  1756 06/24/25  0658   WBC 10*3/mm3 5.98 7.44 7.33   HEMOGLOBIN g/dL 7.0* 7.6* 7.0*   HEMATOCRIT % 23.0* 24.8* 22.3*   PLATELETS 10*3/mm3 123* 131* 117*                   Results from last 7 days   Lab Units 06/26/25  0313 06/25/25  1756 06/24/25  0526   SODIUM mmol/L 132* 138 135*   POTASSIUM mmol/L 4.0 4.1 4.4   CHLORIDE mmol/L 101 102 103   CO2 mmol/L 20.0* 23.0 22.0   BUN mg/dL 63.5* 64.8* 75.4*   CREATININE mg/dL 3.96* 3.99* 4.97*   CALCIUM mg/dL 8.2* 8.5* 7.6*   EGFR mL/min/1.73 20.2* 20.0* 15.4*   BILIRUBIN mg/dL 0.8 0.7 0.8   ALK PHOS U/L 66 71 58   ALT (SGPT) U/L 18 22 24   AST (SGOT) U/L 25 23 26   GLUCOSE mg/dL 140* 112* 99         Radiology:   Imaging Results (Last 72 Hours)         Procedure Component Value Units Date/Time     XR Chest 1 View [[id number]] Collected: 06/25/25 1746       Updated: 06/25/25 1750     Narrative:       EXAM/TECHNIQUE: XR CHEST 1 VW-     INDICATION: Chest pain, shortness of breath     COMPARISON: None     FINDINGS:  Marked enlargement of the cardiac silhouette. No pleural effusion or  visible pneumothorax. No focal consolidation. No acute osseous finding.        Impression:       1.  Marked enlargement of the cardiac silhouette.  2.  Lungs are " "clear.        This report was signed and finalized on 6/25/2025 5:47 PM by Dr. Juan Miguel Aparicio MD.                   Culture:  No results found for: \"BLOODCX\", \"URINECX\", \"WOUNDCX\", \"MRSACX\", \"RESPCX\", \"STOOLCX\"        Assessment    Chronic kidney disease stage 4  Type 2 diabetes  Hypertension  CHF  Seizure disorder  Anemia  Non-STEMI  Medical noncompliance     Plan:   Renal function has improved compared to the previous admission earlier this week and is currently at his baseline level.   Having said that, it remains likely that he will need to start dialysis in the not too distant future.   Typically we would be able manage a patient approaching ESRD in the outpatient setting, but this may be difficult in this case given the patient's long history of noncompliance and unwillingness to follow up as directed.    Further assessment and plan pending Dr Hammer's evaluation of patient        Thank you for the consult, we appreciate the opportunity to provide care to your patients.  Feel free to contact me if I can be of any further assistance.       Dario Fernandez, APRN  6/26/2025  10:21 CDT              Time Temp Pulse Resp BP Patient Position Device (Oxygen Therapy) SpO2   06/26/25 1203 -- -- -- 157/101 Abnormal  -- -- --   06/26/25 1136 -- -- -- 159/112 Abnormal  -- -- --   06/26/25 1032 97.7 (36.5) 91 18 167/117 Abnormal  Lying room air 100   06/26/25 0700 98.2 (36.8) 96 18 153/98 Lying room air --   06/26/25 0422 98.2 (36.8) 97 18 155/100 Lying room air 100   06/26/25 0204 -- -- -- 162/104 Abnormal   Lying -- --   BP: MD Carlos notified at 06/26/25 0204   06/26/25 0021 -- -- -- 145/93 Lying -- --   06/25/25 2344 -- 98 18 162/108 Abnormal   Lying room air 100   BP: MD Carlos notified at 06/25/25 2344 06/25/25 2304 -- -- -- 176/106 Abnormal   Lying -- --   BP: MD Carlos notified at 06/25/25 2304 06/25/25 2232 -- -- -- 176/106 Abnormal   Lying -- --   BP: nitro gtt titrated up at 06/25/25 2232 "   06/25/25 2158 -- -- -- 188/133 Abnormal   Lying -- --   BP: hydralazine given at 06/25/25 2158 06/25/25 2122 -- -- -- -- -- room air --   06/25/25 2039 98 (36.7) 95 -- 182/122 Abnormal  Sitting room air 100   06/25/25 2016 -- 95 -- 177/110 Abnormal  -- -- 100   06/25/25 2008 98 (36.7) -- 16 170/108 Abnormal  -- -- 98   06/25/25 1946 -- 103 16 187/135 Abnormal  -- -- 100   06/25/25 1835 -- 102 -- 171/135 Abnormal  -- -- 94   06/25/25 1759 -- 99 -- 202/145 Abnormal  -- -- --   06/25/25 1725 97.7 (36.5) 96 16 171/134 Abnormal  Lying room air 100       CBC (No Diff) [BNP972] (Order [id number])  Order  Date: 6/25/2025 Department: [facility] Released By/Authorizing: Lesa Gonzalez MD (auto-released)     Reprint Order Requisition    CBC (No Diff) (Order #[id number]) on 6/25/25         Contains abnormal data CBC (No Diff)  Order: [id number]   Status: Final result       Next appt: None    Test Result Released: No    Specimen Information: Blood   0 Result Notes              Component  Ref Range & Units (hover) 03:13  (6/26/25) 1 d ago  (6/25/25) 2 d ago  (6/24/25) 3 d ago  (6/23/25) 3 d ago  (6/23/25) 3 d ago  (6/23/25) 3 d ago  (6/23/25)   WBC 5.98 7.44 7.33    7.16   RBC 2.62 Low  2.79 Low  2.59 Low     2.64 Low    Hemoglobin 7.0 Low  7.6 Low  7.0 Low  7.9 Low  7.1 Low  7.3 Low  7.3 Low    Hematocrit 23.0 Low  24.8 Low  22.3 Low  25.8 Low  22.7 Low  23.6 Low  22.3 Low    MCV 87.8 88.9 86.1    84.5   MCH 26.7 27.2 27.0    27.7   MCHC 30.4 Low  30.6 Low  31.4 Low     32.7   RDW 18.1 High  18.2 High  17.4 High     17.4 High    RDW-SD 56.7 High  56.8 High  53.9    53.4   MPV 11.3 11.2 11.5    11.5   Platelets 123 Low  131 Low  117 Low                 Prepare RBC, 1 Units [PXT432] (Order [id number])       Comprehensive Metabolic Panel [LAB17] (Order [id number])  Order  Date: 6/25/2025 Department: [address] Released By/Authorizing: Lesa Gonzalez MD (auto-released)     Reprint Order  Requisition    Comprehensive Metabolic Panel (Order #[id number]) on 6/25/25         Contains abnormal data Comprehensive Metabolic Panel  Order: [id number]   Status: Final result       Next appt: None    Test Result Released: No    Specimen Information: Blood   0 Result Notes            Component  Ref Range & Units (hover) 03:13  (6/26/25) 1 d ago  (6/25/25) 2 d ago  (6/24/25) 3 d ago  (6/23/25) 4 d ago  (6/22/25) 4 d ago  (6/22/25) 5 d ago  (6/21/25)   Glucose 140 High  112 High  99 81  87 121 High    BUN 63.5 High  64.8 High  75.4 High  95.3 High   108.0 High  110.8 High    Creatinine 3.96 High  3.99 High  4.97 High  5.24 High   5.89 High  5.60 High    Sodium 132 Low  138 135 Low  142  142 139   Potassium 4.0 4.1 4.4 CM 3.5 3.7 3.6 3.7   Chloride 101 102 103 103  103 100   CO2 20.0 Low  23.0 22.0 26.0  26.0 23.0   Calcium 8.2 Low  8.5 Low  7.6 Low  8.1 Low   8.0 Low  7.6 Low    Total Protein 5.6 Low  6.0 5.0 Low     4.4 Low    Albumin 3.1 Low  3.3 Low  3.0 Low     2.6 Low    ALT (SGPT) 18 22 24 CM    42 High    AST (SGOT) 25 23 26 CM    16   Alkaline Phosphatase 66 71 58    70   Total Bilirubin 0.8 0.7 0.8    2.2 High    Globulin 2.5 2.7 2.0    1.8   A/G Ratio 1.2 1.2 1.5    1.4   BUN/Creatinine Ratio 16.0 16.2 15.2 18.2  18.3 19.8   Anion Gap 11.0 13.0 10.0 13.0  13.0 16.0 High    eGFR 20.2 Low                       Current Facility-Administered Medications   Medication Dose Route Frequency Provider Last Rate Last Admin    acetaminophen (TYLENOL) tablet 1,000 mg  1,000 mg Oral Q6H PRN Lesa Gonzalez MD   1,000 mg at 06/25/25 2343    aspirin EC tablet 81 mg  81 mg Oral Daily Lesa Gonzalez MD   81 mg at 06/26/25 0839    dilTIAZem CD (CARDIZEM CD) 24 hr capsule 240 mg  240 mg Oral Q24H Benjamin Dominguez MD   240 mg at 06/26/25 1239    hydrALAZINE (APRESOLINE) injection 10 mg  10 mg Intravenous Q6H PRN Lesa Gonzalez MD   10 mg at 06/26/25 1103    hydrALAZINE (APRESOLINE) tablet 25 mg  25 mg Oral Q8H Moira  MD Sammy        isosorbide mononitrate (IMDUR) 24 hr tablet 30 mg  30 mg Oral Q24H Sammy Pizarro MD   30 mg at 06/26/25 1103    levETIRAcetam (KEPPRA) tablet 500 mg  500 mg Oral BID Lesa Gonzalez MD   500 mg at 06/26/25 0839    metoprolol succinate XL (TOPROL-XL) 24 hr tablet 100 mg  100 mg Oral Daily Lesa Gonzalez MD   100 mg at 06/26/25 0840    nitroglycerin (NITROSTAT) SL tablet 0.4 mg  0.4 mg Sublingual Q5 Min PRN Lesa Gonzalez MD        ondansetron (ZOFRAN) injection 4 mg  4 mg Intravenous Q6H PRN Benjamin Dominguez MD   4 mg at 06/26/25 0840    pantoprazole (PROTONIX) injection 40 mg  40 mg Intravenous Q12H Lesa Gonzalez MD   40 mg at 06/26/25 0840    sodium chloride 0.9 % flush 10 mL  10 mL Intravenous Q12H Lesa Gonzalez MD   10 mL at 06/26/25 0840    sodium chloride 0.9 % flush 10 mL  10 mL Intravenous PRN Lesa Gonzalez MD        sodium chloride 0.9 % infusion 40 mL  40 mL Intravenous PRN Lesa Gonzalez MD

## 2025-06-26 NOTE — PROGRESS NOTES
[facility]  INPATIENT PROGRESS NOTE    Patient Name: José Miguel Webb  Date of Admission: 6/25/2025  Today's Date: 06/26/25  Length of Stay: 1  Primary Care Physician: He Ortega MD    Subjective   Chief Complaint: Transferred for NSTEMI    Patient has no complaint this morning, chest pain has improved.  Discussed hemoglobin of 7.0 and need for transfusion and patient is agreeable.      Review of Systems   All pertinent negatives and positives are as above. All other systems have been reviewed and are negative unless otherwise stated.     Objective    Temp:  [97.2 °F (36.2 °C)-98.2 °F (36.8 °C)] 97.2 °F (36.2 °C)  Heart Rate:  [] 87  Resp:  [16-18] 18  BP: (145-202)/() 150/86  Physical Exam  Constitutional:       Appearance: He is ill-appearing.   Cardiovascular:      Rate and Rhythm: Normal rate and regular rhythm.      Pulses: Normal pulses.      Heart sounds: Normal heart sounds. No murmur heard.  Pulmonary:      Effort: Pulmonary effort is normal. No respiratory distress.      Breath sounds: Normal breath sounds. No wheezing or rales.   Abdominal:      General: Bowel sounds are normal. There is no distension.      Palpations: Abdomen is soft.      Tenderness: There is no abdominal tenderness. There is no guarding.   Musculoskeletal:      Right lower leg: Edema present.      Left lower leg: Edema present.   Skin:     General: Skin is warm.   Neurological:      Mental Status: He is alert and oriented to person, place, and time. Mental status is at baseline.       Results Review:  I have reviewed the labs, radiology results, and diagnostic studies.    Laboratory Data:   Results from last 7 days   Lab Units 06/26/25  0313 06/25/25  1756 06/24/25  0658   WBC 10*3/mm3 5.98 7.44 7.33   HEMOGLOBIN g/dL 7.0* 7.6* 7.0*   HEMATOCRIT % 23.0* 24.8* 22.3*   PLATELETS 10*3/mm3 123* 131* 117*        Results from last 7 days   Lab Units 06/26/25 0313  "06/25/25  1756 06/24/25  0526   SODIUM mmol/L 132* 138 135*   POTASSIUM mmol/L 4.0 4.1 4.4   CHLORIDE mmol/L 101 102 103   CO2 mmol/L 20.0* 23.0 22.0   BUN mg/dL 63.5* 64.8* 75.4*   CREATININE mg/dL 3.96* 3.99* 4.97*   CALCIUM mg/dL 8.2* 8.5* 7.6*   BILIRUBIN mg/dL 0.8 0.7 0.8   ALK PHOS U/L 66 71 58   ALT (SGPT) U/L 18 22 24   AST (SGOT) U/L 25 23 26   GLUCOSE mg/dL 140* 112* 99       Culture Data:   No results found for: \"BLOODCX\", \"URINECX\", \"WOUNDCX\", \"MRSACX\", \"RESPCX\", \"STOOLCX\"    Radiology Data:   Imaging Results (Last 24 Hours)       Procedure Component Value Units Date/Time    XR Chest 1 View [[id number]] Collected: 06/25/25 1746     Updated: 06/25/25 1750    Narrative:      EXAM/TECHNIQUE: XR CHEST 1 VW-     INDICATION: Chest pain, shortness of breath     COMPARISON: None     FINDINGS:  Marked enlargement of the cardiac silhouette. No pleural effusion or  visible pneumothorax. No focal consolidation. No acute osseous finding.       Impression:      1.  Marked enlargement of the cardiac silhouette.  2.  Lungs are clear.        This report was signed and finalized on 6/25/2025 5:47 PM by Dr. Juan Miguel Aparicio MD.               I have reviewed the patient's current medications.     Assessment/Plan   Assessment  Active Hospital Problems    Diagnosis     **Non-STEMI (non-ST elevated myocardial infarction)     Hypertensive crisis     Congestive heart failure     Seizure disorder     Chronic systolic (congestive) heart failure        Treatment Plan    -NSTEMI  Cardiology was consulted and recommended hemodialysis as indicated, echocardiogram, Imdur and hydralazine.  Anticoagulation was contraindicated because of recent GI bleed andpatient is anemic with hemoglobin of 7.  Patient has a history of noncompliance, cocaine abuse and therefore beta-blocker will be avoided.    -ESRD with impending hemodialysis  Nephrology is on consult to help with management, awaiting tentative recommendations.    -Uncontrolled blood " pressure  Patient was on Entresto at home, which is being held because of renal status.  Cardizem and hydralazine has been started in hospital as well as as needed IV hydralazine.    -Recenthistory of present GI bleed  Anticoagulation is avoided, patient's hemoglobin is also low.  We will continue patient on PPI    -Acute on chronic anemia secondary to end-stage renal disease/recent GI bleed  Patient's hemoglobin this morning is 7, will transfuse with 1 unit of PRBC.    DVT prophylaxis-SCD      Electronically signed by Benjamin Dominguez MD, 06/26/25, 15:33 CDT.

## 2025-06-26 NOTE — PLAN OF CARE
Goal Outcome Evaluation:  Plan of Care Reviewed With: patient        Progress: improving  Outcome Evaluation: BP trending down. Last /100, MD notified. No intervention needed at this time per MD.S/ST  per tele. Nitro gtt infusing @ 40 mcg. Safety maintained.

## 2025-06-27 LAB
ANION GAP SERPL CALCULATED.3IONS-SCNC: 12 MMOL/L (ref 5–15)
AORTIC DIMENSIONLESS INDEX: 0.72 (DI)
AV MEAN PRESS GRAD SYS DOP V1V2: 1.91 MMHG
AV VMAX SYS DOP: 101.4 CM/SEC
BASOPHILS # BLD AUTO: 0.05 10*3/MM3 (ref 0–0.2)
BASOPHILS NFR BLD AUTO: 0.8 % (ref 0–1.5)
BH BB BLOOD EXPIRATION DATE: NORMAL
BH BB BLOOD TYPE BARCODE: 8400
BH BB DISPENSE STATUS: NORMAL
BH BB PRODUCT CODE: NORMAL
BH BB UNIT NUMBER: NORMAL
BH CV ECHO LEFT VENTRICLE GLOBAL LONGITUDINAL STRAIN: -4.2 %
BH CV ECHO MEAS - 2D AUTO EF SIEMENS: 21 %
BH CV ECHO MEAS - AO MAX PG: 4.1 MMHG
BH CV ECHO MEAS - AO ROOT DIAM: 2.5 CM
BH CV ECHO MEAS - AO V2 VTI: 14.8 CM
BH CV ECHO MEAS - AVA(I,D): 3.1 CM2
BH CV ECHO MEAS - EDV(CUBED): 329.1 ML
BH CV ECHO MEAS - EDV(MOD-SP2): 261.8 ML
BH CV ECHO MEAS - EDV(MOD-SP4): 259.4 ML
BH CV ECHO MEAS - EF(MOD-SP2): 14.4 %
BH CV ECHO MEAS - EF(MOD-SP4): 21.3 %
BH CV ECHO MEAS - ESV(CUBED): 269 ML
BH CV ECHO MEAS - ESV(MOD-SP2): 224.1 ML
BH CV ECHO MEAS - ESV(MOD-SP4): 204.2 ML
BH CV ECHO MEAS - FS: 6.5 %
BH CV ECHO MEAS - IVS/LVPW: 1.16 CM
BH CV ECHO MEAS - IVSD: 1.45 CM
BH CV ECHO MEAS - LA DIMENSION: 5.2 CM
BH CV ECHO MEAS - LAT PEAK E' VEL: 6.1 CM/SEC
BH CV ECHO MEAS - LV DIASTOLIC VOL/BSA (35-75): 121.4 CM2
BH CV ECHO MEAS - LV MASS(C)D: 463.9 GRAMS
BH CV ECHO MEAS - LV MAX PG: 2.15 MMHG
BH CV ECHO MEAS - LV MEAN PG: 1.11 MMHG
BH CV ECHO MEAS - LV SYSTOLIC VOL/BSA (12-30): 95.6 CM2
BH CV ECHO MEAS - LV V1 MAX: 73.3 CM/SEC
BH CV ECHO MEAS - LV V1 VTI: 10.7 CM
BH CV ECHO MEAS - LVIDD: 6.9 CM
BH CV ECHO MEAS - LVIDS: 6.5 CM
BH CV ECHO MEAS - LVOT AREA: 4.3 CM2
BH CV ECHO MEAS - LVOT DIAM: 2.33 CM
BH CV ECHO MEAS - LVPWD: 1.25 CM
BH CV ECHO MEAS - MED PEAK E' VEL: 3 CM/SEC
BH CV ECHO MEAS - MR MAX PG: 112.2 MMHG
BH CV ECHO MEAS - MR MAX VEL: 529.7 CM/SEC
BH CV ECHO MEAS - MV A MAX VEL: 39.2 CM/SEC
BH CV ECHO MEAS - MV DEC SLOPE: 598.9 CM/SEC2
BH CV ECHO MEAS - MV DEC TIME: 0.13 SEC
BH CV ECHO MEAS - MV E MAX VEL: 75.4 CM/SEC
BH CV ECHO MEAS - MV E/A: 1.92
BH CV ECHO MEAS - MV MAX PG: 3.1 MMHG
BH CV ECHO MEAS - MV MEAN PG: 1.78 MMHG
BH CV ECHO MEAS - MV V2 VTI: 21.3 CM
BH CV ECHO MEAS - MVA(VTI): 2.14 CM2
BH CV ECHO MEAS - PA V2 MAX: 55.9 CM/SEC
BH CV ECHO MEAS - PI END-D VEL: 175.8 CM/SEC
BH CV ECHO MEAS - RAP SYSTOLE: 3 MMHG
BH CV ECHO MEAS - RVSP: 56.3 MMHG
BH CV ECHO MEAS - SV(LVOT): 45.4 ML
BH CV ECHO MEAS - SV(MOD-SP2): 37.7 ML
BH CV ECHO MEAS - SV(MOD-SP4): 55.2 ML
BH CV ECHO MEAS - SVI(LVOT): 21.3 ML/M2
BH CV ECHO MEAS - SVI(MOD-SP2): 17.6 ML/M2
BH CV ECHO MEAS - SVI(MOD-SP4): 25.8 ML/M2
BH CV ECHO MEAS - TAPSE (>1.6): 1.18 CM
BH CV ECHO MEAS - TR MAX PG: 53.3 MMHG
BH CV ECHO MEAS - TR MAX VEL: 365.1 CM/SEC
BH CV ECHO MEASUREMENTS AVERAGE E/E' RATIO: 16.57
BH CV XLRA - RV BASE: 4.3 CM
BH CV XLRA - RV LENGTH: 9.1 CM
BH CV XLRA - RV MID: 3 CM
BUN SERPL-MCNC: 57.4 MG/DL (ref 6–20)
BUN/CREAT SERPL: 14.5 (ref 7–25)
CALCIUM SPEC-SCNC: 8.3 MG/DL (ref 8.6–10.5)
CHLORIDE SERPL-SCNC: 100 MMOL/L (ref 98–107)
CO2 SERPL-SCNC: 21 MMOL/L (ref 22–29)
CREAT SERPL-MCNC: 3.97 MG/DL (ref 0.76–1.27)
CROSSMATCH INTERPRETATION: NORMAL
DEPRECATED RDW RBC AUTO: 57.3 FL (ref 37–54)
EGFRCR SERPLBLD CKD-EPI 2021: 20.1 ML/MIN/1.73
EOSINOPHIL # BLD AUTO: 0.27 10*3/MM3 (ref 0–0.4)
EOSINOPHIL NFR BLD AUTO: 4.3 % (ref 0.3–6.2)
ERYTHROCYTE [DISTWIDTH] IN BLOOD BY AUTOMATED COUNT: 18.3 % (ref 12.3–15.4)
GLUCOSE SERPL-MCNC: 123 MG/DL (ref 65–99)
HCT VFR BLD AUTO: 25.3 % (ref 37.5–51)
HGB BLD-MCNC: 7.9 G/DL (ref 13–17.7)
IMM GRANULOCYTES # BLD AUTO: 0.02 10*3/MM3 (ref 0–0.05)
IMM GRANULOCYTES NFR BLD AUTO: 0.3 % (ref 0–0.5)
LEFT ATRIUM VOLUME INDEX: 60.3 ML/M2
LEFT ATRIUM VOLUME: 129 ML
LYMPHOCYTES # BLD AUTO: 1.59 10*3/MM3 (ref 0.7–3.1)
LYMPHOCYTES NFR BLD AUTO: 25.4 % (ref 19.6–45.3)
MAGNESIUM SERPL-MCNC: 2.4 MG/DL (ref 1.6–2.6)
MCH RBC QN AUTO: 27.6 PG (ref 26.6–33)
MCHC RBC AUTO-ENTMCNC: 31.2 G/DL (ref 31.5–35.7)
MCV RBC AUTO: 88.5 FL (ref 79–97)
MONOCYTES # BLD AUTO: 0.47 10*3/MM3 (ref 0.1–0.9)
MONOCYTES NFR BLD AUTO: 7.5 % (ref 5–12)
NEUTROPHILS NFR BLD AUTO: 3.87 10*3/MM3 (ref 1.7–7)
NEUTROPHILS NFR BLD AUTO: 61.7 % (ref 42.7–76)
NRBC BLD AUTO-RTO: 0 /100 WBC (ref 0–0.2)
PLATELET # BLD AUTO: 140 10*3/MM3 (ref 140–450)
PMV BLD AUTO: 11.2 FL (ref 6–12)
POTASSIUM SERPL-SCNC: 4.4 MMOL/L (ref 3.5–5.2)
RBC # BLD AUTO: 2.86 10*6/MM3 (ref 4.14–5.8)
SODIUM SERPL-SCNC: 133 MMOL/L (ref 136–145)
UNIT  ABO: NORMAL
UNIT  RH: NORMAL
WBC NRBC COR # BLD AUTO: 6.27 10*3/MM3 (ref 3.4–10.8)

## 2025-06-27 PROCEDURE — 85025 COMPLETE CBC W/AUTO DIFF WBC: CPT | Performed by: INTERNAL MEDICINE

## 2025-06-27 PROCEDURE — 83735 ASSAY OF MAGNESIUM: CPT | Performed by: INTERNAL MEDICINE

## 2025-06-27 PROCEDURE — 99232 SBSQ HOSP IP/OBS MODERATE 35: CPT

## 2025-06-27 PROCEDURE — 80048 BASIC METABOLIC PNL TOTAL CA: CPT | Performed by: INTERNAL MEDICINE

## 2025-06-27 PROCEDURE — 25010000002 MORPHINE PER 10 MG

## 2025-06-27 RX ORDER — ISOSORBIDE MONONITRATE 30 MG/1
30 TABLET, EXTENDED RELEASE ORAL DAILY
Qty: 30 TABLET | Refills: 0 | Status: SHIPPED | OUTPATIENT
Start: 2025-06-27

## 2025-06-27 RX ORDER — HYDRALAZINE HYDROCHLORIDE 25 MG/1
25 TABLET, FILM COATED ORAL EVERY 8 HOURS SCHEDULED
Qty: 90 TABLET | Refills: 0 | Status: SHIPPED | OUTPATIENT
Start: 2025-06-27

## 2025-06-27 RX ORDER — HYDRALAZINE HYDROCHLORIDE 50 MG/1
50 TABLET, FILM COATED ORAL EVERY 8 HOURS SCHEDULED
Status: DISCONTINUED | OUTPATIENT
Start: 2025-06-27 | End: 2025-07-01

## 2025-06-27 RX ORDER — ASPIRIN 81 MG/1
81 TABLET ORAL DAILY
Qty: 30 TABLET | Refills: 0 | Status: CANCELLED | OUTPATIENT
Start: 2025-06-28

## 2025-06-27 RX ORDER — AMLODIPINE BESYLATE 5 MG/1
5 TABLET ORAL
Status: DISCONTINUED | OUTPATIENT
Start: 2025-06-27 | End: 2025-07-01

## 2025-06-27 RX ORDER — HYOSCYAMINE SULFATE 0.38 MG/1
375 TABLET, EXTENDED RELEASE ORAL ONCE AS NEEDED
Status: COMPLETED | OUTPATIENT
Start: 2025-06-27 | End: 2025-06-27

## 2025-06-27 RX ORDER — MORPHINE SULFATE 2 MG/ML
1 INJECTION, SOLUTION INTRAMUSCULAR; INTRAVENOUS ONCE AS NEEDED
Status: COMPLETED | OUTPATIENT
Start: 2025-06-27 | End: 2025-06-27

## 2025-06-27 RX ORDER — DILTIAZEM HYDROCHLORIDE EXTENDED-RELEASE TABLETS 240 MG/1
240 TABLET, EXTENDED RELEASE ORAL DAILY
Qty: 30 TABLET | Refills: 11 | Status: SHIPPED | OUTPATIENT
Start: 2025-06-27 | End: 2026-06-27

## 2025-06-27 RX ORDER — ISOSORBIDE DINITRATE 20 MG/1
20 TABLET ORAL
Qty: 90 TABLET | Refills: 0 | Status: CANCELLED | OUTPATIENT
Start: 2025-06-27

## 2025-06-27 RX ORDER — ISOSORBIDE DINITRATE 10 MG/1
20 TABLET ORAL
Status: DISCONTINUED | OUTPATIENT
Start: 2025-06-27 | End: 2025-07-04 | Stop reason: HOSPADM

## 2025-06-27 RX ADMIN — HYDRALAZINE HYDROCHLORIDE 50 MG: 50 TABLET ORAL at 13:46

## 2025-06-27 RX ADMIN — Medication 10 ML: at 21:01

## 2025-06-27 RX ADMIN — ISOSORBIDE MONONITRATE 30 MG: 30 TABLET, EXTENDED RELEASE ORAL at 08:20

## 2025-06-27 RX ADMIN — LEVETIRACETAM 500 MG: 500 TABLET, FILM COATED ORAL at 08:20

## 2025-06-27 RX ADMIN — PANTOPRAZOLE SODIUM 40 MG: 40 INJECTION, POWDER, FOR SOLUTION INTRAVENOUS at 08:20

## 2025-06-27 RX ADMIN — AMLODIPINE BESYLATE 5 MG: 5 TABLET ORAL at 13:46

## 2025-06-27 RX ADMIN — ISOSORBIDE DINITRATE 20 MG: 10 TABLET ORAL at 13:46

## 2025-06-27 RX ADMIN — ISOSORBIDE DINITRATE 20 MG: 10 TABLET ORAL at 18:01

## 2025-06-27 RX ADMIN — HYOSCYAMINE SULFATE 375 MCG: 0.38 TABLET, EXTENDED RELEASE ORAL at 21:59

## 2025-06-27 RX ADMIN — LEVETIRACETAM 500 MG: 500 TABLET, FILM COATED ORAL at 21:01

## 2025-06-27 RX ADMIN — HYDRALAZINE HYDROCHLORIDE 25 MG: 25 TABLET ORAL at 04:38

## 2025-06-27 RX ADMIN — HYDRALAZINE HYDROCHLORIDE 50 MG: 50 TABLET ORAL at 21:01

## 2025-06-27 RX ADMIN — MORPHINE SULFATE 1 MG: 2 INJECTION, SOLUTION INTRAMUSCULAR; INTRAVENOUS at 21:59

## 2025-06-27 RX ADMIN — Medication 10 ML: at 08:23

## 2025-06-27 RX ADMIN — PANTOPRAZOLE SODIUM 40 MG: 40 INJECTION, POWDER, FOR SOLUTION INTRAVENOUS at 21:02

## 2025-06-27 RX ADMIN — METOPROLOL SUCCINATE 100 MG: 100 TABLET, EXTENDED RELEASE ORAL at 08:21

## 2025-06-27 RX ADMIN — ASPIRIN 81 MG: 81 TABLET, COATED ORAL at 08:20

## 2025-06-27 NOTE — PROGRESS NOTES
Nephrology ([facility]) Progress Note      Patient:  José Miguel Webb  YOB: 1995  Date of Service: 6/27/2025  MRN: [id number]   Acct: [id number]   Primary Care Physician: He Ortega MD  Advance Directive:   Code Status and Medical Interventions: CPR (Attempt to Resuscitate); Full Support   Ordered at: 06/25/25 2000     Code Status (Patient has no pulse and is not breathing):    CPR (Attempt to Resuscitate)     Medical Interventions (Patient has pulse or is breathing):    Full Support     Level Of Support Discussed With:    Patient     Admit Date: 6/25/2025       Hospital Day: 2  Referring Provider: Nicola Redd MD      Patient personally seen and examined.  Complete chart including Consults, Notes, Operative Reports, Labs, Cardiology, and Radiology studies reviewed as able.        Subjective:  José Miguel Webb is a 30 y.o. male for whom we were consulted for evaluation and treatment of chronic kidney disease. Baseline chronic kidney disease stage 4, baseline creatinine approximately 4.0. Has followed with our providers on a sporadic basis over the years, typically no-shows for his office visits. History of hypertension, CHF, seizure disorder. Patient was recently treated at [facility] for GI bleed and MINDY. He was scheduled to have permcath placed and to start hemodialysis but left AMA on 6/24.  Presented to [facility] on 6/25 with chest pain and was transferred to [facility] for cardiology and nephrology evaluation. Creatinine 3.99 when admitted this time.  No complaint of pain or dyspnea at time of initial nephrology exam. Denied n/v/d. Denied edema. No recent changes in urination. He explained that he is willing to proceed with dialysis if it is needed    Today is awake and alert. No new complaint or overnight issues. Urine output nonoliguric       Allergies:  Amoxicillin and Penicillins    Home Meds:  Medications Prior to Admission    Medication Sig Dispense Refill Last Dose/Taking    sacubitril-valsartan (Entresto) 49-51 MG tablet Take 1 tablet by mouth 2 (Two) Times a Day. 180 tablet 3        Medicines:  Current Facility-Administered Medications   Medication Dose Route Frequency Provider Last Rate Last Admin    acetaminophen (TYLENOL) tablet 1,000 mg  1,000 mg Oral Q6H PRN Lesa Gonzalez MD   1,000 mg at 06/25/25 2343    aspirin EC tablet 81 mg  81 mg Oral Daily Lesa Gonzalez MD   81 mg at 06/27/25 0820    hydrALAZINE (APRESOLINE) injection 10 mg  10 mg Intravenous Q6H PRN Lesa Gonzalez MD   10 mg at 06/26/25 1103    hydrALAZINE (APRESOLINE) tablet 25 mg  25 mg Oral Q8H Sammy Pizarro MD   25 mg at 06/27/25 0438    isosorbide mononitrate (IMDUR) 24 hr tablet 30 mg  30 mg Oral Q24H Sammy Pizarro MD   30 mg at 06/27/25 0820    levETIRAcetam (KEPPRA) tablet 500 mg  500 mg Oral BID Lesa Gonzalez MD   500 mg at 06/27/25 0820    metoprolol succinate XL (TOPROL-XL) 24 hr tablet 100 mg  100 mg Oral Daily Lesa Gonzalez MD   100 mg at 06/27/25 0821    nitroglycerin (NITROSTAT) SL tablet 0.4 mg  0.4 mg Sublingual Q5 Min PRN Lesa Gonzalez MD        ondansetron (ZOFRAN) injection 4 mg  4 mg Intravenous Q6H PRN Benjamin Dominguez MD   4 mg at 06/26/25 0840    pantoprazole (PROTONIX) injection 40 mg  40 mg Intravenous Q12H Lesa Gonzalez MD   40 mg at 06/27/25 0820    sodium chloride 0.9 % flush 10 mL  10 mL Intravenous Q12H Lesa Gonzalez MD   10 mL at 06/27/25 0823    sodium chloride 0.9 % flush 10 mL  10 mL Intravenous PRN Lesa Gonzalez MD        sodium chloride 0.9 % infusion 40 mL  40 mL Intravenous PRN Lesa Gonzalez MD           Past Medical History:  Past Medical History:   Diagnosis Date    Cardiomegaly     CHF (congestive heart failure)     Congenital fusion of carpal bone     HTN (hypertension)     Non-STEMI (non-ST elevated myocardial infarction) 6/25/2025    Seizure        Past Surgical History:  Past Surgical History:    Procedure Laterality Date    ELBOW PROCEDURE      ENDOSCOPY N/A 6/22/2025    Procedure: ESOPHAGOGASTRODUODENOSCOPY;  Surgeon: Kevin Miranda MD;  Location: [facility];  Service: Gastroenterology;  Laterality: N/A;  preop; GI bleed   postop; gastric ulcer ; esophagitis       Family History  Family History   Problem Relation Age of Onset    Hypertension Mother     Heart disease Mother     No Known Problems Father        Social History  Social History     Socioeconomic History    Marital status: Single   Tobacco Use    Smoking status: Some Days     Current packs/day: 0.50     Types: Cigarettes    Smokeless tobacco: Never   Vaping Use    Vaping status: Never Used   Substance and Sexual Activity    Alcohol use: Yes    Drug use: Yes     Types: Marijuana, Cocaine(coke)    Sexual activity: Defer       Review of Systems:  History obtained from chart review and the patient  General ROS: No fever or chills  Respiratory ROS: No cough, shortness of breath, wheezing  Cardiovascular ROS: No chest pain or palpitations  Gastrointestinal ROS: No abdominal pain or melena  Genito-Urinary ROS: No dysuria or hematuria  Psych ROS: No anxiety and depression  14 point ROS reviewed with the patient and negative except as noted above and in the HPI unless unable to obtain.    Objective:  Patient Vitals for the past 24 hrs:   BP Temp Temp src Pulse Resp SpO2   06/27/25 0749 138/95 97.9 °F (36.6 °C) Oral 73 16 100 %   06/27/25 0424 140/90 98 °F (36.7 °C) Oral 77 18 100 %   06/26/25 1951 123/84 98.9 °F (37.2 °C) Oral 70 18 100 %   06/26/25 1848 148/85 98.4 °F (36.9 °C) -- 87 18 100 %   06/26/25 1623 129/84 97.9 °F (36.6 °C) Oral 71 19 100 %   06/26/25 1605 137/90 98.1 °F (36.7 °C) Oral 73 20 100 %   06/26/25 1539 144/77 98.2 °F (36.8 °C) Oral 75 18 100 %   06/26/25 1500 150/86 97.2 °F (36.2 °C) Oral 87 18 100 %   06/26/25 1203 (!) 157/101 -- -- -- -- --   06/26/25 1136 (!) 159/112 -- -- -- -- --   06/26/25 1032 (!) 167/117 97.7 °F (36.5 °C)  "Oral 91 18 100 %       Intake/Output Summary (Last 24 hours) at 6/27/2025 0943  Last data filed at 6/26/2025 1951  Gross per 24 hour   Intake 630.83 ml   Output 200 ml   Net 430.83 ml     General: awake/alert   Chest:  clear to auscultation bilaterally without respiratory distress  CVS: regular rate and rhythm  Abdominal: soft, nontender, positive bowel sounds  Extremities: no cyanosis or edema  Skin: warm and dry without rash      Labs:  Results from last 7 days   Lab Units 06/27/25 0337 06/26/25 0313 06/25/25 1756   WBC 10*3/mm3 6.27 5.98 7.44   HEMOGLOBIN g/dL 7.9* 7.0* 7.6*   HEMATOCRIT % 25.3* 23.0* 24.8*   PLATELETS 10*3/mm3 140 123* 131*         Results from last 7 days   Lab Units 06/27/25  0337 06/26/25 0313 06/25/25 1756 06/24/25  0526   SODIUM mmol/L 133* 132* 138 135*   POTASSIUM mmol/L 4.4 4.0 4.1 4.4   CHLORIDE mmol/L 100 101 102 103   CO2 mmol/L 21.0* 20.0* 23.0 22.0   BUN mg/dL 57.4* 63.5* 64.8* 75.4*   CREATININE mg/dL 3.97* 3.96* 3.99* 4.97*   CALCIUM mg/dL 8.3* 8.2* 8.5* 7.6*   EGFR mL/min/1.73 20.1* 20.2* 20.0* 15.4*   BILIRUBIN mg/dL  --  0.8 0.7 0.8   ALK PHOS U/L  --  66 71 58   ALT (SGPT) U/L  --  18 22 24   AST (SGOT) U/L  --  25 23 26   GLUCOSE mg/dL 123* 140* 112* 99       Radiology:   Imaging Results (Last 72 Hours)       Procedure Component Value Units Date/Time    XR Chest 1 View [[id number]] Collected: 06/25/25 1746     Updated: 06/25/25 1750    Narrative:      EXAM/TECHNIQUE: XR CHEST 1 VW-     INDICATION: Chest pain, shortness of breath     COMPARISON: None     FINDINGS:  Marked enlargement of the cardiac silhouette. No pleural effusion or  visible pneumothorax. No focal consolidation. No acute osseous finding.       Impression:      1.  Marked enlargement of the cardiac silhouette.  2.  Lungs are clear.        This report was signed and finalized on 6/25/2025 5:47 PM by Dr. Juan Miguel Aparicio MD.               Culture:  No results found for: \"BLOODCX\", \"URINECX\", \"WOUNDCX\", " "\"MRSACX\", \"RESPCX\", \"STOOLCX\"      Assessment   Chronic kidney disease stage 4  Type 2 diabetes  Hypertension  CHF  Seizure disorder  Anemia  Non-STEMI  Medical noncompliance    Plan:   Renal function remains stable and at his baseline level  No immediate plans for dialysis  Encouraged compliance with all medications, follow up visits, etc.       Dario Fernandez, APRN  6/27/2025  09:43 CDT    "

## 2025-06-27 NOTE — PROGRESS NOTES
[facility] -  Progress Note     LOS: 2 days   Patient Care Team:  He Ortega MD as PCP - General (Internal Medicine)    Chief Complaint:.    Subjective     Interval History:     Patient Complaints: None at this time.    Patient presented to [facility] on 6/25/2025 with complaints of chest pain.  At that time he had been transferred from [facility] where he had been given a loading dose of aspirin in addition to a dose of nitroglycerin.  At that time he was complaining of left-sided chest pain that radiated to his left upper extremity.  At that time he denied shortness of breath, abdominal pain, fever or chills, nausea, vomiting, or diarrhea.     Upon consult/assessment by Dr. Pizarro, the patient reported ongoing chest pain that was nonpositional, nonpleuritic, and radiated to his left arm.  His initial troponin was 520 repeat of 639.  At that time he reported no syncope or presyncope, orthopnea, PND, shortness of breath, or swelling.    Of note, he had been admitted to [facility] just a few days prior to this encounter with a GI bleed when a peptic ulcer was found via endoscopy.  He was placed on PPI twice daily and received blood transfusions at that time.    He was also evaluated during that hospitalization by the nephrology team due to worsening renal function, and was scheduled to begin dialysis after permacath placement.  The patient decided to leave AMA prior to those interventions being put into place.  At this admission he stated that he was not going to leave AMA, and plans to stay so that he could receive the care he needed.    His toxicology screen on admission was positive for several substances.  Also noted at his admission that he had a hemoglobin of 7 and a hematocrit of 23.  His renal function shows BUN of 63.5, creatinine 3.96, and a GFR of 20.2.    He has a known medical history of CHF, cardiomegaly, hypertension, CKD,  seizures, history of drug use, peptic ulcer with GI bleed (recent).    At this time he is sitting up at side of bed eating his lunch.  He is appears to be in no distress, and he states that he is feeling well.  He denies chest pain, shortness of breath, dizziness, lightheadedness.  He does report some slight lower extremity edema in his feet and ankles.    Blood pressure is stable at 135/88, heart rate 70, most recent hemoglobin level 7.9.    Review of Systems:     Review of Systems   Constitutional:  Negative for activity change, chills, diaphoresis, fatigue and unexpected weight change.   Respiratory:  Negative for cough, chest tightness, shortness of breath and wheezing.    Cardiovascular:  Positive for leg swelling. Negative for chest pain and palpitations.   Genitourinary:  Negative for hematuria.   Musculoskeletal:  Negative for myalgias.   Neurological:  Negative for dizziness, syncope, weakness and light-headedness.     Objective     Vital Sign Min/Max for last 24 hours  Temp  Min: 97.2 °F (36.2 °C)  Max: 98.9 °F (37.2 °C)   BP  Min: 123/84  Max: 150/86   Pulse  Min: 70  Max: 87   Resp  Min: 16  Max: 20   SpO2  Min: 100 %  Max: 100 %   No data recorded   No data recorded         06/25/25 2039   Weight: 87.1 kg (192 lb)       Telemetry: SR 73 (60's to 80's overnight)      Physical Exam:    Vitals reviewed.   Constitutional:       Appearance: Normal appearance. Not in distress. Chronically ill-appearing.   HENT:      Head: Normocephalic.   Neck:      Vascular: JVD normal.   Pulmonary:      Effort: Pulmonary effort is normal.      Breath sounds: Normal breath sounds.   Cardiovascular:      PMI at left midclavicular line. Normal rate. Regular rhythm. Normal S1. Normal S2.       Murmurs: There is no murmur.      No gallop.  No click. No rub.   Pulses:     Intact distal pulses.   Edema:     Peripheral edema present.     Ankle: bilateral trace pitting edema of the ankle.     Feet: bilateral trace pitting edema of  the feet.  Skin:     General: Skin is warm and dry.   Neurological:      General: No focal deficit present.      Mental Status: Alert and oriented to person, place and time.      Motor: Motor function is intact.      Coordination: Coordination is intact.      Gait: Gait is intact.   Psychiatric:         Attention and Perception: Attention normal.         Speech: Speech normal.         Behavior: Behavior is cooperative.         Cognition and Memory: Cognition normal.       Results Review:   Lab Results (last 72 hours)       Procedure Component Value Units Date/Time    Basic Metabolic Panel [[id number]]  (Abnormal) Collected: 06/27/25 0337    Specimen: Blood Updated: 06/27/25 0504     Glucose 123 mg/dL      BUN 57.4 mg/dL      Creatinine 3.97 mg/dL      Sodium 133 mmol/L      Potassium 4.4 mmol/L      Chloride 100 mmol/L      CO2 21.0 mmol/L      Calcium 8.3 mg/dL      BUN/Creatinine Ratio 14.5     Anion Gap 12.0 mmol/L      eGFR 20.1 mL/min/1.73     Narrative:      GFR Categories in Chronic Kidney Disease (CKD)              GFR Category          GFR (mL/min/1.73)    Interpretation  G1                    90 or greater        Normal or high (1)  G2                    60-89                Mild decrease (1)  G3a                   45-59                Mild to moderate decrease  G3b                   30-44                Moderate to severe decrease  G4                    15-29                Severe decrease  G5                    14 or less           Kidney failure    (1)In the absence of evidence of kidney disease, neither GFR category G1 or G2 fulfill the criteria for CKD.    eGFR calculation 2021 CKD-EPI creatinine equation, which does not include race as a factor    Magnesium [[id number]]  (Normal) Collected: 06/27/25 0337    Specimen: Blood Updated: 06/27/25 0504     Magnesium 2.4 mg/dL     CBC & Differential [[id number]]  (Abnormal) Collected: 06/27/25 0337    Specimen: Blood Updated: 06/27/25 0434    Narrative:       The following orders were created for panel order CBC & Differential.  Procedure                               Abnormality         Status                     ---------                               -----------         ------                     CBC Auto Differential[[id number]]        Abnormal            Final result                 Please view results for these tests on the individual orders.    CBC Auto Differential [[id number]]  (Abnormal) Collected: 06/27/25 0337    Specimen: Blood Updated: 06/27/25 0434     WBC 6.27 10*3/mm3      RBC 2.86 10*6/mm3      Hemoglobin 7.9 g/dL      Hematocrit 25.3 %      MCV 88.5 fL      MCH 27.6 pg      MCHC 31.2 g/dL      RDW 18.3 %      RDW-SD 57.3 fl      MPV 11.2 fL      Platelets 140 10*3/mm3      Neutrophil % 61.7 %      Lymphocyte % 25.4 %      Monocyte % 7.5 %      Eosinophil % 4.3 %      Basophil % 0.8 %      Immature Grans % 0.3 %      Neutrophils, Absolute 3.87 10*3/mm3      Lymphocytes, Absolute 1.59 10*3/mm3      Monocytes, Absolute 0.47 10*3/mm3      Eosinophils, Absolute 0.27 10*3/mm3      Basophils, Absolute 0.05 10*3/mm3      Immature Grans, Absolute 0.02 10*3/mm3      nRBC 0.0 /100 WBC     Hepatitis B Surface Antibody [[id number]] Collected: 06/26/25 1039    Specimen: Blood Updated: 06/26/25 1907     Hep B S Ab Reactive    Narrative:      Non-Reactive - Individual is considered to be not immune to infection with HBV.    Equivocal - Unable to determine if anti-HBs is present at levels consistent with immunity. The individual should be further assessed by associated risk factors and the use of additional diagnositc information, or another sample may be collected and tested.    Reactive - Anti-HBs concentration detected at >10 mIU/mL. Individual is considered to be immune to infection with HBV.      Results may be falsely decreased if patient taking Biotin.      Hepatitis Panel, Acute [[id number]]  (Normal) Collected: 06/26/25 1039    Specimen: Blood Updated: 06/26/25  1139     Hepatitis B Surface Ag Non-Reactive     Hep A IgM Non-Reactive     Hep B C IgM Non-Reactive     Hepatitis C Ab Non-Reactive    Narrative:      Results may be falsely decreased if patient taking Biotin.     Comprehensive Metabolic Panel [[id number]]  (Abnormal) Collected: 06/26/25 0313    Specimen: Blood Updated: 06/26/25 0407     Glucose 140 mg/dL      BUN 63.5 mg/dL      Creatinine 3.96 mg/dL      Sodium 132 mmol/L      Potassium 4.0 mmol/L      Chloride 101 mmol/L      CO2 20.0 mmol/L      Calcium 8.2 mg/dL      Total Protein 5.6 g/dL      Albumin 3.1 g/dL      ALT (SGPT) 18 U/L      AST (SGOT) 25 U/L      Alkaline Phosphatase 66 U/L      Total Bilirubin 0.8 mg/dL      Globulin 2.5 gm/dL      A/G Ratio 1.2 g/dL      BUN/Creatinine Ratio 16.0     Anion Gap 11.0 mmol/L      eGFR 20.2 mL/min/1.73     Narrative:      GFR Categories in Chronic Kidney Disease (CKD)              GFR Category          GFR (mL/min/1.73)    Interpretation  G1                    90 or greater        Normal or high (1)  G2                    60-89                Mild decrease (1)  G3a                   45-59                Mild to moderate decrease  G3b                   30-44                Moderate to severe decrease  G4                    15-29                Severe decrease  G5                    14 or less           Kidney failure    (1)In the absence of evidence of kidney disease, neither GFR category G1 or G2 fulfill the criteria for CKD.    eGFR calculation 2021 CKD-EPI creatinine equation, which does not include race as a factor    Magnesium [[id number]]  (Normal) Collected: 06/26/25 0313    Specimen: Blood Updated: 06/26/25 0407     Magnesium 2.1 mg/dL     Phosphorus [[id number]]  (Normal) Collected: 06/26/25 0313    Specimen: Blood Updated: 06/26/25 0407     Phosphorus 3.4 mg/dL     CBC (No Diff) [[id number]]  (Abnormal) Collected: 06/26/25 0313    Specimen: Blood Updated: 06/26/25 0340     WBC 5.98 10*3/mm3      RBC 2.62  10*6/mm3      Hemoglobin 7.0 g/dL      Hematocrit 23.0 %      MCV 87.8 fL      MCH 26.7 pg      MCHC 30.4 g/dL      RDW 18.1 %      RDW-SD 56.7 fl      MPV 11.3 fL      Platelets 123 10*3/mm3     Fentanyl, Urine - Urine, Clean Catch [[id number]]  (Normal) Collected: 06/25/25 1953    Specimen: Urine, Clean Catch Updated: 06/25/25 2129     Fentanyl, Urine Negative    Narrative:      Negative Threshold:      Fentanyl 5 ng/mL     The normal value for the drug tested is negative. This report includes final unconfirmed screening results to be used for medical treatment purposes only. Unconfirmed results must not be used for non-medical purposes such as employment or legal testing. Clinical consideration should be applied to any drug of abuse test, particularly when unconfirmed results are used.           Urine Drug Screen - Urine, Clean Catch [[id number]]  (Abnormal) Collected: 06/25/25 1953    Specimen: Urine, Clean Catch Updated: 06/25/25 2129     THC, Screen, Urine Positive     Phencyclidine (PCP), Urine Negative     Cocaine Screen, Urine Positive     Methamphetamine, Ur Negative     Opiate Screen Positive     Amphetamine Screen, Urine Negative     Benzodiazepine Screen, Urine Negative     Tricyclic Antidepressants Screen Negative     Methadone Screen, Urine Negative     Barbiturates Screen, Urine Negative     Oxycodone Screen, Urine Negative     Buprenorphine, Screen, Urine Negative    Narrative:      Cutoff For Drugs Screened:    Amphetamines               500 ng/ml  Barbiturates               200 ng/ml  Benzodiazepines            150 ng/ml  Cocaine                    150 ng/ml  Methadone                  200 ng/ml  Opiates                    100 ng/ml  Phencyclidine               25 ng/ml  THC                         50 ng/ml  Methamphetamine            500 ng/ml  Tricyclic Antidepressants  300 ng/ml  Oxycodone                  100 ng/ml  Buprenorphine               10 ng/ml    The normal value for all drugs tested is  negative. This report includes unconfirmed screening results, with the cutoff values listed, to be used for medical treatment purposes only.  Unconfirmed results must not be used for non-medical purposes such as employment or legal testing.  Clinical consideration should be applied to any drug of abuse test, particularly when unconfirmed results are used.      Urinalysis With Microscopic If Indicated (No Culture) - Urine, Clean Catch [[id number]]  (Abnormal) Collected: 06/25/25 1953    Specimen: Urine, Clean Catch Updated: 06/25/25 2010     Color, UA Yellow     Appearance, UA Clear     pH, UA 6.0     Specific Gravity, UA 1.016     Glucose,  mg/dL (Trace)     Ketones, UA Negative     Bilirubin, UA Negative     Blood, UA Negative     Protein, UA >=300 mg/dL (3+)     Leuk Esterase, UA Negative     Nitrite, UA Negative     Urobilinogen, UA 1.0 E.U./dL    Urinalysis, Microscopic Only - Urine, Clean Catch [[id number]] Collected: 06/25/25 1953    Specimen: Urine, Clean Catch Updated: 06/25/25 2010     RBC, UA 0-2 /HPF      WBC, UA 0-2 /HPF      Bacteria, UA None Seen /HPF      Squamous Epithelial Cells, UA 0-2 /HPF      Hyaline Casts, UA None Seen /LPF      Methodology Automated Microscopy    Ethanol [[id number]] Collected: 06/25/25 1919    Specimen: Blood from Arm, Right Updated: 06/25/25 2001     Ethanol % <0.010 %     Narrative:      Not for legal purposes.    High Sensitivity Troponin T 1Hr [[id number]]  (Abnormal) Collected: 06/25/25 1919    Specimen: Blood from Arm, Right Updated: 06/25/25 1951     HS Troponin T 639 ng/L      Troponin T Numeric Delta 117 ng/L      Troponin T % Delta 22    Narrative:      High Sensitive Troponin T Reference Range:  <14.0 ng/L- Negative Female for AMI  <22.0 ng/L- Negative Male for AMI  >=14 - Abnormal Female indicating possible myocardial injury.  >=22 - Abnormal Male indicating possible myocardial injury.   Clinicians would have to utilize clinical acumen, EKG, Troponin, and serial  "changes to determine if it is an Acute Myocardial Infarction or myocardial injury due to an underlying chronic condition.         Procalcitonin [[id number]]  (Normal) Collected: 06/25/25 1756    Specimen: Blood Updated: 06/25/25 1858     Procalcitonin 0.19 ng/mL     Narrative:      As a Marker for Sepsis (Non-Neonates):    1. <0.5 ng/mL represents a low risk of severe sepsis and/or septic shock.  2. >2 ng/mL represents a high risk of severe sepsis and/or septic shock.    As a Marker for Lower Respiratory Tract Infections that require antibiotic therapy:    PCT on Admission    Antibiotic Therapy       6-12 Hrs later    >0.5                Strongly Recommended  >0.25 - <0.5        Recommended   0.1 - 0.25          Discouraged              Remeasure/reassess PCT  <0.1                Strongly Discouraged     Remeasure/reassess PCT    As 28 day mortality risk marker: \"Change in Procalcitonin Result\" (>80% or <=80%) if Day 0 (or Day 1) and Day 4 values are available. Refer to http://www.[email or web address]-pct-calculator.com    Change in PCT <=80%  A decrease of PCT levels below or equal to 80% defines a positive change in PCT test result representing a higher risk for 28-day all-cause mortality of patients diagnosed with severe sepsis for septic shock.    Change in PCT >80%  A decrease of PCT levels of more than 80% defines a negative change in PCT result representing a lower risk for 28-day all-cause mortality of patients diagnosed with severe sepsis or septic shock.       High Sensitivity Troponin T [[id number]]  (Abnormal) Collected: 06/25/25 1756    Specimen: Blood Updated: 06/25/25 1855     HS Troponin T 522 ng/L     Narrative:      High Sensitive Troponin T Reference Range:  <14.0 ng/L- Negative Female for AMI  <22.0 ng/L- Negative Male for AMI  >=14 - Abnormal Female indicating possible myocardial injury.  >=22 - Abnormal Male indicating possible myocardial injury.   Clinicians would have to utilize clinical acumen, EKG, " Troponin, and serial changes to determine if it is an Acute Myocardial Infarction or myocardial injury due to an underlying chronic condition.         Comprehensive Metabolic Panel [[id number]]  (Abnormal) Collected: 06/25/25 1756    Specimen: Blood Updated: 06/25/25 1853     Glucose 112 mg/dL      BUN 64.8 mg/dL      Creatinine 3.99 mg/dL      Sodium 138 mmol/L      Potassium 4.1 mmol/L      Chloride 102 mmol/L      CO2 23.0 mmol/L      Calcium 8.5 mg/dL      Total Protein 6.0 g/dL      Albumin 3.3 g/dL      ALT (SGPT) 22 U/L      AST (SGOT) 23 U/L      Alkaline Phosphatase 71 U/L      Total Bilirubin 0.7 mg/dL      Globulin 2.7 gm/dL      A/G Ratio 1.2 g/dL      BUN/Creatinine Ratio 16.2     Anion Gap 13.0 mmol/L      eGFR 20.0 mL/min/1.73     Narrative:      GFR Categories in Chronic Kidney Disease (CKD)              GFR Category          GFR (mL/min/1.73)    Interpretation  G1                    90 or greater        Normal or high (1)  G2                    60-89                Mild decrease (1)  G3a                   45-59                Mild to moderate decrease  G3b                   30-44                Moderate to severe decrease  G4                    15-29                Severe decrease  G5                    14 or less           Kidney failure    (1)In the absence of evidence of kidney disease, neither GFR category G1 or G2 fulfill the criteria for CKD.    eGFR calculation 2021 CKD-EPI creatinine equation, which does not include race as a factor    Magnesium [[id number]]  (Normal) Collected: 06/25/25 1756    Specimen: Blood Updated: 06/25/25 1853     Magnesium 2.2 mg/dL     CK [[id number]]  (Normal) Collected: 06/25/25 1756    Specimen: Blood Updated: 06/25/25 1853     Creatine Kinase 186 U/L     Lactic Acid, Plasma [[id number]]  (Normal) Collected: 06/25/25 1756    Specimen: Blood Updated: 06/25/25 1851     Lactate 1.1 mmol/L     BNP [[id number]]  (Abnormal) Collected: 06/25/25 1756    Specimen: Blood  Updated: 06/25/25 1851     proBNP 13,331.0 pg/mL     Narrative:      This assay is used as an aid in the diagnosis of individuals suspected of having heart failure. It can be used as an aid in the diagnosis of acute decompensated heart failure (ADHF) in patients presenting with signs and symptoms of ADHF to the emergency department (ED). In addition, NT-proBNP of <300 pg/mL indicates ADHF is not likely.    Age Range Result Interpretation  NT-proBNP Concentration (pg/mL:      <50             Positive            >450                   Gray                 300-450                    Negative             <300    50-75           Positive            >900                  Gray                300-900                  Negative            <300      >75             Positive            >1800                  Gray                300-1800                  Negative            <300    aPTT [[id number]]  (Normal) Collected: 06/25/25 1756    Specimen: Blood Updated: 06/25/25 1841     PTT 32.9 seconds     Narrative:      PTT = The equivalent PTT values for the therapeutic range of heparin levels at 0.3 to 0.7 U/ml are 77 - 99 seconds.    Protime-INR [[id number]]  (Abnormal) Collected: 06/25/25 1756    Specimen: Blood Updated: 06/25/25 1841     Protime 14.9 Seconds      INR 1.11    CBC & Differential [[id number]]  (Abnormal) Collected: 06/25/25 1756    Specimen: Blood Updated: 06/25/25 1831    Narrative:      The following orders were created for panel order CBC & Differential.  Procedure                               Abnormality         Status                     ---------                               -----------         ------                     CBC Auto Differential[[id number]]        Abnormal            Final result                 Please view results for these tests on the individual orders.    CBC Auto Differential [[id number]]  (Abnormal) Collected: 06/25/25 1756    Specimen: Blood Updated: 06/25/25 1831     WBC 7.44 10*3/mm3       RBC 2.79 10*6/mm3      Hemoglobin 7.6 g/dL      Hematocrit 24.8 %      MCV 88.9 fL      MCH 27.2 pg      MCHC 30.6 g/dL      RDW 18.2 %      RDW-SD 56.8 fl      MPV 11.2 fL      Platelets 131 10*3/mm3      Neutrophil % 82.8 %      Lymphocyte % 10.9 %      Monocyte % 5.0 %      Eosinophil % 0.5 %      Basophil % 0.4 %      Immature Grans % 0.4 %      Neutrophils, Absolute 6.16 10*3/mm3      Lymphocytes, Absolute 0.81 10*3/mm3      Monocytes, Absolute 0.37 10*3/mm3      Eosinophils, Absolute 0.04 10*3/mm3      Basophils, Absolute 0.03 10*3/mm3      Immature Grans, Absolute 0.03 10*3/mm3      nRBC 0.0 /100 WBC                 Echo EF Estimated  Results for orders placed during the hospital encounter of 06/25/25    Adult Transthoracic Echo Complete W/ Cont if Necessary Per Protocol    Interpretation Summary  Images from the original result were not included.      Left ventricular systolic function is severely decreased. Automated 2D EF = 21%  Left ventricular ejection fraction appears to be 21 - 25%.    The left ventricular cavity is moderately dilated.    Left ventricular diastolic function is consistent with (grade II w/high LAP) pseudonormalization.    Left atrial volume is severely increased.    The right atrial cavity is moderately dilated.    Moderate pulmonary hypertension is present.    There is a small (<1cm) pericardial effusion. There is no evidence of cardiac tamponade.    Compared to prior echo dated 11/24/2024 LVEF has decreased from 31-35 % to current above      Abnormal left ventricular global strain with left ventricular apical sparing.  Recommend workup for cardiac amyloid.       Medication Review: yes  Current Facility-Administered Medications   Medication Dose Route Frequency Provider Last Rate Last Admin    acetaminophen (TYLENOL) tablet 1,000 mg  1,000 mg Oral Q6H PRN Lesa Gonzalez MD   1,000 mg at 06/25/25 2403    amLODIPine (NORVASC) tablet 5 mg  5 mg Oral Q24H Marta Clemens, APRN         aspirin EC tablet 81 mg  81 mg Oral Daily Lesa Gonzalez MD   81 mg at 06/27/25 0820    hydrALAZINE (APRESOLINE) injection 10 mg  10 mg Intravenous Q6H PRN Lesa Gonzalez MD   10 mg at 06/26/25 1103    hydrALAZINE (APRESOLINE) tablet 50 mg  50 mg Oral Q8H Marta Clemens APRN        isosorbide dinitrate (ISORDIL) tablet 20 mg  20 mg Oral TID - Nitrates Mani Mena MD        levETIRAcetam (KEPPRA) tablet 500 mg  500 mg Oral BID Lesa Gonzalez MD   500 mg at 06/27/25 0820    metoprolol succinate XL (TOPROL-XL) 24 hr tablet 100 mg  100 mg Oral Daily Lesa Gonzalez MD   100 mg at 06/27/25 0821    nitroglycerin (NITROSTAT) SL tablet 0.4 mg  0.4 mg Sublingual Q5 Min PRN Lesa Gonzalez MD        ondansetron (ZOFRAN) injection 4 mg  4 mg Intravenous Q6H PRN Benjamin Dominguez MD   4 mg at 06/26/25 0840    pantoprazole (PROTONIX) injection 40 mg  40 mg Intravenous Q12H Lesa Gonzalez MD   40 mg at 06/27/25 0820    sodium chloride 0.9 % flush 10 mL  10 mL Intravenous Q12H Lesa Gonzalez MD   10 mL at 06/27/25 0823    sodium chloride 0.9 % flush 10 mL  10 mL Intravenous PRN Lesa Gonzalez MD        sodium chloride 0.9 % infusion 40 mL  40 mL Intravenous PRN Lesa Gonzalez MD             Assessment & Plan       Non-STEMI (non-ST elevated myocardial infarction)    Chronic systolic (congestive) heart failure    Seizure disorder    Congestive heart failure    Hypertensive crisis    - IV nitroglycerin was discontinued yesterday cardiology.  Patient has been chest pain-free since this time.  - At this time we will stop his Imdur, and start Isordil 20 mg 3 times daily.  - Continue aspirin 81 mg daily.    - His latest echo shows very reduced EF of 21 to 25%.  - Consideration should be given prior to ReDs discharge to have discussion regarding a LifeVest.  - We will start amlodipine 5 mg daily.  - Hydralazine will be increased to 50 mg 3 times daily.    - Blood pressure is well-controlled at this time.  Continue with  above/current medications.    - Physician recommends outpatient PET stress testing.  Patient is agreeable to scheduling this.  Will be discussed further prior to his discharge.    -Patient stated that he has seen Dr. Aguirre for cardiology needs in the past, and he would like to follow-up with him after discharge.    - Lifestyle choices were discussed, and patient was encouraged to prioritize his cardiac health, and discontinue the things that are harming him, primarily his drug use.  Compliance with his heart failure and other medication regimens was also reinforced.  He expressed understanding.    - Agree with continued plan to initiate dialysis if nephrology feels this is reasonable.  Defer all decisions regarding this to nephrology team.    - Continue other medications per recommendation of admitting physician.      Further orders per Dr. Mena.      Electronically signed by SUKHDEV Macias, 06/27/25, 12:35 PM CDT.

## 2025-06-27 NOTE — PROGRESS NOTES
Enter Query Response Below      Query Response: Type 2 NSTEMI due to: Severe anemia and chronic kidney disease             If applicable, please update the problem list.

## 2025-06-27 NOTE — PROGRESS NOTES
Enter Query Response Below      Query Response: Other-untreated hypertension             If applicable, please update the problem list.

## 2025-06-27 NOTE — PROGRESS NOTES
[facility] Medicine Services  INPATIENT PROGRESS NOTE    Patient Name: José Miguel Webb  Date of Admission: 6/25/2025  Today's Date: 06/27/25  Length of Stay: 2  Primary Care Physician: He Ortega MD    Subjective   Chief Complaint: Transferred for NSTEMI    Patient has no complaint this morning, discussed echocardiogram with low ejection fraction and also stressed the need to discontinue illegal drug use.      Review of Systems   All pertinent negatives and positives are as above. All other systems have been reviewed and are negative unless otherwise stated.     Objective    Temp:  [97.9 °F (36.6 °C)-98.9 °F (37.2 °C)] 98 °F (36.7 °C)  Heart Rate:  [70-87] 70  Resp:  [16-20] 18  BP: (123-148)/(77-95) 135/88  Physical Exam  Constitutional:       Appearance: He is ill-appearing.   Cardiovascular:      Rate and Rhythm: Normal rate and regular rhythm.      Pulses: Normal pulses.      Heart sounds: Normal heart sounds. No murmur heard.  Pulmonary:      Effort: Pulmonary effort is normal. No respiratory distress.      Breath sounds: Normal breath sounds. No wheezing or rales.   Abdominal:      General: Bowel sounds are normal. There is no distension.      Palpations: Abdomen is soft.      Tenderness: There is no abdominal tenderness. There is no guarding.   Musculoskeletal:      Right lower leg: Edema present.      Left lower leg: Edema present.   Skin:     General: Skin is warm.   Neurological:      Mental Status: He is alert and oriented to person, place, and time. Mental status is at baseline.       Results Review:  I have reviewed the labs, radiology results, and diagnostic studies.    Laboratory Data:   Results from last 7 days   Lab Units 06/27/25  0337 06/26/25  0313 06/25/25  1756   WBC 10*3/mm3 6.27 5.98 7.44   HEMOGLOBIN g/dL 7.9* 7.0* 7.6*   HEMATOCRIT % 25.3* 23.0* 24.8*   PLATELETS 10*3/mm3 140 123* 131*        Results from last 7 days   Lab Units 06/27/25  0337  "06/26/25  0313 06/25/25  1756 06/24/25  0526   SODIUM mmol/L 133* 132* 138 135*   POTASSIUM mmol/L 4.4 4.0 4.1 4.4   CHLORIDE mmol/L 100 101 102 103   CO2 mmol/L 21.0* 20.0* 23.0 22.0   BUN mg/dL 57.4* 63.5* 64.8* 75.4*   CREATININE mg/dL 3.97* 3.96* 3.99* 4.97*   CALCIUM mg/dL 8.3* 8.2* 8.5* 7.6*   BILIRUBIN mg/dL  --  0.8 0.7 0.8   ALK PHOS U/L  --  66 71 58   ALT (SGPT) U/L  --  18 22 24   AST (SGOT) U/L  --  25 23 26   GLUCOSE mg/dL 123* 140* 112* 99       Culture Data:   No results found for: \"BLOODCX\", \"URINECX\", \"WOUNDCX\", \"MRSACX\", \"RESPCX\", \"STOOLCX\"    Radiology Data:   Imaging Results (Last 24 Hours)       ** No results found for the last 24 hours. **            I have reviewed the patient's current medications.     Assessment/Plan   Assessment  Active Hospital Problems    Diagnosis     **Non-STEMI (non-ST elevated myocardial infarction)     Hypertensive crisis     Congestive heart failure     Seizure disorder     Chronic systolic (congestive) heart failure        Treatment Plan    -NSTEMI  Cardiology was consulted and recommended hemodialysis as indicated, echocardiogram showed very low ejection fraction at 21%, patient may benefit from AICD.  Cardiologist switched Imdur to Isordil, added amlodipine and continued hydralazine  Anticoagulation was contraindicated because of recent GI bleed and patient was anemic.  Patient has a history of noncompliance, cocaine abuse and therefore beta-blocker will be avoided.    -ESRD with impending hemodialysis  Nephrology is on consult to help with management, awaiting tentative recommendations//no plan for renal replacement therapy for now    -Uncontrolled blood pressure  Patient was on Entresto at home, which is being held because of renal status.   Norvasc and hydralazine has been started in hospital as well as as needed IV hydralazine.    -Recent history of present GI bleed  Anticoagulation is avoided, patient's hemoglobin is also low.  We will continue patient on " PPI    -Acute on chronic anemia secondary to end-stage renal disease/recent GI bleed  Patient's hemoglobin was 7 on admission, he is status post 1 unit of PRBC transfusion.    DVT prophylaxis-SCD    Disposition-follow cardiologist recommendations      Electronically signed by Benjamin Dominguez MD, 06/27/25, 15:00 CDT.

## 2025-06-27 NOTE — PLAN OF CARE
Goal Outcome Evaluation:              Outcome Evaluation: VSS.  Scheduled hydralazine controlling BP well.  C/o abdominal pain; one time dose PRN Morphine given.  Telemetry SR 71-81.  Safety maintained.

## 2025-06-28 LAB
ANION GAP SERPL CALCULATED.3IONS-SCNC: 10 MMOL/L (ref 5–15)
BASOPHILS # BLD AUTO: 0.05 10*3/MM3 (ref 0–0.2)
BASOPHILS NFR BLD AUTO: 0.7 % (ref 0–1.5)
BUN SERPL-MCNC: 55.8 MG/DL (ref 6–20)
BUN/CREAT SERPL: 13 (ref 7–25)
CALCIUM SPEC-SCNC: 8 MG/DL (ref 8.6–10.5)
CHLORIDE SERPL-SCNC: 101 MMOL/L (ref 98–107)
CO2 SERPL-SCNC: 21 MMOL/L (ref 22–29)
CREAT SERPL-MCNC: 4.29 MG/DL (ref 0.76–1.27)
DEPRECATED RDW RBC AUTO: 58.8 FL (ref 37–54)
EGFRCR SERPLBLD CKD-EPI 2021: 18.3 ML/MIN/1.73
EOSINOPHIL # BLD AUTO: 0.16 10*3/MM3 (ref 0–0.4)
EOSINOPHIL NFR BLD AUTO: 2.3 % (ref 0.3–6.2)
ERYTHROCYTE [DISTWIDTH] IN BLOOD BY AUTOMATED COUNT: 18.3 % (ref 12.3–15.4)
GLUCOSE SERPL-MCNC: 106 MG/DL (ref 65–99)
HCT VFR BLD AUTO: 23.4 % (ref 37.5–51)
HGB BLD-MCNC: 7.2 G/DL (ref 13–17.7)
IMM GRANULOCYTES # BLD AUTO: 0.02 10*3/MM3 (ref 0–0.05)
IMM GRANULOCYTES NFR BLD AUTO: 0.3 % (ref 0–0.5)
LYMPHOCYTES # BLD AUTO: 1.45 10*3/MM3 (ref 0.7–3.1)
LYMPHOCYTES NFR BLD AUTO: 21 % (ref 19.6–45.3)
MAGNESIUM SERPL-MCNC: 2.4 MG/DL (ref 1.6–2.6)
MCH RBC QN AUTO: 27.8 PG (ref 26.6–33)
MCHC RBC AUTO-ENTMCNC: 30.8 G/DL (ref 31.5–35.7)
MCV RBC AUTO: 90.3 FL (ref 79–97)
MONOCYTES # BLD AUTO: 0.55 10*3/MM3 (ref 0.1–0.9)
MONOCYTES NFR BLD AUTO: 8 % (ref 5–12)
NEUTROPHILS NFR BLD AUTO: 4.68 10*3/MM3 (ref 1.7–7)
NEUTROPHILS NFR BLD AUTO: 67.7 % (ref 42.7–76)
NRBC BLD AUTO-RTO: 0 /100 WBC (ref 0–0.2)
PLATELET # BLD AUTO: 150 10*3/MM3 (ref 140–450)
PMV BLD AUTO: 11.6 FL (ref 6–12)
POTASSIUM SERPL-SCNC: 4.1 MMOL/L (ref 3.5–5.2)
RBC # BLD AUTO: 2.59 10*6/MM3 (ref 4.14–5.8)
SODIUM SERPL-SCNC: 132 MMOL/L (ref 136–145)
WBC NRBC COR # BLD AUTO: 6.91 10*3/MM3 (ref 3.4–10.8)

## 2025-06-28 PROCEDURE — 83735 ASSAY OF MAGNESIUM: CPT | Performed by: INTERNAL MEDICINE

## 2025-06-28 PROCEDURE — 25010000002 NA FERRIC GLUC CPLX PER 12.5 MG

## 2025-06-28 PROCEDURE — 80048 BASIC METABOLIC PNL TOTAL CA: CPT | Performed by: INTERNAL MEDICINE

## 2025-06-28 PROCEDURE — 99232 SBSQ HOSP IP/OBS MODERATE 35: CPT

## 2025-06-28 PROCEDURE — 25810000003 SODIUM CHLORIDE 0.9 % SOLUTION

## 2025-06-28 PROCEDURE — 85025 COMPLETE CBC W/AUTO DIFF WBC: CPT | Performed by: INTERNAL MEDICINE

## 2025-06-28 RX ORDER — LOSARTAN POTASSIUM 25 MG/1
12.5 TABLET ORAL
Status: DISCONTINUED | OUTPATIENT
Start: 2025-06-28 | End: 2025-06-29

## 2025-06-28 RX ORDER — CARVEDILOL 6.25 MG/1
12.5 TABLET ORAL 2 TIMES DAILY WITH MEALS
Status: DISCONTINUED | OUTPATIENT
Start: 2025-06-28 | End: 2025-06-29

## 2025-06-28 RX ORDER — HYOSCYAMINE SULFATE 0.38 MG/1
375 TABLET, EXTENDED RELEASE ORAL ONCE AS NEEDED
Status: COMPLETED | OUTPATIENT
Start: 2025-06-28 | End: 2025-06-28

## 2025-06-28 RX ADMIN — LEVETIRACETAM 500 MG: 500 TABLET, FILM COATED ORAL at 08:38

## 2025-06-28 RX ADMIN — ISOSORBIDE DINITRATE 20 MG: 10 TABLET ORAL at 08:38

## 2025-06-28 RX ADMIN — LEVETIRACETAM 500 MG: 500 TABLET, FILM COATED ORAL at 20:35

## 2025-06-28 RX ADMIN — PANTOPRAZOLE SODIUM 40 MG: 40 INJECTION, POWDER, FOR SOLUTION INTRAVENOUS at 08:39

## 2025-06-28 RX ADMIN — ISOSORBIDE DINITRATE 20 MG: 10 TABLET ORAL at 15:40

## 2025-06-28 RX ADMIN — HYDRALAZINE HYDROCHLORIDE 50 MG: 50 TABLET ORAL at 07:01

## 2025-06-28 RX ADMIN — Medication 10 ML: at 20:35

## 2025-06-28 RX ADMIN — HYDRALAZINE HYDROCHLORIDE 50 MG: 50 TABLET ORAL at 15:40

## 2025-06-28 RX ADMIN — CARVEDILOL 12.5 MG: 6.25 TABLET, FILM COATED ORAL at 18:18

## 2025-06-28 RX ADMIN — HYDRALAZINE HYDROCHLORIDE 50 MG: 50 TABLET ORAL at 22:27

## 2025-06-28 RX ADMIN — LOSARTAN POTASSIUM 12.5 MG: 25 TABLET, FILM COATED ORAL at 11:37

## 2025-06-28 RX ADMIN — AMLODIPINE BESYLATE 5 MG: 5 TABLET ORAL at 08:38

## 2025-06-28 RX ADMIN — ASPIRIN 81 MG: 81 TABLET, COATED ORAL at 08:38

## 2025-06-28 RX ADMIN — CARVEDILOL 12.5 MG: 6.25 TABLET, FILM COATED ORAL at 08:38

## 2025-06-28 RX ADMIN — ISOSORBIDE DINITRATE 20 MG: 10 TABLET ORAL at 18:18

## 2025-06-28 RX ADMIN — HYOSCYAMINE SULFATE 375 MCG: 0.38 TABLET, EXTENDED RELEASE ORAL at 22:27

## 2025-06-28 RX ADMIN — Medication 10 ML: at 08:39

## 2025-06-28 RX ADMIN — SODIUM CHLORIDE 250 MG: 9 INJECTION, SOLUTION INTRAVENOUS at 08:39

## 2025-06-28 RX ADMIN — PANTOPRAZOLE SODIUM 40 MG: 40 INJECTION, POWDER, FOR SOLUTION INTRAVENOUS at 20:35

## 2025-06-28 NOTE — PROGRESS NOTES
[facility] Medicine Services  INPATIENT PROGRESS NOTE    Patient Name: José Miguel Webb  Date of Admission: 6/25/2025  Today's Date: 06/28/25  Length of Stay: 3  Primary Care Physician: He Ortega MD    Subjective   Chief Complaint: Transferred for NSTEMI    Patient has no complaint this morning, discussed echocardiogram with low ejection fraction and also stressed the need to discontinue illegal drug use.      Review of Systems   All pertinent negatives and positives are as above. All other systems have been reviewed and are negative unless otherwise stated.     Objective    Temp:  [97.8 °F (36.6 °C)-98.4 °F (36.9 °C)] 98.2 °F (36.8 °C)  Heart Rate:  [77-88] 88  Resp:  [16] 16  BP: (140-148)/(81-90) 140/86  Physical Exam  Constitutional:       Appearance: He is ill-appearing.   Cardiovascular:      Rate and Rhythm: Normal rate and regular rhythm.      Pulses: Normal pulses.      Heart sounds: Normal heart sounds. No murmur heard.  Pulmonary:      Effort: Pulmonary effort is normal. No respiratory distress.      Breath sounds: Normal breath sounds. No wheezing or rales.   Abdominal:      General: Bowel sounds are normal. There is no distension.      Palpations: Abdomen is soft.      Tenderness: There is no abdominal tenderness. There is no guarding.   Musculoskeletal:      Right lower leg: Edema present.      Left lower leg: Edema present.   Skin:     General: Skin is warm.   Neurological:      Mental Status: He is alert and oriented to person, place, and time. Mental status is at baseline.       Results Review:  I have reviewed the labs, radiology results, and diagnostic studies.    Laboratory Data:   Results from last 7 days   Lab Units 06/28/25  0305 06/27/25  0337 06/26/25  0313   WBC 10*3/mm3 6.91 6.27 5.98   HEMOGLOBIN g/dL 7.2* 7.9* 7.0*   HEMATOCRIT % 23.4* 25.3* 23.0*   PLATELETS 10*3/mm3 150 140 123*        Results from last 7 days   Lab Units 06/28/25  0305  "06/27/25  0337 06/26/25  0313 06/25/25  1756 06/24/25  0526   SODIUM mmol/L 132* 133* 132* 138 135*   POTASSIUM mmol/L 4.1 4.4 4.0 4.1 4.4   CHLORIDE mmol/L 101 100 101 102 103   CO2 mmol/L 21.0* 21.0* 20.0* 23.0 22.0   BUN mg/dL 55.8* 57.4* 63.5* 64.8* 75.4*   CREATININE mg/dL 4.29* 3.97* 3.96* 3.99* 4.97*   CALCIUM mg/dL 8.0* 8.3* 8.2* 8.5* 7.6*   BILIRUBIN mg/dL  --   --  0.8 0.7 0.8   ALK PHOS U/L  --   --  66 71 58   ALT (SGPT) U/L  --   --  18 22 24   AST (SGOT) U/L  --   --  25 23 26   GLUCOSE mg/dL 106* 123* 140* 112* 99       Culture Data:   No results found for: \"BLOODCX\", \"URINECX\", \"WOUNDCX\", \"MRSACX\", \"RESPCX\", \"STOOLCX\"    Radiology Data:   Imaging Results (Last 24 Hours)       ** No results found for the last 24 hours. **            I have reviewed the patient's current medications.     Assessment/Plan   Assessment  Active Hospital Problems    Diagnosis     **Non-STEMI (non-ST elevated myocardial infarction)     Hypertensive crisis     Congestive heart failure     Seizure disorder     Chronic systolic (congestive) heart failure        Treatment Plan    -NSTEMI  Cardiology was consulted and recommended hemodialysis as indicated, echocardiogram showed very low ejection fraction at 21%, patient may benefit from AICD.  Cardiologist switched Imdur to Isordil, added amlodipine and continued hydralazine  Anticoagulation was contraindicated because of recent GI bleed and patient was anemic.  Patient has a history of noncompliance, cocaine abuse and therefore beta-blocker will be avoided.    -ESRD with impending hemodialysis  Nephrology is on consult to help with management, awaiting tentative recommendations//no plan for renal replacement therapy for now    -Uncontrolled blood pressure  Patient was on Entresto at home, which is being held because of renal status.   Norvasc and hydralazine has been started in hospital as well as as needed IV hydralazine.    -Recent history of present GI bleed  Anticoagulation " is avoided, patient's hemoglobin is also low.  We will continue patient on PPI    -Acute on chronic anemia secondary to end-stage renal disease/recent GI bleed  Patient's hemoglobin was 7 on admission, he is status post 1 unit of PRBC transfusion.    DVT prophylaxis-SCD    Disposition-follow cardiologist recommendations/discharge planning      Electronically signed by Benjamin Dominguez MD, 06/28/25, 16:04 CDT.

## 2025-06-28 NOTE — PLAN OF CARE
Goal Outcome Evaluation:  Plan of Care Reviewed With: patient        Progress: improving  Outcome Evaluation: AOX4 VSS BP improving. Meds adjusted per MD. No c/o chest pain. Edema improving to LE's. Dly wts ordered with weight gain since admission, pt snacks frequently. S/ST  pvc's per tele. Ambulated in [place] this afternoon.

## 2025-06-28 NOTE — PLAN OF CARE
Goal Outcome Evaluation:              Outcome Evaluation: VSS.  One time dose of Morphine given for c/o abdominal pain.  Telemetry SR 82-93.  Safety maintained.

## 2025-06-28 NOTE — PROGRESS NOTES
Nephrology ([facility]) Progress Note      Patient:  José Miguel Webb  YOB: 1995  Date of Service: 6/28/2025  MRN: [id number]   Acct: [id number]   Primary Care Physician: He Ortega MD  Advance Directive:   Code Status and Medical Interventions: CPR (Attempt to Resuscitate); Full Support   Ordered at: 06/25/25 2000     Code Status (Patient has no pulse and is not breathing):    CPR (Attempt to Resuscitate)     Medical Interventions (Patient has pulse or is breathing):    Full Support     Level Of Support Discussed With:    Patient     Admit Date: 6/25/2025       Hospital Day: 3  Referring Provider: Nicola Redd MD      Patient personally seen and examined.  Complete chart including Consults, Notes, Operative Reports, Labs, Cardiology, and Radiology studies reviewed as able.        Subjective:  José Miguel Webb is a 30 y.o. male for whom we were consulted for evaluation and treatment of chronic kidney disease. Baseline chronic kidney disease stage 4, baseline creatinine approximately 4.0. Has followed with our providers on a sporadic basis over the years, typically no-shows for his office visits. History of hypertension, CHF, seizure disorder. Patient was recently treated at [facility] for GI bleed and MINDY. He was scheduled to have permcath placed and to start hemodialysis but left AMA on 6/24.  Presented to [facility] on 6/25 with chest pain and was transferred to [facility] for cardiology and nephrology evaluation. Creatinine 3.99 when admitted this time.  No complaint of pain or dyspnea at time of initial nephrology exam. Denied n/v/d. Denied edema. No recent changes in urination. He explained that he is willing to proceed with dialysis if it is needed    Today is awake and alert. No new complaint or overnight issues. Urine output nonoliguric       Allergies:  Amoxicillin and Penicillins    Home Meds:  Medications Prior to Admission    Medication Sig Dispense Refill Last Dose/Taking    [Paused] sacubitril-valsartan (Entresto) 49-51 MG tablet Take 1 tablet by mouth 2 (Two) Times a Day. 180 tablet 3        Medicines:  Current Facility-Administered Medications   Medication Dose Route Frequency Provider Last Rate Last Admin    acetaminophen (TYLENOL) tablet 1,000 mg  1,000 mg Oral Q6H PRN Lesa Gonzalez MD   1,000 mg at 06/25/25 2343    amLODIPine (NORVASC) tablet 5 mg  5 mg Oral Q24H Marta Clemens APRN   5 mg at 06/28/25 0838    aspirin EC tablet 81 mg  81 mg Oral Daily Lesa Gonzalez MD   81 mg at 06/28/25 0838    carvedilol (COREG) tablet 12.5 mg  12.5 mg Oral BID With Meals Mani Mena MD   12.5 mg at 06/28/25 0838    ferric gluconate (FERRLECIT) 250 MG in sodium chloride 0.9% 250 mL IVPB  250 mg Intravenous Q24H Marta Clemens APRN 125 mL/hr at 06/28/25 0839 250 mg at 06/28/25 0839    hydrALAZINE (APRESOLINE) injection 10 mg  10 mg Intravenous Q6H PRN Lesa Gonzalez MD   10 mg at 06/26/25 1103    hydrALAZINE (APRESOLINE) tablet 50 mg  50 mg Oral Q8H Marta Clemens APRN   50 mg at 06/28/25 0701    isosorbide dinitrate (ISORDIL) tablet 20 mg  20 mg Oral TID - Nitrates Mani Mena MD   20 mg at 06/28/25 0838    levETIRAcetam (KEPPRA) tablet 500 mg  500 mg Oral BID Lesa Gonzalez MD   500 mg at 06/28/25 0838    losartan (COZAAR) tablet 12.5 mg  12.5 mg Oral Q24H Marta Clemens APRN   12.5 mg at 06/28/25 1137    nitroglycerin (NITROSTAT) SL tablet 0.4 mg  0.4 mg Sublingual Q5 Min PRN Lesa Gonzalez MD        ondansetron (ZOFRAN) injection 4 mg  4 mg Intravenous Q6H PRN Benjamin Dominguez MD   4 mg at 06/26/25 0840    pantoprazole (PROTONIX) injection 40 mg  40 mg Intravenous Q12H Lesa Gonzalez MD   40 mg at 06/28/25 0839    sodium chloride 0.9 % flush 10 mL  10 mL Intravenous Q12H Lesa Gonzalez MD   10 mL at 06/28/25 0839    sodium chloride 0.9 % flush 10 mL  10 mL Intravenous PRN Lesa Gonzalez MD         sodium chloride 0.9 % infusion 40 mL  40 mL Intravenous PRN Lesa Gonzalez MD           Past Medical History:  Past Medical History:   Diagnosis Date    Cardiomegaly     CHF (congestive heart failure)     Congenital fusion of carpal bone     HTN (hypertension)     Non-STEMI (non-ST elevated myocardial infarction) 6/25/2025    Seizure        Past Surgical History:  Past Surgical History:   Procedure Laterality Date    ELBOW PROCEDURE      ENDOSCOPY N/A 6/22/2025    Procedure: ESOPHAGOGASTRODUODENOSCOPY;  Surgeon: Kevin Miranda MD;  Location: [facility];  Service: Gastroenterology;  Laterality: N/A;  preop; GI bleed   postop; gastric ulcer ; esophagitis       Family History  Family History   Problem Relation Age of Onset    Hypertension Mother     Heart disease Mother     No Known Problems Father        Social History  Social History     Socioeconomic History    Marital status: Single   Tobacco Use    Smoking status: Some Days     Current packs/day: 0.50     Types: Cigarettes    Smokeless tobacco: Never   Vaping Use    Vaping status: Never Used   Substance and Sexual Activity    Alcohol use: Yes    Drug use: Yes     Types: Marijuana, Cocaine(coke)    Sexual activity: Defer       Review of Systems:  History obtained from chart review and the patient  General ROS: No fever or chills  Respiratory ROS: No cough, shortness of breath, wheezing  Cardiovascular ROS: No chest pain or palpitations  Gastrointestinal ROS: No abdominal pain or melena  Genito-Urinary ROS: No dysuria or hematuria  Psych ROS: No anxiety and depression  14 point ROS reviewed with the patient and negative except as noted above and in the HPI unless unable to obtain.    Objective:  Patient Vitals for the past 24 hrs:   BP Temp Temp src Pulse Resp SpO2 Weight   06/28/25 1146 140/86 98.2 °F (36.8 °C) Oral 88 16 100 % --   06/28/25 1100 -- -- -- -- -- -- 92.3 kg (203 lb 6 oz)   06/28/25 0835 148/86 98.1 °F (36.7 °C) Oral 87 16 98 % --   06/28/25 0319  141/81 98.4 °F (36.9 °C) Oral 88 16 97 % --   06/27/25 1934 140/90 97.8 °F (36.6 °C) Oral 77 16 99 % --   06/27/25 1500 132/80 98.1 °F (36.7 °C) Oral 75 16 98 % --       Intake/Output Summary (Last 24 hours) at 6/28/2025 1431  Last data filed at 6/28/2025 0500  Gross per 24 hour   Intake 720 ml   Output --   Net 720 ml     General: awake/alert   Chest:  clear to auscultation bilaterally without respiratory distress  CVS: regular rate and rhythm  Abdominal: soft, nontender, positive bowel sounds  Extremities: no cyanosis or edema  Skin: warm and dry without rash      Labs:  Results from last 7 days   Lab Units 06/28/25  0305 06/27/25  0337 06/26/25  0313   WBC 10*3/mm3 6.91 6.27 5.98   HEMOGLOBIN g/dL 7.2* 7.9* 7.0*   HEMATOCRIT % 23.4* 25.3* 23.0*   PLATELETS 10*3/mm3 150 140 123*         Results from last 7 days   Lab Units 06/28/25  0305 06/27/25  0337 06/26/25  0313 06/25/25  1756 06/24/25  0526   SODIUM mmol/L 132* 133* 132* 138 135*   POTASSIUM mmol/L 4.1 4.4 4.0 4.1 4.4   CHLORIDE mmol/L 101 100 101 102 103   CO2 mmol/L 21.0* 21.0* 20.0* 23.0 22.0   BUN mg/dL 55.8* 57.4* 63.5* 64.8* 75.4*   CREATININE mg/dL 4.29* 3.97* 3.96* 3.99* 4.97*   CALCIUM mg/dL 8.0* 8.3* 8.2* 8.5* 7.6*   EGFR mL/min/1.73 18.3* 20.1* 20.2* 20.0* 15.4*   BILIRUBIN mg/dL  --   --  0.8 0.7 0.8   ALK PHOS U/L  --   --  66 71 58   ALT (SGPT) U/L  --   --  18 22 24   AST (SGOT) U/L  --   --  25 23 26   GLUCOSE mg/dL 106* 123* 140* 112* 99       Radiology:   Imaging Results (Last 72 Hours)       Procedure Component Value Units Date/Time    XR Chest 1 View [[id number]] Collected: 06/25/25 1746     Updated: 06/25/25 1750    Narrative:      EXAM/TECHNIQUE: XR CHEST 1 VW-     INDICATION: Chest pain, shortness of breath     COMPARISON: None     FINDINGS:  Marked enlargement of the cardiac silhouette. No pleural effusion or  visible pneumothorax. No focal consolidation. No acute osseous finding.       Impression:      1.  Marked enlargement of the  "cardiac silhouette.  2.  Lungs are clear.        This report was signed and finalized on 6/25/2025 5:47 PM by Dr. Juan Miguel Aparicio MD.               Culture:  No results found for: \"BLOODCX\", \"URINECX\", \"WOUNDCX\", \"MRSACX\", \"RESPCX\", \"STOOLCX\"      Assessment   Chronic kidney disease stage 4  Type 2 diabetes  Hypertension  CHF  Seizure disorder  Anemia  Non-STEMI  Medical noncompliance    Plan:  Dialysis can still be held.  Follow-up cardiology recommendations  Encouraged compliance with all medications, follow up visits.      Durga Hammer MD  6/28/2025  14:31 CDT    "

## 2025-06-28 NOTE — PROGRESS NOTES
[facility] -  Progress Note     LOS: 3 days   Patient Care Team:  He Ortega MD as PCP - General (Internal Medicine)    Chief Complaint: Shortness of breath    Subjective     Interval History:     Patient Complaints: None at this time    This morning the patient is resting comfortably in bed.  He states that he has no complaints at this time, and he feels that his swelling and shortness of breath are significantly improved.  He states that he is able to ambulate in the room without any difficulty, and his appetite is good.  At this time he denies chest pain, dizziness, lightheadedness, or palpitations.  He does still have some very mild edema in his feet and ankles.  Overall he appears euvolemic on exam.    Iron studies on 6/27/2025 revealed low iron levels. Iron infusions will be added to assist with this.  Renal function slightly worse this morning with creatinine of 4.29 (up from 3.97 yesterday), GFR 18.3 down from 20.1 yesterday.      Patient is still being followed by nephrology.  Decisions regarding potential need for dialysis to be made by the nephrology provider.  He reports that he has good urine output.    Blood pressure mildly elevated this morning at 148/86, heart rate 88, weight not recorded this morning.  Orders added for daily weights.  Will reach out to RN to have this completed this morning.    Changes made to his medications yesterday to include Norvasc 5 mg daily, carvedilol 12.5 mg daily, hydralazine 50 mg 3 times daily, Isordil 20 mg 3 times daily.  He seems to be tolerating all of this well at this time.    Review of Systems:     Review of Systems   Constitutional:  Negative for activity change, fatigue and unexpected weight change.   Respiratory:  Negative for cough, chest tightness, shortness of breath and wheezing.    Cardiovascular:  Positive for leg swelling. Negative for chest pain and palpitations.   Gastrointestinal:  Negative for nausea.   Musculoskeletal:   Negative for myalgias.   Neurological:  Negative for dizziness, syncope, weakness and light-headedness.     Objective     Vital Sign Min/Max for last 24 hours  Temp  Min: 97.8 °F (36.6 °C)  Max: 98.4 °F (36.9 °C)   BP  Min: 132/80  Max: 148/86   Pulse  Min: 70  Max: 88   Resp  Min: 16  Max: 18   SpO2  Min: 97 %  Max: 100 %   No data recorded   No data recorded         06/25/25 2039   Weight: 87.1 kg (192 lb)     Telemetry: SR - 82-93    Physical Exam:    Vitals reviewed.   Constitutional:       Appearance: Normal appearance. Not in distress.   HENT:      Head: Normocephalic.   Neck:      Vascular: JVD normal.   Pulmonary:      Effort: Pulmonary effort is normal.      Breath sounds: Normal breath sounds.   Cardiovascular:      PMI at left midclavicular line. Normal rate. Regular rhythm. Normal S1. Normal S2.       Murmurs: There is no murmur.      No gallop.  No click. No rub.   Pulses:     Intact distal pulses.   Edema:     Peripheral edema present.     Ankle: bilateral trace pitting edema of the ankle.     Feet: bilateral trace pitting edema of the feet.  Skin:     General: Skin is warm and dry.   Neurological:      General: No focal deficit present.      Mental Status: Alert and oriented to person, place and time.      Coordination: Coordination is intact.   Psychiatric:         Attention and Perception: Attention normal.         Mood and Affect: Mood normal.         Speech: Speech normal.         Behavior: Behavior is cooperative.         Thought Content: Thought content normal.       Results Review:   Lab Results (last 72 hours)       Procedure Component Value Units Date/Time    Basic Metabolic Panel [[id number]]  (Abnormal) Collected: 06/28/25 0305    Specimen: Blood Updated: 06/28/25 0351     Glucose 106 mg/dL      BUN 55.8 mg/dL      Creatinine 4.29 mg/dL      Sodium 132 mmol/L      Potassium 4.1 mmol/L      Chloride 101 mmol/L      CO2 21.0 mmol/L      Calcium 8.0 mg/dL      BUN/Creatinine Ratio 13.0      Anion Gap 10.0 mmol/L      eGFR 18.3 mL/min/1.73     Narrative:      GFR Categories in Chronic Kidney Disease (CKD)              GFR Category          GFR (mL/min/1.73)    Interpretation  G1                    90 or greater        Normal or high (1)  G2                    60-89                Mild decrease (1)  G3a                   45-59                Mild to moderate decrease  G3b                   30-44                Moderate to severe decrease  G4                    15-29                Severe decrease  G5                    14 or less           Kidney failure    (1)In the absence of evidence of kidney disease, neither GFR category G1 or G2 fulfill the criteria for CKD.    eGFR calculation 2021 CKD-EPI creatinine equation, which does not include race as a factor    Magnesium [[id number]]  (Normal) Collected: 06/28/25 0305    Specimen: Blood Updated: 06/28/25 0351     Magnesium 2.4 mg/dL     CBC & Differential [[id number]]  (Abnormal) Collected: 06/28/25 0305    Specimen: Blood Updated: 06/28/25 0334    Narrative:      The following orders were created for panel order CBC & Differential.  Procedure                               Abnormality         Status                     ---------                               -----------         ------                     CBC Auto Differential[[id number]]        Abnormal            Final result                 Please view results for these tests on the individual orders.    CBC Auto Differential [[id number]]  (Abnormal) Collected: 06/28/25 0305    Specimen: Blood Updated: 06/28/25 0334     WBC 6.91 10*3/mm3      RBC 2.59 10*6/mm3      Hemoglobin 7.2 g/dL      Hematocrit 23.4 %      MCV 90.3 fL      MCH 27.8 pg      MCHC 30.8 g/dL      RDW 18.3 %      RDW-SD 58.8 fl      MPV 11.6 fL      Platelets 150 10*3/mm3      Neutrophil % 67.7 %      Lymphocyte % 21.0 %      Monocyte % 8.0 %      Eosinophil % 2.3 %      Basophil % 0.7 %      Immature Grans % 0.3 %      Neutrophils,  Absolute 4.68 10*3/mm3      Lymphocytes, Absolute 1.45 10*3/mm3      Monocytes, Absolute 0.55 10*3/mm3      Eosinophils, Absolute 0.16 10*3/mm3      Basophils, Absolute 0.05 10*3/mm3      Immature Grans, Absolute 0.02 10*3/mm3      nRBC 0.0 /100 WBC     Basic Metabolic Panel [[id number]]  (Abnormal) Collected: 06/27/25 0337    Specimen: Blood Updated: 06/27/25 0504     Glucose 123 mg/dL      BUN 57.4 mg/dL      Creatinine 3.97 mg/dL      Sodium 133 mmol/L      Potassium 4.4 mmol/L      Chloride 100 mmol/L      CO2 21.0 mmol/L      Calcium 8.3 mg/dL      BUN/Creatinine Ratio 14.5     Anion Gap 12.0 mmol/L      eGFR 20.1 mL/min/1.73     Narrative:      GFR Categories in Chronic Kidney Disease (CKD)              GFR Category          GFR (mL/min/1.73)    Interpretation  G1                    90 or greater        Normal or high (1)  G2                    60-89                Mild decrease (1)  G3a                   45-59                Mild to moderate decrease  G3b                   30-44                Moderate to severe decrease  G4                    15-29                Severe decrease  G5                    14 or less           Kidney failure    (1)In the absence of evidence of kidney disease, neither GFR category G1 or G2 fulfill the criteria for CKD.    eGFR calculation 2021 CKD-EPI creatinine equation, which does not include race as a factor    Magnesium [[id number]]  (Normal) Collected: 06/27/25 0337    Specimen: Blood Updated: 06/27/25 0504     Magnesium 2.4 mg/dL     CBC & Differential [[id number]]  (Abnormal) Collected: 06/27/25 0337    Specimen: Blood Updated: 06/27/25 0434    Narrative:      The following orders were created for panel order CBC & Differential.  Procedure                               Abnormality         Status                     ---------                               -----------         ------                     CBC Auto Differential[[id number]]        Abnormal            Final result                  Please view results for these tests on the individual orders.    CBC Auto Differential [[id number]]  (Abnormal) Collected: 06/27/25 0337    Specimen: Blood Updated: 06/27/25 0434     WBC 6.27 10*3/mm3      RBC 2.86 10*6/mm3      Hemoglobin 7.9 g/dL      Hematocrit 25.3 %      MCV 88.5 fL      MCH 27.6 pg      MCHC 31.2 g/dL      RDW 18.3 %      RDW-SD 57.3 fl      MPV 11.2 fL      Platelets 140 10*3/mm3      Neutrophil % 61.7 %      Lymphocyte % 25.4 %      Monocyte % 7.5 %      Eosinophil % 4.3 %      Basophil % 0.8 %      Immature Grans % 0.3 %      Neutrophils, Absolute 3.87 10*3/mm3      Lymphocytes, Absolute 1.59 10*3/mm3      Monocytes, Absolute 0.47 10*3/mm3      Eosinophils, Absolute 0.27 10*3/mm3      Basophils, Absolute 0.05 10*3/mm3      Immature Grans, Absolute 0.02 10*3/mm3      nRBC 0.0 /100 WBC     Hepatitis B Surface Antibody [[id number]] Collected: 06/26/25 1039    Specimen: Blood Updated: 06/26/25 1907     Hep B S Ab Reactive    Narrative:      Non-Reactive - Individual is considered to be not immune to infection with HBV.    Equivocal - Unable to determine if anti-HBs is present at levels consistent with immunity. The individual should be further assessed by associated risk factors and the use of additional diagnositc information, or another sample may be collected and tested.    Reactive - Anti-HBs concentration detected at >10 mIU/mL. Individual is considered to be immune to infection with HBV.      Results may be falsely decreased if patient taking Biotin.      Hepatitis Panel, Acute [[id number]]  (Normal) Collected: 06/26/25 1039    Specimen: Blood Updated: 06/26/25 1139     Hepatitis B Surface Ag Non-Reactive     Hep A IgM Non-Reactive     Hep B C IgM Non-Reactive     Hepatitis C Ab Non-Reactive    Narrative:      Results may be falsely decreased if patient taking Biotin.     Comprehensive Metabolic Panel [[id number]]  (Abnormal) Collected: 06/26/25 0313    Specimen: Blood Updated:  06/26/25 0407     Glucose 140 mg/dL      BUN 63.5 mg/dL      Creatinine 3.96 mg/dL      Sodium 132 mmol/L      Potassium 4.0 mmol/L      Chloride 101 mmol/L      CO2 20.0 mmol/L      Calcium 8.2 mg/dL      Total Protein 5.6 g/dL      Albumin 3.1 g/dL      ALT (SGPT) 18 U/L      AST (SGOT) 25 U/L      Alkaline Phosphatase 66 U/L      Total Bilirubin 0.8 mg/dL      Globulin 2.5 gm/dL      A/G Ratio 1.2 g/dL      BUN/Creatinine Ratio 16.0     Anion Gap 11.0 mmol/L      eGFR 20.2 mL/min/1.73     Narrative:      GFR Categories in Chronic Kidney Disease (CKD)              GFR Category          GFR (mL/min/1.73)    Interpretation  G1                    90 or greater        Normal or high (1)  G2                    60-89                Mild decrease (1)  G3a                   45-59                Mild to moderate decrease  G3b                   30-44                Moderate to severe decrease  G4                    15-29                Severe decrease  G5                    14 or less           Kidney failure    (1)In the absence of evidence of kidney disease, neither GFR category G1 or G2 fulfill the criteria for CKD.    eGFR calculation 2021 CKD-EPI creatinine equation, which does not include race as a factor    Magnesium [[id number]]  (Normal) Collected: 06/26/25 0313    Specimen: Blood Updated: 06/26/25 0407     Magnesium 2.1 mg/dL     Phosphorus [[id number]]  (Normal) Collected: 06/26/25 0313    Specimen: Blood Updated: 06/26/25 0407     Phosphorus 3.4 mg/dL     CBC (No Diff) [[id number]]  (Abnormal) Collected: 06/26/25 0313    Specimen: Blood Updated: 06/26/25 0340     WBC 5.98 10*3/mm3      RBC 2.62 10*6/mm3      Hemoglobin 7.0 g/dL      Hematocrit 23.0 %      MCV 87.8 fL      MCH 26.7 pg      MCHC 30.4 g/dL      RDW 18.1 %      RDW-SD 56.7 fl      MPV 11.3 fL      Platelets 123 10*3/mm3     Fentanyl, Urine - Urine, Clean Catch [[id number]]  (Normal) Collected: 06/25/25 1953    Specimen: Urine, Clean Catch Updated:  06/25/25 2129     Fentanyl, Urine Negative    Narrative:      Negative Threshold:      Fentanyl 5 ng/mL     The normal value for the drug tested is negative. This report includes final unconfirmed screening results to be used for medical treatment purposes only. Unconfirmed results must not be used for non-medical purposes such as employment or legal testing. Clinical consideration should be applied to any drug of abuse test, particularly when unconfirmed results are used.           Urine Drug Screen - Urine, Clean Catch [[id number]]  (Abnormal) Collected: 06/25/25 1953    Specimen: Urine, Clean Catch Updated: 06/25/25 2129     THC, Screen, Urine Positive     Phencyclidine (PCP), Urine Negative     Cocaine Screen, Urine Positive     Methamphetamine, Ur Negative     Opiate Screen Positive     Amphetamine Screen, Urine Negative     Benzodiazepine Screen, Urine Negative     Tricyclic Antidepressants Screen Negative     Methadone Screen, Urine Negative     Barbiturates Screen, Urine Negative     Oxycodone Screen, Urine Negative     Buprenorphine, Screen, Urine Negative    Narrative:      Cutoff For Drugs Screened:    Amphetamines               500 ng/ml  Barbiturates               200 ng/ml  Benzodiazepines            150 ng/ml  Cocaine                    150 ng/ml  Methadone                  200 ng/ml  Opiates                    100 ng/ml  Phencyclidine               25 ng/ml  THC                         50 ng/ml  Methamphetamine            500 ng/ml  Tricyclic Antidepressants  300 ng/ml  Oxycodone                  100 ng/ml  Buprenorphine               10 ng/ml    The normal value for all drugs tested is negative. This report includes unconfirmed screening results, with the cutoff values listed, to be used for medical treatment purposes only.  Unconfirmed results must not be used for non-medical purposes such as employment or legal testing.  Clinical consideration should be applied to any drug of abuse test,  particularly when unconfirmed results are used.      Urinalysis With Microscopic If Indicated (No Culture) - Urine, Clean Catch [[id number]]  (Abnormal) Collected: 06/25/25 1953    Specimen: Urine, Clean Catch Updated: 06/25/25 2010     Color, UA Yellow     Appearance, UA Clear     pH, UA 6.0     Specific Gravity, UA 1.016     Glucose,  mg/dL (Trace)     Ketones, UA Negative     Bilirubin, UA Negative     Blood, UA Negative     Protein, UA >=300 mg/dL (3+)     Leuk Esterase, UA Negative     Nitrite, UA Negative     Urobilinogen, UA 1.0 E.U./dL    Urinalysis, Microscopic Only - Urine, Clean Catch [[id number]] Collected: 06/25/25 1953    Specimen: Urine, Clean Catch Updated: 06/25/25 2010     RBC, UA 0-2 /HPF      WBC, UA 0-2 /HPF      Bacteria, UA None Seen /HPF      Squamous Epithelial Cells, UA 0-2 /HPF      Hyaline Casts, UA None Seen /LPF      Methodology Automated Microscopy    Ethanol [[id number]] Collected: 06/25/25 1919    Specimen: Blood from Arm, Right Updated: 06/25/25 2001     Ethanol % <0.010 %     Narrative:      Not for legal purposes.    High Sensitivity Troponin T 1Hr [[id number]]  (Abnormal) Collected: 06/25/25 1919    Specimen: Blood from Arm, Right Updated: 06/25/25 1951     HS Troponin T 639 ng/L      Troponin T Numeric Delta 117 ng/L      Troponin T % Delta 22    Narrative:      High Sensitive Troponin T Reference Range:  <14.0 ng/L- Negative Female for AMI  <22.0 ng/L- Negative Male for AMI  >=14 - Abnormal Female indicating possible myocardial injury.  >=22 - Abnormal Male indicating possible myocardial injury.   Clinicians would have to utilize clinical acumen, EKG, Troponin, and serial changes to determine if it is an Acute Myocardial Infarction or myocardial injury due to an underlying chronic condition.         Procalcitonin [[id number]]  (Normal) Collected: 06/25/25 1756    Specimen: Blood Updated: 06/25/25 1858     Procalcitonin 0.19 ng/mL     Narrative:      As a Marker for Sepsis  "(Non-Neonates):    1. <0.5 ng/mL represents a low risk of severe sepsis and/or septic shock.  2. >2 ng/mL represents a high risk of severe sepsis and/or septic shock.    As a Marker for Lower Respiratory Tract Infections that require antibiotic therapy:    PCT on Admission    Antibiotic Therapy       6-12 Hrs later    >0.5                Strongly Recommended  >0.25 - <0.5        Recommended   0.1 - 0.25          Discouraged              Remeasure/reassess PCT  <0.1                Strongly Discouraged     Remeasure/reassess PCT    As 28 day mortality risk marker: \"Change in Procalcitonin Result\" (>80% or <=80%) if Day 0 (or Day 1) and Day 4 values are available. Refer to [email or web address]://[email or web address]    Change in PCT <=80%  A decrease of PCT levels below or equal to 80% defines a positive change in PCT test result representing a higher risk for 28-day all-cause mortality of patients diagnosed with severe sepsis for septic shock.    Change in PCT >80%  A decrease of PCT levels of more than 80% defines a negative change in PCT result representing a lower risk for 28-day all-cause mortality of patients diagnosed with severe sepsis or septic shock.       High Sensitivity Troponin T [[id number]]  (Abnormal) Collected: 06/25/25 1756    Specimen: Blood Updated: 06/25/25 1855     HS Troponin T 522 ng/L     Narrative:      High Sensitive Troponin T Reference Range:  <14.0 ng/L- Negative Female for AMI  <22.0 ng/L- Negative Male for AMI  >=14 - Abnormal Female indicating possible myocardial injury.  >=22 - Abnormal Male indicating possible myocardial injury.   Clinicians would have to utilize clinical acumen, EKG, Troponin, and serial changes to determine if it is an Acute Myocardial Infarction or myocardial injury due to an underlying chronic condition.         Comprehensive Metabolic Panel [[id number]]  (Abnormal) Collected: 06/25/25 1756    Specimen: Blood Updated: 06/25/25 1853     Glucose 112 mg/dL      BUN 64.8 " mg/dL      Creatinine 3.99 mg/dL      Sodium 138 mmol/L      Potassium 4.1 mmol/L      Chloride 102 mmol/L      CO2 23.0 mmol/L      Calcium 8.5 mg/dL      Total Protein 6.0 g/dL      Albumin 3.3 g/dL      ALT (SGPT) 22 U/L      AST (SGOT) 23 U/L      Alkaline Phosphatase 71 U/L      Total Bilirubin 0.7 mg/dL      Globulin 2.7 gm/dL      A/G Ratio 1.2 g/dL      BUN/Creatinine Ratio 16.2     Anion Gap 13.0 mmol/L      eGFR 20.0 mL/min/1.73     Narrative:      GFR Categories in Chronic Kidney Disease (CKD)              GFR Category          GFR (mL/min/1.73)    Interpretation  G1                    90 or greater        Normal or high (1)  G2                    60-89                Mild decrease (1)  G3a                   45-59                Mild to moderate decrease  G3b                   30-44                Moderate to severe decrease  G4                    15-29                Severe decrease  G5                    14 or less           Kidney failure    (1)In the absence of evidence of kidney disease, neither GFR category G1 or G2 fulfill the criteria for CKD.    eGFR calculation 2021 CKD-EPI creatinine equation, which does not include race as a factor    Magnesium [[id number]]  (Normal) Collected: 06/25/25 1756    Specimen: Blood Updated: 06/25/25 1853     Magnesium 2.2 mg/dL     CK [[id number]]  (Normal) Collected: 06/25/25 1756    Specimen: Blood Updated: 06/25/25 1853     Creatine Kinase 186 U/L     Lactic Acid, Plasma [[id number]]  (Normal) Collected: 06/25/25 1756    Specimen: Blood Updated: 06/25/25 1851     Lactate 1.1 mmol/L     BNP [[id number]]  (Abnormal) Collected: 06/25/25 1756    Specimen: Blood Updated: 06/25/25 1851     proBNP 13,331.0 pg/mL     Narrative:      This assay is used as an aid in the diagnosis of individuals suspected of having heart failure. It can be used as an aid in the diagnosis of acute decompensated heart failure (ADHF) in patients presenting with signs and symptoms of ADHF to the  emergency department (ED). In addition, NT-proBNP of <300 pg/mL indicates ADHF is not likely.    Age Range Result Interpretation  NT-proBNP Concentration (pg/mL:      <50             Positive            >450                   Gray                 300-450                    Negative             <300    50-75           Positive            >900                  Gray                300-900                  Negative            <300      >75             Positive            >1800                  Gray                300-1800                  Negative            <300    aPTT [[id number]]  (Normal) Collected: 06/25/25 1756    Specimen: Blood Updated: 06/25/25 1841     PTT 32.9 seconds     Narrative:      PTT = The equivalent PTT values for the therapeutic range of heparin levels at 0.3 to 0.7 U/ml are 77 - 99 seconds.    Protime-INR [[id number]]  (Abnormal) Collected: 06/25/25 1756    Specimen: Blood Updated: 06/25/25 1841     Protime 14.9 Seconds      INR 1.11    CBC & Differential [[id number]]  (Abnormal) Collected: 06/25/25 1756    Specimen: Blood Updated: 06/25/25 1831    Narrative:      The following orders were created for panel order CBC & Differential.  Procedure                               Abnormality         Status                     ---------                               -----------         ------                     CBC Auto Differential[[id number]]        Abnormal            Final result                 Please view results for these tests on the individual orders.    CBC Auto Differential [[id number]]  (Abnormal) Collected: 06/25/25 1756    Specimen: Blood Updated: 06/25/25 1831     WBC 7.44 10*3/mm3      RBC 2.79 10*6/mm3      Hemoglobin 7.6 g/dL      Hematocrit 24.8 %      MCV 88.9 fL      MCH 27.2 pg      MCHC 30.6 g/dL      RDW 18.2 %      RDW-SD 56.8 fl      MPV 11.2 fL      Platelets 131 10*3/mm3      Neutrophil % 82.8 %      Lymphocyte % 10.9 %      Monocyte % 5.0 %      Eosinophil % 0.5 %      Basophil %  0.4 %      Immature Grans % 0.4 %      Neutrophils, Absolute 6.16 10*3/mm3      Lymphocytes, Absolute 0.81 10*3/mm3      Monocytes, Absolute 0.37 10*3/mm3      Eosinophils, Absolute 0.04 10*3/mm3      Basophils, Absolute 0.03 10*3/mm3      Immature Grans, Absolute 0.03 10*3/mm3      nRBC 0.0 /100 WBC              Echo EF Estimated  Results for orders placed during the hospital encounter of 06/25/25    Adult Transthoracic Echo Complete W/ Cont if Necessary Per Protocol    Interpretation Summary  Images from the original result were not included.      Left ventricular systolic function is severely decreased. Automated 2D EF = 21%  Left ventricular ejection fraction appears to be 21 - 25%.    The left ventricular cavity is moderately dilated.    Left ventricular diastolic function is consistent with (grade II w/high LAP) pseudonormalization.    Left atrial volume is severely increased.    The right atrial cavity is moderately dilated.    Moderate pulmonary hypertension is present.    There is a small (<1cm) pericardial effusion. There is no evidence of cardiac tamponade.    Compared to prior echo dated 11/24/2024 LVEF has decreased from 31-35 % to current above      Abnormal left ventricular global strain with left ventricular apical sparing.  Recommend workup for cardiac amyloid.       Medication Review: yes  Current Facility-Administered Medications   Medication Dose Route Frequency Provider Last Rate Last Admin    acetaminophen (TYLENOL) tablet 1,000 mg  1,000 mg Oral Q6H PRN Lesa Gonzalez MD   1,000 mg at 06/25/25 2343    amLODIPine (NORVASC) tablet 5 mg  5 mg Oral Q24H Marta Clemens APRN   5 mg at 06/28/25 0838    aspirin EC tablet 81 mg  81 mg Oral Daily Lesa Gonzalez MD   81 mg at 06/28/25 0838    carvedilol (COREG) tablet 12.5 mg  12.5 mg Oral BID With Meals Mani Mena MD   12.5 mg at 06/28/25 0838    ferric gluconate (FERRLECIT) 250 MG in sodium chloride 0.9% 250 mL IVPB  250 mg Intravenous Q24H  Martinezelle, Marta A, APRN 125 mL/hr at 06/28/25 0839 250 mg at 06/28/25 0839    hydrALAZINE (APRESOLINE) injection 10 mg  10 mg Intravenous Q6H PRN Lesa Gonzalez MD   10 mg at 06/26/25 1103    hydrALAZINE (APRESOLINE) tablet 50 mg  50 mg Oral Q8H Jayleen, Marta A, APRN   50 mg at 06/28/25 0701    isosorbide dinitrate (ISORDIL) tablet 20 mg  20 mg Oral TID - Nitrates Mani Mena MD   20 mg at 06/28/25 0838    levETIRAcetam (KEPPRA) tablet 500 mg  500 mg Oral BID Lesa Gonzalez MD   500 mg at 06/28/25 0838    losartan (COZAAR) tablet 12.5 mg  12.5 mg Oral Q24H Marta Clemens A, APRN        nitroglycerin (NITROSTAT) SL tablet 0.4 mg  0.4 mg Sublingual Q5 Min PRN Lesa Gonzalez MD        ondansetron (ZOFRAN) injection 4 mg  4 mg Intravenous Q6H PRN Benjamin Dominguez MD   4 mg at 06/26/25 0840    pantoprazole (PROTONIX) injection 40 mg  40 mg Intravenous Q12H Lesa Gonzalez MD   40 mg at 06/28/25 0839    sodium chloride 0.9 % flush 10 mL  10 mL Intravenous Q12H Lesa Gonzalez MD   10 mL at 06/28/25 0839    sodium chloride 0.9 % flush 10 mL  10 mL Intravenous PRN Lesa Gonzalez MD        sodium chloride 0.9 % infusion 40 mL  40 mL Intravenous PRN Lesa Gonzalez MD             Assessment & Plan       Non-STEMI (non-ST elevated myocardial infarction)    Chronic systolic (congestive) heart failure    Seizure disorder    Congestive heart failure    Hypertensive crisis    - Isordil 20 mg three times daily added yesterday. Patient continues to be chest pain-free and seems to be tolerating this well.    - Continue aspirin 81 mg daily.     - Latest echo with very reduced EF of 21 to 25%.  Further lifevest discussion prior to discharge.  Patient indicates that he will consider this.    - Check daily weights.    - He appears euvolemic on exam.    - Continue amlodipine 5 mg daily.  - Continue Hydralazine to 50 mg 3 times daily.  - Add/begin Losartan 12.5 mg daily today.     - Physician recommends outpatient PET  stress testing.  Patient is agreeable to scheduling this.  Will be discussed further prior to his discharge.     - Follow up with Dr. Aguirre for cardiology after discharge (patient request/previous patient).     - Lifestyle choices were discussed, and patient was encouraged to prioritize his cardiac health, and discontinue the things that are harming him, primarily his drug use.  Compliance with his heart failure and other medication regimens was also reinforced.  He expressed understanding.     - Agree with continued plan to initiate dialysis if nephrology feels this is reasonable.  Defer all decisions regarding this to nephrology team.    - Continue other medications per recommendation of admitting physician.    Further orders per Dr. Mena      Electronically signed by SUKHDEV Macias, 06/28/25, 8:14 AM CDT.

## 2025-06-29 LAB
ABSOLUTE LUNG FLUID CONTENT: 45 % (ref 20–35)
ANION GAP SERPL CALCULATED.3IONS-SCNC: 13 MMOL/L (ref 5–15)
BASOPHILS # BLD AUTO: 0.04 10*3/MM3 (ref 0–0.2)
BASOPHILS NFR BLD AUTO: 0.7 % (ref 0–1.5)
BUN SERPL-MCNC: 51.2 MG/DL (ref 6–20)
BUN/CREAT SERPL: 12.7 (ref 7–25)
CALCIUM SPEC-SCNC: 8.2 MG/DL (ref 8.6–10.5)
CHLORIDE SERPL-SCNC: 104 MMOL/L (ref 98–107)
CO2 SERPL-SCNC: 20 MMOL/L (ref 22–29)
CREAT SERPL-MCNC: 4.03 MG/DL (ref 0.76–1.27)
DEPRECATED RDW RBC AUTO: 60.2 FL (ref 37–54)
EGFRCR SERPLBLD CKD-EPI 2021: 19.7 ML/MIN/1.73
EOSINOPHIL # BLD AUTO: 0.13 10*3/MM3 (ref 0–0.4)
EOSINOPHIL NFR BLD AUTO: 2.2 % (ref 0.3–6.2)
ERYTHROCYTE [DISTWIDTH] IN BLOOD BY AUTOMATED COUNT: 18.6 % (ref 12.3–15.4)
GLUCOSE SERPL-MCNC: 98 MG/DL (ref 65–99)
HCT VFR BLD AUTO: 21.8 % (ref 37.5–51)
HGB BLD-MCNC: 6.9 G/DL (ref 13–17.7)
IMM GRANULOCYTES # BLD AUTO: 0.02 10*3/MM3 (ref 0–0.05)
IMM GRANULOCYTES NFR BLD AUTO: 0.3 % (ref 0–0.5)
LYMPHOCYTES # BLD AUTO: 1.31 10*3/MM3 (ref 0.7–3.1)
LYMPHOCYTES NFR BLD AUTO: 21.9 % (ref 19.6–45.3)
MAGNESIUM SERPL-MCNC: 2.3 MG/DL (ref 1.6–2.6)
MCH RBC QN AUTO: 29.5 PG (ref 26.6–33)
MCHC RBC AUTO-ENTMCNC: 31.7 G/DL (ref 31.5–35.7)
MCV RBC AUTO: 93.2 FL (ref 79–97)
MONOCYTES # BLD AUTO: 0.55 10*3/MM3 (ref 0.1–0.9)
MONOCYTES NFR BLD AUTO: 9.2 % (ref 5–12)
NEUTROPHILS NFR BLD AUTO: 3.92 10*3/MM3 (ref 1.7–7)
NEUTROPHILS NFR BLD AUTO: 65.7 % (ref 42.7–76)
NRBC BLD AUTO-RTO: 0 /100 WBC (ref 0–0.2)
PLATELET # BLD AUTO: 147 10*3/MM3 (ref 140–450)
PMV BLD AUTO: 11.4 FL (ref 6–12)
POTASSIUM SERPL-SCNC: 3.9 MMOL/L (ref 3.5–5.2)
RBC # BLD AUTO: 2.34 10*6/MM3 (ref 4.14–5.8)
SODIUM SERPL-SCNC: 137 MMOL/L (ref 136–145)
WBC NRBC COR # BLD AUTO: 5.97 10*3/MM3 (ref 3.4–10.8)

## 2025-06-29 PROCEDURE — 25010000002 NA FERRIC GLUC CPLX PER 12.5 MG

## 2025-06-29 PROCEDURE — 86900 BLOOD TYPING SEROLOGIC ABO: CPT

## 2025-06-29 PROCEDURE — 86901 BLOOD TYPING SEROLOGIC RH(D): CPT

## 2025-06-29 PROCEDURE — 80048 BASIC METABOLIC PNL TOTAL CA: CPT | Performed by: HOSPITALIST

## 2025-06-29 PROCEDURE — 83735 ASSAY OF MAGNESIUM: CPT | Performed by: INTERNAL MEDICINE

## 2025-06-29 PROCEDURE — 25010000002 FUROSEMIDE PER 20 MG

## 2025-06-29 PROCEDURE — 85025 COMPLETE CBC W/AUTO DIFF WBC: CPT | Performed by: INTERNAL MEDICINE

## 2025-06-29 PROCEDURE — P9016 RBC LEUKOCYTES REDUCED: HCPCS

## 2025-06-29 PROCEDURE — 94726 PLETHYSMOGRAPHY LUNG VOLUMES: CPT

## 2025-06-29 PROCEDURE — 86923 COMPATIBILITY TEST ELECTRIC: CPT

## 2025-06-29 PROCEDURE — 99232 SBSQ HOSP IP/OBS MODERATE 35: CPT

## 2025-06-29 PROCEDURE — 25810000003 SODIUM CHLORIDE 0.9 % SOLUTION

## 2025-06-29 PROCEDURE — 36430 TRANSFUSION BLD/BLD COMPNT: CPT

## 2025-06-29 RX ORDER — FUROSEMIDE 10 MG/ML
40 INJECTION INTRAMUSCULAR; INTRAVENOUS ONCE
Status: COMPLETED | OUTPATIENT
Start: 2025-06-29 | End: 2025-06-29

## 2025-06-29 RX ORDER — LOSARTAN POTASSIUM 25 MG/1
12.5 TABLET ORAL ONCE
Status: COMPLETED | OUTPATIENT
Start: 2025-06-29 | End: 2025-06-29

## 2025-06-29 RX ORDER — HYDROCODONE BITARTRATE AND ACETAMINOPHEN 5; 325 MG/1; MG/1
1 TABLET ORAL ONCE AS NEEDED
Refills: 0 | Status: COMPLETED | OUTPATIENT
Start: 2025-06-29 | End: 2025-06-30

## 2025-06-29 RX ORDER — LOSARTAN POTASSIUM 25 MG/1
25 TABLET ORAL
Status: DISCONTINUED | OUTPATIENT
Start: 2025-06-30 | End: 2025-07-04 | Stop reason: HOSPADM

## 2025-06-29 RX ORDER — CARVEDILOL 25 MG/1
25 TABLET ORAL 2 TIMES DAILY WITH MEALS
Status: DISCONTINUED | OUTPATIENT
Start: 2025-06-29 | End: 2025-07-04 | Stop reason: HOSPADM

## 2025-06-29 RX ADMIN — ISOSORBIDE DINITRATE 20 MG: 10 TABLET ORAL at 13:13

## 2025-06-29 RX ADMIN — PANTOPRAZOLE SODIUM 40 MG: 40 INJECTION, POWDER, FOR SOLUTION INTRAVENOUS at 08:28

## 2025-06-29 RX ADMIN — Medication 10 ML: at 21:24

## 2025-06-29 RX ADMIN — ACETAMINOPHEN 1000 MG: 500 TABLET, FILM COATED ORAL at 23:17

## 2025-06-29 RX ADMIN — LOSARTAN POTASSIUM 12.5 MG: 25 TABLET, FILM COATED ORAL at 08:29

## 2025-06-29 RX ADMIN — HYDRALAZINE HYDROCHLORIDE 50 MG: 50 TABLET ORAL at 21:20

## 2025-06-29 RX ADMIN — HYDRALAZINE HYDROCHLORIDE 50 MG: 50 TABLET ORAL at 05:49

## 2025-06-29 RX ADMIN — LEVETIRACETAM 500 MG: 500 TABLET, FILM COATED ORAL at 08:29

## 2025-06-29 RX ADMIN — ISOSORBIDE DINITRATE 20 MG: 10 TABLET ORAL at 17:59

## 2025-06-29 RX ADMIN — SODIUM CHLORIDE 250 MG: 9 INJECTION, SOLUTION INTRAVENOUS at 08:28

## 2025-06-29 RX ADMIN — Medication 10 ML: at 08:29

## 2025-06-29 RX ADMIN — LOSARTAN POTASSIUM 12.5 MG: 25 TABLET, FILM COATED ORAL at 12:06

## 2025-06-29 RX ADMIN — ASPIRIN 81 MG: 81 TABLET, COATED ORAL at 08:29

## 2025-06-29 RX ADMIN — PANTOPRAZOLE SODIUM 40 MG: 40 INJECTION, POWDER, FOR SOLUTION INTRAVENOUS at 21:20

## 2025-06-29 RX ADMIN — CARVEDILOL 25 MG: 25 TABLET, FILM COATED ORAL at 17:59

## 2025-06-29 RX ADMIN — CARVEDILOL 12.5 MG: 6.25 TABLET, FILM COATED ORAL at 08:29

## 2025-06-29 RX ADMIN — AMLODIPINE BESYLATE 5 MG: 5 TABLET ORAL at 08:29

## 2025-06-29 RX ADMIN — ISOSORBIDE DINITRATE 20 MG: 10 TABLET ORAL at 08:29

## 2025-06-29 RX ADMIN — LEVETIRACETAM 500 MG: 500 TABLET, FILM COATED ORAL at 21:20

## 2025-06-29 RX ADMIN — HYDRALAZINE HYDROCHLORIDE 50 MG: 50 TABLET ORAL at 15:14

## 2025-06-29 RX ADMIN — FUROSEMIDE 40 MG: 10 INJECTION, SOLUTION INTRAVENOUS at 12:07

## 2025-06-29 NOTE — PROGRESS NOTES
Nephrology ([facility]) Progress Note      Patient:  José Miguel Webb  YOB: 1995  Date of Service: 6/29/2025  MRN: [id number]   Acct: [id number]   Primary Care Physician: He Ortega MD  Advance Directive:   Code Status and Medical Interventions: CPR (Attempt to Resuscitate); Full Support   Ordered at: 06/25/25 2000     Code Status (Patient has no pulse and is not breathing):    CPR (Attempt to Resuscitate)     Medical Interventions (Patient has pulse or is breathing):    Full Support     Level Of Support Discussed With:    Patient     Admit Date: 6/25/2025       Hospital Day: 4  Referring Provider: Nicola Redd MD      Patient personally seen and examined.  Complete chart including Consults, Notes, Operative Reports, Labs, Cardiology, and Radiology studies reviewed as able.        Subjective:  José Miguel Webb is a 30 y.o. male for whom we were consulted for evaluation and treatment of chronic kidney disease. Baseline chronic kidney disease stage 4, baseline creatinine approximately 4.0. Has followed with our providers on a sporadic basis over the years, typically no-shows for his office visits. History of hypertension, CHF, seizure disorder. Patient was recently treated at [facility] for GI bleed and MINDY. He was scheduled to have permcath placed and to start hemodialysis but left AMA on 6/24.  Presented to [facility] on 6/25 with chest pain and was transferred to [facility] for cardiology and nephrology evaluation. Creatinine 3.99 when admitted this time.  No complaint of pain or dyspnea at time of initial nephrology exam. Denied n/v/d. Denied edema. No recent changes in urination. He explained that he is willing to proceed with dialysis if it is needed    Today, he is awake and alert. No new complaints or overnight issues. Urine output nonoliguric.       Allergies:  Amoxicillin and Penicillins    Home Meds:  Medications Prior to  Admission   Medication Sig Dispense Refill Last Dose/Taking    [Paused] sacubitril-valsartan (Entresto) 49-51 MG tablet Take 1 tablet by mouth 2 (Two) Times a Day. 180 tablet 3        Medicines:  Current Facility-Administered Medications   Medication Dose Route Frequency Provider Last Rate Last Admin    acetaminophen (TYLENOL) tablet 1,000 mg  1,000 mg Oral Q6H PRN Lesa Gonzalez MD   1,000 mg at 06/25/25 2343    amLODIPine (NORVASC) tablet 5 mg  5 mg Oral Q24H Marta Clemens APRN   5 mg at 06/29/25 0829    aspirin EC tablet 81 mg  81 mg Oral Daily Lesa Gonzalez MD   81 mg at 06/29/25 0829    carvedilol (COREG) tablet 25 mg  25 mg Oral BID With Meals Marta Clemens APRN        ferric gluconate (FERRLECIT) 250 MG in sodium chloride 0.9% 250 mL IVPB  250 mg Intravenous Q24H Marta Clemens APRN   Stopped at 06/29/25 1102    hydrALAZINE (APRESOLINE) injection 10 mg  10 mg Intravenous Q6H PRN Lesa Gonzalez MD   10 mg at 06/26/25 1103    hydrALAZINE (APRESOLINE) tablet 50 mg  50 mg Oral Q8H Marta Clemens APRN   50 mg at 06/29/25 0549    isosorbide dinitrate (ISORDIL) tablet 20 mg  20 mg Oral TID - Nitrates Mani Mena MD   20 mg at 06/29/25 1313    levETIRAcetam (KEPPRA) tablet 500 mg  500 mg Oral BID Lesa Gonzalez MD   500 mg at 06/29/25 0829    [START ON 6/30/2025] losartan (COZAAR) tablet 25 mg  25 mg Oral Q24H Marta Clemens APRN        nitroglycerin (NITROSTAT) SL tablet 0.4 mg  0.4 mg Sublingual Q5 Min PRN Lesa Gonzalez MD        ondansetron (ZOFRAN) injection 4 mg  4 mg Intravenous Q6H PRN Benjamin Dominguez MD   4 mg at 06/26/25 0840    pantoprazole (PROTONIX) injection 40 mg  40 mg Intravenous Q12H Lesa Gonzalez MD   40 mg at 06/29/25 0828    sodium chloride 0.9 % flush 10 mL  10 mL Intravenous Q12H Lesa Gonzalez MD   10 mL at 06/29/25 0829    sodium chloride 0.9 % flush 10 mL  10 mL Intravenous PRN Lesa Gonzalez MD        sodium chloride 0.9 % infusion 40 mL  40 mL  Intravenous Lesa Gomez MD           Past Medical History:  Past Medical History:   Diagnosis Date    Cardiomegaly     CHF (congestive heart failure)     Congenital fusion of carpal bone     HTN (hypertension)     Non-STEMI (non-ST elevated myocardial infarction) 6/25/2025    Seizure        Past Surgical History:  Past Surgical History:   Procedure Laterality Date    ELBOW PROCEDURE      ENDOSCOPY N/A 6/22/2025    Procedure: ESOPHAGOGASTRODUODENOSCOPY;  Surgeon: Kevin Miranda MD;  Location: [facility];  Service: Gastroenterology;  Laterality: N/A;  preop; GI bleed   postop; gastric ulcer ; esophagitis       Family History  Family History   Problem Relation Age of Onset    Hypertension Mother     Heart disease Mother     No Known Problems Father        Social History  Social History     Socioeconomic History    Marital status: Single   Tobacco Use    Smoking status: Some Days     Current packs/day: 0.50     Types: Cigarettes    Smokeless tobacco: Never   Vaping Use    Vaping status: Never Used   Substance and Sexual Activity    Alcohol use: Yes    Drug use: Yes     Types: Marijuana, Cocaine(coke)    Sexual activity: Defer       Review of Systems:  History obtained from chart review and the patient  General ROS: No fever or chills  Respiratory ROS: No cough, shortness of breath, wheezing  Cardiovascular ROS: No chest pain or palpitations  Gastrointestinal ROS: No abdominal pain or melena  Genito-Urinary ROS: No dysuria or hematuria  Psych ROS: No anxiety and depression  14 point ROS reviewed with the patient and negative except as noted above and in the HPI unless unable to obtain.    Objective:  Patient Vitals for the past 24 hrs:   BP Temp Temp src Pulse Resp SpO2 Weight   06/29/25 1320 136/71 98.6 °F (37 °C) Oral 88 18 -- --   06/29/25 1258 140/73 98.6 °F (37 °C) Oral 88 18 -- --   06/29/25 1220 136/72 98.7 °F (37.1 °C) Oral 88 18 -- --   06/29/25 1140 141/74 98.7 °F (37.1 °C) Oral 95 18 97 % --  "  06/29/25 0731 145/77 98.4 °F (36.9 °C) Oral 90 16 98 % --   06/29/25 0521 144/89 98.2 °F (36.8 °C) Oral 90 16 98 % --   06/29/25 0415 127/58 98.4 °F (36.9 °C) Oral 93 16 100 % 94.1 kg (207 lb 6.4 oz)   06/28/25 2013 136/68 98.3 °F (36.8 °C) Oral 87 16 100 % --   06/28/25 1618 138/79 98.3 °F (36.8 °C) Oral 90 16 100 % --       Intake/Output Summary (Last 24 hours) at 6/29/2025 1432  Last data filed at 6/29/2025 1340  Gross per 24 hour   Intake 1320 ml   Output 650 ml   Net 670 ml     General: awake/alert   Chest:  clear to auscultation bilaterally without respiratory distress  CVS: regular rate and rhythm  Abdominal: soft, nontender, positive bowel sounds  Extremities: no cyanosis or edema  Skin: warm and dry without rash      Labs:  Results from last 7 days   Lab Units 06/29/25  0359 06/28/25  0305 06/27/25  0337   WBC 10*3/mm3 5.97 6.91 6.27   HEMOGLOBIN g/dL 6.9* 7.2* 7.9*   HEMATOCRIT % 21.8* 23.4* 25.3*   PLATELETS 10*3/mm3 147 150 140         Results from last 7 days   Lab Units 06/29/25  0359 06/28/25  0305 06/27/25  0337 06/26/25  0313 06/25/25  1756 06/24/25  0526   SODIUM mmol/L 137 132* 133* 132* 138 135*   POTASSIUM mmol/L 3.9 4.1 4.4 4.0 4.1 4.4   CHLORIDE mmol/L 104 101 100 101 102 103   CO2 mmol/L 20.0* 21.0* 21.0* 20.0* 23.0 22.0   BUN mg/dL 51.2* 55.8* 57.4* 63.5* 64.8* 75.4*   CREATININE mg/dL 4.03* 4.29* 3.97* 3.96* 3.99* 4.97*   CALCIUM mg/dL 8.2* 8.0* 8.3* 8.2* 8.5* 7.6*   EGFR mL/min/1.73 19.7* 18.3* 20.1* 20.2* 20.0* 15.4*   BILIRUBIN mg/dL  --   --   --  0.8 0.7 0.8   ALK PHOS U/L  --   --   --  66 71 58   ALT (SGPT) U/L  --   --   --  18 22 24   AST (SGOT) U/L  --   --   --  25 23 26   GLUCOSE mg/dL 98 106* 123* 140* 112* 99       Radiology:   Imaging Results (Last 72 Hours)       ** No results found for the last 72 hours. **            Culture:  No results found for: \"BLOODCX\", \"URINECX\", \"WOUNDCX\", \"MRSACX\", \"RESPCX\", \"STOOLCX\"      Assessment   Chronic kidney disease stage 4  Type 2 " diabetes  Hypertension  CHF  Seizure disorder  Anemia  Non-STEMI  Medical noncompliance    Plan:  Dialysis may be needed a lot of his cardiorenal syndrome and fluid overload even if his GFR is still above 15  Follow-up cardiology recommendations  Encouraged compliance with all medications, follow up visits.      Durga Hammer MD  6/29/2025  14:32 CDT

## 2025-06-29 NOTE — SIGNIFICANT NOTE
Called on this patient for having hemoglobin 6.9.  I ordered a unit of packed RBC transfusion.  Patient hemodynamically stable. continue to follow-up

## 2025-06-29 NOTE — PROGRESS NOTES
[facility] Medicine Services  INPATIENT PROGRESS NOTE    Patient Name: José Miguel Webb  Date of Admission: 6/25/2025  Today's Date: 06/29/25  Length of Stay: 4  Primary Care Physician: He Ortega MD    Subjective   Chief Complaint: Transferred for NSTEMI    Patient complained this morning about worsening of his lower extremity edema.      Review of Systems   All pertinent negatives and positives are as above. All other systems have been reviewed and are negative unless otherwise stated.     Objective    Temp:  [98.2 °F (36.8 °C)-98.7 °F (37.1 °C)] 98.6 °F (37 °C)  Heart Rate:  [87-95] 88  Resp:  [16-18] 18  BP: (127-145)/(58-89) 136/71  Physical Exam  Constitutional:       Appearance: He is ill-appearing.   Cardiovascular:      Rate and Rhythm: Normal rate and regular rhythm.      Pulses: Normal pulses.      Heart sounds: Normal heart sounds. No murmur heard.  Pulmonary:      Effort: Pulmonary effort is normal. No respiratory distress.      Breath sounds: Normal breath sounds. No wheezing or rales.   Abdominal:      General: Bowel sounds are normal. There is no distension.      Palpations: Abdomen is soft.      Tenderness: There is no abdominal tenderness. There is no guarding.   Musculoskeletal:      Right lower leg: Edema present.      Left lower leg: Edema present.   Skin:     General: Skin is warm.   Neurological:      Mental Status: He is alert and oriented to person, place, and time. Mental status is at baseline.       Results Review:  I have reviewed the labs, radiology results, and diagnostic studies.    Laboratory Data:   Results from last 7 days   Lab Units 06/29/25  0359 06/28/25  0305 06/27/25  0337   WBC 10*3/mm3 5.97 6.91 6.27   HEMOGLOBIN g/dL 6.9* 7.2* 7.9*   HEMATOCRIT % 21.8* 23.4* 25.3*   PLATELETS 10*3/mm3 147 150 140        Results from last 7 days   Lab Units 06/29/25  0359 06/28/25  0305 06/27/25  0337 06/26/25  0313 06/25/25  1756 06/24/25  0526  "  SODIUM mmol/L 137 132* 133* 132* 138 135*   POTASSIUM mmol/L 3.9 4.1 4.4 4.0 4.1 4.4   CHLORIDE mmol/L 104 101 100 101 102 103   CO2 mmol/L 20.0* 21.0* 21.0* 20.0* 23.0 22.0   BUN mg/dL 51.2* 55.8* 57.4* 63.5* 64.8* 75.4*   CREATININE mg/dL 4.03* 4.29* 3.97* 3.96* 3.99* 4.97*   CALCIUM mg/dL 8.2* 8.0* 8.3* 8.2* 8.5* 7.6*   BILIRUBIN mg/dL  --   --   --  0.8 0.7 0.8   ALK PHOS U/L  --   --   --  66 71 58   ALT (SGPT) U/L  --   --   --  18 22 24   AST (SGOT) U/L  --   --   --  25 23 26   GLUCOSE mg/dL 98 106* 123* 140* 112* 99       Culture Data:   No results found for: \"BLOODCX\", \"URINECX\", \"WOUNDCX\", \"MRSACX\", \"RESPCX\", \"STOOLCX\"    Radiology Data:   Imaging Results (Last 24 Hours)       ** No results found for the last 24 hours. **            I have reviewed the patient's current medications.     Assessment/Plan   Assessment  Active Hospital Problems    Diagnosis     **Non-STEMI (non-ST elevated myocardial infarction)     Hypertensive crisis     Congestive heart failure     Seizure disorder     Chronic systolic (congestive) heart failure        Treatment Plan    -NSTEMI  Cardiology was consulted and recommended hemodialysis as indicated, echocardiogram showed very low ejection fraction at 21%, patient may benefit from AICD.  Cardiologist switched Imdur to Isordil, added amlodipine and continued hydralazine and Coreg.  Anticoagulation was contraindicated because of recent GI bleed and patient was anemic.  Patient has a history of noncompliance, cocaine abuse and therefore beta-blocker will be avoided.    -ESRD with impending hemodialysis  Nephrology is on consult to help with management, awaiting tentative recommendations [patient is developing worsening cardiorenal syndrome]    -Uncontrolled blood pressure  Patient was on Entresto at home, currently on losartan, Norvasc, Coreg and hydralazine as well as as needed IV hydralazine.    -Recent history of present GI bleed  Anticoagulation is avoided, patient's " hemoglobin is also low.  We will continue patient on PPI    -Acute on chronic anemia secondary to end-stage renal disease/recent GI bleed  Patient is status post 2 units of PRBC this admission.    DVT prophylaxis-SCD    Disposition-follow cardiologist recommendations/discharge planning      Electronically signed by Benjamin Dominguez MD, 06/29/25, 15:43 CDT.

## 2025-06-29 NOTE — PROGRESS NOTES
Heart Failure Clinic    Date:  06/29/25     Vitals:    06/29/25 0731   BP: 145/77   Pulse: 90   Resp: 16   Temp: 98.4 °F (36.9 °C)   SpO2: 98%        Indication:  Heart Failure    Procedure:  ReDS device sensor unit applied to right side of chest and right side of back.  Appropriate positioning confirmed based off of the unit's calculation.  Chest measurement obtained with the chest size ruler.  Measurement session performed over 45 seconds.      Results:  ReDS Value=45    Interpretation:  39-46% - elevated lung fluid content    Tayler Strauss CRT 06/29/25 09:24 CDT

## 2025-06-29 NOTE — PLAN OF CARE
Goal Outcome Evaluation:  Plan of Care Reviewed With: patient        Progress: improving  Outcome Evaluation: VSS No c/o voiced. IV lasix given with good UOP. Pt agreed to blood transfusion after lasix given  labs in am. Pt asking for food frequently and lunch  stated she saw him getting food off cart. S 84-94 pvc's per tele.

## 2025-06-29 NOTE — PLAN OF CARE
"Goal Outcome Evaluation:  Plan of Care Reviewed With: patient        Progress: no change  Outcome Evaluation: VSS. S 85-96 on tele. No changes or complaints this shift. Continuing to snack frequently. Call light within reach. Safety maintained.     Informed patient of hgb 6.9 and the need for blood transfusion. Pt stated he \"does not want blood until the doctor gives me something to get the extra fluid off.\" I explained to pt that Dr. Gonzalez will not bypass nephrology and patient still refused blood transfusion \"until a doctor will get the extra fluid off.\" I had patient sign refusal form and it is in patient's chart.                        "

## 2025-06-29 NOTE — PROGRESS NOTES
"[facility] -  Progress Note     LOS: 4 days   Patient Care Team:  He Ortega MD as PCP - General (Internal Medicine)    Chief Complaint: Shortness of breath    Subjective     Interval History:     Patient Complaints: \"I'm swelling\"    This morning the patient is sitting bedside, and he appears slightly agitated.  This morning on lab draw his hemoglobin was noted to be 6.9.  Attending physician ordered 1 unit of PRBCs to be administered.  Patient subsequently refused this, stating that \"I feel like I I am swelling, and I do not think I need to take in any more fluids until something is done about it\".  He states that family advised this to be a good plan.      He does appear slightly volume elevated this morning, with a weight increase of 4 pounds since being weighed yesterday, a ReDs reading of 45%, and slightly worse bilateral lower extremity edema.    He is receiving an iron infusion at this time, which he seems comfortable with.  He denies any issues with Yarsani reasons or other reasons for not accepting blood.  States that he is still urinating but this seems to be less than yesterday.  At this time he continues to deny shortness of breath. He also denies chest pain, dizziness, lightheadedness, or palpitations.      His renal function is stable, with a slight improvement of creatinine at 4.03 down from 4.29 yesterday, and GFR of 19.7 up from 18.3 yesterday.  Nephrology still following, with no plans for dialysis at this time.    Blood pressure this morning was 145/77, heart rate 90, respirations 16.    He appears to be tolerating the medication changes that have been made up to this point.    Review of Systems:     Review of Systems   Constitutional:  Negative for activity change, fatigue and unexpected weight change.   Respiratory:  Positive for shortness of breath. Negative for cough, chest tightness and wheezing.    Cardiovascular:  Positive for leg swelling. Negative for chest " pain and palpitations.   Gastrointestinal:  Negative for nausea.   Musculoskeletal:  Negative for myalgias.   Neurological:  Negative for dizziness, syncope, weakness and light-headedness.     Objective     Vital Sign Min/Max for last 24 hours  Temp  Min: 98.2 °F (36.8 °C)  Max: 98.4 °F (36.9 °C)   BP  Min: 127/58  Max: 145/77   Pulse  Min: 87  Max: 93   Resp  Min: 16  Max: 16   SpO2  Min: 98 %  Max: 100 %   No data recorded   Weight  Min: 94.1 kg (207 lb 6.4 oz)  Max: 94.1 kg (207 lb 6.4 oz)         06/29/25  0415   Weight: 94.1 kg (207 lb 6.4 oz)     Telemetry: SR - 82-93    Physical Exam:    Vitals reviewed.   Constitutional:       Appearance: Normal appearance. Not in distress.   HENT:      Head: Normocephalic.   Neck:      Vascular: JVD normal.   Pulmonary:      Effort: Pulmonary effort is normal.      Breath sounds: Normal breath sounds.   Cardiovascular:      PMI at left midclavicular line. Normal rate. Regular rhythm. Normal S1. Normal S2.       Murmurs: There is no murmur.      No gallop.  No click. No rub.   Pulses:     Intact distal pulses.   Edema:     Peripheral edema present.     Pretibial: bilateral trace edema of the pretibial area.     Ankle: bilateral 1+ pitting edema of the ankle.     Feet: bilateral 1+ pitting edema of the feet.  Skin:     General: Skin is warm and dry.   Neurological:      General: No focal deficit present.      Mental Status: Alert and oriented to person, place and time.      Coordination: Coordination is intact.   Psychiatric:         Attention and Perception: Attention normal.         Mood and Affect: Mood normal.         Speech: Speech normal.         Behavior: Behavior is cooperative.         Thought Content: Thought content normal.       Results Review:       Lab Results (last 72 hours)       Procedure Component Value Units Date/Time    Basic Metabolic Panel [[id number]]  (Abnormal) Collected: 06/28/25 0305    Specimen: Blood Updated: 06/28/25 0351     Glucose 106 mg/dL       BUN 55.8 mg/dL      Creatinine 4.29 mg/dL      Sodium 132 mmol/L      Potassium 4.1 mmol/L      Chloride 101 mmol/L      CO2 21.0 mmol/L      Calcium 8.0 mg/dL      BUN/Creatinine Ratio 13.0     Anion Gap 10.0 mmol/L      eGFR 18.3 mL/min/1.73     Narrative:      GFR Categories in Chronic Kidney Disease (CKD)              GFR Category          GFR (mL/min/1.73)    Interpretation  G1                    90 or greater        Normal or high (1)  G2                    60-89                Mild decrease (1)  G3a                   45-59                Mild to moderate decrease  G3b                   30-44                Moderate to severe decrease  G4                    15-29                Severe decrease  G5                    14 or less           Kidney failure    (1)In the absence of evidence of kidney disease, neither GFR category G1 or G2 fulfill the criteria for CKD.    eGFR calculation 2021 CKD-EPI creatinine equation, which does not include race as a factor    Magnesium [[id number]]  (Normal) Collected: 06/28/25 0305    Specimen: Blood Updated: 06/28/25 0351     Magnesium 2.4 mg/dL     CBC & Differential [[id number]]  (Abnormal) Collected: 06/28/25 0305    Specimen: Blood Updated: 06/28/25 0334    Narrative:      The following orders were created for panel order CBC & Differential.  Procedure                               Abnormality         Status                     ---------                               -----------         ------                     CBC Auto Differential[[id number]]        Abnormal            Final result                 Please view results for these tests on the individual orders.    CBC Auto Differential [[id number]]  (Abnormal) Collected: 06/28/25 0305    Specimen: Blood Updated: 06/28/25 0334     WBC 6.91 10*3/mm3      RBC 2.59 10*6/mm3      Hemoglobin 7.2 g/dL      Hematocrit 23.4 %      MCV 90.3 fL      MCH 27.8 pg      MCHC 30.8 g/dL      RDW 18.3 %      RDW-SD 58.8 fl      MPV 11.6 fL       Platelets 150 10*3/mm3      Neutrophil % 67.7 %      Lymphocyte % 21.0 %      Monocyte % 8.0 %      Eosinophil % 2.3 %      Basophil % 0.7 %      Immature Grans % 0.3 %      Neutrophils, Absolute 4.68 10*3/mm3      Lymphocytes, Absolute 1.45 10*3/mm3      Monocytes, Absolute 0.55 10*3/mm3      Eosinophils, Absolute 0.16 10*3/mm3      Basophils, Absolute 0.05 10*3/mm3      Immature Grans, Absolute 0.02 10*3/mm3      nRBC 0.0 /100 WBC     Basic Metabolic Panel [[id number]]  (Abnormal) Collected: 06/27/25 0337    Specimen: Blood Updated: 06/27/25 0504     Glucose 123 mg/dL      BUN 57.4 mg/dL      Creatinine 3.97 mg/dL      Sodium 133 mmol/L      Potassium 4.4 mmol/L      Chloride 100 mmol/L      CO2 21.0 mmol/L      Calcium 8.3 mg/dL      BUN/Creatinine Ratio 14.5     Anion Gap 12.0 mmol/L      eGFR 20.1 mL/min/1.73     Narrative:      GFR Categories in Chronic Kidney Disease (CKD)              GFR Category          GFR (mL/min/1.73)    Interpretation  G1                    90 or greater        Normal or high (1)  G2                    60-89                Mild decrease (1)  G3a                   45-59                Mild to moderate decrease  G3b                   30-44                Moderate to severe decrease  G4                    15-29                Severe decrease  G5                    14 or less           Kidney failure    (1)In the absence of evidence of kidney disease, neither GFR category G1 or G2 fulfill the criteria for CKD.    eGFR calculation 2021 CKD-EPI creatinine equation, which does not include race as a factor    Magnesium [[id number]]  (Normal) Collected: 06/27/25 0337    Specimen: Blood Updated: 06/27/25 0504     Magnesium 2.4 mg/dL     CBC & Differential [[id number]]  (Abnormal) Collected: 06/27/25 0337    Specimen: Blood Updated: 06/27/25 0434    Narrative:      The following orders were created for panel order CBC & Differential.  Procedure                               Abnormality          Status                     ---------                               -----------         ------                     CBC Auto Differential[[id number]]        Abnormal            Final result                 Please view results for these tests on the individual orders.    CBC Auto Differential [[id number]]  (Abnormal) Collected: 06/27/25 0337    Specimen: Blood Updated: 06/27/25 0434     WBC 6.27 10*3/mm3      RBC 2.86 10*6/mm3      Hemoglobin 7.9 g/dL      Hematocrit 25.3 %      MCV 88.5 fL      MCH 27.6 pg      MCHC 31.2 g/dL      RDW 18.3 %      RDW-SD 57.3 fl      MPV 11.2 fL      Platelets 140 10*3/mm3      Neutrophil % 61.7 %      Lymphocyte % 25.4 %      Monocyte % 7.5 %      Eosinophil % 4.3 %      Basophil % 0.8 %      Immature Grans % 0.3 %      Neutrophils, Absolute 3.87 10*3/mm3      Lymphocytes, Absolute 1.59 10*3/mm3      Monocytes, Absolute 0.47 10*3/mm3      Eosinophils, Absolute 0.27 10*3/mm3      Basophils, Absolute 0.05 10*3/mm3      Immature Grans, Absolute 0.02 10*3/mm3      nRBC 0.0 /100 WBC     Hepatitis B Surface Antibody [[id number]] Collected: 06/26/25 1039    Specimen: Blood Updated: 06/26/25 1907     Hep B S Ab Reactive    Narrative:      Non-Reactive - Individual is considered to be not immune to infection with HBV.    Equivocal - Unable to determine if anti-HBs is present at levels consistent with immunity. The individual should be further assessed by associated risk factors and the use of additional diagnositc information, or another sample may be collected and tested.    Reactive - Anti-HBs concentration detected at >10 mIU/mL. Individual is considered to be immune to infection with HBV.      Results may be falsely decreased if patient taking Biotin.      Hepatitis Panel, Acute [[id number]]  (Normal) Collected: 06/26/25 1039    Specimen: Blood Updated: 06/26/25 1139     Hepatitis B Surface Ag Non-Reactive     Hep A IgM Non-Reactive     Hep B C IgM Non-Reactive     Hepatitis C Ab Non-Reactive     Narrative:      Results may be falsely decreased if patient taking Biotin.     Comprehensive Metabolic Panel [[id number]]  (Abnormal) Collected: 06/26/25 0313    Specimen: Blood Updated: 06/26/25 0407     Glucose 140 mg/dL      BUN 63.5 mg/dL      Creatinine 3.96 mg/dL      Sodium 132 mmol/L      Potassium 4.0 mmol/L      Chloride 101 mmol/L      CO2 20.0 mmol/L      Calcium 8.2 mg/dL      Total Protein 5.6 g/dL      Albumin 3.1 g/dL      ALT (SGPT) 18 U/L      AST (SGOT) 25 U/L      Alkaline Phosphatase 66 U/L      Total Bilirubin 0.8 mg/dL      Globulin 2.5 gm/dL      A/G Ratio 1.2 g/dL      BUN/Creatinine Ratio 16.0     Anion Gap 11.0 mmol/L      eGFR 20.2 mL/min/1.73     Narrative:      GFR Categories in Chronic Kidney Disease (CKD)              GFR Category          GFR (mL/min/1.73)    Interpretation  G1                    90 or greater        Normal or high (1)  G2                    60-89                Mild decrease (1)  G3a                   45-59                Mild to moderate decrease  G3b                   30-44                Moderate to severe decrease  G4                    15-29                Severe decrease  G5                    14 or less           Kidney failure    (1)In the absence of evidence of kidney disease, neither GFR category G1 or G2 fulfill the criteria for CKD.    eGFR calculation 2021 CKD-EPI creatinine equation, which does not include race as a factor    Magnesium [[id number]]  (Normal) Collected: 06/26/25 0313    Specimen: Blood Updated: 06/26/25 0407     Magnesium 2.1 mg/dL     Phosphorus [[id number]]  (Normal) Collected: 06/26/25 0313    Specimen: Blood Updated: 06/26/25 0407     Phosphorus 3.4 mg/dL     CBC (No Diff) [[id number]]  (Abnormal) Collected: 06/26/25 0313    Specimen: Blood Updated: 06/26/25 0340     WBC 5.98 10*3/mm3      RBC 2.62 10*6/mm3      Hemoglobin 7.0 g/dL      Hematocrit 23.0 %      MCV 87.8 fL      MCH 26.7 pg      MCHC 30.4 g/dL      RDW 18.1 %       RDW-SD 56.7 fl      MPV 11.3 fL      Platelets 123 10*3/mm3     Fentanyl, Urine - Urine, Clean Catch [[id number]]  (Normal) Collected: 06/25/25 1953    Specimen: Urine, Clean Catch Updated: 06/25/25 2129     Fentanyl, Urine Negative    Narrative:      Negative Threshold:      Fentanyl 5 ng/mL     The normal value for the drug tested is negative. This report includes final unconfirmed screening results to be used for medical treatment purposes only. Unconfirmed results must not be used for non-medical purposes such as employment or legal testing. Clinical consideration should be applied to any drug of abuse test, particularly when unconfirmed results are used.           Urine Drug Screen - Urine, Clean Catch [[id number]]  (Abnormal) Collected: 06/25/25 1953    Specimen: Urine, Clean Catch Updated: 06/25/25 2129     THC, Screen, Urine Positive     Phencyclidine (PCP), Urine Negative     Cocaine Screen, Urine Positive     Methamphetamine, Ur Negative     Opiate Screen Positive     Amphetamine Screen, Urine Negative     Benzodiazepine Screen, Urine Negative     Tricyclic Antidepressants Screen Negative     Methadone Screen, Urine Negative     Barbiturates Screen, Urine Negative     Oxycodone Screen, Urine Negative     Buprenorphine, Screen, Urine Negative    Narrative:      Cutoff For Drugs Screened:    Amphetamines               500 ng/ml  Barbiturates               200 ng/ml  Benzodiazepines            150 ng/ml  Cocaine                    150 ng/ml  Methadone                  200 ng/ml  Opiates                    100 ng/ml  Phencyclidine               25 ng/ml  THC                         50 ng/ml  Methamphetamine            500 ng/ml  Tricyclic Antidepressants  300 ng/ml  Oxycodone                  100 ng/ml  Buprenorphine               10 ng/ml    The normal value for all drugs tested is negative. This report includes unconfirmed screening results, with the cutoff values listed, to be used for medical treatment  purposes only.  Unconfirmed results must not be used for non-medical purposes such as employment or legal testing.  Clinical consideration should be applied to any drug of abuse test, particularly when unconfirmed results are used.      Urinalysis With Microscopic If Indicated (No Culture) - Urine, Clean Catch [[id number]]  (Abnormal) Collected: 06/25/25 1953    Specimen: Urine, Clean Catch Updated: 06/25/25 2010     Color, UA Yellow     Appearance, UA Clear     pH, UA 6.0     Specific Gravity, UA 1.016     Glucose,  mg/dL (Trace)     Ketones, UA Negative     Bilirubin, UA Negative     Blood, UA Negative     Protein, UA >=300 mg/dL (3+)     Leuk Esterase, UA Negative     Nitrite, UA Negative     Urobilinogen, UA 1.0 E.U./dL    Urinalysis, Microscopic Only - Urine, Clean Catch [[id number]] Collected: 06/25/25 1953    Specimen: Urine, Clean Catch Updated: 06/25/25 2010     RBC, UA 0-2 /HPF      WBC, UA 0-2 /HPF      Bacteria, UA None Seen /HPF      Squamous Epithelial Cells, UA 0-2 /HPF      Hyaline Casts, UA None Seen /LPF      Methodology Automated Microscopy    Ethanol [[id number]] Collected: 06/25/25 1919    Specimen: Blood from Arm, Right Updated: 06/25/25 2001     Ethanol % <0.010 %     Narrative:      Not for legal purposes.    High Sensitivity Troponin T 1Hr [[id number]]  (Abnormal) Collected: 06/25/25 1919    Specimen: Blood from Arm, Right Updated: 06/25/25 1951     HS Troponin T 639 ng/L      Troponin T Numeric Delta 117 ng/L      Troponin T % Delta 22    Narrative:      High Sensitive Troponin T Reference Range:  <14.0 ng/L- Negative Female for AMI  <22.0 ng/L- Negative Male for AMI  >=14 - Abnormal Female indicating possible myocardial injury.  >=22 - Abnormal Male indicating possible myocardial injury.   Clinicians would have to utilize clinical acumen, EKG, Troponin, and serial changes to determine if it is an Acute Myocardial Infarction or myocardial injury due to an underlying chronic condition.      "    Procalcitonin [[id number]]  (Normal) Collected: 06/25/25 1756    Specimen: Blood Updated: 06/25/25 1858     Procalcitonin 0.19 ng/mL     Narrative:      As a Marker for Sepsis (Non-Neonates):    1. <0.5 ng/mL represents a low risk of severe sepsis and/or septic shock.  2. >2 ng/mL represents a high risk of severe sepsis and/or septic shock.    As a Marker for Lower Respiratory Tract Infections that require antibiotic therapy:    PCT on Admission    Antibiotic Therapy       6-12 Hrs later    >0.5                Strongly Recommended  >0.25 - <0.5        Recommended   0.1 - 0.25          Discouraged              Remeasure/reassess PCT  <0.1                Strongly Discouraged     Remeasure/reassess PCT    As 28 day mortality risk marker: \"Change in Procalcitonin Result\" (>80% or <=80%) if Day 0 (or Day 1) and Day 4 values are available. Refer to http://www.[employer] Interactivepct-calculator.com    Change in PCT <=80%  A decrease of PCT levels below or equal to 80% defines a positive change in PCT test result representing a higher risk for 28-day all-cause mortality of patients diagnosed with severe sepsis for septic shock.    Change in PCT >80%  A decrease of PCT levels of more than 80% defines a negative change in PCT result representing a lower risk for 28-day all-cause mortality of patients diagnosed with severe sepsis or septic shock.       High Sensitivity Troponin T [[id number]]  (Abnormal) Collected: 06/25/25 1756    Specimen: Blood Updated: 06/25/25 1855     HS Troponin T 522 ng/L     Narrative:      High Sensitive Troponin T Reference Range:  <14.0 ng/L- Negative Female for AMI  <22.0 ng/L- Negative Male for AMI  >=14 - Abnormal Female indicating possible myocardial injury.  >=22 - Abnormal Male indicating possible myocardial injury.   Clinicians would have to utilize clinical acumen, EKG, Troponin, and serial changes to determine if it is an Acute Myocardial Infarction or myocardial injury due to an underlying chronic " condition.         Comprehensive Metabolic Panel [[id number]]  (Abnormal) Collected: 06/25/25 1756    Specimen: Blood Updated: 06/25/25 1853     Glucose 112 mg/dL      BUN 64.8 mg/dL      Creatinine 3.99 mg/dL      Sodium 138 mmol/L      Potassium 4.1 mmol/L      Chloride 102 mmol/L      CO2 23.0 mmol/L      Calcium 8.5 mg/dL      Total Protein 6.0 g/dL      Albumin 3.3 g/dL      ALT (SGPT) 22 U/L      AST (SGOT) 23 U/L      Alkaline Phosphatase 71 U/L      Total Bilirubin 0.7 mg/dL      Globulin 2.7 gm/dL      A/G Ratio 1.2 g/dL      BUN/Creatinine Ratio 16.2     Anion Gap 13.0 mmol/L      eGFR 20.0 mL/min/1.73     Narrative:      GFR Categories in Chronic Kidney Disease (CKD)              GFR Category          GFR (mL/min/1.73)    Interpretation  G1                    90 or greater        Normal or high (1)  G2                    60-89                Mild decrease (1)  G3a                   45-59                Mild to moderate decrease  G3b                   30-44                Moderate to severe decrease  G4                    15-29                Severe decrease  G5                    14 or less           Kidney failure    (1)In the absence of evidence of kidney disease, neither GFR category G1 or G2 fulfill the criteria for CKD.    eGFR calculation 2021 CKD-EPI creatinine equation, which does not include race as a factor    Magnesium [[id number]]  (Normal) Collected: 06/25/25 1756    Specimen: Blood Updated: 06/25/25 1853     Magnesium 2.2 mg/dL     CK [[id number]]  (Normal) Collected: 06/25/25 1756    Specimen: Blood Updated: 06/25/25 1853     Creatine Kinase 186 U/L     Lactic Acid, Plasma [[id number]]  (Normal) Collected: 06/25/25 1756    Specimen: Blood Updated: 06/25/25 1851     Lactate 1.1 mmol/L     BNP [[id number]]  (Abnormal) Collected: 06/25/25 1756    Specimen: Blood Updated: 06/25/25 1851     proBNP 13,331.0 pg/mL     Narrative:      This assay is used as an aid in the diagnosis of individuals  suspected of having heart failure. It can be used as an aid in the diagnosis of acute decompensated heart failure (ADHF) in patients presenting with signs and symptoms of ADHF to the emergency department (ED). In addition, NT-proBNP of <300 pg/mL indicates ADHF is not likely.    Age Range Result Interpretation  NT-proBNP Concentration (pg/mL:      <50             Positive            >450                   Gray                 300-450                    Negative             <300    50-75           Positive            >900                  Gray                300-900                  Negative            <300      >75             Positive            >1800                  Gray                300-1800                  Negative            <300    aPTT [[id number]]  (Normal) Collected: 06/25/25 1756    Specimen: Blood Updated: 06/25/25 1841     PTT 32.9 seconds     Narrative:      PTT = The equivalent PTT values for the therapeutic range of heparin levels at 0.3 to 0.7 U/ml are 77 - 99 seconds.    Protime-INR [[id number]]  (Abnormal) Collected: 06/25/25 1756    Specimen: Blood Updated: 06/25/25 1841     Protime 14.9 Seconds      INR 1.11    CBC & Differential [[id number]]  (Abnormal) Collected: 06/25/25 1756    Specimen: Blood Updated: 06/25/25 1831    Narrative:      The following orders were created for panel order CBC & Differential.  Procedure                               Abnormality         Status                     ---------                               -----------         ------                     CBC Auto Differential[[id number]]        Abnormal            Final result                 Please view results for these tests on the individual orders.    CBC Auto Differential [[id number]]  (Abnormal) Collected: 06/25/25 1756    Specimen: Blood Updated: 06/25/25 1831     WBC 7.44 10*3/mm3      RBC 2.79 10*6/mm3      Hemoglobin 7.6 g/dL      Hematocrit 24.8 %      MCV 88.9 fL      MCH 27.2 pg      MCHC 30.6 g/dL      RDW  18.2 %      RDW-SD 56.8 fl      MPV 11.2 fL      Platelets 131 10*3/mm3      Neutrophil % 82.8 %      Lymphocyte % 10.9 %      Monocyte % 5.0 %      Eosinophil % 0.5 %      Basophil % 0.4 %      Immature Grans % 0.4 %      Neutrophils, Absolute 6.16 10*3/mm3      Lymphocytes, Absolute 0.81 10*3/mm3      Monocytes, Absolute 0.37 10*3/mm3      Eosinophils, Absolute 0.04 10*3/mm3      Basophils, Absolute 0.03 10*3/mm3      Immature Grans, Absolute 0.03 10*3/mm3      nRBC 0.0 /100 WBC              Echo EF Estimated  Results for orders placed during the hospital encounter of 06/25/25    Adult Transthoracic Echo Complete W/ Cont if Necessary Per Protocol    Interpretation Summary  Images from the original result were not included.      Left ventricular systolic function is severely decreased. Automated 2D EF = 21%  Left ventricular ejection fraction appears to be 21 - 25%.    The left ventricular cavity is moderately dilated.    Left ventricular diastolic function is consistent with (grade II w/high LAP) pseudonormalization.    Left atrial volume is severely increased.    The right atrial cavity is moderately dilated.    Moderate pulmonary hypertension is present.    There is a small (<1cm) pericardial effusion. There is no evidence of cardiac tamponade.    Compared to prior echo dated 11/24/2024 LVEF has decreased from 31-35 % to current above      Abnormal left ventricular global strain with left ventricular apical sparing.  Recommend workup for cardiac amyloid.       Medication Review: yes  Current Facility-Administered Medications   Medication Dose Route Frequency Provider Last Rate Last Admin    acetaminophen (TYLENOL) tablet 1,000 mg  1,000 mg Oral Q6H PRN Lesa Gonzalez MD   1,000 mg at 06/25/25 2343    amLODIPine (NORVASC) tablet 5 mg  5 mg Oral Q24H Marta Clemens APRN   5 mg at 06/29/25 0829    aspirin EC tablet 81 mg  81 mg Oral Daily Lesa Gonzalez MD   81 mg at 06/29/25 0829    carvedilol (COREG)  tablet 25 mg  25 mg Oral BID With Meals Marta Clemens APRN        ferric gluconate (FERRLECIT) 250 MG in sodium chloride 0.9% 250 mL IVPB  250 mg Intravenous Q24H Marta Clemens APRN   Stopped at 06/29/25 1102    furosemide (LASIX) injection 40 mg  40 mg Intravenous Once Marta Clemens APRN        hydrALAZINE (APRESOLINE) injection 10 mg  10 mg Intravenous Q6H PRN Lesa Gonzalez MD   10 mg at 06/26/25 1103    hydrALAZINE (APRESOLINE) tablet 50 mg  50 mg Oral Q8H Marta Clemens APRN   50 mg at 06/29/25 0549    isosorbide dinitrate (ISORDIL) tablet 20 mg  20 mg Oral TID - Nitrates Mani Mena MD   20 mg at 06/29/25 0829    levETIRAcetam (KEPPRA) tablet 500 mg  500 mg Oral BID Lesa Gonzalez MD   500 mg at 06/29/25 0829    losartan (COZAAR) tablet 12.5 mg  12.5 mg Oral Once Marta Clemens APRN        [START ON 6/30/2025] losartan (COZAAR) tablet 25 mg  25 mg Oral Q24H Marta Clemens APRN        nitroglycerin (NITROSTAT) SL tablet 0.4 mg  0.4 mg Sublingual Q5 Min PRN Lesa Gonzalez MD        ondansetron (ZOFRAN) injection 4 mg  4 mg Intravenous Q6H PRN Benjamin Dominguez MD   4 mg at 06/26/25 0840    pantoprazole (PROTONIX) injection 40 mg  40 mg Intravenous Q12H Lesa Gonzalez MD   40 mg at 06/29/25 0828    sodium chloride 0.9 % flush 10 mL  10 mL Intravenous Q12H Lesa Gonzalez MD   10 mL at 06/29/25 0829    sodium chloride 0.9 % flush 10 mL  10 mL Intravenous PRN Lesa Gonzalez MD        sodium chloride 0.9 % infusion 40 mL  40 mL Intravenous PRN Lesa Gonzalez MD             Assessment & Plan       Non-STEMI (non-ST elevated myocardial infarction)    Chronic systolic (congestive) heart failure    Seizure disorder    Congestive heart failure    Hypertensive crisis    - Continue Isordil 20 mg three times daily.    - Continue aspirin 81 mg daily.     - Latest echo with very reduced EF of 21 to 25%.  Strongly encourage LifeVest at discharge, as well as potential placement of  AICD device.    - Weight is up 4 pounds compared to yesterday.  ReDs vest reading of 45%. He appears slightly volume up at this examination with some increase in bilateral lower extremity edema.  Will give a single dose of IV Lasix 40 mg.    - Encouraged patient to consider receiving the unit of PRBCs per the order of his attending physician, he states that he is willing to do this since we are addressing his concerns over his fluid status.  His nurse will reach out to reorder the unit of blood and begin administering this this afternoon.    - Continue amlodipine 5 mg daily.  - Continue Hydralazine 50 mg 3 times daily.  - Increase losartan to 25 mg daily.  He has already received a dose of 12.5 mg today.  We will add an additional dose of 12.5 mg today, and start 25 mg dose in the morning.  - Increase carvedilol to 25 mg twice daily starting with his evening dose.   - Further optimization of GDMT tomorrow, upon evaluation of patient's tolerance of these changes.     - Outpatient PET stress testing order entered to be completed when patient is discharged from the hospital.     - Request made in the system for follow-up visit to be made with Dr. Aguirre of cardiology (per patient's request) following discharge to address his heart failure issues, and discussed the results of this testing.     - Lifestyle choices were discussed, and patient was encouraged to prioritize his cardiac health, and discontinue the things that are harming him, primarily his drug use.  Compliance with his heart failure and other medication regimens was also reinforced.  He expressed understanding.     - Nephrology still holding off with beginning dialysis at this time.  Defer all decisions in this regard to nephrology.    - Continue other medications per recommendation of admitting physician.    Further orders per Dr. Mena      Electronically signed by SUKHDEV Macias, 06/28/25, 8:14 AM CDT.

## 2025-06-30 LAB
ANION GAP SERPL CALCULATED.3IONS-SCNC: 13 MMOL/L (ref 5–15)
BASOPHILS # BLD AUTO: 0.03 10*3/MM3 (ref 0–0.2)
BASOPHILS NFR BLD AUTO: 0.5 % (ref 0–1.5)
BH BB BLOOD EXPIRATION DATE: NORMAL
BH BB BLOOD TYPE BARCODE: 8400
BH BB DISPENSE STATUS: NORMAL
BH BB PRODUCT CODE: NORMAL
BH BB UNIT NUMBER: NORMAL
BUN SERPL-MCNC: 44.5 MG/DL (ref 6–20)
BUN/CREAT SERPL: 11.9 (ref 7–25)
CALCIUM SPEC-SCNC: 8.1 MG/DL (ref 8.6–10.5)
CHLORIDE SERPL-SCNC: 104 MMOL/L (ref 98–107)
CO2 SERPL-SCNC: 21 MMOL/L (ref 22–29)
CREAT SERPL-MCNC: 3.73 MG/DL (ref 0.76–1.27)
CROSSMATCH INTERPRETATION: NORMAL
DEPRECATED RDW RBC AUTO: 60.4 FL (ref 37–54)
EGFRCR SERPLBLD CKD-EPI 2021: 21.7 ML/MIN/1.73
EOSINOPHIL # BLD AUTO: 0.13 10*3/MM3 (ref 0–0.4)
EOSINOPHIL NFR BLD AUTO: 2.2 % (ref 0.3–6.2)
ERYTHROCYTE [DISTWIDTH] IN BLOOD BY AUTOMATED COUNT: 18.6 % (ref 12.3–15.4)
GLUCOSE SERPL-MCNC: 96 MG/DL (ref 65–99)
HBA1C MFR BLD: 5.4 % (ref 4.8–5.6)
HCT VFR BLD AUTO: 24.9 % (ref 37.5–51)
HGB BLD-MCNC: 8 G/DL (ref 13–17.7)
IMM GRANULOCYTES # BLD AUTO: 0.03 10*3/MM3 (ref 0–0.05)
IMM GRANULOCYTES NFR BLD AUTO: 0.5 % (ref 0–0.5)
LYMPHOCYTES # BLD AUTO: 1.44 10*3/MM3 (ref 0.7–3.1)
LYMPHOCYTES NFR BLD AUTO: 24.5 % (ref 19.6–45.3)
MAGNESIUM SERPL-MCNC: 2.1 MG/DL (ref 1.6–2.6)
MCH RBC QN AUTO: 30.1 PG (ref 26.6–33)
MCHC RBC AUTO-ENTMCNC: 32.1 G/DL (ref 31.5–35.7)
MCV RBC AUTO: 93.6 FL (ref 79–97)
MONOCYTES # BLD AUTO: 0.56 10*3/MM3 (ref 0.1–0.9)
MONOCYTES NFR BLD AUTO: 9.5 % (ref 5–12)
NEUTROPHILS NFR BLD AUTO: 3.69 10*3/MM3 (ref 1.7–7)
NEUTROPHILS NFR BLD AUTO: 62.8 % (ref 42.7–76)
NRBC BLD AUTO-RTO: 0 /100 WBC (ref 0–0.2)
PLATELET # BLD AUTO: 163 10*3/MM3 (ref 140–450)
PMV BLD AUTO: 12.2 FL (ref 6–12)
POTASSIUM SERPL-SCNC: 3.6 MMOL/L (ref 3.5–5.2)
RBC # BLD AUTO: 2.66 10*6/MM3 (ref 4.14–5.8)
SODIUM SERPL-SCNC: 138 MMOL/L (ref 136–145)
UNIT  ABO: NORMAL
UNIT  RH: NORMAL
WBC NRBC COR # BLD AUTO: 5.88 10*3/MM3 (ref 3.4–10.8)

## 2025-06-30 PROCEDURE — 85025 COMPLETE CBC W/AUTO DIFF WBC: CPT | Performed by: INTERNAL MEDICINE

## 2025-06-30 PROCEDURE — 63710000001 DIPHENHYDRAMINE PER 50 MG

## 2025-06-30 PROCEDURE — 25010000002 EPOETIN ALFA-EPBX 10000 UNIT/ML SOLUTION: Performed by: INTERNAL MEDICINE

## 2025-06-30 PROCEDURE — 99233 SBSQ HOSP IP/OBS HIGH 50: CPT | Performed by: INTERNAL MEDICINE

## 2025-06-30 PROCEDURE — 83735 ASSAY OF MAGNESIUM: CPT | Performed by: INTERNAL MEDICINE

## 2025-06-30 PROCEDURE — 80048 BASIC METABOLIC PNL TOTAL CA: CPT | Performed by: INTERNAL MEDICINE

## 2025-06-30 PROCEDURE — 25010000002 BUMETANIDE PER 0.5 MG: Performed by: NURSE PRACTITIONER

## 2025-06-30 PROCEDURE — 83036 HEMOGLOBIN GLYCOSYLATED A1C: CPT | Performed by: INTERNAL MEDICINE

## 2025-06-30 PROCEDURE — 25810000003 SODIUM CHLORIDE 0.9 % SOLUTION

## 2025-06-30 PROCEDURE — 25010000002 NA FERRIC GLUC CPLX PER 12.5 MG

## 2025-06-30 RX ORDER — DIPHENHYDRAMINE HCL 25 MG
25 CAPSULE ORAL ONCE AS NEEDED
Status: COMPLETED | OUTPATIENT
Start: 2025-06-30 | End: 2025-06-30

## 2025-06-30 RX ORDER — CALCITRIOL 0.25 UG/1
0.5 CAPSULE, LIQUID FILLED ORAL DAILY
Status: DISCONTINUED | OUTPATIENT
Start: 2025-06-30 | End: 2025-07-04 | Stop reason: HOSPADM

## 2025-06-30 RX ORDER — BUMETANIDE 0.25 MG/ML
2 INJECTION, SOLUTION INTRAMUSCULAR; INTRAVENOUS ONCE
Status: COMPLETED | OUTPATIENT
Start: 2025-06-30 | End: 2025-06-30

## 2025-06-30 RX ORDER — BUMETANIDE 1 MG/1
1 TABLET ORAL DAILY
Status: DISCONTINUED | OUTPATIENT
Start: 2025-06-30 | End: 2025-07-01

## 2025-06-30 RX ADMIN — ISOSORBIDE DINITRATE 20 MG: 10 TABLET ORAL at 17:54

## 2025-06-30 RX ADMIN — Medication 10 ML: at 22:06

## 2025-06-30 RX ADMIN — DIPHENHYDRAMINE HYDROCHLORIDE 25 MG: 25 CAPSULE ORAL at 22:05

## 2025-06-30 RX ADMIN — ISOSORBIDE DINITRATE 20 MG: 10 TABLET ORAL at 09:08

## 2025-06-30 RX ADMIN — HYDRALAZINE HYDROCHLORIDE 50 MG: 50 TABLET ORAL at 22:05

## 2025-06-30 RX ADMIN — HYDROCODONE BITARTRATE AND ACETAMINOPHEN 1 TABLET: 5; 325 TABLET ORAL at 23:00

## 2025-06-30 RX ADMIN — HYDRALAZINE HYDROCHLORIDE 50 MG: 50 TABLET ORAL at 13:42

## 2025-06-30 RX ADMIN — LEVETIRACETAM 500 MG: 500 TABLET, FILM COATED ORAL at 09:08

## 2025-06-30 RX ADMIN — LOSARTAN POTASSIUM 25 MG: 25 TABLET, FILM COATED ORAL at 09:08

## 2025-06-30 RX ADMIN — HYDRALAZINE HYDROCHLORIDE 50 MG: 50 TABLET ORAL at 06:14

## 2025-06-30 RX ADMIN — BUMETANIDE 2 MG: 0.25 INJECTION INTRAMUSCULAR; INTRAVENOUS at 15:58

## 2025-06-30 RX ADMIN — CALCITRIOL CAPSULES 0.25 MCG 0.5 MCG: 0.25 CAPSULE ORAL at 15:52

## 2025-06-30 RX ADMIN — LEVETIRACETAM 500 MG: 500 TABLET, FILM COATED ORAL at 22:05

## 2025-06-30 RX ADMIN — ASPIRIN 81 MG: 81 TABLET, COATED ORAL at 09:08

## 2025-06-30 RX ADMIN — CARVEDILOL 25 MG: 25 TABLET, FILM COATED ORAL at 17:54

## 2025-06-30 RX ADMIN — AMLODIPINE BESYLATE 5 MG: 5 TABLET ORAL at 09:09

## 2025-06-30 RX ADMIN — ISOSORBIDE DINITRATE 20 MG: 10 TABLET ORAL at 13:42

## 2025-06-30 RX ADMIN — CARVEDILOL 25 MG: 25 TABLET, FILM COATED ORAL at 09:09

## 2025-06-30 RX ADMIN — SODIUM CHLORIDE 250 MG: 9 INJECTION, SOLUTION INTRAVENOUS at 09:09

## 2025-06-30 RX ADMIN — Medication 10 ML: at 09:10

## 2025-06-30 RX ADMIN — PANTOPRAZOLE SODIUM 40 MG: 40 INJECTION, POWDER, FOR SOLUTION INTRAVENOUS at 09:09

## 2025-06-30 RX ADMIN — EPOETIN ALFA-EPBX 10000 UNITS: 10000 INJECTION, SOLUTION INTRAVENOUS; SUBCUTANEOUS at 15:53

## 2025-06-30 RX ADMIN — PANTOPRAZOLE SODIUM 40 MG: 40 INJECTION, POWDER, FOR SOLUTION INTRAVENOUS at 22:05

## 2025-06-30 NOTE — PROGRESS NOTES
Nephrology ([facility]) Progress Note      Patient:  José Miguel Webb  YOB: 1995  Date of Service: 6/30/2025  MRN: [id number]   Acct: [id number]   Primary Care Physician: He Ortega MD  Advance Directive:   Code Status and Medical Interventions: CPR (Attempt to Resuscitate); Full Support   Ordered at: 06/25/25 2000     Code Status (Patient has no pulse and is not breathing):    CPR (Attempt to Resuscitate)     Medical Interventions (Patient has pulse or is breathing):    Full Support     Level Of Support Discussed With:    Patient     Admit Date: 6/25/2025       Hospital Day: 5  Referring Provider: Nicola Redd MD      Patient personally seen and examined.  Complete chart including Consults, Notes, Operative Reports, Labs, Cardiology, and Radiology studies reviewed as able.    Chief complaint: Abnormal labs.    Subjective:  José Miguel Webb is a 30 y.o. male for whom we were consulted for evaluation and treatment of chronic kidney disease. Baseline chronic kidney disease stage 4, baseline creatinine approximately 4.0. Has followed with our providers on a sporadic basis over the years, typically no-shows for his office visits. History of hypertension, CHF, seizure disorder. Patient was recently treated at [facility] for GI bleed and MINDY. He was scheduled to have permcath placed and to start hemodialysis but left AMA on 6/24.  Presented to [facility] on 6/25 with chest pain and was transferred to [facility] for cardiology and nephrology evaluation. Creatinine 3.99 when admitted this time.  No complaint of pain or dyspnea at time of initial nephrology exam. Denied n/v/d. Denied edema. No recent changes in urination. He explained that he is willing to proceed with dialysis if it is needed    This morning he is fully alert and awake.  He is still complaining of swelling of feet but no shortness of breath.  He has walked today in the hallway  several times.      Allergies:  Amoxicillin and Penicillins    Home Meds:  Medications Prior to Admission   Medication Sig Dispense Refill Last Dose/Taking    [Paused] sacubitril-valsartan (Entresto) 49-51 MG tablet Take 1 tablet by mouth 2 (Two) Times a Day. 180 tablet 3        Medicines:  Current Facility-Administered Medications   Medication Dose Route Frequency Provider Last Rate Last Admin    acetaminophen (TYLENOL) tablet 1,000 mg  1,000 mg Oral Q6H PRN Lesa Gonzalez MD   1,000 mg at 06/29/25 2317    amLODIPine (NORVASC) tablet 5 mg  5 mg Oral Q24H Marta Clemens APRN   5 mg at 06/30/25 0909    aspirin EC tablet 81 mg  81 mg Oral Daily Lesa Gonzalez MD   81 mg at 06/30/25 0908    carvedilol (COREG) tablet 25 mg  25 mg Oral BID With Meals Marta Clemens APRN   25 mg at 06/30/25 0909    hydrALAZINE (APRESOLINE) injection 10 mg  10 mg Intravenous Q6H PRN Lesa Gonzalez MD   10 mg at 06/26/25 1103    hydrALAZINE (APRESOLINE) tablet 50 mg  50 mg Oral Q8H Marta Clemens APRN   50 mg at 06/30/25 1342    HYDROcodone-acetaminophen (NORCO) 5-325 MG per tablet 1 tablet  1 tablet Oral Once PRN Lesa Gonzalez MD        isosorbide dinitrate (ISORDIL) tablet 20 mg  20 mg Oral TID - Nitrates Mani Mena MD   20 mg at 06/30/25 1342    levETIRAcetam (KEPPRA) tablet 500 mg  500 mg Oral BID Lesa Gonzalez MD   500 mg at 06/30/25 0908    losartan (COZAAR) tablet 25 mg  25 mg Oral Q24H Marta Clemens APRN   25 mg at 06/30/25 0908    nitroglycerin (NITROSTAT) SL tablet 0.4 mg  0.4 mg Sublingual Q5 Min PRN Lesa Gonzalez MD        ondansetron (ZOFRAN) injection 4 mg  4 mg Intravenous Q6H PRN Benjamin Dominguez MD   4 mg at 06/26/25 0840    pantoprazole (PROTONIX) injection 40 mg  40 mg Intravenous Q12H Lesa Gonzalez MD   40 mg at 06/30/25 0909    sodium chloride 0.9 % flush 10 mL  10 mL Intravenous Q12H Lesa Gonzalez MD   10 mL at 06/30/25 0910    sodium chloride 0.9 % flush 10 mL  10 mL  Intravenous Lesa Gomez MD        sodium chloride 0.9 % infusion 40 mL  40 mL Intravenous Lesa Gomez MD           Past Medical History:  Past Medical History:   Diagnosis Date    Cardiomegaly     CHF (congestive heart failure)     Congenital fusion of carpal bone     HTN (hypertension)     Non-STEMI (non-ST elevated myocardial infarction) 6/25/2025    Seizure        Past Surgical History:  Past Surgical History:   Procedure Laterality Date    ELBOW PROCEDURE      ENDOSCOPY N/A 6/22/2025    Procedure: ESOPHAGOGASTRODUODENOSCOPY;  Surgeon: Kevin Miranda MD;  Location: [facility] OR;  Service: Gastroenterology;  Laterality: N/A;  preop; GI bleed   postop; gastric ulcer ; esophagitis       Family History  Family History   Problem Relation Age of Onset    Hypertension Mother     Heart disease Mother     No Known Problems Father        Social History  Social History     Socioeconomic History    Marital status: Single   Tobacco Use    Smoking status: Some Days     Current packs/day: 0.50     Types: Cigarettes    Smokeless tobacco: Never   Vaping Use    Vaping status: Never Used   Substance and Sexual Activity    Alcohol use: Yes    Drug use: Yes     Types: Marijuana, Cocaine(coke)    Sexual activity: Defer       Review of Systems:  History obtained from chart review and the patient  General ROS: No fever or chills  Respiratory ROS: No cough, shortness of breath, wheezing  Cardiovascular ROS: No chest pain or palpitations  Gastrointestinal ROS: No abdominal pain or melena  Genito-Urinary ROS: No dysuria or hematuria  Psych ROS: No anxiety and depression  14 point ROS reviewed with the patient and negative except as noted above and in the HPI unless unable to obtain.    Objective:  Patient Vitals for the past 24 hrs:   BP Temp Temp src Pulse Resp SpO2 Weight   06/30/25 1135 111/61 97.8 °F (36.6 °C) Oral 85 20 100 % --   06/30/25 0725 140/89 98.3 °F (36.8 °C) Oral 90 18 98 % --   06/30/25 0615 -- -- -- -- --  "-- 95.2 kg (209 lb 12.8 oz)   06/30/25 0407 138/79 98.2 °F (36.8 °C) Oral 87 18 96 % --   06/29/25 2004 141/78 98.6 °F (37 °C) Oral 80 18 98 % --   06/29/25 1623 130/71 98.7 °F (37.1 °C) Oral 90 18 96 % --       Intake/Output Summary (Last 24 hours) at 6/30/2025 1437  Last data filed at 6/30/2025 1300  Gross per 24 hour   Intake 1843.75 ml   Output 2175 ml   Net -331.25 ml     General: awake/alert   HEENT: Normocephalic atraumatic head  Neck: Supple with no JVD or carotid bruits.  Chest:  clear to auscultation bilaterally without respiratory distress  CVS: regular rate and rhythm  Abdominal: soft, nontender, positive bowel sounds  Extremities: 2+ pedal edema  Skin: warm and dry without rash      Labs:  Results from last 7 days   Lab Units 06/30/25  0345 06/29/25  0359 06/28/25  0305   WBC 10*3/mm3 5.88 5.97 6.91   HEMOGLOBIN g/dL 8.0* 6.9* 7.2*   HEMATOCRIT % 24.9* 21.8* 23.4*   PLATELETS 10*3/mm3 163 147 150         Results from last 7 days   Lab Units 06/30/25  0345 06/29/25  0359 06/28/25  0305 06/27/25  0337 06/26/25  0313 06/25/25  1756 06/24/25  0526   SODIUM mmol/L 138 137 132*   < > 132* 138 135*   POTASSIUM mmol/L 3.6 3.9 4.1   < > 4.0 4.1 4.4   CHLORIDE mmol/L 104 104 101   < > 101 102 103   CO2 mmol/L 21.0* 20.0* 21.0*   < > 20.0* 23.0 22.0   BUN mg/dL 44.5* 51.2* 55.8*   < > 63.5* 64.8* 75.4*   CREATININE mg/dL 3.73* 4.03* 4.29*   < > 3.96* 3.99* 4.97*   CALCIUM mg/dL 8.1* 8.2* 8.0*   < > 8.2* 8.5* 7.6*   EGFR mL/min/1.73 21.7* 19.7* 18.3*   < > 20.2* 20.0* 15.4*   BILIRUBIN mg/dL  --   --   --   --  0.8 0.7 0.8   ALK PHOS U/L  --   --   --   --  66 71 58   ALT (SGPT) U/L  --   --   --   --  18 22 24   AST (SGOT) U/L  --   --   --   --  25 23 26   GLUCOSE mg/dL 96 98 106*   < > 140* 112* 99    < > = values in this interval not displayed.       Radiology:   Imaging Results (Last 72 Hours)       ** No results found for the last 72 hours. **            Culture:  No results found for: \"BLOODCX\", \"URINECX\", " "\"WOUNDCX\", \"MRSACX\", \"RESPCX\", \"STOOLCX\"      Assessment   1.  Stage IV chronic kidney disease.  2.  Type 2 diabetes with nephropathy.  3.  Recent history of acute kidney injury resolving.  4.  Congestive heart failure exacerbation.  5.  Benign essential hypertension.  6.  Metabolic acidemia.  7.  Non-STEMI.  8.  Noncompliance with medical management  9.  Secondary hyperparathyroidism.    Plan:  1.  At this point he has stable renal function and no indication for dialysis.  Most recently he had episode of acute kidney injury which is now resolving.  Meanwhile he has stage IV chronic kidney disease baseline.  2.  Initiate KIANA for the treatment of anemia of chronic kidney disease.  3.  Initiate low-dose loop diuretics for pedal edema.  4.  Initiate p.o. calcitriol for secondary hyperparathyroidism treatment      Robinson Preston MD  6/30/2025  14:37 CDT    "

## 2025-06-30 NOTE — PLAN OF CARE
Goal Outcome Evaluation:  Plan of Care Reviewed With: patient        Progress: no change  Outcome Evaluation: VSS. S 87-94 on tele. Pt c/o fluid retention and said he wanted to speak to dayshift provider. Administered PRN Tylenol per pt request for bilateral lower extremity pain. Pt continues to eat very frequently. Call light within reach. Safety maintained.

## 2025-06-30 NOTE — PROGRESS NOTES
[facility] Medicine Services  INPATIENT PROGRESS NOTE    Patient Name: José Miguel Webb  Date of Admission: 6/25/2025  Today's Date: 06/30/25  Length of Stay: 5  Primary Care Physician: He Ortega MD    Subjective   Chief Complaint: Transferred for NSTEMI    Patient still complained of worsening lower extremity edema.      Review of Systems   All pertinent negatives and positives are as above. All other systems have been reviewed and are negative unless otherwise stated.     Objective    Temp:  [97.6 °F (36.4 °C)-98.6 °F (37 °C)] 97.6 °F (36.4 °C)  Heart Rate:  [80-90] 85  Resp:  [18-20] 18  BP: (111-141)/(61-89) 131/68  Physical Exam      Constitutional:       Appearance: He is ill-appearing.   Cardiovascular:      Rate and Rhythm: Normal rate and regular rhythm.      Pulses: Normal pulses.      Heart sounds: Normal heart sounds. No murmur heard.  Pulmonary:      Effort: Pulmonary effort is normal. No respiratory distress.      Breath sounds: Normal breath sounds. No wheezing or rales.   Abdominal:      General: Bowel sounds are normal. There is no distension.      Palpations: Abdomen is soft.      Tenderness: There is no abdominal tenderness. There is no guarding.   Musculoskeletal:      Right lower leg: Edema present.      Left lower leg: Edema present.   Skin:     General: Skin is warm.   Neurological:      Mental Status: He is alert and oriented to person, place, and time. Mental status is at baseline.       Results Review:  I have reviewed the labs, radiology results, and diagnostic studies.    Laboratory Data:   Results from last 7 days   Lab Units 06/30/25  0345 06/29/25  0359 06/28/25  0305   WBC 10*3/mm3 5.88 5.97 6.91   HEMOGLOBIN g/dL 8.0* 6.9* 7.2*   HEMATOCRIT % 24.9* 21.8* 23.4*   PLATELETS 10*3/mm3 163 147 150        Results from last 7 days   Lab Units 06/30/25  0345 06/29/25  0359 06/28/25  0305 06/27/25  0337 06/26/25  0313 06/25/25  1756 06/24/25  0526  "  SODIUM mmol/L 138 137 132*   < > 132* 138 135*   POTASSIUM mmol/L 3.6 3.9 4.1   < > 4.0 4.1 4.4   CHLORIDE mmol/L 104 104 101   < > 101 102 103   CO2 mmol/L 21.0* 20.0* 21.0*   < > 20.0* 23.0 22.0   BUN mg/dL 44.5* 51.2* 55.8*   < > 63.5* 64.8* 75.4*   CREATININE mg/dL 3.73* 4.03* 4.29*   < > 3.96* 3.99* 4.97*   CALCIUM mg/dL 8.1* 8.2* 8.0*   < > 8.2* 8.5* 7.6*   BILIRUBIN mg/dL  --   --   --   --  0.8 0.7 0.8   ALK PHOS U/L  --   --   --   --  66 71 58   ALT (SGPT) U/L  --   --   --   --  18 22 24   AST (SGOT) U/L  --   --   --   --  25 23 26   GLUCOSE mg/dL 96 98 106*   < > 140* 112* 99    < > = values in this interval not displayed.       Culture Data:   No results found for: \"BLOODCX\", \"URINECX\", \"WOUNDCX\", \"MRSACX\", \"RESPCX\", \"STOOLCX\"    Radiology Data:   Imaging Results (Last 24 Hours)       ** No results found for the last 24 hours. **            I have reviewed the patient's current medications.     Assessment/Plan   Assessment  Active Hospital Problems    Diagnosis     **Non-STEMI (non-ST elevated myocardial infarction)     Hypertensive crisis     Congestive heart failure     Seizure disorder     Chronic systolic (congestive) heart failure        Treatment Plan    -NSTEMI  Cardiology was consulted and recommended hemodialysis as indicated, echocardiogram showed very low ejection fraction at 21%, patient may benefit from AICD.  Cardiologist switched Imdur to Isordil, added amlodipine and continued hydralazine and Coreg.  Anticoagulation was contraindicated because of recent GI bleed and patient was anemic.  Patient has a history of noncompliance, cocaine abuse and therefore beta-blocker will be avoided.    -ESRD with impending hemodialysis  Nephrology is on consult to help with management.  Nephrologist has ruled out initiation of hemodialysis at this point, patient's acute renal failure improved.    -Uncontrolled blood pressure  Patient was on Entresto at home, currently on losartan, Norvasc, Coreg and " hydralazine as well as as needed IV hydralazine.    -Recent history of present GI bleed  Anticoagulation is avoided, patient's hemoglobin is also low.  We will continue patient on PPI    -Acute on chronic anemia secondary to end-stage renal disease/recent GI bleed  Patient is status post 2 units of PRBC this admission.    DVT prophylaxis-SCD    Disposition-follow cardiologist and nephrologist recommendations/discharge planning.      Electronically signed by Benjamin Dominguez MD, 06/30/25, 16:55 CDT.

## 2025-06-30 NOTE — PROGRESS NOTES
[facility] -  Progress Note     LOS: 5 days   Patient Care Team:  He Ortega MD as PCP - General (Internal Medicine)    Chief Complaint: swelling    Subjective     Interval History:     Patient Complaints: Patient is sitting on side of bed today. He reports that he had good urine output with the lasix yesterday but still has swelling today. Cr improved to 3.73 today. He denies any chest pain. He denies any shortness or breath. He reports that he feels like he is swollen up to his abd. He denies any heart racing or palpitations. Hgb 8.0 today     Review of Systems:     Review of Systems   Respiratory:  Negative for shortness of breath.    Cardiovascular:  Positive for leg swelling. Negative for chest pain and palpitations.   All other systems reviewed and are negative.    Objective     Vital Sign Min/Max for last 24 hours  Temp  Min: 97.8 °F (36.6 °C)  Max: 98.7 °F (37.1 °C)   BP  Min: 111/61  Max: 141/78   Pulse  Min: 80  Max: 90   Resp  Min: 18  Max: 20   SpO2  Min: 96 %  Max: 100 %   No data recorded   Weight  Min: 95.2 kg (209 lb 12.8 oz)  Max: 95.2 kg (209 lb 12.8 oz)         06/30/25  0615   Weight: 95.2 kg (209 lb 12.8 oz)       Intake/Output Summary (Last 24 hours) at 6/30/2025 1410  Last data filed at 6/30/2025 1300  Gross per 24 hour   Intake 1843.75 ml   Output 2175 ml   Net -331.25 ml       Telemetry: sinus rhythm rates 83-94 with PVCs      Physical Exam:    Vitals reviewed.   Constitutional:       General: Not in acute distress.     Appearance: Normal appearance. Well-developed.   Eyes:      Pupils: Pupils are equal, round, and reactive to light.   HENT:      Head: Normocephalic and atraumatic.      Nose: Nose normal.   Neck:      Vascular: No carotid bruit.   Pulmonary:      Effort: Pulmonary effort is normal. No respiratory distress.      Breath sounds: Normal breath sounds. No wheezing. No rales.   Cardiovascular:      Normal rate. Regular rhythm.      Murmurs: There is no  murmur.   Edema:     Peripheral edema present.     Pretibial: bilateral 2+ edema of the pretibial area.     Ankle: bilateral 2+ edema of the ankle.     Feet: bilateral 2+ edema of the feet.  Abdominal:      General: There is no distension.      Palpations: Abdomen is soft.   Musculoskeletal: Normal range of motion.      Cervical back: Normal range of motion and neck supple. Skin:     General: Skin is warm.      Findings: No erythema or rash.   Neurological:      Mental Status: Alert and oriented to person, place, and time.   Psychiatric:         Speech: Speech normal.         Behavior: Behavior normal.         Thought Content: Thought content normal.         Judgment: Judgment normal.       Results Review:   Lab Results (last 72 hours)       Procedure Component Value Units Date/Time    Basic Metabolic Panel [[id number]]  (Abnormal) Collected: 06/30/25 0345    Specimen: Blood Updated: 06/30/25 0502     Glucose 96 mg/dL      BUN 44.5 mg/dL      Creatinine 3.73 mg/dL      Sodium 138 mmol/L      Potassium 3.6 mmol/L      Chloride 104 mmol/L      CO2 21.0 mmol/L      Calcium 8.1 mg/dL      BUN/Creatinine Ratio 11.9     Anion Gap 13.0 mmol/L      eGFR 21.7 mL/min/1.73     Narrative:      GFR Categories in Chronic Kidney Disease (CKD)              GFR Category          GFR (mL/min/1.73)    Interpretation  G1                    90 or greater        Normal or high (1)  G2                    60-89                Mild decrease (1)  G3a                   45-59                Mild to moderate decrease  G3b                   30-44                Moderate to severe decrease  G4                    15-29                Severe decrease  G5                    14 or less           Kidney failure    (1)In the absence of evidence of kidney disease, neither GFR category G1 or G2 fulfill the criteria for CKD.    eGFR calculation 2021 CKD-EPI creatinine equation, which does not include race as a factor    Magnesium [[id number]]  (Normal)  Collected: 06/30/25 0345    Specimen: Blood Updated: 06/30/25 0502     Magnesium 2.1 mg/dL     CBC & Differential [[id number]]  (Abnormal) Collected: 06/30/25 0345    Specimen: Blood Updated: 06/30/25 0445    Narrative:      The following orders were created for panel order CBC & Differential.  Procedure                               Abnormality         Status                     ---------                               -----------         ------                     CBC Auto Differential[[id number]]        Abnormal            Final result                 Please view results for these tests on the individual orders.    CBC Auto Differential [[id number]]  (Abnormal) Collected: 06/30/25 0345    Specimen: Blood Updated: 06/30/25 0445     WBC 5.88 10*3/mm3      RBC 2.66 10*6/mm3      Hemoglobin 8.0 g/dL      Hematocrit 24.9 %      MCV 93.6 fL      MCH 30.1 pg      MCHC 32.1 g/dL      RDW 18.6 %      RDW-SD 60.4 fl      MPV 12.2 fL      Platelets 163 10*3/mm3      Neutrophil % 62.8 %      Lymphocyte % 24.5 %      Monocyte % 9.5 %      Eosinophil % 2.2 %      Basophil % 0.5 %      Immature Grans % 0.5 %      Neutrophils, Absolute 3.69 10*3/mm3      Lymphocytes, Absolute 1.44 10*3/mm3      Monocytes, Absolute 0.56 10*3/mm3      Eosinophils, Absolute 0.13 10*3/mm3      Basophils, Absolute 0.03 10*3/mm3      Immature Grans, Absolute 0.03 10*3/mm3      nRBC 0.0 /100 WBC     Basic Metabolic Panel [[id number]]  (Abnormal) Collected: 06/29/25 0359    Specimen: Blood Updated: 06/29/25 0800     Glucose 98 mg/dL      BUN 51.2 mg/dL      Creatinine 4.03 mg/dL      Sodium 137 mmol/L      Potassium 3.9 mmol/L      Chloride 104 mmol/L      CO2 20.0 mmol/L      Calcium 8.2 mg/dL      BUN/Creatinine Ratio 12.7     Anion Gap 13.0 mmol/L      eGFR 19.7 mL/min/1.73     Narrative:      GFR Categories in Chronic Kidney Disease (CKD)              GFR Category          GFR (mL/min/1.73)    Interpretation  G1                    90 or greater         Normal or high (1)  G2                    60-89                Mild decrease (1)  G3a                   45-59                Mild to moderate decrease  G3b                   30-44                Moderate to severe decrease  G4                    15-29                Severe decrease  G5                    14 or less           Kidney failure    (1)In the absence of evidence of kidney disease, neither GFR category G1 or G2 fulfill the criteria for CKD.    eGFR calculation 2021 CKD-EPI creatinine equation, which does not include race as a factor    Magnesium [[id number]]  (Normal) Collected: 06/29/25 0359    Specimen: Blood Updated: 06/29/25 0509     Magnesium 2.3 mg/dL     CBC & Differential [[id number]]  (Abnormal) Collected: 06/29/25 0359    Specimen: Blood Updated: 06/29/25 0449    Narrative:      The following orders were created for panel order CBC & Differential.  Procedure                               Abnormality         Status                     ---------                               -----------         ------                     CBC Auto Differential[[id number]]        Abnormal            Final result                 Please view results for these tests on the individual orders.    CBC Auto Differential [[id number]]  (Abnormal) Collected: 06/29/25 0359    Specimen: Blood Updated: 06/29/25 0449     WBC 5.97 10*3/mm3      RBC 2.34 10*6/mm3      Hemoglobin 6.9 g/dL      Hematocrit 21.8 %      MCV 93.2 fL      MCH 29.5 pg      MCHC 31.7 g/dL      RDW 18.6 %      RDW-SD 60.2 fl      MPV 11.4 fL      Platelets 147 10*3/mm3      Neutrophil % 65.7 %      Lymphocyte % 21.9 %      Monocyte % 9.2 %      Eosinophil % 2.2 %      Basophil % 0.7 %      Immature Grans % 0.3 %      Neutrophils, Absolute 3.92 10*3/mm3      Lymphocytes, Absolute 1.31 10*3/mm3      Monocytes, Absolute 0.55 10*3/mm3      Eosinophils, Absolute 0.13 10*3/mm3      Basophils, Absolute 0.04 10*3/mm3      Immature Grans, Absolute 0.02 10*3/mm3      nRBC  0.0 /100 WBC     Basic Metabolic Panel [[id number]]  (Abnormal) Collected: 06/28/25 0305    Specimen: Blood Updated: 06/28/25 0351     Glucose 106 mg/dL      BUN 55.8 mg/dL      Creatinine 4.29 mg/dL      Sodium 132 mmol/L      Potassium 4.1 mmol/L      Chloride 101 mmol/L      CO2 21.0 mmol/L      Calcium 8.0 mg/dL      BUN/Creatinine Ratio 13.0     Anion Gap 10.0 mmol/L      eGFR 18.3 mL/min/1.73     Narrative:      GFR Categories in Chronic Kidney Disease (CKD)              GFR Category          GFR (mL/min/1.73)    Interpretation  G1                    90 or greater        Normal or high (1)  G2                    60-89                Mild decrease (1)  G3a                   45-59                Mild to moderate decrease  G3b                   30-44                Moderate to severe decrease  G4                    15-29                Severe decrease  G5                    14 or less           Kidney failure    (1)In the absence of evidence of kidney disease, neither GFR category G1 or G2 fulfill the criteria for CKD.    eGFR calculation 2021 CKD-EPI creatinine equation, which does not include race as a factor    Magnesium [[id number]]  (Normal) Collected: 06/28/25 0305    Specimen: Blood Updated: 06/28/25 0351     Magnesium 2.4 mg/dL     CBC & Differential [[id number]]  (Abnormal) Collected: 06/28/25 0305    Specimen: Blood Updated: 06/28/25 0334    Narrative:      The following orders were created for panel order CBC & Differential.  Procedure                               Abnormality         Status                     ---------                               -----------         ------                     CBC Auto Differential[[id number]]        Abnormal            Final result                 Please view results for these tests on the individual orders.    CBC Auto Differential [[id number]]  (Abnormal) Collected: 06/28/25 0305    Specimen: Blood Updated: 06/28/25 0334     WBC 6.91 10*3/mm3      RBC 2.59 10*6/mm3   "    Hemoglobin 7.2 g/dL      Hematocrit 23.4 %      MCV 90.3 fL      MCH 27.8 pg      MCHC 30.8 g/dL      RDW 18.3 %      RDW-SD 58.8 fl      MPV 11.6 fL      Platelets 150 10*3/mm3      Neutrophil % 67.7 %      Lymphocyte % 21.0 %      Monocyte % 8.0 %      Eosinophil % 2.3 %      Basophil % 0.7 %      Immature Grans % 0.3 %      Neutrophils, Absolute 4.68 10*3/mm3      Lymphocytes, Absolute 1.45 10*3/mm3      Monocytes, Absolute 0.55 10*3/mm3      Eosinophils, Absolute 0.16 10*3/mm3      Basophils, Absolute 0.05 10*3/mm3      Immature Grans, Absolute 0.02 10*3/mm3      nRBC 0.0 /100 WBC              Echo EF Estimated  Results for orders placed during the hospital encounter of 06/25/25    Adult Transthoracic Echo Complete W/ Cont if Necessary Per Protocol    Interpretation Summary  Images from the original result were not included.      Left ventricular systolic function is severely decreased. Automated 2D EF = 21%  Left ventricular ejection fraction appears to be 21 - 25%.    The left ventricular cavity is moderately dilated.    Left ventricular diastolic function is consistent with (grade II w/high LAP) pseudonormalization.    Left atrial volume is severely increased.    The right atrial cavity is moderately dilated.    Moderate pulmonary hypertension is present.    There is a small (<1cm) pericardial effusion. There is no evidence of cardiac tamponade.    Compared to prior echo dated 11/24/2024 LVEF has decreased from 31-35 % to current above      Abnormal left ventricular global strain with left ventricular apical sparing.  Recommend workup for cardiac amyloid.    Cath Ejection Fraction Quantitative  No results found for: \"CATHEF\"    Medication Review: yes  Current Facility-Administered Medications   Medication Dose Route Frequency Provider Last Rate Last Admin    acetaminophen (TYLENOL) tablet 1,000 mg  1,000 mg Oral Q6H PRN Lesa Gonzalez MD   1,000 mg at 06/29/25 5269    amLODIPine (NORVASC) tablet 5 mg  5 " mg Oral Q24H Jayleen, Marta A, APRN   5 mg at 06/30/25 0909    aspirin EC tablet 81 mg  81 mg Oral Daily Lesa Gonzalez MD   81 mg at 06/30/25 0908    carvedilol (COREG) tablet 25 mg  25 mg Oral BID With Meals Jayleen, Marta A, APRN   25 mg at 06/30/25 0909    hydrALAZINE (APRESOLINE) injection 10 mg  10 mg Intravenous Q6H PRN Lesa Gonazlez MD   10 mg at 06/26/25 1103    hydrALAZINE (APRESOLINE) tablet 50 mg  50 mg Oral Q8H Jayleen Marta A, APRN   50 mg at 06/30/25 1342    HYDROcodone-acetaminophen (NORCO) 5-325 MG per tablet 1 tablet  1 tablet Oral Once PRN Lesa Gonzalez MD        isosorbide dinitrate (ISORDIL) tablet 20 mg  20 mg Oral TID - Nitrates Mani Mena MD   20 mg at 06/30/25 1342    levETIRAcetam (KEPPRA) tablet 500 mg  500 mg Oral BID Lesa Gonzalez MD   500 mg at 06/30/25 0908    losartan (COZAAR) tablet 25 mg  25 mg Oral Q24H Marta Clemens A, APRN   25 mg at 06/30/25 0908    nitroglycerin (NITROSTAT) SL tablet 0.4 mg  0.4 mg Sublingual Q5 Min PRN Lesa Gonzalez MD        ondansetron (ZOFRAN) injection 4 mg  4 mg Intravenous Q6H PRN Benjamin Dominguez MD   4 mg at 06/26/25 0840    pantoprazole (PROTONIX) injection 40 mg  40 mg Intravenous Q12H Lesa Gonzalez MD   40 mg at 06/30/25 0909    sodium chloride 0.9 % flush 10 mL  10 mL Intravenous Q12H Lesa Gonzalez MD   10 mL at 06/30/25 0910    sodium chloride 0.9 % flush 10 mL  10 mL Intravenous PRN Lesa Gonzalez MD        sodium chloride 0.9 % infusion 40 mL  40 mL Intravenous PRN Lesa Gonzalez MD             Assessment & Plan     -Congestive heart failure: LVEF 21-25% on echo 6/25/2025. Will give IV bumex today. Continue carvedilol, hydralalzine, isordil, and losartan. Cardiac PET for ischemic evaluation to be done outpatient.     -anemia: hgb 8.0 today    -hypertension     -seizure disorder    Plan:   - will give Bumex 2 mg IV now  - continue carvedilol, hydralalzine, isordil, and losartan  - continue aspirin and  amlodipine  - continue to monitor kidney function and electrolytes closely   - continue to monitor I/O and daily weights  - continue to monitor H & H and transfuse as needed  - optimize GDMT as tolerated  - Cardiac PET ordered for ischemic evaluation to be done outpatient    Electronically signed by SUKHDEV Kelly, 06/30/25, 2:05 PM CDT.

## 2025-07-01 DIAGNOSIS — I50.22 CHRONIC SYSTOLIC (CONGESTIVE) HEART FAILURE: Primary | ICD-10-CM

## 2025-07-01 LAB
ALBUMIN SERPL-MCNC: 3.1 G/DL (ref 3.5–5.2)
ALBUMIN/GLOB SERPL: 1.2 G/DL
ALP SERPL-CCNC: 71 U/L (ref 39–117)
ALT SERPL W P-5'-P-CCNC: 10 U/L (ref 1–41)
ANION GAP SERPL CALCULATED.3IONS-SCNC: 11 MMOL/L (ref 5–15)
AST SERPL-CCNC: 10 U/L (ref 1–40)
BASOPHILS # BLD AUTO: 0.05 10*3/MM3 (ref 0–0.2)
BASOPHILS NFR BLD AUTO: 0.9 % (ref 0–1.5)
BILIRUB SERPL-MCNC: 0.4 MG/DL (ref 0–1.2)
BUN SERPL-MCNC: 42.6 MG/DL (ref 6–20)
BUN/CREAT SERPL: 10.9 (ref 7–25)
CALCIUM SPEC-SCNC: 8.4 MG/DL (ref 8.6–10.5)
CHLORIDE SERPL-SCNC: 105 MMOL/L (ref 98–107)
CO2 SERPL-SCNC: 22 MMOL/L (ref 22–29)
CREAT SERPL-MCNC: 3.9 MG/DL (ref 0.76–1.27)
DEPRECATED RDW RBC AUTO: 61.1 FL (ref 37–54)
EGFRCR SERPLBLD CKD-EPI 2021: 20.5 ML/MIN/1.73
EOSINOPHIL # BLD AUTO: 0.16 10*3/MM3 (ref 0–0.4)
EOSINOPHIL NFR BLD AUTO: 2.7 % (ref 0.3–6.2)
ERYTHROCYTE [DISTWIDTH] IN BLOOD BY AUTOMATED COUNT: 19.8 % (ref 12.3–15.4)
GLOBULIN UR ELPH-MCNC: 2.5 GM/DL
GLUCOSE SERPL-MCNC: 91 MG/DL (ref 65–99)
HCT VFR BLD AUTO: 24.8 % (ref 37.5–51)
HGB BLD-MCNC: 7.8 G/DL (ref 13–17.7)
IMM GRANULOCYTES # BLD AUTO: 0.02 10*3/MM3 (ref 0–0.05)
IMM GRANULOCYTES NFR BLD AUTO: 0.3 % (ref 0–0.5)
LYMPHOCYTES # BLD AUTO: 1.38 10*3/MM3 (ref 0.7–3.1)
LYMPHOCYTES NFR BLD AUTO: 23.5 % (ref 19.6–45.3)
MAGNESIUM SERPL-MCNC: 2.2 MG/DL (ref 1.6–2.6)
MCH RBC QN AUTO: 29.3 PG (ref 26.6–33)
MCHC RBC AUTO-ENTMCNC: 31.5 G/DL (ref 31.5–35.7)
MCV RBC AUTO: 93.2 FL (ref 79–97)
MONOCYTES # BLD AUTO: 0.58 10*3/MM3 (ref 0.1–0.9)
MONOCYTES NFR BLD AUTO: 9.9 % (ref 5–12)
NEUTROPHILS NFR BLD AUTO: 3.68 10*3/MM3 (ref 1.7–7)
NEUTROPHILS NFR BLD AUTO: 62.7 % (ref 42.7–76)
NRBC BLD AUTO-RTO: 0 /100 WBC (ref 0–0.2)
PLATELET # BLD AUTO: 163 10*3/MM3 (ref 140–450)
PMV BLD AUTO: 11 FL (ref 6–12)
POTASSIUM SERPL-SCNC: 3.7 MMOL/L (ref 3.5–5.2)
PROT SERPL-MCNC: 5.6 G/DL (ref 6–8.5)
RBC # BLD AUTO: 2.66 10*6/MM3 (ref 4.14–5.8)
SODIUM SERPL-SCNC: 138 MMOL/L (ref 136–145)
WBC NRBC COR # BLD AUTO: 5.87 10*3/MM3 (ref 3.4–10.8)

## 2025-07-01 PROCEDURE — 25010000002 BUMETANIDE PER 0.5 MG: Performed by: INTERNAL MEDICINE

## 2025-07-01 PROCEDURE — 83735 ASSAY OF MAGNESIUM: CPT | Performed by: INTERNAL MEDICINE

## 2025-07-01 PROCEDURE — 99233 SBSQ HOSP IP/OBS HIGH 50: CPT | Performed by: INTERNAL MEDICINE

## 2025-07-01 PROCEDURE — 85025 COMPLETE CBC W/AUTO DIFF WBC: CPT | Performed by: INTERNAL MEDICINE

## 2025-07-01 PROCEDURE — 80053 COMPREHEN METABOLIC PANEL: CPT | Performed by: INTERNAL MEDICINE

## 2025-07-01 RX ORDER — BUMETANIDE 0.25 MG/ML
1 INJECTION, SOLUTION INTRAMUSCULAR; INTRAVENOUS ONCE
Status: COMPLETED | OUTPATIENT
Start: 2025-07-01 | End: 2025-07-01

## 2025-07-01 RX ORDER — HYDRALAZINE HYDROCHLORIDE 50 MG/1
100 TABLET, FILM COATED ORAL EVERY 8 HOURS SCHEDULED
Status: DISCONTINUED | OUTPATIENT
Start: 2025-07-01 | End: 2025-07-04 | Stop reason: HOSPADM

## 2025-07-01 RX ORDER — SODIUM CHLORIDE 0.9 % (FLUSH) 0.9 %
10 SYRINGE (ML) INJECTION EVERY 12 HOURS SCHEDULED
OUTPATIENT
Start: 2025-07-01

## 2025-07-01 RX ORDER — BUMETANIDE 1 MG/1
1 TABLET ORAL DAILY
Status: DISCONTINUED | OUTPATIENT
Start: 2025-07-01 | End: 2025-07-02

## 2025-07-01 RX ORDER — SODIUM CHLORIDE 9 MG/ML
40 INJECTION, SOLUTION INTRAVENOUS AS NEEDED
OUTPATIENT
Start: 2025-07-01

## 2025-07-01 RX ORDER — HYDROCODONE BITARTRATE AND ACETAMINOPHEN 5; 325 MG/1; MG/1
1 TABLET ORAL ONCE AS NEEDED
Refills: 0 | Status: COMPLETED | OUTPATIENT
Start: 2025-07-01 | End: 2025-07-01

## 2025-07-01 RX ORDER — PANTOPRAZOLE SODIUM 40 MG/1
40 TABLET, DELAYED RELEASE ORAL
Status: DISCONTINUED | OUTPATIENT
Start: 2025-07-01 | End: 2025-07-04 | Stop reason: HOSPADM

## 2025-07-01 RX ORDER — SODIUM CHLORIDE 0.9 % (FLUSH) 0.9 %
10 SYRINGE (ML) INJECTION AS NEEDED
OUTPATIENT
Start: 2025-07-01

## 2025-07-01 RX ADMIN — LEVETIRACETAM 500 MG: 500 TABLET, FILM COATED ORAL at 21:03

## 2025-07-01 RX ADMIN — CARVEDILOL 25 MG: 25 TABLET, FILM COATED ORAL at 08:09

## 2025-07-01 RX ADMIN — BUMETANIDE 1 MG: 0.25 INJECTION INTRAMUSCULAR; INTRAVENOUS at 21:03

## 2025-07-01 RX ADMIN — Medication 10 ML: at 21:03

## 2025-07-01 RX ADMIN — HYDRALAZINE HYDROCHLORIDE 100 MG: 50 TABLET ORAL at 21:03

## 2025-07-01 RX ADMIN — PANTOPRAZOLE SODIUM 40 MG: 40 TABLET, DELAYED RELEASE ORAL at 17:23

## 2025-07-01 RX ADMIN — LOSARTAN POTASSIUM 25 MG: 25 TABLET, FILM COATED ORAL at 08:09

## 2025-07-01 RX ADMIN — PANTOPRAZOLE SODIUM 40 MG: 40 INJECTION, POWDER, FOR SOLUTION INTRAVENOUS at 08:10

## 2025-07-01 RX ADMIN — AMLODIPINE BESYLATE 5 MG: 5 TABLET ORAL at 08:08

## 2025-07-01 RX ADMIN — HYDROCODONE BITARTRATE AND ACETAMINOPHEN 1 TABLET: 5; 325 TABLET ORAL at 22:33

## 2025-07-01 RX ADMIN — ASPIRIN 81 MG: 81 TABLET, COATED ORAL at 08:09

## 2025-07-01 RX ADMIN — ISOSORBIDE DINITRATE 20 MG: 10 TABLET ORAL at 08:09

## 2025-07-01 RX ADMIN — HYDRALAZINE HYDROCHLORIDE 50 MG: 50 TABLET ORAL at 07:25

## 2025-07-01 RX ADMIN — ISOSORBIDE DINITRATE 20 MG: 10 TABLET ORAL at 11:55

## 2025-07-01 RX ADMIN — ISOSORBIDE DINITRATE 20 MG: 10 TABLET ORAL at 17:22

## 2025-07-01 RX ADMIN — Medication 10 ML: at 08:11

## 2025-07-01 RX ADMIN — LEVETIRACETAM 500 MG: 500 TABLET, FILM COATED ORAL at 08:09

## 2025-07-01 RX ADMIN — CALCITRIOL CAPSULES 0.25 MCG 0.5 MCG: 0.25 CAPSULE ORAL at 08:09

## 2025-07-01 RX ADMIN — HYDRALAZINE HYDROCHLORIDE 50 MG: 50 TABLET ORAL at 14:06

## 2025-07-01 RX ADMIN — CARVEDILOL 25 MG: 25 TABLET, FILM COATED ORAL at 17:23

## 2025-07-01 RX ADMIN — BUMETANIDE 1 MG: 1 TABLET ORAL at 11:55

## 2025-07-01 NOTE — PLAN OF CARE
"Goal Outcome Evaluation:  Plan of Care Reviewed With: patient        Progress: no change  Outcome Evaluation: VSS. S 87-95 pvc on tele. Pt continues to c/o fluid retention and his dissatisfaction regarding \"no doctor giving anything to get the fluid off.\" Pt also stated he would like to \"be transferred to different hospital if nothing gets done today.\" Pt continues to frequently seek out staff for food. Call light within reach. Safety maintained.                             "

## 2025-07-01 NOTE — PROGRESS NOTES
[facility] Clinical Pharmacy Services: IV to PO Interchange    Relevant clinical data and objective history reviewed:  Diet Order   Procedures    Diet: Cardiac; Healthy Heart (2-3 Na+); Fluid Consistency: Thin (IDDSI 0)       I/O last 3 completed shifts:  In: 1900 [P.O.:1900]  Out: 2600 [Urine:2600]    The patient meets the following criteria to be switched from IV administration to PO including:  Functioning GI tract  Hemodynamically stable  Showing signs of clinical improvement  Tolerating other oral medications or enteral diet    Assessment/Plan:  Protonix 40 mg has been changed from IV to PO formulation based on our [facility] [employer] approved Adult IV to PO Switching policy    Siri YoonD  7/1/2025  14:14 CDT

## 2025-07-01 NOTE — PROGRESS NOTES
[facility] Medicine Services  INPATIENT PROGRESS NOTE    Patient Name: José Miguel Webb  Date of Admission: 6/25/2025  Today's Date: 07/01/25  Length of Stay: 6  Primary Care Physician: He Ortega MD    Subjective   Chief Complaint: Transferred for NSTEMI  History of Present Illness  This is a 30-year-old male patient with a medical history of CHF, cardiomegaly, hypertension, CKD , seizures, history of drug use, recent history of peptic ulcer with GI bleed, presenting to the ED for the evaluation of chest pain.  As reported, patient was initially evaluated at [facility] for chest pain and he was given loading dose of aspirin in addition to nitro dose.  Patient was complaining of left-sided chest pain radiating to his left upper extremity, that started around 3 PM.  He denies having shortness of breath or abdominal pain, fever or chills, nausea vomiting or diarrhea.  To note that he was admitted few days ago to our hospital with GI bleed and was found to have peptic ulcer, received an endoscopy, was on PPI twice daily, and received blood transfusions.  He was also evaluated during the hospitalization by the nephrology team, and he was planned for dialysis and permacath placement, however he decided to leave AMA.  Today he mentions that he does not want to leave AMA at this time and he is ready to receive the care.  6/30  Patient still complained of worsening lower extremity edema.  7/1  Before history of cardiomegaly cardiomyopathy EF 21 to 25% nonischemic hypertension chronic kidney disease drug abuse peptic ulcer disease seizures presented with chest pain non-STEMI was transferred to us per cardiology felt elevated troponin secondary to CHF chronic kidney disease appreciate nephrology remains on IV Bumex continue monitoring continue medications appreciate nephrology    Review of Systems   All pertinent negatives and positives are as above. All other  systems have been reviewed and are negative unless otherwise stated.     Objective    Temp:  [97.6 °F (36.4 °C)-97.9 °F (36.6 °C)] 97.9 °F (36.6 °C)  Heart Rate:  [85-87] 86  Resp:  [18-20] 18  BP: (111-144)/(61-96) 143/96  Physical Exam      Constitutional:       Appearance: He is ill-appearing.   Cardiovascular:      Rate and Rhythm: Normal rate and regular rhythm.      Pulses: Normal pulses.      Heart sounds: Normal heart sounds. No murmur heard.  Pulmonary:      Effort: Pulmonary effort is normal. No respiratory distress.      Breath sounds: Normal breath sounds. No wheezing or rales.   Abdominal:      General: Bowel sounds are normal. There is no distension.      Palpations: Abdomen is soft.      Tenderness: There is no abdominal tenderness. There is no guarding.   Musculoskeletal:      Right lower leg: Edema present.      Left lower leg: Edema present.   Skin:     General: Skin is warm.   Neurological:      Mental Status: He is alert and oriented to person, place, and time. Mental status is at baseline.       Results Review:  I have reviewed the labs, radiology results, and diagnostic studies.    Laboratory Data:   Results from last 7 days   Lab Units 07/01/25  0430 06/30/25  0345 06/29/25  0359   WBC 10*3/mm3 5.87 5.88 5.97   HEMOGLOBIN g/dL 7.8* 8.0* 6.9*   HEMATOCRIT % 24.8* 24.9* 21.8*   PLATELETS 10*3/mm3 163 163 147        Results from last 7 days   Lab Units 07/01/25  0430 06/30/25  0345 06/29/25  0359 06/27/25  0337 06/26/25  0313 06/25/25  1756   SODIUM mmol/L 138 138 137   < > 132* 138   POTASSIUM mmol/L 3.7 3.6 3.9   < > 4.0 4.1   CHLORIDE mmol/L 105 104 104   < > 101 102   CO2 mmol/L 22.0 21.0* 20.0*   < > 20.0* 23.0   BUN mg/dL 42.6* 44.5* 51.2*   < > 63.5* 64.8*   CREATININE mg/dL 3.90* 3.73* 4.03*   < > 3.96* 3.99*   CALCIUM mg/dL 8.4* 8.1* 8.2*   < > 8.2* 8.5*   BILIRUBIN mg/dL 0.4  --   --   --  0.8 0.7   ALK PHOS U/L 71  --   --   --  66 71   ALT (SGPT) U/L 10  --   --   --  18 22   AST  "(SGOT) U/L 10  --   --   --  25 23   GLUCOSE mg/dL 91 96 98   < > 140* 112*    < > = values in this interval not displayed.       Culture Data:   No results found for: \"BLOODCX\", \"URINECX\", \"WOUNDCX\", \"MRSACX\", \"RESPCX\", \"STOOLCX\"    Radiology Data:   Imaging Results (Last 24 Hours)       ** No results found for the last 24 hours. **            I have reviewed the patient's current medications.     Assessment/Plan   Assessment  Active Hospital Problems    Diagnosis     **Non-STEMI (non-ST elevated myocardial infarction)     Hypertensive crisis     Congestive heart failure     Seizure disorder     Chronic systolic (congestive) heart failure        Treatment Plan    -NSTEMI  Cardiology was consulted and recommended hemodialysis as indicated, echocardiogram showed very low ejection fraction at 21%, patient may benefit from AICD.  Cardiologist switched Imdur to Isordil, added amlodipine and continued hydralazine and Coreg.  Anticoagulation was contraindicated because of recent GI bleed and patient was anemic.  Patient has a history of noncompliance, cocaine abuse and therefore beta-blocker will be avoided.    -ESRD with impending hemodialysis  Nephrology is on consult to help with management.  Nephrologist has ruled out initiation of hemodialysis at this point, patient's acute renal failure improved.    -Uncontrolled blood pressure  Patient was on Entresto at home, currently on losartan, Norvasc, Coreg and hydralazine as well as as needed IV hydralazine.    -Recent history of present GI bleed  Anticoagulation is avoided, patient's hemoglobin is also low.  We will continue patient on PPI    -Acute on chronic anemia secondary to end-stage renal disease/recent GI bleed  Patient is status post 2 units of PRBC this admission.    DVT prophylaxis-SCD    Disposition-follow cardiologist and nephrologist recommendations/discharge planning.      Electronically signed by Jenna Ospina MD, 07/01/25, 10:34 CDT.   "

## 2025-07-01 NOTE — PROGRESS NOTES
[facility] -  Progress Note     LOS: 6 days   Patient Care Team:  He Ortega MD as PCP - General (Internal Medicine)    Chief Complaint: leg swelling    Subjective     Interval History:     Patient Complaints: patient is lying in bed comfortably. He denies any chest pain. He denies any shortness of breath. He denies any heart racing, palpitations, orthopnea or PND. He reports that he still has leg swelling.     Review of Systems:     Review of Systems   Respiratory:  Negative for shortness of breath.    Cardiovascular:  Positive for leg swelling. Negative for chest pain and palpitations.   All other systems reviewed and are negative.    Objective     Vital Sign Min/Max for last 24 hours  Temp  Min: 97.6 °F (36.4 °C)  Max: 97.9 °F (36.6 °C)   BP  Min: 111/61  Max: 144/74   Pulse  Min: 85  Max: 87   Resp  Min: 18  Max: 20   SpO2  Min: 95 %  Max: 100 %   No data recorded   Weight  Min: 96.8 kg (213 lb 6.4 oz)  Max: 96.8 kg (213 lb 6.4 oz)         07/01/25  0346   Weight: 96.8 kg (213 lb 6.4 oz)       Intake/Output Summary (Last 24 hours) at 7/1/2025 1111  Last data filed at 7/1/2025 1018  Gross per 24 hour   Intake 960 ml   Output 2025 ml   Net -1065 ml     Telemetry: sinus rhythm rates 80-90's      Physical Exam:    Vitals reviewed.   Constitutional:       General: Not in acute distress.     Appearance: Normal appearance. Well-developed.   Eyes:      Pupils: Pupils are equal, round, and reactive to light.   HENT:      Head: Normocephalic and atraumatic.      Nose: Nose normal.   Neck:      Vascular: No carotid bruit.   Pulmonary:      Effort: Pulmonary effort is normal. No respiratory distress.      Breath sounds: Normal breath sounds. No wheezing. No rales.   Cardiovascular:      Normal rate. Regular rhythm.      Murmurs: There is no murmur.   Edema:     Peripheral edema present.     Ankle: bilateral 2+ edema of the ankle.     Feet: bilateral 2+ edema of the feet.  Abdominal:       General: There is no distension.      Palpations: Abdomen is soft.   Musculoskeletal: Normal range of motion.      Cervical back: Normal range of motion and neck supple. Skin:     General: Skin is warm.      Findings: No erythema or rash.   Neurological:      Mental Status: Alert and oriented to person, place, and time.   Psychiatric:         Speech: Speech normal.         Behavior: Behavior normal.         Thought Content: Thought content normal.         Judgment: Judgment normal.       Results Review:   Lab Results (last 72 hours)       Procedure Component Value Units Date/Time    Comprehensive Metabolic Panel [[id number]]  (Abnormal) Collected: 07/01/25 0430    Specimen: Blood Updated: 07/01/25 0540     Glucose 91 mg/dL      BUN 42.6 mg/dL      Creatinine 3.90 mg/dL      Sodium 138 mmol/L      Potassium 3.7 mmol/L      Chloride 105 mmol/L      CO2 22.0 mmol/L      Calcium 8.4 mg/dL      Total Protein 5.6 g/dL      Albumin 3.1 g/dL      ALT (SGPT) 10 U/L      AST (SGOT) 10 U/L      Alkaline Phosphatase 71 U/L      Total Bilirubin 0.4 mg/dL      Globulin 2.5 gm/dL      A/G Ratio 1.2 g/dL      BUN/Creatinine Ratio 10.9     Anion Gap 11.0 mmol/L      eGFR 20.5 mL/min/1.73     Narrative:      GFR Categories in Chronic Kidney Disease (CKD)              GFR Category          GFR (mL/min/1.73)    Interpretation  G1                    90 or greater        Normal or high (1)  G2                    60-89                Mild decrease (1)  G3a                   45-59                Mild to moderate decrease  G3b                   30-44                Moderate to severe decrease  G4                    15-29                Severe decrease  G5                    14 or less           Kidney failure    (1)In the absence of evidence of kidney disease, neither GFR category G1 or G2 fulfill the criteria for CKD.    eGFR calculation 2021 CKD-EPI creatinine equation, which does not include race as a factor    Magnesium [[id number]]   (Normal) Collected: 07/01/25 0430    Specimen: Blood Updated: 07/01/25 0540     Magnesium 2.2 mg/dL     CBC & Differential [[id number]]  (Abnormal) Collected: 07/01/25 0430    Specimen: Blood Updated: 07/01/25 0508    Narrative:      The following orders were created for panel order CBC & Differential.  Procedure                               Abnormality         Status                     ---------                               -----------         ------                     CBC Auto Differential[[id number]]        Abnormal            Final result                 Please view results for these tests on the individual orders.    CBC Auto Differential [[id number]]  (Abnormal) Collected: 07/01/25 0430    Specimen: Blood Updated: 07/01/25 0508     WBC 5.87 10*3/mm3      RBC 2.66 10*6/mm3      Hemoglobin 7.8 g/dL      Hematocrit 24.8 %      MCV 93.2 fL      MCH 29.3 pg      MCHC 31.5 g/dL      RDW 19.8 %      RDW-SD 61.1 fl      MPV 11.0 fL      Platelets 163 10*3/mm3      Neutrophil % 62.7 %      Lymphocyte % 23.5 %      Monocyte % 9.9 %      Eosinophil % 2.7 %      Basophil % 0.9 %      Immature Grans % 0.3 %      Neutrophils, Absolute 3.68 10*3/mm3      Lymphocytes, Absolute 1.38 10*3/mm3      Monocytes, Absolute 0.58 10*3/mm3      Eosinophils, Absolute 0.16 10*3/mm3      Basophils, Absolute 0.05 10*3/mm3      Immature Grans, Absolute 0.02 10*3/mm3      nRBC 0.0 /100 WBC     Hemoglobin A1c [[id number]]  (Normal) Collected: 06/30/25 0345    Specimen: Blood Updated: 06/30/25 2005     Hemoglobin A1C 5.40 %     Narrative:      Hemoglobin A1C Ranges:    Increased Risk for Diabetes  5.7% to 6.4%  Diabetes                     >= 6.5%  Diabetic Goal                < 7.0%    Basic Metabolic Panel [[id number]]  (Abnormal) Collected: 06/30/25 0345    Specimen: Blood Updated: 06/30/25 0502     Glucose 96 mg/dL      BUN 44.5 mg/dL      Creatinine 3.73 mg/dL      Sodium 138 mmol/L      Potassium 3.6 mmol/L      Chloride 104 mmol/L       CO2 21.0 mmol/L      Calcium 8.1 mg/dL      BUN/Creatinine Ratio 11.9     Anion Gap 13.0 mmol/L      eGFR 21.7 mL/min/1.73     Narrative:      GFR Categories in Chronic Kidney Disease (CKD)              GFR Category          GFR (mL/min/1.73)    Interpretation  G1                    90 or greater        Normal or high (1)  G2                    60-89                Mild decrease (1)  G3a                   45-59                Mild to moderate decrease  G3b                   30-44                Moderate to severe decrease  G4                    15-29                Severe decrease  G5                    14 or less           Kidney failure    (1)In the absence of evidence of kidney disease, neither GFR category G1 or G2 fulfill the criteria for CKD.    eGFR calculation 2021 CKD-EPI creatinine equation, which does not include race as a factor    Magnesium [[id number]]  (Normal) Collected: 06/30/25 0345    Specimen: Blood Updated: 06/30/25 0502     Magnesium 2.1 mg/dL     CBC & Differential [[id number]]  (Abnormal) Collected: 06/30/25 0345    Specimen: Blood Updated: 06/30/25 0445    Narrative:      The following orders were created for panel order CBC & Differential.  Procedure                               Abnormality         Status                     ---------                               -----------         ------                     CBC Auto Differential[[id number]]        Abnormal            Final result                 Please view results for these tests on the individual orders.    CBC Auto Differential [[id number]]  (Abnormal) Collected: 06/30/25 0345    Specimen: Blood Updated: 06/30/25 0445     WBC 5.88 10*3/mm3      RBC 2.66 10*6/mm3      Hemoglobin 8.0 g/dL      Hematocrit 24.9 %      MCV 93.6 fL      MCH 30.1 pg      MCHC 32.1 g/dL      RDW 18.6 %      RDW-SD 60.4 fl      MPV 12.2 fL      Platelets 163 10*3/mm3      Neutrophil % 62.8 %      Lymphocyte % 24.5 %      Monocyte % 9.5 %      Eosinophil % 2.2 %       Basophil % 0.5 %      Immature Grans % 0.5 %      Neutrophils, Absolute 3.69 10*3/mm3      Lymphocytes, Absolute 1.44 10*3/mm3      Monocytes, Absolute 0.56 10*3/mm3      Eosinophils, Absolute 0.13 10*3/mm3      Basophils, Absolute 0.03 10*3/mm3      Immature Grans, Absolute 0.03 10*3/mm3      nRBC 0.0 /100 WBC     Basic Metabolic Panel [[id number]]  (Abnormal) Collected: 06/29/25 0359    Specimen: Blood Updated: 06/29/25 0800     Glucose 98 mg/dL      BUN 51.2 mg/dL      Creatinine 4.03 mg/dL      Sodium 137 mmol/L      Potassium 3.9 mmol/L      Chloride 104 mmol/L      CO2 20.0 mmol/L      Calcium 8.2 mg/dL      BUN/Creatinine Ratio 12.7     Anion Gap 13.0 mmol/L      eGFR 19.7 mL/min/1.73     Narrative:      GFR Categories in Chronic Kidney Disease (CKD)              GFR Category          GFR (mL/min/1.73)    Interpretation  G1                    90 or greater        Normal or high (1)  G2                    60-89                Mild decrease (1)  G3a                   45-59                Mild to moderate decrease  G3b                   30-44                Moderate to severe decrease  G4                    15-29                Severe decrease  G5                    14 or less           Kidney failure    (1)In the absence of evidence of kidney disease, neither GFR category G1 or G2 fulfill the criteria for CKD.    eGFR calculation 2021 CKD-EPI creatinine equation, which does not include race as a factor    Magnesium [[id number]]  (Normal) Collected: 06/29/25 0359    Specimen: Blood Updated: 06/29/25 0509     Magnesium 2.3 mg/dL     CBC & Differential [[id number]]  (Abnormal) Collected: 06/29/25 0359    Specimen: Blood Updated: 06/29/25 0449    Narrative:      The following orders were created for panel order CBC & Differential.  Procedure                               Abnormality         Status                     ---------                               -----------         ------                     CBC Auto  "Differential[[id number]]        Abnormal            Final result                 Please view results for these tests on the individual orders.    CBC Auto Differential [[id number]]  (Abnormal) Collected: 06/29/25 0359    Specimen: Blood Updated: 06/29/25 0449     WBC 5.97 10*3/mm3      RBC 2.34 10*6/mm3      Hemoglobin 6.9 g/dL      Hematocrit 21.8 %      MCV 93.2 fL      MCH 29.5 pg      MCHC 31.7 g/dL      RDW 18.6 %      RDW-SD 60.2 fl      MPV 11.4 fL      Platelets 147 10*3/mm3      Neutrophil % 65.7 %      Lymphocyte % 21.9 %      Monocyte % 9.2 %      Eosinophil % 2.2 %      Basophil % 0.7 %      Immature Grans % 0.3 %      Neutrophils, Absolute 3.92 10*3/mm3      Lymphocytes, Absolute 1.31 10*3/mm3      Monocytes, Absolute 0.55 10*3/mm3      Eosinophils, Absolute 0.13 10*3/mm3      Basophils, Absolute 0.04 10*3/mm3      Immature Grans, Absolute 0.02 10*3/mm3      nRBC 0.0 /100 WBC                 Echo EF Estimated  No results found for: \"ECHOEFEST\"      Cath Ejection Fraction Quantitative  No results found for: \"CATHEF\"        Medication Review: yes  Current Facility-Administered Medications   Medication Dose Route Frequency Provider Last Rate Last Admin    acetaminophen (TYLENOL) tablet 1,000 mg  1,000 mg Oral Q6H PRN Lesa Gonzalez MD   1,000 mg at 06/29/25 2317    amLODIPine (NORVASC) tablet 5 mg  5 mg Oral Q24H Marta Clemens APRN   5 mg at 07/01/25 0808    aspirin EC tablet 81 mg  81 mg Oral Daily Lesa Gonzalez MD   81 mg at 07/01/25 0809    bumetanide (BUMEX) tablet 1 mg  1 mg Oral Daily Travon Teixeira APRN        calcitriol (ROCALTROL) capsule 0.5 mcg  0.5 mcg Oral Daily Robinson Preston MD   0.5 mcg at 07/01/25 0809    carvedilol (COREG) tablet 25 mg  25 mg Oral BID With Meals Marta Clemens APRN   25 mg at 07/01/25 0809    epoetin yocasta-epbx (RETACRIT) injection 10,000 Units  10,000 Units Subcutaneous Once per day on Monday Wednesday Friday Robinson Preston MD   10,000 Units at 06/30/25 1553 "    hydrALAZINE (APRESOLINE) injection 10 mg  10 mg Intravenous Q6H PRN Lesa Gonzalez MD   10 mg at 06/26/25 1103    hydrALAZINE (APRESOLINE) tablet 50 mg  50 mg Oral Q8H Marta Clemens APRN   50 mg at 07/01/25 0725    isosorbide dinitrate (ISORDIL) tablet 20 mg  20 mg Oral TID - Nitrates Mani Mena MD   20 mg at 07/01/25 0809    levETIRAcetam (KEPPRA) tablet 500 mg  500 mg Oral BID Lesa Gonzalez MD   500 mg at 07/01/25 0809    losartan (COZAAR) tablet 25 mg  25 mg Oral Q24H Marta Clemens APRN   25 mg at 07/01/25 0809    nitroglycerin (NITROSTAT) SL tablet 0.4 mg  0.4 mg Sublingual Q5 Min PRN Lesa Gonzalez MD        ondansetron (ZOFRAN) injection 4 mg  4 mg Intravenous Q6H PRN Benjamin Dominguez MD   4 mg at 06/26/25 0840    pantoprazole (PROTONIX) injection 40 mg  40 mg Intravenous Q12H Lesa Gonzalez MD   40 mg at 07/01/25 0810    sodium chloride 0.9 % flush 10 mL  10 mL Intravenous Q12H Lesa Gonzalez MD   10 mL at 07/01/25 0811    sodium chloride 0.9 % flush 10 mL  10 mL Intravenous PRN Lesa Gonzalez MD        sodium chloride 0.9 % infusion 40 mL  40 mL Intravenous PRN Lesa Gonzalez MD             Assessment & Plan     -Congestive heart failure: LVEF 21-25% on echo 6/25/2025. Continue carvedilol, hydralalzine, isordil, bumex, and losartan. Cardiac PET for ischemic evaluation to be done outpatient. Discussed LifeVest with patient and he declines at this time    -Acute on chronic kidney disease stage IV: Cr today 3.9. Baseline 4.0. Nephrology following     -anemia: hgb 7.8 today     -hypertension      -seizure disorder     Plan:   - transition to oral bumex today   - continue carvedilol, hydralalzine, isordil, and losartan  - continue aspirin and amlodipine  - continue to monitor kidney function and electrolytes closely   - continue to monitor I/O and daily weights  - continue to monitor H & H and transfuse as needed  - optimize GDMT as tolerated  - Cardiac PET ordered for ischemic  evaluation to be done outpatient    Electronically signed by SUKHDEV Kelly, 07/01/25, 11:05 AM CDT.

## 2025-07-01 NOTE — PLAN OF CARE
Goal Outcome Evaluation: Pt is alert and oriented, vitals are stable, room air, indep in room, no s/s of distress, IV is clean dry intact           Progress: no change

## 2025-07-01 NOTE — PROGRESS NOTES
Nephrology ([facility]) Progress Note      Patient:  José Miguel Webb  YOB: 1995  Date of Service: 7/1/2025  MRN: [id number]   Acct: [id number]   Primary Care Physician: He Ortega MD  Advance Directive:   Code Status and Medical Interventions: CPR (Attempt to Resuscitate); Full Support   Ordered at: 06/25/25 2000     Code Status (Patient has no pulse and is not breathing):    CPR (Attempt to Resuscitate)     Medical Interventions (Patient has pulse or is breathing):    Full Support     Level Of Support Discussed With:    Patient     Admit Date: 6/25/2025       Hospital Day: 6  Referring Provider: Nicola Redd MD      Patient personally seen and examined.  Complete chart including Consults, Notes, Operative Reports, Labs, Cardiology, and Radiology studies reviewed as able.    Chief complaint: Abnormal labs.    Subjective:  José Miguel Webb is a 30 y.o. male for whom we were consulted for evaluation and treatment of chronic kidney disease. Baseline chronic kidney disease stage 4, baseline creatinine approximately 4.0. Has followed with our providers on a sporadic basis over the years, typically no-shows for his office visits. History of hypertension, CHF, seizure disorder. Patient was recently treated at [facility] for GI bleed and MINDY. He was scheduled to have permcath placed and to start hemodialysis but left AMA on 6/24.  Presented to [facility] on 6/25 with chest pain and was transferred to [facility] for cardiology and nephrology evaluation. Creatinine 3.99 when admitted this time.  No complaint of pain or dyspnea at time of initial nephrology exam. Denied n/v/d. Denied edema. No recent changes in urination. He explained that he is willing to proceed with dialysis if it is needed    This afternoon patient is feeling well.  He denies any shortness of breath.  His renal function is remaining stable with a borderline  GFR      Allergies:  Amoxicillin and Penicillins    Home Meds:  Medications Prior to Admission   Medication Sig Dispense Refill Last Dose/Taking    [Paused] sacubitril-valsartan (Entresto) 49-51 MG tablet Take 1 tablet by mouth 2 (Two) Times a Day. 180 tablet 3        Medicines:  Current Facility-Administered Medications   Medication Dose Route Frequency Provider Last Rate Last Admin    acetaminophen (TYLENOL) tablet 1,000 mg  1,000 mg Oral Q6H PRN Lesa Gonzalez MD   1,000 mg at 06/29/25 2317    amLODIPine (NORVASC) tablet 5 mg  5 mg Oral Q24H Marta Clemens APRN   5 mg at 07/01/25 0808    aspirin EC tablet 81 mg  81 mg Oral Daily Lesa Gonzalez MD   81 mg at 07/01/25 0809    bumetanide (BUMEX) tablet 1 mg  1 mg Oral Daily Travon Teixeira APRN   1 mg at 07/01/25 1155    calcitriol (ROCALTROL) capsule 0.5 mcg  0.5 mcg Oral Daily Robinson Preston MD   0.5 mcg at 07/01/25 0809    carvedilol (COREG) tablet 25 mg  25 mg Oral BID With Meals Marta Clemens APRN   25 mg at 07/01/25 0809    epoetin yocasta-epbx (RETACRIT) injection 10,000 Units  10,000 Units Subcutaneous Once per day on Monday Wednesday Friday Robinson Preston MD   10,000 Units at 06/30/25 1553    hydrALAZINE (APRESOLINE) injection 10 mg  10 mg Intravenous Q6H PRN Lesa Gonzalez MD   10 mg at 06/26/25 1103    hydrALAZINE (APRESOLINE) tablet 50 mg  50 mg Oral Q8H Marta Clemens APRN   50 mg at 07/01/25 0725    isosorbide dinitrate (ISORDIL) tablet 20 mg  20 mg Oral TID - Nitrates Mani Mena MD   20 mg at 07/01/25 1155    levETIRAcetam (KEPPRA) tablet 500 mg  500 mg Oral BID Lesa Gonzalez MD   500 mg at 07/01/25 0809    losartan (COZAAR) tablet 25 mg  25 mg Oral Q24H Marta Clemens APRN   25 mg at 07/01/25 0809    nitroglycerin (NITROSTAT) SL tablet 0.4 mg  0.4 mg Sublingual Q5 Min PRN Lesa Gonzalez MD        ondansetron (ZOFRAN) injection 4 mg  4 mg Intravenous Q6H PRN Benjamin Dominguez MD   4 mg at 06/26/25 0840    pantoprazole  (PROTONIX) injection 40 mg  40 mg Intravenous Q12H Lesa Gonzalez MD   40 mg at 07/01/25 0810    sodium chloride 0.9 % flush 10 mL  10 mL Intravenous Q12H Lesa Gonzalez MD   10 mL at 07/01/25 0811    sodium chloride 0.9 % flush 10 mL  10 mL Intravenous PRN Lesa Gonzalez MD        sodium chloride 0.9 % infusion 40 mL  40 mL Intravenous PRN Lesa Gonzalez MD           Past Medical History:  Past Medical History:   Diagnosis Date    Cardiomegaly     CHF (congestive heart failure)     Congenital fusion of carpal bone     HTN (hypertension)     Non-STEMI (non-ST elevated myocardial infarction) 6/25/2025    Seizure        Past Surgical History:  Past Surgical History:   Procedure Laterality Date    ELBOW PROCEDURE      ENDOSCOPY N/A 6/22/2025    Procedure: ESOPHAGOGASTRODUODENOSCOPY;  Surgeon: Kevin Miranda MD;  Location: [facility];  Service: Gastroenterology;  Laterality: N/A;  preop; GI bleed   postop; gastric ulcer ; esophagitis       Family History  Family History   Problem Relation Age of Onset    Hypertension Mother     Heart disease Mother     No Known Problems Father        Social History  Social History     Socioeconomic History    Marital status: Single   Tobacco Use    Smoking status: Some Days     Current packs/day: 0.50     Types: Cigarettes    Smokeless tobacco: Never   Vaping Use    Vaping status: Never Used   Substance and Sexual Activity    Alcohol use: Yes    Drug use: Yes     Types: Marijuana, Cocaine(coke)    Sexual activity: Defer       Review of Systems:  History obtained from chart review and the patient  General ROS: No fever or chills  Respiratory ROS: No cough, shortness of breath, wheezing  Cardiovascular ROS: No chest pain or palpitations  Gastrointestinal ROS: No abdominal pain or melena  Genito-Urinary ROS: No dysuria or hematuria  Psych ROS: No anxiety and depression  14 point ROS reviewed with the patient and negative except as noted above and in the HPI unless unable to  obtain.    Objective:  Patient Vitals for the past 24 hrs:   BP Temp Temp src Pulse Resp SpO2 Weight   07/01/25 1125 120/63 97.9 °F (36.6 °C) Oral 85 18 98 % --   07/01/25 0731 143/96 -- -- 86 18 100 % --   07/01/25 0346 144/74 97.9 °F (36.6 °C) Oral 87 18 99 % 96.8 kg (213 lb 6.4 oz)   06/30/25 1900 136/74 97.8 °F (36.6 °C) Oral 85 18 99 % --   06/30/25 1652 131/68 97.6 °F (36.4 °C) Oral 85 18 95 % --       Intake/Output Summary (Last 24 hours) at 7/1/2025 1350  Last data filed at 7/1/2025 1318  Gross per 24 hour   Intake 940 ml   Output 1825 ml   Net -885 ml     General: awake/alert   HEENT: Normocephalic atraumatic head  Neck: Supple with no JVD or carotid bruits.  Chest:  clear to auscultation bilaterally without respiratory distress  CVS: regular rate and rhythm  Abdominal: soft, nontender, positive bowel sounds  Extremities: 2+ pedal edema  Skin: warm and dry without rash      Labs:  Results from last 7 days   Lab Units 07/01/25  0430 06/30/25  0345 06/29/25  0359   WBC 10*3/mm3 5.87 5.88 5.97   HEMOGLOBIN g/dL 7.8* 8.0* 6.9*   HEMATOCRIT % 24.8* 24.9* 21.8*   PLATELETS 10*3/mm3 163 163 147         Results from last 7 days   Lab Units 07/01/25  0430 06/30/25  0345 06/29/25  0359 06/27/25  0337 06/26/25  0313 06/25/25  1756   SODIUM mmol/L 138 138 137   < > 132* 138   POTASSIUM mmol/L 3.7 3.6 3.9   < > 4.0 4.1   CHLORIDE mmol/L 105 104 104   < > 101 102   CO2 mmol/L 22.0 21.0* 20.0*   < > 20.0* 23.0   BUN mg/dL 42.6* 44.5* 51.2*   < > 63.5* 64.8*   CREATININE mg/dL 3.90* 3.73* 4.03*   < > 3.96* 3.99*   CALCIUM mg/dL 8.4* 8.1* 8.2*   < > 8.2* 8.5*   EGFR mL/min/1.73 20.5* 21.7* 19.7*   < > 20.2* 20.0*   BILIRUBIN mg/dL 0.4  --   --   --  0.8 0.7   ALK PHOS U/L 71  --   --   --  66 71   ALT (SGPT) U/L 10  --   --   --  18 22   AST (SGOT) U/L 10  --   --   --  25 23   GLUCOSE mg/dL 91 96 98   < > 140* 112*    < > = values in this interval not displayed.       Radiology:   Imaging Results (Last 72 Hours)       **  "No results found for the last 72 hours. **            Culture:  No results found for: \"BLOODCX\", \"URINECX\", \"WOUNDCX\", \"MRSACX\", \"RESPCX\", \"STOOLCX\"      Assessment   1.  Stage IV chronic kidney disease.  2.  Type 2 diabetes with nephropathy.  3.  Recent history of acute kidney injury resolving.  4.  Congestive heart failure exacerbation.  5.  Benign essential hypertension.  6.  Metabolic acidemia.  7.  Non-STEMI.  8.  Noncompliance with medical management  9.  Secondary hyperparathyroidism.    Plan:  1.  Continue current dose of loop diuretics.  2.  Continue KIANA for treatment of CKD.  3.  Continue p.o. calcitriol.  4.  Monitor renal function closely, if discharged home need follow-up at [facility].  5.  Plan was discussed with the patient.      Robinson Preston MD  7/1/2025  13:50 CDT    "

## 2025-07-01 NOTE — PAYOR COMM NOTE
"7/1/25 [facility] [phone]  [phone]    FAXING UPDATE CLINICAL FOR CONT STAY.    7/1/25.              José Miguel Wahl (30 y.o. Male)       Date of Birth   1995    Social Security Number       Address   [address] [place] [address]    Home Phone   [phone]    MRN   [id number]       Scientology   Worship    Marital Status   Single                            Admission Date   6/25/2025    Admission Type   Urgent    Admitting Provider   Jenna Ospina MD    Attending Provider   Jenna Ospina MD    Department, Room/Bed   [address], 448/1       Discharge Date       Discharge Disposition       Discharge Destination                                 Attending Provider: Jenna Ospina MD    Allergies: Amoxicillin, Penicillins    Isolation: None   Infection: None   Code Status: CPR    Ht: 188 cm (74\")   Wt: 96.8 kg (213 lb 6.4 oz)    Admission Cmt: None   Principal Problem: Non-STEMI (non-ST elevated myocardial infarction) [I21.4]                   Active Insurance as of 6/25/2025       Primary Coverage       Payor Plan Insurance Group Employer/Plan Group    [place] [id number]       Payor Plan Address Payor Plan Phone Number Payor Plan Fax Number Effective Dates    [employer]ICAL [address] [address] [phone]  1/1/2025 - None Entered    [facility] [id number]         Subscriber Name Subscriber Birth Date Member ID       JOSÉ MIGUEL WAHL 1995 [id number]                     Emergency Contacts        (Rel.) Home Phone Work Phone Mobile Phone    Jesika Wahl (Mother) [phone] -- [phone]             [facility] Encounter Date/Time: 6/25/2025 [facility]5   Hospital Account: [id number]    MRN: [id number]   Patient:  José Miguel Wahl   Contact Serial #: [id number]   SSN:          ENCOUNTER             Patient Class: Inpatient   Unit: [address] Service: Medicine     Bed: 448/1   Admitting " Provider: Jenna Ospina MD   Referring Physician: Nicola Redd   Attending Provider: Jenna Ospina MD   Adm Diagnosis: Non-STEMI (non-ST elevat*               PATIENT             Name: José Miguel Webb : 1995 (30 yrs)   Address: [address] [place] [address] [place] Sex: Male   City: [place] [address]   County: [facility]   Marital Status: SINGLE Ethnicity: NOT        Race: BLACK   Primary Care Provider: He Ortega MD Patients Phone: Home Phone: [phone]     Mobile Phone: [phone]     EMERGENCY CONTACT   Contact Name Legal Guardian? Relationship to Patient Home Phone Work Phone Mobile Phone   1. Jesika Webb  2. *No Contact Specified*      Mother    [phone]      GUARANTOR             Guarantor: José Miguel Webb     : 1995   Address: [address] [place] [address] Sex: [place] [address]     Relation to Patient: Self       Home Phone: [phone]   Guarantor ID: [id number]       Work Phone:     GUARANTOR EMPLOYER   Employer:           Status: NOT EMPLO*   COVERAGE          PRIMARY INSURANCE   [place] Plan[place]   Group Number: [id number] Insurance Type: INDEMNITY   Subscriber Name: JOSÉ MIGUEL WEBB Subscriber : 1995   Subscriber ID: [id number] Coverage Address: [place] [address]   Pat. Rel. to Subscriber: Self Coverage Phone: [phone]   SECONDARY INSURANCE   Payor: N/A Plan: N/A   Group Number:   Insurance Type:     Subscriber Name:   Subscriber :     Subscriber ID:   Coverage Address:     Pat. Rel. to Subscriber:   Coverage Phone:        Contact Serial # ([id number])         2025    Chart ID ([id number])           Jenna Ospina MD   Physician  Hospitalist     Progress Notes      Incomplete     Date of Service: 25 1034  Creation Time: 25 1034     Incomplete              [facility] Medicine Services  INPATIENT PROGRESS NOTE     Patient  Name: José Miguel Webb  Date of Admission: 6/25/2025  Today's Date: 07/01/25  Length of Stay: 6  Primary Care Physician: He Ortega MD     Subjective   Chief Complaint: Transferred for NSTEMI  History of Present Illness  This is a 30-year-old male patient with a medical history of CHF, cardiomegaly, hypertension, CKD , seizures, history of drug use, recent history of peptic ulcer with GI bleed, presenting to the ED for the evaluation of chest pain.  As reported, patient was initially evaluated at [facility] for chest pain and he was given loading dose of aspirin in addition to nitro dose.  Patient was complaining of left-sided chest pain radiating to his left upper extremity, that started around 3 PM.  He denies having shortness of breath or abdominal pain, fever or chills, nausea vomiting or diarrhea.  To note that he was admitted few days ago to our hospital with GI bleed and was found to have peptic ulcer, received an endoscopy, was on PPI twice daily, and received blood transfusions.  He was also evaluated during the hospitalization by the nephrology team, and he was planned for dialysis and permacath placement, however he decided to leave AMA.  Today he mentions that he does not want to leave AMA at this time and he is ready to receive the care.  6/30  Patient still complained of worsening lower extremity edema.  7/1  Before history of cardiomegaly cardiomyopathy EF 21 to 25% nonischemic hypertension chronic kidney disease drug abuse peptic ulcer disease seizures presented with chest pain non-STEMI was transferred to us per cardiology felt elevated troponin secondary to CHF chronic kidney disease appreciate nephrology remains on IV Bumex continue monitoring continue medications appreciate nephrology     Review of Systems   All pertinent negatives and positives are as above. All other systems have been reviewed and are negative unless otherwise stated.      Objective    Temp:  [97.6 °F  (36.4 °C)-97.9 °F (36.6 °C)] 97.9 °F (36.6 °C)  Heart Rate:  [85-87] 86  Resp:  [18-20] 18  BP: (111-144)/(61-96) 143/96  Physical Exam        Constitutional:       Appearance: He is ill-appearing.   Cardiovascular:      Rate and Rhythm: Normal rate and regular rhythm.      Pulses: Normal pulses.      Heart sounds: Normal heart sounds. No murmur heard.  Pulmonary:      Effort: Pulmonary effort is normal. No respiratory distress.      Breath sounds: Normal breath sounds. No wheezing or rales.   Abdominal:      General: Bowel sounds are normal. There is no distension.      Palpations: Abdomen is soft.      Tenderness: There is no abdominal tenderness. There is no guarding.   Musculoskeletal:      Right lower leg: Edema present.      Left lower leg: Edema present.   Skin:     General: Skin is warm.   Neurological:      Mental Status: He is alert and oriented to person, place, and time. Mental status is at baseline.         Results Review:  I have reviewed the labs, radiology results, and diagnostic studies.     Laboratory Data:          Results from last 7 days   Lab Units 07/01/25  0430 06/30/25  0345 06/29/25  0359   WBC 10*3/mm3 5.87 5.88 5.97   HEMOGLOBIN g/dL 7.8* 8.0* 6.9*   HEMATOCRIT % 24.8* 24.9* 21.8*   PLATELETS 10*3/mm3 163 163 147                   Results from last 7 days   Lab Units 07/01/25  0430 06/30/25  0345 06/29/25  0359 06/27/25  0337 06/26/25  0313 06/25/25  1756   SODIUM mmol/L 138 138 137   < > 132* 138   POTASSIUM mmol/L 3.7 3.6 3.9   < > 4.0 4.1   CHLORIDE mmol/L 105 104 104   < > 101 102   CO2 mmol/L 22.0 21.0* 20.0*   < > 20.0* 23.0   BUN mg/dL 42.6* 44.5* 51.2*   < > 63.5* 64.8*   CREATININE mg/dL 3.90* 3.73* 4.03*   < > 3.96* 3.99*   CALCIUM mg/dL 8.4* 8.1* 8.2*   < > 8.2* 8.5*   BILIRUBIN mg/dL 0.4  --   --   --  0.8 0.7   ALK PHOS U/L 71  --   --   --  66 71   ALT (SGPT) U/L 10  --   --   --  18 22   AST (SGOT) U/L 10  --   --   --  25 23   GLUCOSE mg/dL 91 96 98   < > 140* 112*    < >  "= values in this interval not displayed.         Culture Data:   No results found for: \"BLOODCX\", \"URINECX\", \"WOUNDCX\", \"MRSACX\", \"RESPCX\", \"STOOLCX\"     Radiology Data:   Imaging Results (Last 24 Hours)         ** No results found for the last 24 hours. **                I have reviewed the patient's current medications.      Assessment/Plan   Assessment       Active Hospital Problems     Diagnosis      **Non-STEMI (non-ST elevated myocardial infarction)      Hypertensive crisis      Congestive heart failure      Seizure disorder      Chronic systolic (congestive) heart failure           Treatment Plan     -NSTEMI  Cardiology was consulted and recommended hemodialysis as indicated, echocardiogram showed very low ejection fraction at 21%, patient may benefit from AICD.  Cardiologist switched Imdur to Isordil, added amlodipine and continued hydralazine and Coreg.  Anticoagulation was contraindicated because of recent GI bleed and patient was anemic.  Patient has a history of noncompliance, cocaine abuse and therefore beta-blocker will be avoided.     -ESRD with impending hemodialysis  Nephrology is on consult to help with management.  Nephrologist has ruled out initiation of hemodialysis at this point, patient's acute renal failure improved.     -Uncontrolled blood pressure  Patient was on Entresto at home, currently on losartan, Norvasc, Coreg and hydralazine as well as as needed IV hydralazine.     -Recent history of present GI bleed  Anticoagulation is avoided, patient's hemoglobin is also low.  We will continue patient on PPI     -Acute on chronic anemia secondary to end-stage renal disease/recent GI bleed  Patient is status post 2 units of PRBC this admission.     DVT prophylaxis-SCD     Disposition-follow cardiologist and nephrologist recommendations/discharge planning.        Electronically signed by Jenna Ospina MD, 07/01/25, 10:34 CDT.                      Travon Teixeira APRN   Nurse " Practitioner  Cardiology     Progress Notes      Incomplete     Date of Service: 07/01/25 1105  Creation Time: 07/01/25 1105     Incomplete       Expand All Collapse All    [facility] HEART [facility] -  Progress Note      LOS: 6 days   Patient Care Team:  He Ortega MD as PCP - General (Internal Medicine)     Chief Complaint: leg swelling     Subjective  Interval History:      Patient Complaints: patient is lying in bed comfortably. He denies any chest pain. He denies any shortness of breath. He denies any heart racing, palpitations, orthopnea or PND. He reports that he still has leg swelling.      Review of Systems:      Review of Systems   Respiratory:  Negative for shortness of breath.    Cardiovascular:  Positive for leg swelling. Negative for chest pain and palpitations.   All other systems reviewed and are negative.     Objective[]Expand by Default  Vital Sign Min/Max for last 24 hours  Temp  Min: 97.6 °F (36.4 °C)  Max: 97.9 °F (36.6 °C)   BP  Min: 111/61  Max: 144/74   Pulse  Min: 85  Max: 87   Resp  Min: 18  Max: 20   SpO2  Min: 95 %  Max: 100 %   No data recorded   Weight  Min: 96.8 kg (213 lb 6.4 oz)  Max: 96.8 kg (213 lb 6.4 oz)      Vitals             07/01/25  0346   Weight: 96.8 kg (213 lb 6.4 oz)            Intake/Output Summary (Last 24 hours) at 7/1/2025 1111  Last data filed at 7/1/2025 1018      Gross per 24 hour   Intake 960 ml   Output 2025 ml   Net -1065 ml            Telemetry: sinus rhythm rates 80-90's        Physical Exam:     Vitals reviewed.   Constitutional:       General: Not in acute distress.     Appearance: Normal appearance. Well-developed.   Eyes:      Pupils: Pupils are equal, round, and reactive to light.   HENT:      Head: Normocephalic and atraumatic.      Nose: Nose normal.   Neck:      Vascular: No carotid bruit.   Pulmonary:      Effort: Pulmonary effort is normal. No respiratory distress.      Breath sounds: Normal breath sounds. No wheezing. No rales.    Cardiovascular:      Normal rate. Regular rhythm.      Murmurs: There is no murmur.   Edema:     Peripheral edema present.     Ankle: bilateral 2+ edema of the ankle.     Feet: bilateral 2+ edema of the feet.  Abdominal:      General: There is no distension.      Palpations: Abdomen is soft.   Musculoskeletal: Normal range of motion.      Cervical back: Normal range of motion and neck supple. Skin:     General: Skin is warm.      Findings: No erythema or rash.   Neurological:      Mental Status: Alert and oriented to person, place, and time.   Psychiatric:         Speech: Speech normal.         Behavior: Behavior normal.         Thought Content: Thought content normal.         Judgment: Judgment normal.         Results Review:   Lab Results (last 72 hours)         Procedure Component Value Units Date/Time     Comprehensive Metabolic Panel [[id number]]  (Abnormal) Collected: 07/01/25 0430     Specimen: Blood Updated: 07/01/25 0540       Glucose 91 mg/dL         BUN 42.6 mg/dL         Creatinine 3.90 mg/dL         Sodium 138 mmol/L         Potassium 3.7 mmol/L         Chloride 105 mmol/L         CO2 22.0 mmol/L         Calcium 8.4 mg/dL         Total Protein 5.6 g/dL         Albumin 3.1 g/dL         ALT (SGPT) 10 U/L         AST (SGOT) 10 U/L         Alkaline Phosphatase 71 U/L         Total Bilirubin 0.4 mg/dL         Globulin 2.5 gm/dL         A/G Ratio 1.2 g/dL         BUN/Creatinine Ratio 10.9       Anion Gap 11.0 mmol/L         eGFR 20.5 mL/min/1.73       Narrative:       GFR Categories in Chronic Kidney Disease (CKD)              GFR Category          GFR (mL/min/1.73)    Interpretation  G1                    90 or greater        Normal or high (1)  G2                    60-89                Mild decrease (1)  G3a                   45-59                Mild to moderate decrease  G3b                   30-44                Moderate to severe decrease  G4                    15-29                Severe decrease  G5                     14 or less           Kidney failure     (1)In the absence of evidence of kidney disease, neither GFR category G1 or G2 fulfill the criteria for CKD.     eGFR calculation 2021 CKD-EPI creatinine equation, which does not include race as a factor     Magnesium [[id number]]  (Normal) Collected: 07/01/25 0430     Specimen: Blood Updated: 07/01/25 0540       Magnesium 2.2 mg/dL       CBC & Differential [[id number]]  (Abnormal) Collected: 07/01/25 0430     Specimen: Blood Updated: 07/01/25 0508     Narrative:       The following orders were created for panel order CBC & Differential.  Procedure                               Abnormality         Status                     ---------                               -----------         ------                     CBC Auto Differential[[id number]]        Abnormal            Final result                  Please view results for these tests on the individual orders.     CBC Auto Differential [[id number]]  (Abnormal) Collected: 07/01/25 0430     Specimen: Blood Updated: 07/01/25 0508       WBC 5.87 10*3/mm3         RBC 2.66 10*6/mm3         Hemoglobin 7.8 g/dL         Hematocrit 24.8 %         MCV 93.2 fL         MCH 29.3 pg         MCHC 31.5 g/dL         RDW 19.8 %         RDW-SD 61.1 fl         MPV 11.0 fL         Platelets 163 10*3/mm3         Neutrophil % 62.7 %         Lymphocyte % 23.5 %         Monocyte % 9.9 %         Eosinophil % 2.7 %         Basophil % 0.9 %         Immature Grans % 0.3 %         Neutrophils, Absolute 3.68 10*3/mm3         Lymphocytes, Absolute 1.38 10*3/mm3         Monocytes, Absolute 0.58 10*3/mm3         Eosinophils, Absolute 0.16 10*3/mm3         Basophils, Absolute 0.05 10*3/mm3         Immature Grans, Absolute 0.02 10*3/mm3         nRBC 0.0 /100 WBC       Hemoglobin A1c [[id number]]  (Normal) Collected: 06/30/25 0345     Specimen: Blood Updated: 06/30/25 2005       Hemoglobin A1C 5.40 %       Narrative:       Hemoglobin A1C Ranges:      Increased Risk for Diabetes  5.7% to 6.4%  Diabetes                     >= 6.5%  Diabetic Goal                < 7.0%     Basic Metabolic Panel [[id number]]  (Abnormal) Collected: 06/30/25 0345     Specimen: Blood Updated: 06/30/25 0502       Glucose 96 mg/dL         BUN 44.5 mg/dL         Creatinine 3.73 mg/dL         Sodium 138 mmol/L         Potassium 3.6 mmol/L         Chloride 104 mmol/L         CO2 21.0 mmol/L         Calcium 8.1 mg/dL         BUN/Creatinine Ratio 11.9       Anion Gap 13.0 mmol/L         eGFR 21.7 mL/min/1.73       Narrative:       GFR Categories in Chronic Kidney Disease (CKD)              GFR Category          GFR (mL/min/1.73)    Interpretation  G1                    90 or greater        Normal or high (1)  G2                    60-89                Mild decrease (1)  G3a                   45-59                Mild to moderate decrease  G3b                   30-44                Moderate to severe decrease  G4                    15-29                Severe decrease  G5                    14 or less           Kidney failure     (1)In the absence of evidence of kidney disease, neither GFR category G1 or G2 fulfill the criteria for CKD.     eGFR calculation 2021 CKD-EPI creatinine equation, which does not include race as a factor     Magnesium [[id number]]  (Normal) Collected: 06/30/25 0345     Specimen: Blood Updated: 06/30/25 0502       Magnesium 2.1 mg/dL       CBC & Differential [[id number]]  (Abnormal) Collected: 06/30/25 0345     Specimen: Blood Updated: 06/30/25 0445     Narrative:       The following orders were created for panel order CBC & Differential.  Procedure                               Abnormality         Status                     ---------                               -----------         ------                     CBC Auto Differential[[id number]]        Abnormal            Final result                  Please view results for these tests on the individual orders.     CBC  Auto Differential [[id number]]  (Abnormal) Collected: 06/30/25 0345     Specimen: Blood Updated: 06/30/25 0445       WBC 5.88 10*3/mm3         RBC 2.66 10*6/mm3         Hemoglobin 8.0 g/dL         Hematocrit 24.9 %         MCV 93.6 fL         MCH 30.1 pg         MCHC 32.1 g/dL         RDW 18.6 %         RDW-SD 60.4 fl         MPV 12.2 fL         Platelets 163 10*3/mm3         Neutrophil % 62.8 %         Lymphocyte % 24.5 %         Monocyte % 9.5 %         Eosinophil % 2.2 %         Basophil % 0.5 %         Immature Grans % 0.5 %         Neutrophils, Absolute 3.69 10*3/mm3         Lymphocytes, Absolute 1.44 10*3/mm3         Monocytes, Absolute 0.56 10*3/mm3         Eosinophils, Absolute 0.13 10*3/mm3         Basophils, Absolute 0.03 10*3/mm3         Immature Grans, Absolute 0.03 10*3/mm3         nRBC 0.0 /100 WBC       Basic Metabolic Panel [[id number]]  (Abnormal) Collected: 06/29/25 0359     Specimen: Blood Updated: 06/29/25 0800       Glucose 98 mg/dL         BUN 51.2 mg/dL         Creatinine 4.03 mg/dL         Sodium 137 mmol/L         Potassium 3.9 mmol/L         Chloride 104 mmol/L         CO2 20.0 mmol/L         Calcium 8.2 mg/dL         BUN/Creatinine Ratio 12.7       Anion Gap 13.0 mmol/L         eGFR 19.7 mL/min/1.73       Narrative:       GFR Categories in Chronic Kidney Disease (CKD)              GFR Category          GFR (mL/min/1.73)    Interpretation  G1                    90 or greater        Normal or high (1)  G2                    60-89                Mild decrease (1)  G3a                   45-59                Mild to moderate decrease  G3b                   30-44                Moderate to severe decrease  G4                    15-29                Severe decrease  G5                    14 or less           Kidney failure     (1)In the absence of evidence of kidney disease, neither GFR category G1 or G2 fulfill the criteria for CKD.     eGFR calculation 2021 CKD-EPI creatinine equation, which does  "not include race as a factor     Magnesium [[id number]]  (Normal) Collected: 06/29/25 0359     Specimen: Blood Updated: 06/29/25 0509       Magnesium 2.3 mg/dL       CBC & Differential [[id number]]  (Abnormal) Collected: 06/29/25 0359     Specimen: Blood Updated: 06/29/25 0449     Narrative:       The following orders were created for panel order CBC & Differential.  Procedure                               Abnormality         Status                     ---------                               -----------         ------                     CBC Auto Differential[[id number]]        Abnormal            Final result                  Please view results for these tests on the individual orders.     CBC Auto Differential [[id number]]  (Abnormal) Collected: 06/29/25 0359     Specimen: Blood Updated: 06/29/25 0449       WBC 5.97 10*3/mm3         RBC 2.34 10*6/mm3         Hemoglobin 6.9 g/dL         Hematocrit 21.8 %         MCV 93.2 fL         MCH 29.5 pg         MCHC 31.7 g/dL         RDW 18.6 %         RDW-SD 60.2 fl         MPV 11.4 fL         Platelets 147 10*3/mm3         Neutrophil % 65.7 %         Lymphocyte % 21.9 %         Monocyte % 9.2 %         Eosinophil % 2.2 %         Basophil % 0.7 %         Immature Grans % 0.3 %         Neutrophils, Absolute 3.92 10*3/mm3         Lymphocytes, Absolute 1.31 10*3/mm3         Monocytes, Absolute 0.55 10*3/mm3         Eosinophils, Absolute 0.13 10*3/mm3         Basophils, Absolute 0.04 10*3/mm3         Immature Grans, Absolute 0.02 10*3/mm3         nRBC 0.0 /100 WBC                     Echo EF Estimated  No results found for: \"ECHOEFEST\"        Cath Ejection Fraction Quantitative  No results found for: \"CATHEF\"           Medication Review: yes  Current Medications             Current Facility-Administered Medications   Medication Dose Route Frequency Provider Last Rate Last Admin    acetaminophen (TYLENOL) tablet 1,000 mg  1,000 mg Oral Q6H PRN Lesa Gonzalez MD   1,000 mg at " 06/29/25 2317    amLODIPine (NORVASC) tablet 5 mg  5 mg Oral Q24H Marta Clemens, APRN   5 mg at 07/01/25 0808    aspirin EC tablet 81 mg  81 mg Oral Daily Lesa Gonzalez MD   81 mg at 07/01/25 0809    bumetanide (BUMEX) tablet 1 mg  1 mg Oral Daily Travon Teixeira APRN        calcitriol (ROCALTROL) capsule 0.5 mcg  0.5 mcg Oral Daily Robinson Preston MD   0.5 mcg at 07/01/25 0809    carvedilol (COREG) tablet 25 mg  25 mg Oral BID With Meals Marta Clemens APRN   25 mg at 07/01/25 0809    epoetin yocasta-epbx (RETACRIT) injection 10,000 Units  10,000 Units Subcutaneous Once per day on Monday Wednesday Friday Robinson Preston MD   10,000 Units at 06/30/25 1553    hydrALAZINE (APRESOLINE) injection 10 mg  10 mg Intravenous Q6H PRN Lesa Gonzalez MD   10 mg at 06/26/25 1103    hydrALAZINE (APRESOLINE) tablet 50 mg  50 mg Oral Q8H Marta Clemens APRN   50 mg at 07/01/25 0725    isosorbide dinitrate (ISORDIL) tablet 20 mg  20 mg Oral TID - Nitrates Mani Mena MD   20 mg at 07/01/25 0809    levETIRAcetam (KEPPRA) tablet 500 mg  500 mg Oral BID Lesa Gonzalez MD   500 mg at 07/01/25 0809    losartan (COZAAR) tablet 25 mg  25 mg Oral Q24H Marta Clemens, APRN   25 mg at 07/01/25 0809    nitroglycerin (NITROSTAT) SL tablet 0.4 mg  0.4 mg Sublingual Q5 Min PRN Lesa Gonzalez MD        ondansetron (ZOFRAN) injection 4 mg  4 mg Intravenous Q6H PRN Benjamin Dominguez MD   4 mg at 06/26/25 0840    pantoprazole (PROTONIX) injection 40 mg  40 mg Intravenous Q12H Lesa Gonzalez MD   40 mg at 07/01/25 0810    sodium chloride 0.9 % flush 10 mL  10 mL Intravenous Q12H Lesa Gonzalez MD   10 mL at 07/01/25 0811    sodium chloride 0.9 % flush 10 mL  10 mL Intravenous PRN Lesa Gonzalez MD        sodium chloride 0.9 % infusion 40 mL  40 mL Intravenous PRN Lesa Gonzalez MD                   Assessment & Plan  -Congestive heart failure: LVEF 21-25% on echo 6/25/2025. Continue carvedilol, hydralalzine, isordil,  bumex, and losartan. Cardiac PET for ischemic evaluation to be done outpatient.      -Acute on chronic kidney disease stage IV: Cr today 3.9. Baseline 4.0. Nephrology following     -anemia: hgb 7.8 today     -hypertension      -seizure disorder     Plan:   - transition to oral bumex today   - continue carvedilol, hydralalzine, isordil, and losartan  - continue aspirin and amlodipine  - continue to monitor kidney function and electrolytes closely   - continue to monitor I/O and daily weights  - continue to monitor H & H and transfuse as needed  - optimize GDMT as tolerated  - Cardiac PET ordered for ischemic evaluation to be done outpatient     Electronically signed by SUKHDEV Kelly, 07/01/25, 11:05 AM CDT.                    te/Time Temp Pulse Resp BP Patient Position Device (Oxygen Therapy) SpO2   07/01/25 1125 97.9 (36.6) 85 18 120/63 Sitting room air 98   07/01/25 0731 -- 86 18 143/96 Sitting room air 100   07/01/25 0346 97.9 (36.6) 87 18 144/74 Sitting room air 99   06/30/25 2000 -- -- -- -- -- room air --   06/30/25 1900 97.8 (36.6) 85 18 136/74 Lying -- 99   06/30/25 1652 97.6 (36.4) 85 18 131/68 Sitting -- 95   06/30/25 1135 97.8 (36.6) 85 20 111/61 Lying room air 100   06/30/25 0748 -- -- -- --            Comprehensive Metabolic Panel [LAB17] (Order [id number])  Order  Date: 6/30/2025 Department: [facility] 4B Released By/Authorizing: Robinson Preston MD (auto-released)     Reprint Order Requisition    Comprehensive Metabolic Panel (Order #[id number]) on 6/30/25         Contains abnormal data Comprehensive Metabolic Panel  Order: [id number]   Status: Final result       Next appt: 07/02/2025 at 09:45 AM in Cardiology (Sammy Pizarro MD)    Test Result Released: No (scheduled for 7/1/2025  6:40 AM)    Specimen Information: Blood   0 Result Notes            Component  Ref Range & Units (hover) 04:30 1 d ago 2 d ago 3 d ago 4 d ago 5 d ago 6 d ago   Glucose 91 96 98 106 High  123 High  140 High  112  High    BUN 42.6 High  44.5 High  51.2 High  55.8 High  57.4 High  63.5 High  64.8 High    Creatinine 3.90 High  3.73 High  4.03 High  4.29 High  3.97 High  3.96 High  3.99 High    Sodium 138 138 137 132 Low  133 Low  132 Low  138   Potassium 3.7 3.6 3.9 4.1 4.4 4.0 4.1   Chloride 105 104 104 101 100 101 102   CO2 22.0 21.0 Low  20.0 Low  21.0 Low  21.0 Low  20.0 Low  23.0   Calcium 8.4 Low  8.1 Low  8.2 Low  8.0 Low  8.3 Low  8.2 Low  8.5 Low    Total Protein 5.6 Low      5.6 Low  6.0   Albumin 3.1 Low      3.1 Low  3.3 Low    ALT (SGPT) 10     18 22   AST (SGOT) 10     25 23   Alkaline Phosphatase 71     66 71   Total Bilirubin 0.4     0.8 0.7   Globulin 2.5     2.5 2.7   A/G Ratio 1.2     1.2 1.2   BUN/Creatinine Ratio 10.9 11.9 12.7 13.0 14.5 16.0 16.2   Anion Gap 11.0 13.0 13.0 10.0 12.0 11.0 13.0   eGFR 20.5 Low                           Patient Communication    CBC & Differential     7/1/2025  6:08 AM  Not seen        CBC Auto Differential     7/1/2025  6:08 AM  Not seen     Results   CBC & Differential (Order [id number])  Linked Results    Procedure Abnormality Status   CBC Auto Differential Abnormal Abnormal  Final result      Contains abnormal data CBC & Differential  Order: [id number]   Status: Final result    Test Result Released: No (scheduled for 7/1/2025  6:08 AM)    Specimen Information: Blood   0 Result Notes       Specimen Collected: 07/01/25 04:30 CDT Last Resulted: 07/01/25 05:08 CDT       Order Details        View Encounter        Lab and Collection Details        Routing        Result History     View All Conversations on this Encounter     Result Care Coordination      Patient Communication     7/1/2025  6:08 AM  Not seen Back to Top      Contains abnormal data CBC Auto Differential  Order: [id number] - Part of Panel Order [id number]   Status: Final result    Test Result Released: No (scheduled for 7/1/2025  6:08 AM)    Specimen Information: Blood   0 Result Notes            Component  Ref Range &  Units ([facility]) 04:30 1 d ago 2 d ago 3 d ago 4 d ago 5 d ago 6 d ago   WBC 5.87 5.88 5.97 6.91 6.27 5.98 7.44   RBC 2.66 Low  2.66 Low  2.34 Low  2.59 Low  2.86 Low  2.62 Low  2.79 Low    Hemoglobin 7.8 Low  8.0 Low  6.9 Low Critical  7.2 Low  7.9 Low  7.0 Low  7.6 Low    Hematocrit 24.8 Low  24.9 Low  21.8 Low  23.4 Low  25.3 Low  23.0 Low  24.8 Low    MCV 93.2 93.6 93.2 90.3 88.5 87.8 88.9   MCH 29.3 30.1 29.5 27.8 27.6 26.7 27.2   MCHC 31.5 32.1 31.7 30.8 Low  31.2 Low  30.4 Low  30.6 Low    RDW 19.8 High  18.6 High  18.6 High  18.3 High  18.3 High  18.1 High  18.2 High    RDW-SD 61.1 High  60.4 High  60.2 High  58.8 High  57.3 High  56.7 High  56.8 High    MPV 11.0 12.2 High  11.4 11.6 11.2 11.3 11.2   Platelets 163 163 147 150 140 123 Low  131 Low    Neutrophil % 62.7 62.8 65.7 67.7 61.7  82.8 High    Lymphocyte % 23.5 24.5 21.9 21.0 25.4  10.9 Low    Monocyte % 9.9 9.5 9.2 8.0 7.5  5.0   Eosinophil % 2.7 2.2 2.2 2.3 4.3  0.5   Basophil % 0.9 0.5 0.7 0.7 0.8  0.4   Immature Grans % 0.3 0.5 0.3 0.3 0.3  0.4   Neutrophils, Absolute 3.68 3.69 3.92 4.68 3.87  6.16   Lymphocytes, Absolute 1.38 1.44 1.31 1.45 1.59  0.81   Monocytes, Absolute 0.58 0.56 0.55 0.55 0.47  0.37   Eosinophils, Absolute 0.16 0.13 0.13 0.16 0.27  0.04   Basophils, Absolute 0.05 0.03 0.04 0.05 0.05  0.03   Immature Grans, Absolute 0.02 0.03 0.02 0.02 0.02  0.03   nRBC 0.0 0.0                           Current Facility-Administered Medications   Medication Dose Route Frequency Provider Last Rate Last Admin    acetaminophen (TYLENOL) tablet 1,000 mg  1,000 mg Oral Q6H PRN Lesa Gonzalez MD   1,000 mg at 06/29/25 2317    amLODIPine (NORVASC) tablet 5 mg  5 mg Oral Q24H Marta Clemens APRN   5 mg at 07/01/25 0808    aspirin EC tablet 81 mg  81 mg Oral Daily Lesa Gonzalez MD   81 mg at 07/01/25 0809    bumetanide (BUMEX) tablet 1 mg  1 mg Oral Daily Travon Teixeira APRN        calcitriol (ROCALTROL) capsule 0.5 mcg  0.5 mcg Oral Daily Kary  MD Robinson   0.5 mcg at 07/01/25 0809    carvedilol (COREG) tablet 25 mg  25 mg Oral BID With Meals Marta Clemens, APRN   25 mg at 07/01/25 0809    epoetin yocasta-epbx (RETACRIT) injection 10,000 Units  10,000 Units Subcutaneous Once per day on Monday Wednesday Friday Robinson Preston MD   10,000 Units at 06/30/25 1553    hydrALAZINE (APRESOLINE) injection 10 mg  10 mg Intravenous Q6H PRN Lesa Gonzalez MD   10 mg at 06/26/25 1103    hydrALAZINE (APRESOLINE) tablet 50 mg  50 mg Oral Q8H Marta Clemens, APRN   50 mg at 07/01/25 0725    isosorbide dinitrate (ISORDIL) tablet 20 mg  20 mg Oral TID - Nitrates Mani Mena MD   20 mg at 07/01/25 0809    levETIRAcetam (KEPPRA) tablet 500 mg  500 mg Oral BID Lesa Gonzalez MD   500 mg at 07/01/25 0809    losartan (COZAAR) tablet 25 mg  25 mg Oral Q24H Marta Clemens, APRN   25 mg at 07/01/25 0809    nitroglycerin (NITROSTAT) SL tablet 0.4 mg  0.4 mg Sublingual Q5 Min PRN Lsea Gonzalez MD        ondansetron (ZOFRAN) injection 4 mg  4 mg Intravenous Q6H PRN Benjamin Dominguez MD   4 mg at 06/26/25 0840    pantoprazole (PROTONIX) injection 40 mg  40 mg Intravenous Q12H Lesa Gonzalez MD   40 mg at 07/01/25 0810    sodium chloride 0.9 % flush 10 mL  10 mL Intravenous Q12H Lesa Gonzalez MD   10 mL at 07/01/25 0811    sodium chloride 0.9 % flush 10 mL  10 mL Intravenous PRN Lesa Gonzalez MD        sodium chloride 0.9 % infusion 40 mL  40 mL Intravenous PRN Lesa Gonzalez MD

## 2025-07-02 LAB
ALBUMIN SERPL-MCNC: 3.3 G/DL (ref 3.5–5.2)
ALBUMIN/GLOB SERPL: 1.3 G/DL
ALP SERPL-CCNC: 76 U/L (ref 39–117)
ALT SERPL W P-5'-P-CCNC: 10 U/L (ref 1–41)
ANION GAP SERPL CALCULATED.3IONS-SCNC: 12 MMOL/L (ref 5–15)
AST SERPL-CCNC: 11 U/L (ref 1–40)
BASOPHILS # BLD AUTO: 0.05 10*3/MM3 (ref 0–0.2)
BASOPHILS NFR BLD AUTO: 0.8 % (ref 0–1.5)
BILIRUB SERPL-MCNC: 0.4 MG/DL (ref 0–1.2)
BUN SERPL-MCNC: 42.1 MG/DL (ref 6–20)
BUN/CREAT SERPL: 10.5 (ref 7–25)
CALCIUM SPEC-SCNC: 8.4 MG/DL (ref 8.6–10.5)
CHLORIDE SERPL-SCNC: 103 MMOL/L (ref 98–107)
CO2 SERPL-SCNC: 22 MMOL/L (ref 22–29)
CREAT SERPL-MCNC: 4 MG/DL (ref 0.76–1.27)
DEPRECATED RDW RBC AUTO: 60.9 FL (ref 37–54)
EGFRCR SERPLBLD CKD-EPI 2021: 19.9 ML/MIN/1.73
EOSINOPHIL # BLD AUTO: 0.17 10*3/MM3 (ref 0–0.4)
EOSINOPHIL NFR BLD AUTO: 2.9 % (ref 0.3–6.2)
ERYTHROCYTE [DISTWIDTH] IN BLOOD BY AUTOMATED COUNT: 20.3 % (ref 12.3–15.4)
GLOBULIN UR ELPH-MCNC: 2.6 GM/DL
GLUCOSE SERPL-MCNC: 85 MG/DL (ref 65–99)
HCT VFR BLD AUTO: 26 % (ref 37.5–51)
HGB BLD-MCNC: 8.3 G/DL (ref 13–17.7)
IMM GRANULOCYTES # BLD AUTO: 0.02 10*3/MM3 (ref 0–0.05)
IMM GRANULOCYTES NFR BLD AUTO: 0.3 % (ref 0–0.5)
LYMPHOCYTES # BLD AUTO: 1.34 10*3/MM3 (ref 0.7–3.1)
LYMPHOCYTES NFR BLD AUTO: 22.7 % (ref 19.6–45.3)
MCH RBC QN AUTO: 29 PG (ref 26.6–33)
MCHC RBC AUTO-ENTMCNC: 31.9 G/DL (ref 31.5–35.7)
MCV RBC AUTO: 90.9 FL (ref 79–97)
MONOCYTES # BLD AUTO: 0.5 10*3/MM3 (ref 0.1–0.9)
MONOCYTES NFR BLD AUTO: 8.5 % (ref 5–12)
NEUTROPHILS NFR BLD AUTO: 3.83 10*3/MM3 (ref 1.7–7)
NEUTROPHILS NFR BLD AUTO: 64.8 % (ref 42.7–76)
NRBC BLD AUTO-RTO: 0 /100 WBC (ref 0–0.2)
PLATELET # BLD AUTO: 170 10*3/MM3 (ref 140–450)
PMV BLD AUTO: 11.1 FL (ref 6–12)
POTASSIUM SERPL-SCNC: 4 MMOL/L (ref 3.5–5.2)
PROT SERPL-MCNC: 5.9 G/DL (ref 6–8.5)
RBC # BLD AUTO: 2.86 10*6/MM3 (ref 4.14–5.8)
SODIUM SERPL-SCNC: 137 MMOL/L (ref 136–145)
WBC NRBC COR # BLD AUTO: 5.91 10*3/MM3 (ref 3.4–10.8)

## 2025-07-02 PROCEDURE — 25010000002 BUMETANIDE PER 0.5 MG: Performed by: HOSPITALIST

## 2025-07-02 PROCEDURE — 85025 COMPLETE CBC W/AUTO DIFF WBC: CPT | Performed by: HOSPITALIST

## 2025-07-02 PROCEDURE — 99233 SBSQ HOSP IP/OBS HIGH 50: CPT | Performed by: INTERNAL MEDICINE

## 2025-07-02 PROCEDURE — 80053 COMPREHEN METABOLIC PANEL: CPT | Performed by: HOSPITALIST

## 2025-07-02 PROCEDURE — 25010000002 EPOETIN ALFA-EPBX 10000 UNIT/ML SOLUTION: Performed by: INTERNAL MEDICINE

## 2025-07-02 RX ORDER — HYDROCODONE BITARTRATE AND ACETAMINOPHEN 5; 325 MG/1; MG/1
1 TABLET ORAL ONCE AS NEEDED
Refills: 0 | Status: COMPLETED | OUTPATIENT
Start: 2025-07-02 | End: 2025-07-02

## 2025-07-02 RX ADMIN — ISOSORBIDE DINITRATE 20 MG: 10 TABLET ORAL at 17:15

## 2025-07-02 RX ADMIN — CALCITRIOL CAPSULES 0.25 MCG 0.5 MCG: 0.25 CAPSULE ORAL at 08:45

## 2025-07-02 RX ADMIN — Medication 10 ML: at 08:46

## 2025-07-02 RX ADMIN — CARVEDILOL 25 MG: 25 TABLET, FILM COATED ORAL at 08:45

## 2025-07-02 RX ADMIN — ASPIRIN 81 MG: 81 TABLET, COATED ORAL at 08:45

## 2025-07-02 RX ADMIN — LEVETIRACETAM 500 MG: 500 TABLET, FILM COATED ORAL at 08:45

## 2025-07-02 RX ADMIN — LOSARTAN POTASSIUM 25 MG: 25 TABLET, FILM COATED ORAL at 08:45

## 2025-07-02 RX ADMIN — HYDRALAZINE HYDROCHLORIDE 100 MG: 50 TABLET ORAL at 21:49

## 2025-07-02 RX ADMIN — LEVETIRACETAM 500 MG: 500 TABLET, FILM COATED ORAL at 21:49

## 2025-07-02 RX ADMIN — ISOSORBIDE DINITRATE 20 MG: 10 TABLET ORAL at 08:45

## 2025-07-02 RX ADMIN — CARVEDILOL 25 MG: 25 TABLET, FILM COATED ORAL at 17:15

## 2025-07-02 RX ADMIN — HYDROCODONE BITARTRATE AND ACETAMINOPHEN 1 TABLET: 5; 325 TABLET ORAL at 23:42

## 2025-07-02 RX ADMIN — BUMETANIDE 1 MG: 1 TABLET ORAL at 08:45

## 2025-07-02 RX ADMIN — ISOSORBIDE DINITRATE 20 MG: 10 TABLET ORAL at 12:48

## 2025-07-02 RX ADMIN — EPOETIN ALFA-EPBX 10000 UNITS: 10000 INJECTION, SOLUTION INTRAVENOUS; SUBCUTANEOUS at 08:46

## 2025-07-02 RX ADMIN — HYDRALAZINE HYDROCHLORIDE 100 MG: 50 TABLET ORAL at 06:25

## 2025-07-02 RX ADMIN — BUMETANIDE 1 MG/HR: 0.25 INJECTION INTRAMUSCULAR; INTRAVENOUS at 12:48

## 2025-07-02 RX ADMIN — PANTOPRAZOLE SODIUM 40 MG: 40 TABLET, DELAYED RELEASE ORAL at 08:45

## 2025-07-02 RX ADMIN — Medication 10 ML: at 21:49

## 2025-07-02 RX ADMIN — PANTOPRAZOLE SODIUM 40 MG: 40 TABLET, DELAYED RELEASE ORAL at 17:15

## 2025-07-02 RX ADMIN — HYDRALAZINE HYDROCHLORIDE 100 MG: 50 TABLET ORAL at 14:27

## 2025-07-02 NOTE — PROGRESS NOTES
Nephrology ([facility]) Progress Note      Patient:  Jos éMiguel Webb  YOB: 1995  Date of Service: 7/2/2025  MRN: [id number]   Acct: [id number]   Primary Care Physician: He Ortega MD  Advance Directive:   Code Status and Medical Interventions: CPR (Attempt to Resuscitate); Full Support   Ordered at: 06/25/25 2000     Code Status (Patient has no pulse and is not breathing):    CPR (Attempt to Resuscitate)     Medical Interventions (Patient has pulse or is breathing):    Full Support     Level Of Support Discussed With:    Patient     Admit Date: 6/25/2025       Hospital Day: 7  Referring Provider: Nicola Redd MD      Patient personally seen and examined.  Complete chart including Consults, Notes, Operative Reports, Labs, Cardiology, and Radiology studies reviewed as able.    Chief complaint: Abnormal labs.    Subjective:  José Miguel Webb is a 30 y.o. male for whom we were consulted for evaluation and treatment of chronic kidney disease. Baseline chronic kidney disease stage 4, baseline creatinine approximately 4.0. Has followed with our providers on a sporadic basis over the years, typically no-shows for his office visits. History of hypertension, CHF, seizure disorder. Patient was recently treated at [facility] for GI bleed and MINDY. He was scheduled to have permcath placed and to start hemodialysis but left AMA on 6/24.  Presented to [facility] on 6/25 with chest pain and was transferred to [facility] for cardiology and nephrology evaluation. Creatinine 3.99 when admitted this time.  No complaint of pain or dyspnea at time of initial nephrology exam. Denied n/v/d. Denied edema. No recent changes in urination. He explained that he is willing to proceed with dialysis if it is needed    This afternoon patient feels well.  He is lying flat, denies any shortness of breath.  His edema has significantly improved.  His serum creatinine is  slowly rising/GFR is slowly declining      Allergies:  Amoxicillin and Penicillins    Home Meds:  Medications Prior to Admission   Medication Sig Dispense Refill Last Dose/Taking    [Paused] sacubitril-valsartan (Entresto) 49-51 MG tablet Take 1 tablet by mouth 2 (Two) Times a Day. 180 tablet 3        Medicines:  Current Facility-Administered Medications   Medication Dose Route Frequency Provider Last Rate Last Admin    acetaminophen (TYLENOL) tablet 1,000 mg  1,000 mg Oral Q6H PRN Lesa Gonzalez MD   1,000 mg at 06/29/25 2317    aspirin EC tablet 81 mg  81 mg Oral Daily Lesa Gonzalez MD   81 mg at 07/02/25 0845    bumetanide (BUMEX) 25 mg/100mL (0.25 mg/mL) infusion  1 mg/hr Intravenous Continuous Jenna Ospina MD 4 mL/hr at 07/02/25 1248 1 mg/hr at 07/02/25 1248    calcitriol (ROCALTROL) capsule 0.5 mcg  0.5 mcg Oral Daily Robinson Preston MD   0.5 mcg at 07/02/25 0845    carvedilol (COREG) tablet 25 mg  25 mg Oral BID With Meals Marta Clemens APRN   25 mg at 07/02/25 0845    epoetin yocasta-epbx (RETACRIT) injection 10,000 Units  10,000 Units Subcutaneous Once per day on Monday Wednesday Friday Robinson Preston MD   10,000 Units at 07/02/25 0846    hydrALAZINE (APRESOLINE) injection 10 mg  10 mg Intravenous Q6H PRN Lesa Gonzalez MD   10 mg at 06/26/25 1103    hydrALAZINE (APRESOLINE) tablet 100 mg  100 mg Oral Q8H Guille Aguirre MD   100 mg at 07/02/25 0625    isosorbide dinitrate (ISORDIL) tablet 20 mg  20 mg Oral TID - Nitrates Mani Mena MD   20 mg at 07/02/25 1248    levETIRAcetam (KEPPRA) tablet 500 mg  500 mg Oral BID Lesa Gonzalez MD   500 mg at 07/02/25 0845    losartan (COZAAR) tablet 25 mg  25 mg Oral Q24H Marta Clemens APRN   25 mg at 07/02/25 0845    nitroglycerin (NITROSTAT) SL tablet 0.4 mg  0.4 mg Sublingual Q5 Min PRN Lesa Gonzalez MD        ondansetron (ZOFRAN) injection 4 mg  4 mg Intravenous Q6H PRN Benjamin Dominguez MD   4 mg at 06/26/25 0840    pantoprazole (PROTONIX)  EC tablet 40 mg  40 mg Oral BID AC Jenna Ospina MD   40 mg at 07/02/25 0845    sodium chloride 0.9 % flush 10 mL  10 mL Intravenous Q12H Lesa Gonzalez MD   10 mL at 07/02/25 0846    sodium chloride 0.9 % flush 10 mL  10 mL Intravenous PRN Lesa Gonzalez MD        sodium chloride 0.9 % infusion 40 mL  40 mL Intravenous PRN Lesa Gonzalez MD           Past Medical History:  Past Medical History:   Diagnosis Date    Cardiomegaly     CHF (congestive heart failure)     Congenital fusion of carpal bone     HTN (hypertension)     Non-STEMI (non-ST elevated myocardial infarction) 6/25/2025    Seizure        Past Surgical History:  Past Surgical History:   Procedure Laterality Date    ELBOW PROCEDURE      ENDOSCOPY N/A 6/22/2025    Procedure: ESOPHAGOGASTRODUODENOSCOPY;  Surgeon: Kevin Miranda MD;  Location: [facility];  Service: Gastroenterology;  Laterality: N/A;  preop; GI bleed   postop; gastric ulcer ; esophagitis       Family History  Family History   Problem Relation Age of Onset    Hypertension Mother     Heart disease Mother     No Known Problems Father        Social History  Social History     Socioeconomic History    Marital status: Single   Tobacco Use    Smoking status: Some Days     Current packs/day: 0.50     Types: Cigarettes    Smokeless tobacco: Never   Vaping Use    Vaping status: Never Used   Substance and Sexual Activity    Alcohol use: Yes    Drug use: Yes     Types: Marijuana, Cocaine(coke)    Sexual activity: Defer       Review of Systems:  History obtained from chart review and the patient  General ROS: No fever or chills  Respiratory ROS: No cough, shortness of breath, wheezing  Cardiovascular ROS: No chest pain or palpitations  Gastrointestinal ROS: No abdominal pain or melena  Genito-Urinary ROS: No dysuria or hematuria  Psych ROS: No anxiety and depression  14 point ROS reviewed with the patient and negative except as noted above and in the HPI unless unable to  obtain.    Objective:  Patient Vitals for the past 24 hrs:   BP Temp Temp src Pulse Resp SpO2 Weight   07/02/25 1053 137/71 97.5 °F (36.4 °C) Oral 90 16 100 % --   07/02/25 0747 148/89 98.1 °F (36.7 °C) Oral 88 16 99 % --   07/02/25 0308 149/87 98.3 °F (36.8 °C) Oral 90 17 98 % 98.3 kg (216 lb 12.8 oz)   07/01/25 2329 150/83 98.4 °F (36.9 °C) Oral 92 18 99 % --   07/01/25 2018 137/74 98.6 °F (37 °C) Oral 88 16 97 % --   07/01/25 1702 130/79 98.2 °F (36.8 °C) Oral 81 18 98 % --       Intake/Output Summary (Last 24 hours) at 7/2/2025 1359  Last data filed at 7/2/2025 1053  Gross per 24 hour   Intake 120 ml   Output --   Net 120 ml     General: awake/alert   HEENT: Normocephalic atraumatic head  Neck: Supple with no JVD or carotid bruits.  Chest:  clear to auscultation bilaterally without respiratory distress  CVS: regular rate and rhythm  Abdominal: soft, nontender, positive bowel sounds  Extremities: 2+ pedal edema  Skin: warm and dry without rash      Labs:  Results from last 7 days   Lab Units 07/02/25  0319 07/01/25 0430 06/30/25  0345   WBC 10*3/mm3 5.91 5.87 5.88   HEMOGLOBIN g/dL 8.3* 7.8* 8.0*   HEMATOCRIT % 26.0* 24.8* 24.9*   PLATELETS 10*3/mm3 170 163 163         Results from last 7 days   Lab Units 07/02/25  0320 07/01/25  0430 06/30/25  0345 06/27/25  0337 06/26/25  0313   SODIUM mmol/L 137 138 138   < > 132*   POTASSIUM mmol/L 4.0 3.7 3.6   < > 4.0   CHLORIDE mmol/L 103 105 104   < > 101   CO2 mmol/L 22.0 22.0 21.0*   < > 20.0*   BUN mg/dL 42.1* 42.6* 44.5*   < > 63.5*   CREATININE mg/dL 4.00* 3.90* 3.73*   < > 3.96*   CALCIUM mg/dL 8.4* 8.4* 8.1*   < > 8.2*   EGFR mL/min/1.73 19.9* 20.5* 21.7*   < > 20.2*   BILIRUBIN mg/dL 0.4 0.4  --   --  0.8   ALK PHOS U/L 76 71  --   --  66   ALT (SGPT) U/L 10 10  --   --  18   AST (SGOT) U/L 11 10  --   --  25   GLUCOSE mg/dL 85 91 96   < > 140*    < > = values in this interval not displayed.       Radiology:   Imaging Results (Last 72 Hours)       ** No results  "found for the last 72 hours. **            Culture:  No results found for: \"BLOODCX\", \"URINECX\", \"WOUNDCX\", \"MRSACX\", \"RESPCX\", \"STOOLCX\"      Assessment   1.  Stage IV chronic kidney disease.  2.  Type 2 diabetes with nephropathy.  3.  Recent history of acute kidney injury resolving.  4.  Congestive heart failure exacerbation.  5.  Benign essential hypertension.  6.  Metabolic acidemia.  7.  Non-STEMI.  8.  Noncompliance with medical management  9.  Secondary hyperparathyroidism.    Plan:  1.  Continue current dose of loop diuretics.  2.  Continue KIANA for treatment of CKD.  3.  Continue p.o. calcitriol.  4.  Monitor renal function closely, if discharged home need follow-up at [facility].  5.  Plan was discussed with the patient.      Robinson Preston MD  7/2/2025  13:59 CDT    "

## 2025-07-02 NOTE — PAYOR COMM NOTE
"7/2/25 [facility] [phone]  [phone]    FAXING UPDATE CLINICAL FOR CONT STAY. PATIENT IS STILL IN HOUSE.      José Miguel Wahl (30 y.o. Male)       Date of Birth   1995    Social Security Number       Address   [address] [place] [address]    Home Phone   [phone]    MRN   [id number]       Presybeterian   Yazidism    Marital Status   Single                            Admission Date   6/25/2025    Admission Type   Urgent    Admitting Provider   Jenna Ospina MD    Attending Provider   Jenna Ospina MD    Department, Room/Bed   [address], 448/1       Discharge Date       Discharge Disposition       Discharge Destination                                 Attending Provider: Jenna Ospina MD    Allergies: Amoxicillin, Penicillins    Isolation: None   Infection: None   Code Status: CPR    Ht: 188 cm (74\")   Wt: 98.3 kg (216 lb 12.8 oz)    Admission Cmt: None   Principal Problem: Non-STEMI (non-ST elevated myocardial infarction) [I21.4]                   Active Insurance as of 6/25/2025       Primary Coverage       Payor Plan Insurance Group Employer/Plan Group    [place] [id number]       Payor Plan Address Payor Plan Phone Number Payor Plan Fax Number Effective Dates    HU[employer]ICAL [address] [address] [phone]  1/1/2025 - None Entered    [facility] [id number]         Subscriber Name Subscriber Birth Date Member ID       JOSÉ MIGUEL WAHL 1995 [id number]                     Emergency Contacts        (Rel.) Home Phone Work Phone Mobile Phone    Jesika Wahl (Mother) [phone] -- [phone]             [facility] Encounter Date/Time: 6/25/2025 [facility]   Hospital Account: [id number]    MRN: [id number]   Patient:  José Miguel Wahl   Contact Serial #: [id number]   SSN:          ENCOUNTER             Patient Class: Inpatient   Unit: [address] Service: Medicine     Bed: 448/1 "   Admitting Provider: Jenna Ospina MD   Referring Physician: Nicola Redd   Attending Provider: Jenna Ospina MD   Adm Diagnosis: Non-STEMI (non-ST elevat*               PATIENT             Name: José Miguel Webb : 1995 (30 yrs)   Address: [address] [place] [address] Sex: Male   City: [place] [address]   County: [facility]   Marital Status: SINGLE Ethnicity: NOT        Race: BLACK   Primary Care Provider: He Ortega MD Patients Phone: Home Phone: [phone]     Mobile Phone: [phone]     EMERGENCY CONTACT   Contact Name Legal Guardian? Relationship to Patient Home Phone Work Phone Mobile Phone   1. Jesika Webb  2. *No Contact Specified*      Mother    [phone]      GUARANTOR             Guarantor: José Miguel Webb     : 1995   Address: [address] [place] [address] Sex: [place] [address]     Relation to Patient: Self       Home Phone: [phone]   Guarantor ID: [id number]       Work Phone:     GUARANTOR EMPLOYER   Employer:           Status: NOT EMPLO*   COVERAGE          PRIMARY INSURANCE   Pay[place] Plan[place]   Group Number: [id number] Insurance Type: INDEMNITY   Subscriber Name: JOSÉ MIGUEL WEBB Subscriber : 1995   Subscriber ID: [id number] Coverage Address: [place] [address]   Pat. Rel. to Subscriber: Self Coverage Phone: [phone]   SECONDARY INSURANCE   Payor: N/A Plan: N/A   Group Number:   Insurance Type:     Subscriber Name:   Subscriber :     Subscriber ID:   Coverage Address:     Pat. Rel. to Subscriber:   Coverage Phone:        Contact Serial # ([id number])         2025    Chart ID ([id number])           Jenna Ospina MD   Physician  Hospitalist     Progress Notes      Incomplete     Date of Service: 25 1052  Creation Time: 25 105     Incomplete              [facility] Medicine Services  INPATIENT PROGRESS NOTE      Patient Name: José Miguel Webb  Date of Admission: 6/25/2025  Today's Date: 07/02/25  Length of Stay: 7  Primary Care Physician: He Ortega MD     Subjective   Chief Complaint: Transferred for NSTEMI  History of Present Illness  This is a 30-year-old male patient with a medical history of CHF, cardiomegaly, hypertension, CKD , seizures, history of drug use, recent history of peptic ulcer with GI bleed, presenting to the ED for the evaluation of chest pain.  As reported, patient was initially evaluated at [facility] for chest pain and he was given loading dose of aspirin in addition to nitro dose.  Patient was complaining of left-sided chest pain radiating to his left upper extremity, that started around 3 PM.  He denies having shortness of breath or abdominal pain, fever or chills, nausea vomiting or diarrhea.  To note that he was admitted few days ago to our hospital with GI bleed and was found to have peptic ulcer, received an endoscopy, was on PPI twice daily, and received blood transfusions.  He was also evaluated during the hospitalization by the nephrology team, and he was planned for dialysis and permacath placement, however he decided to leave AMA.  Today he mentions that he does not want to leave AMA at this time and he is ready to receive the care.  6/30  Patient still complained of worsening lower extremity edema.  7/1  Before history of cardiomegaly cardiomyopathy EF 21 to 25% nonischemic hypertension chronic kidney disease drug abuse peptic ulcer disease seizures presented with chest pain non-STEMI was transferred to us per cardiology felt elevated troponin secondary to CHF chronic kidney disease appreciate nephrology remains on IV Bumex continue monitoring continue medications appreciate nephrology  7/2  As before the patient actually gained about 24 pounds Per RD we will go ahead and start him on Bumex drip appreciate cardiology and nephrology continue monitoring again  emphasized fluid restriction might need to be evaluated for sleep apnea  Review of Systems   All pertinent negatives and positives are as above. All other systems have been reviewed and are negative unless otherwise stated.      Objective    Temp:  [97.9 °F (36.6 °C)-98.6 °F (37 °C)] 98.1 °F (36.7 °C)  Heart Rate:  [81-92] 88  Resp:  [16-18] 16  BP: (120-150)/(63-89) 148/89  Physical Exam        Constitutional:       Appearance: He is ill-appearing.   Cardiovascular:      Rate and Rhythm: Normal rate and regular rhythm.      Pulses: Normal pulses.      Heart sounds: Normal heart sounds. No murmur heard.  Pulmonary:      Effort: Pulmonary effort is normal. No respiratory distress.      Breath sounds: Normal breath sounds. No wheezing or rales.   Abdominal:      General: Bowel sounds are normal. There is no distension.      Palpations: Abdomen is soft.      Tenderness: There is no abdominal tenderness. There is no guarding.   Musculoskeletal:      Right lower leg: Edema present.      Left lower leg: Edema present.   Skin:     General: Skin is warm.   Neurological:      Mental Status: He is alert and oriented to person, place, and time. Mental status is at baseline.         Results Review:  I have reviewed the labs, radiology results, and diagnostic studies.     Laboratory Data:          Results from last 7 days   Lab Units 07/02/25  0319 07/01/25  0430 06/30/25  0345   WBC 10*3/mm3 5.91 5.87 5.88   HEMOGLOBIN g/dL 8.3* 7.8* 8.0*   HEMATOCRIT % 26.0* 24.8* 24.9*   PLATELETS 10*3/mm3 170 163 163                  Results from last 7 days   Lab Units 07/02/25  0320 07/01/25  0430 06/30/25  0345 06/27/25  0337 06/26/25  0313   SODIUM mmol/L 137 138 138   < > 132*   POTASSIUM mmol/L 4.0 3.7 3.6   < > 4.0   CHLORIDE mmol/L 103 105 104   < > 101   CO2 mmol/L 22.0 22.0 21.0*   < > 20.0*   BUN mg/dL 42.1* 42.6* 44.5*   < > 63.5*   CREATININE mg/dL 4.00* 3.90* 3.73*   < > 3.96*   CALCIUM mg/dL 8.4* 8.4* 8.1*   < > 8.2*  "  BILIRUBIN mg/dL 0.4 0.4  --   --  0.8   ALK PHOS U/L 76 71  --   --  66   ALT (SGPT) U/L 10 10  --   --  18   AST (SGOT) U/L 11 10  --   --  25   GLUCOSE mg/dL 85 91 96   < > 140*    < > = values in this interval not displayed.         Culture Data:   No results found for: \"BLOODCX\", \"URINECX\", \"WOUNDCX\", \"MRSACX\", \"RESPCX\", \"STOOLCX\"     Radiology Data:   Imaging Results (Last 24 Hours)         ** No results found for the last 24 hours. **                I have reviewed the patient's current medications.      Assessment/Plan   Assessment       Active Hospital Problems     Diagnosis      **Non-STEMI (non-ST elevated myocardial infarction)      Hypertensive crisis      Congestive heart failure      Seizure disorder      Chronic systolic (congestive) heart failure           Treatment Plan     -NSTEMI  Cardiology was consulted and recommended hemodialysis as indicated, echocardiogram showed very low ejection fraction at 21%, patient may benefit from AICD.  Cardiologist switched Imdur to Isordil, added amlodipine and continued hydralazine and Coreg.  Anticoagulation was contraindicated because of recent GI bleed and patient was anemic.  Patient has a history of noncompliance, cocaine abuse and therefore beta-blocker will be avoided.     -ESRD with impending hemodialysis  Nephrology is on consult to help with management.  Nephrologist has ruled out initiation of hemodialysis at this point, patient's acute renal failure improved.     -Uncontrolled blood pressure  Patient was on Entresto at home, currently on losartan, Norvasc, Coreg and hydralazine as well as as needed IV hydralazine.     -Recent history of present GI bleed  Anticoagulation is avoided, patient's hemoglobin is also low.  We will continue patient on PPI     -Acute on chronic anemia secondary to end-stage renal disease/recent GI bleed  Patient is status post 2 units of PRBC this admission.     DVT prophylaxis-SCD     Disposition-follow cardiologist and " nephrologist recommendations/discharge planning.        Electronically signed by Jenna Ospina MD, 07/02/25, 10:52 CDT.                 ate/Time Temp Pulse Resp BP Patient Position Device (Oxygen Therapy) SpO2   07/02/25 1053 97.5 (36.4) 90 16 137/71 Sitting room air 100   07/02/25 0800 -- -- -- -- -- room air --   07/02/25 0747 98.1 (36.7) 88 16 148/89 Lying room air 99   07/02/25 0308 98.3 (36.8) 90 17 149/87 Lying room air 98   07/0                    Patient Communication    CBC & Differential     Not Released  Not seen        CBC Auto Differential     Not Released  Not seen     Results   CBC & Differential (Order [id number])  Linked Results    Procedure Abnormality Status   CBC Auto Differential Abnormal Abnormal  Final result      Contains abnormal data CBC & Differential  Order: [id number]   Status: Final result    Test Result Released: No    Specimen Information: Blood   0 Result Notes       Specimen Collected: 07/02/25 03:19 CDT Last Resulted: 07/02/25 03:58 CDT       Order Details        View Encounter        Lab and Collection Details        Routing        Result History     View All Conversations on this Encounter     Result Care Coordination      Patient Communication     Not Released  Not seen Back to Top      Contains abnormal data CBC Auto Differential  Order: [id number] - Part of Panel Order [id number]   Status: Final result    Test Result Released: No    Specimen Information: Blood   0 Result Notes            Component  Ref Range & Units (hover) 03:19 1 d ago 2 d ago 3 d ago 4 d ago 5 d ago 6 d ago   WBC 5.91 5.87 5.88 5.97 6.91 6.27 5.98   RBC 2.86 Low  2.66 Low  2.66 Low  2.34 Low  2.59 Low  2.86 Low  2.62 Low    Hemoglobin 8.3 Low  7.8 Low  8.0 Low  6.9 Low Critical  7.2 Low  7.9 Low  7.0 Low    Hematocrit 26.0 Low  24.8 Low  24.9 Low  21.8 Low  23.4 Low  25.3 Low  23.0 Low    MCV 90.9 93.2 93.6 93.2 90.3 88.5 87.8   MCH 29.0 29.3 30.1 29.5 27.8 27.6 26.7   MCHC 31.9 31.5 32.1 31.7 30.8 Low  31.2  Low  30.4 Low    RDW 20.3 High  19.8 High  18.6 High  18.6 High  18.3 High  18.3 High  18.1 High    RDW-SD 60.9 High  61.1 High  60.4 High  60.2 High  58.8 High  57.3 High  56.7 High    MPV 11.1 11.0 12.2 High  11.4 11.6 11.2 11.3   Platelets 170 163 163 147 150 140 123 Low    Neutrophil % 64.8 62.7 62.8 65.7 67.7 61.7    Lymphocyte % 22.7 23.5 24.5 21.9 21.0 25.4    Monocyte % 8.5 9.9 9.5 9.2 8.0 7.5    Eosinophil % 2.9 2.7 2.2 2.2 2.3 4.3    Basophil % 0.8 0.9 0.5 0.7 0.7 0.8    Immature Grans % 0.3 0.3 0.5 0.3 0.3 0.3    Neutrophils, Absolute 3.83 3.68 3.69 3.92 4.68 3.87    Lymphocytes, Absolute 1.34 1.38 1.44 1.31 1.45 1.59    Monocytes, Absolute 0.50 0.58 0.56 0.55 0.55 0.47    Eosinophils, Absolute 0.17 0.16 0.13 0.13 0.16 0.27    Basophils, Absolute 0.05 0.05 0.03 0.04 0.05 0.05    Immature Grans, Absolute 0.02 0.02 0.03 0.02 0.02 0.02    nRBC 0.0 0.0                     Comprehensive Metabolic Panel [LAB17] (Order [id number])  Order  Date: 7/1/2025 Department: [address] Released By/Authorizing: Jenna Ospina MD (auto-released)     Reprint Order Requisition    Comprehensive Metabolic Panel (Order #[id number]) on 7/1/25         Contains abnormal data Comprehensive Metabolic Panel  Order: [id number]   Status: Final result       Next appt: 08/19/2025 at 10:00 AM in Radiology ([facility])    Test Result Released: No    Specimen Information: Blood   0 Result Notes            Component  Ref Range & Units (hover) 03:20 1 d ago 2 d ago 3 d ago 4 d ago 5 d ago 6 d ago   Glucose 85 91 96 98 106 High  123 High  140 High    BUN 42.1 High  42.6 High  44.5 High  51.2 High  55.8 High  57.4 High  63.5 High    Creatinine 4.00 High  3.90 High  3.73 High  4.03 High  4.29 High  3.97 High  3.96 High    Sodium 137 138 138 137 132 Low  133 Low  132 Low    Potassium 4.0 3.7 3.6 3.9 4.1 4.4 4.0   Chloride 103 105 104 104 101 100 101   CO2 22.0 22.0 21.0 Low  20.0 Low  21.0 Low  21.0 Low  20.0 Low    Calcium 8.4 Low   8.4 Low  8.1 Low  8.2 Low  8.0 Low  8.3 Low  8.2 Low    Total Protein 5.9 Low  5.6 Low      5.6 Low    Albumin 3.3 Low  3.1 Low      3.1 Low    ALT (SGPT) 10 10     18   AST (SGOT) 11 10     25   Alkaline Phosphatase 76 71     66   Total Bilirubin 0.4 0.4     0.8   Globulin 2.6 2.5     2.5   A/G Ratio 1.3 1.2     1.2   BUN/Creatinine Ratio 10.5 10.9 11.9 12.7 13.0 14.5 16.0   Anion Gap 12.0 11.0 13.0 13.0 10.0 12.0 11.0   eGFR 19.9 Low                     NM PET Cardiac Stress Radiology Interpretation (Order: [id number]) - 7/1/2025             Current Facility-Administered Medications   Medication Dose Route Frequency Provider Last Rate Last Admin    acetaminophen (TYLENOL) tablet 1,000 mg  1,000 mg Oral Q6H PRN Lesa Gonzalez MD   1,000 mg at 06/29/25 2317    aspirin EC tablet 81 mg  81 mg Oral Daily Lesa Gonzalez MD   81 mg at 07/02/25 0845    bumetanide (BUMEX) 25 mg/100mL (0.25 mg/mL) infusion  1 mg/hr Intravenous Continuous Jenna Ospina MD        calcitriol (ROCALTROL) capsule 0.5 mcg  0.5 mcg Oral Daily Robinson Preston MD   0.5 mcg at 07/02/25 0845    carvedilol (COREG) tablet 25 mg  25 mg Oral BID With Meals Marta Clemens, APRN   25 mg at 07/02/25 0845    epoetin yocasta-epbx (RETACRIT) injection 10,000 Units  10,000 Units Subcutaneous Once per day on Monday Wednesday Friday Robinson Preston MD   10,000 Units at 07/02/25 0846    hydrALAZINE (APRESOLINE) injection 10 mg  10 mg Intravenous Q6H PRN Lesa Gonzalez MD   10 mg at 06/26/25 1103    hydrALAZINE (APRESOLINE) tablet 100 mg  100 mg Oral Q8H Guille Aguirre MD   100 mg at 07/02/25 0625    isosorbide dinitrate (ISORDIL) tablet 20 mg  20 mg Oral TID - Nitrates Mani Mena MD   20 mg at 07/02/25 0845    levETIRAcetam (KEPPRA) tablet 500 mg  500 mg Oral BID Lesa Gonzalez MD   500 mg at 07/02/25 0845    losartan (COZAAR) tablet 25 mg  25 mg Oral Q24H Marta Clemens APRN   25 mg at 07/02/25 0845    nitroglycerin (NITROSTAT) SL tablet 0.4 mg  0.4  mg Sublingual Q5 Min PRN Lesa Gonzalez MD        ondansetron (ZOFRAN) injection 4 mg  4 mg Intravenous Q6H PRN Benjamin Dominguez MD   4 mg at 06/26/25 0840    pantoprazole (PROTONIX) EC tablet 40 mg  40 mg Oral BID AC Jenna Ospina MD   40 mg at 07/02/25 0845    sodium chloride 0.9 % flush 10 mL  10 mL Intravenous Q12H Lesa Gonzalez MD   10 mL at 07/02/25 0846    sodium chloride 0.9 % flush 10 mL  10 mL Intravenous PRN Lesa Gonzalez MD        sodium chloride 0.9 % infusion 40 mL  40 mL Intravenous PRN Lesa Gonzalez MD

## 2025-07-02 NOTE — PROGRESS NOTES
[facility] Medicine Services  INPATIENT PROGRESS NOTE    Patient Name: José Miguel Webb  Date of Admission: 6/25/2025  Today's Date: 07/02/25  Length of Stay: 7  Primary Care Physician: He Ortega MD    Subjective   Chief Complaint: Transferred for NSTEMI  History of Present Illness  This is a 30-year-old male patient with a medical history of CHF, cardiomegaly, hypertension, CKD , seizures, history of drug use, recent history of peptic ulcer with GI bleed, presenting to the ED for the evaluation of chest pain.  As reported, patient was initially evaluated at [facility] for chest pain and he was given loading dose of aspirin in addition to nitro dose.  Patient was complaining of left-sided chest pain radiating to his left upper extremity, that started around 3 PM.  He denies having shortness of breath or abdominal pain, fever or chills, nausea vomiting or diarrhea.  To note that he was admitted few days ago to our hospital with GI bleed and was found to have peptic ulcer, received an endoscopy, was on PPI twice daily, and received blood transfusions.  He was also evaluated during the hospitalization by the nephrology team, and he was planned for dialysis and permacath placement, however he decided to leave AMA.  Today he mentions that he does not want to leave AMA at this time and he is ready to receive the care.  6/30  Patient still complained of worsening lower extremity edema.  7/1  Before history of cardiomegaly cardiomyopathy EF 21 to 25% nonischemic hypertension chronic kidney disease drug abuse peptic ulcer disease seizures presented with chest pain non-STEMI was transferred to us per cardiology felt elevated troponin secondary to CHF chronic kidney disease appreciate nephrology remains on IV Bumex continue monitoring continue medications appreciate nephrology  7/2  As before the patient actually gained about 24 pounds Per RD we will go ahead and  start him on Bumex drip appreciate cardiology and nephrology continue monitoring again emphasized fluid restriction might need to be evaluated for sleep apnea  Review of Systems   All pertinent negatives and positives are as above. All other systems have been reviewed and are negative unless otherwise stated.     Objective    Temp:  [97.9 °F (36.6 °C)-98.6 °F (37 °C)] 98.1 °F (36.7 °C)  Heart Rate:  [81-92] 88  Resp:  [16-18] 16  BP: (120-150)/(63-89) 148/89  Physical Exam      Constitutional:       Appearance: He is ill-appearing.   Cardiovascular:      Rate and Rhythm: Normal rate and regular rhythm.      Pulses: Normal pulses.      Heart sounds: Normal heart sounds. No murmur heard.  Pulmonary:      Effort: Pulmonary effort is normal. No respiratory distress.      Breath sounds: Normal breath sounds. No wheezing or rales.   Abdominal:      General: Bowel sounds are normal. There is no distension.      Palpations: Abdomen is soft.      Tenderness: There is no abdominal tenderness. There is no guarding.   Musculoskeletal:      Right lower leg: Edema present.      Left lower leg: Edema present.   Skin:     General: Skin is warm.   Neurological:      Mental Status: He is alert and oriented to person, place, and time. Mental status is at baseline.       Results Review:  I have reviewed the labs, radiology results, and diagnostic studies.    Laboratory Data:   Results from last 7 days   Lab Units 07/02/25  0319 07/01/25  0430 06/30/25  0345   WBC 10*3/mm3 5.91 5.87 5.88   HEMOGLOBIN g/dL 8.3* 7.8* 8.0*   HEMATOCRIT % 26.0* 24.8* 24.9*   PLATELETS 10*3/mm3 170 163 163        Results from last 7 days   Lab Units 07/02/25  0320 07/01/25  0430 06/30/25  0345 06/27/25  0337 06/26/25  0313   SODIUM mmol/L 137 138 138   < > 132*   POTASSIUM mmol/L 4.0 3.7 3.6   < > 4.0   CHLORIDE mmol/L 103 105 104   < > 101   CO2 mmol/L 22.0 22.0 21.0*   < > 20.0*   BUN mg/dL 42.1* 42.6* 44.5*   < > 63.5*   CREATININE mg/dL 4.00* 3.90* 3.73*  "  < > 3.96*   CALCIUM mg/dL 8.4* 8.4* 8.1*   < > 8.2*   BILIRUBIN mg/dL 0.4 0.4  --   --  0.8   ALK PHOS U/L 76 71  --   --  66   ALT (SGPT) U/L 10 10  --   --  18   AST (SGOT) U/L 11 10  --   --  25   GLUCOSE mg/dL 85 91 96   < > 140*    < > = values in this interval not displayed.       Culture Data:   No results found for: \"BLOODCX\", \"URINECX\", \"WOUNDCX\", \"MRSACX\", \"RESPCX\", \"STOOLCX\"    Radiology Data:   Imaging Results (Last 24 Hours)       ** No results found for the last 24 hours. **            I have reviewed the patient's current medications.     Assessment/Plan   Assessment  Active Hospital Problems    Diagnosis     **Non-STEMI (non-ST elevated myocardial infarction)     Hypertensive crisis     Congestive heart failure     Seizure disorder     Chronic systolic (congestive) heart failure        Treatment Plan    -NSTEMI  Cardiology was consulted and recommended hemodialysis as indicated, echocardiogram showed very low ejection fraction at 21%, patient may benefit from AICD.  Cardiologist switched Imdur to Isordil, added amlodipine and continued hydralazine and Coreg.  Anticoagulation was contraindicated because of recent GI bleed and patient was anemic.  Patient has a history of noncompliance, cocaine abuse and therefore beta-blocker will be avoided.    -ESRD with impending hemodialysis  Nephrology is on consult to help with management.  Nephrologist has ruled out initiation of hemodialysis at this point, patient's acute renal failure improved.    -Uncontrolled blood pressure  Patient was on Entresto at home, currently on losartan, Norvasc, Coreg and hydralazine as well as as needed IV hydralazine.    -Recent history of present GI bleed  Anticoagulation is avoided, patient's hemoglobin is also low.  We will continue patient on PPI    -Acute on chronic anemia secondary to end-stage renal disease/recent GI bleed  Patient is status post 2 units of PRBC this admission.    DVT " prophylaxis-SCD    Disposition-follow cardiologist and nephrologist recommendations/discharge planning.      Electronically signed by Jenna Ospina MD, 07/02/25, 10:52 CDT.

## 2025-07-02 NOTE — PROGRESS NOTES
Nutrition Services    Patient Name:  José Miguel Webb  YOB: 1995  MRN: [id number]  Admit Date:  6/25/2025    Patient Name: José Miguel Webb  YOB: 1995  MRN: [id number]  Admission date: 6/25/2025  Reason for Encounter: [facility] Clinical Nutrition Progress Note              Subjective: Pt reviewed this day r/t LOS protocol.  Remains at low risk for malnutrition.  Pt was admitted r/t NSTEMI.  Has a medical hx that includes CHF, cardiomegaly, HTN, CKD with ESRD, seizures, drug use, and peptic ulcer with GI bleed.  Nephrology following; no dialysis at this time.  Pt is noted to have B/L LE swelling and has gained 24# since admission.  Secure chat sen to attending physician to notify him of wt change.  Current wt: 216.8#.  Pt is eating 100% of meals and snacks.  Pt consuming ~1213 cc/day fluid.  UOP ~2325cc/day plus 6 unmeasured occurrences.  Last BM 6/28.  Dung Benjamin.  Will continue to monitor and f/u as needed throughout clinical course.        PO Diet: Diet: Cardiac; Healthy Heart (2-3 Na+); Fluid Consistency: Thin (IDDSI 0)   PO Supplements: None    PO Intake:  100%       Current nutrition support: N/A   Nutrition support review: N/A       Labs: Reviewed       GI Function:  Last BM 6/28        Brief Weight Review:    Wt Readings from Last 1 Encounters:   07/02/25 0308 98.3 kg (216 lb 12.8 oz)   07/01/25 0346 96.8 kg (213 lb 6.4 oz)   06/30/25 0615 95.2 kg (209 lb 12.8 oz)   06/29/25 0415 94.1 kg (207 lb 6.4 oz)   06/28/25 1100 92.3 kg (203 lb 6 oz)   06/25/25 2039 87.1 kg (192 lb)   06/25/25 1725 84.4 kg (186 lb)        Results from last 7 days   Lab Units 07/02/25  0320 07/01/25  0430 06/30/25  0345 06/27/25  0337 06/26/25  0313   SODIUM mmol/L 137 138 138   < > 132*   POTASSIUM mmol/L 4.0 3.7 3.6   < > 4.0   CHLORIDE mmol/L 103 105 104   < > 101   CO2 mmol/L 22.0 22.0 21.0*   < > 20.0*   BUN mg/dL 42.1* 42.6* 44.5*   < > 63.5*   CREATININE mg/dL 4.00* 3.90* 3.73*   < >  3.96*   CALCIUM mg/dL 8.4* 8.4* 8.1*   < > 8.2*   BILIRUBIN mg/dL 0.4 0.4  --   --  0.8   ALK PHOS U/L 76 71  --   --  66   ALT (SGPT) U/L 10 10  --   --  18   AST (SGOT) U/L 11 10  --   --  25   GLUCOSE mg/dL 85 91 96   < > 140*    < > = values in this interval not displayed.     Results from last 7 days   Lab Units 07/02/25  0319 07/01/25  0430 06/30/25  0345 06/29/25  0359 06/27/25  0337 06/26/25  0313   MAGNESIUM mg/dL  --  2.2 2.1 2.3   < > 2.1   PHOSPHORUS mg/dL  --   --   --   --   --  3.4   PLATELETS 10*3/mm3 170 163 163 147   < > 123*   HEMOGLOBIN g/dL 8.3* 7.8* 8.0* 6.9*   < > 7.0*   HEMATOCRIT % 26.0* 24.8* 24.9* 21.8*   < > 23.0*    < > = values in this interval not displayed.     Lab Results   Component Value Date    HGBA1C 5.40 06/30/2025       Electronically signed by:  Alvin Kilgore RD  07/02/25 10:35 CDT        Electronically signed by:  Alvin Kilgore RD  07/02/25 10:34 CDT

## 2025-07-02 NOTE — PROGRESS NOTES
[facility] -  Progress Note     LOS: 7 days   Patient Care Team:  He Ortega MD as PCP - General (Internal Medicine)    Chief Complaint: leg swelling    Subjective     Interval History:     Patient Complaints: patient is sitting on side of bed today. He reports that he is still having leg swelling. He was stared on bumex drip today. He denies any chest pain. He denies any shortness of breath. He denies any orthopnea or PND.     Review of Systems:     Review of Systems   Respiratory:  Negative for shortness of breath.    Cardiovascular:  Positive for leg swelling. Negative for chest pain and palpitations.   All other systems reviewed and are negative.    Objective     Vital Sign Min/Max for last 24 hours  Temp  Min: 97.5 °F (36.4 °C)  Max: 98.6 °F (37 °C)   BP  Min: 130/79  Max: 150/83   Pulse  Min: 81  Max: 92   Resp  Min: 16  Max: 18   SpO2  Min: 97 %  Max: 100 %   No data recorded   Weight  Min: 98.3 kg (216 lb 12.8 oz)  Max: 98.3 kg (216 lb 12.8 oz)         07/02/25  0308   Weight: 98.3 kg (216 lb 12.8 oz)       Intake/Output Summary (Last 24 hours) at 7/2/2025 1419  Last data filed at 7/2/2025 1053  Gross per 24 hour   Intake 120 ml   Output --   Net 120 ml     Telemetry: sinus rhythm rates 80-90's      Physical Exam:    Vitals reviewed.   Constitutional:       General: Not in acute distress.     Appearance: Normal appearance. Well-developed.   Eyes:      Pupils: Pupils are equal, round, and reactive to light.   HENT:      Head: Normocephalic and atraumatic.      Nose: Nose normal.   Neck:      Vascular: No carotid bruit.   Pulmonary:      Effort: Pulmonary effort is normal. No respiratory distress.      Breath sounds: Normal breath sounds. No wheezing. No rales.   Cardiovascular:      Normal rate. Regular rhythm.      Murmurs: There is no murmur.   Edema:     Peripheral edema present.     Ankle: bilateral 2+ edema of the ankle.     Feet: bilateral 3+ edema of the feet.  Abdominal:       General: There is no distension.      Palpations: Abdomen is soft.   Musculoskeletal: Normal range of motion.      Cervical back: Normal range of motion and neck supple. Skin:     General: Skin is warm.      Findings: No erythema or rash.   Neurological:      General: No focal deficit present.      Mental Status: Alert and oriented to person, place, and time.   Psychiatric:         Attention and Perception: Attention normal.         Mood and Affect: Mood normal.         Speech: Speech normal.         Behavior: Behavior normal.         Thought Content: Thought content normal.         Judgment: Judgment normal.       Results Review:   Lab Results (last 72 hours)       Procedure Component Value Units Date/Time    Comprehensive Metabolic Panel [[id number]]  (Abnormal) Collected: 07/02/25 0320    Specimen: Blood Updated: 07/02/25 0429     Glucose 85 mg/dL      BUN 42.1 mg/dL      Creatinine 4.00 mg/dL      Sodium 137 mmol/L      Potassium 4.0 mmol/L      Chloride 103 mmol/L      CO2 22.0 mmol/L      Calcium 8.4 mg/dL      Total Protein 5.9 g/dL      Albumin 3.3 g/dL      ALT (SGPT) 10 U/L      AST (SGOT) 11 U/L      Alkaline Phosphatase 76 U/L      Total Bilirubin 0.4 mg/dL      Globulin 2.6 gm/dL      A/G Ratio 1.3 g/dL      BUN/Creatinine Ratio 10.5     Anion Gap 12.0 mmol/L      eGFR 19.9 mL/min/1.73     Narrative:      GFR Categories in Chronic Kidney Disease (CKD)              GFR Category          GFR (mL/min/1.73)    Interpretation  G1                    90 or greater        Normal or high (1)  G2                    60-89                Mild decrease (1)  G3a                   45-59                Mild to moderate decrease  G3b                   30-44                Moderate to severe decrease  G4                    15-29                Severe decrease  G5                    14 or less           Kidney failure    (1)In the absence of evidence of kidney disease, neither GFR category G1 or G2 fulfill the  criteria for CKD.    eGFR calculation 2021 CKD-EPI creatinine equation, which does not include race as a factor    CBC & Differential [[id number]]  (Abnormal) Collected: 07/02/25 0319    Specimen: Blood Updated: 07/02/25 0358    Narrative:      The following orders were created for panel order CBC & Differential.  Procedure                               Abnormality         Status                     ---------                               -----------         ------                     CBC Auto Differential[[id number]]        Abnormal            Final result                 Please view results for these tests on the individual orders.    CBC Auto Differential [[id number]]  (Abnormal) Collected: 07/02/25 0319    Specimen: Blood Updated: 07/02/25 0358     WBC 5.91 10*3/mm3      RBC 2.86 10*6/mm3      Hemoglobin 8.3 g/dL      Hematocrit 26.0 %      MCV 90.9 fL      MCH 29.0 pg      MCHC 31.9 g/dL      RDW 20.3 %      RDW-SD 60.9 fl      MPV 11.1 fL      Platelets 170 10*3/mm3      Neutrophil % 64.8 %      Lymphocyte % 22.7 %      Monocyte % 8.5 %      Eosinophil % 2.9 %      Basophil % 0.8 %      Immature Grans % 0.3 %      Neutrophils, Absolute 3.83 10*3/mm3      Lymphocytes, Absolute 1.34 10*3/mm3      Monocytes, Absolute 0.50 10*3/mm3      Eosinophils, Absolute 0.17 10*3/mm3      Basophils, Absolute 0.05 10*3/mm3      Immature Grans, Absolute 0.02 10*3/mm3      nRBC 0.0 /100 WBC     Comprehensive Metabolic Panel [[id number]]  (Abnormal) Collected: 07/01/25 0430    Specimen: Blood Updated: 07/01/25 0540     Glucose 91 mg/dL      BUN 42.6 mg/dL      Creatinine 3.90 mg/dL      Sodium 138 mmol/L      Potassium 3.7 mmol/L      Chloride 105 mmol/L      CO2 22.0 mmol/L      Calcium 8.4 mg/dL      Total Protein 5.6 g/dL      Albumin 3.1 g/dL      ALT (SGPT) 10 U/L      AST (SGOT) 10 U/L      Alkaline Phosphatase 71 U/L      Total Bilirubin 0.4 mg/dL      Globulin 2.5 gm/dL      A/G Ratio 1.2 g/dL      BUN/Creatinine Ratio 10.9      Anion Gap 11.0 mmol/L      eGFR 20.5 mL/min/1.73     Narrative:      GFR Categories in Chronic Kidney Disease (CKD)              GFR Category          GFR (mL/min/1.73)    Interpretation  G1                    90 or greater        Normal or high (1)  G2                    60-89                Mild decrease (1)  G3a                   45-59                Mild to moderate decrease  G3b                   30-44                Moderate to severe decrease  G4                    15-29                Severe decrease  G5                    14 or less           Kidney failure    (1)In the absence of evidence of kidney disease, neither GFR category G1 or G2 fulfill the criteria for CKD.    eGFR calculation 2021 CKD-EPI creatinine equation, which does not include race as a factor    Magnesium [[id number]]  (Normal) Collected: 07/01/25 0430    Specimen: Blood Updated: 07/01/25 0540     Magnesium 2.2 mg/dL     CBC & Differential [[id number]]  (Abnormal) Collected: 07/01/25 0430    Specimen: Blood Updated: 07/01/25 0508    Narrative:      The following orders were created for panel order CBC & Differential.  Procedure                               Abnormality         Status                     ---------                               -----------         ------                     CBC Auto Differential[[id number]]        Abnormal            Final result                 Please view results for these tests on the individual orders.    CBC Auto Differential [[id number]]  (Abnormal) Collected: 07/01/25 0430    Specimen: Blood Updated: 07/01/25 0508     WBC 5.87 10*3/mm3      RBC 2.66 10*6/mm3      Hemoglobin 7.8 g/dL      Hematocrit 24.8 %      MCV 93.2 fL      MCH 29.3 pg      MCHC 31.5 g/dL      RDW 19.8 %      RDW-SD 61.1 fl      MPV 11.0 fL      Platelets 163 10*3/mm3      Neutrophil % 62.7 %      Lymphocyte % 23.5 %      Monocyte % 9.9 %      Eosinophil % 2.7 %      Basophil % 0.9 %      Immature Grans % 0.3 %      Neutrophils,  Absolute 3.68 10*3/mm3      Lymphocytes, Absolute 1.38 10*3/mm3      Monocytes, Absolute 0.58 10*3/mm3      Eosinophils, Absolute 0.16 10*3/mm3      Basophils, Absolute 0.05 10*3/mm3      Immature Grans, Absolute 0.02 10*3/mm3      nRBC 0.0 /100 WBC     Hemoglobin A1c [[id number]]  (Normal) Collected: 06/30/25 0345    Specimen: Blood Updated: 06/30/25 2005     Hemoglobin A1C 5.40 %     Narrative:      Hemoglobin A1C Ranges:    Increased Risk for Diabetes  5.7% to 6.4%  Diabetes                     >= 6.5%  Diabetic Goal                < 7.0%    Basic Metabolic Panel [[id number]]  (Abnormal) Collected: 06/30/25 0345    Specimen: Blood Updated: 06/30/25 0502     Glucose 96 mg/dL      BUN 44.5 mg/dL      Creatinine 3.73 mg/dL      Sodium 138 mmol/L      Potassium 3.6 mmol/L      Chloride 104 mmol/L      CO2 21.0 mmol/L      Calcium 8.1 mg/dL      BUN/Creatinine Ratio 11.9     Anion Gap 13.0 mmol/L      eGFR 21.7 mL/min/1.73     Narrative:      GFR Categories in Chronic Kidney Disease (CKD)              GFR Category          GFR (mL/min/1.73)    Interpretation  G1                    90 or greater        Normal or high (1)  G2                    60-89                Mild decrease (1)  G3a                   45-59                Mild to moderate decrease  G3b                   30-44                Moderate to severe decrease  G4                    15-29                Severe decrease  G5                    14 or less           Kidney failure    (1)In the absence of evidence of kidney disease, neither GFR category G1 or G2 fulfill the criteria for CKD.    eGFR calculation 2021 CKD-EPI creatinine equation, which does not include race as a factor    Magnesium [[id number]]  (Normal) Collected: 06/30/25 0345    Specimen: Blood Updated: 06/30/25 0502     Magnesium 2.1 mg/dL     CBC & Differential [[id number]]  (Abnormal) Collected: 06/30/25 0345    Specimen: Blood Updated: 06/30/25 0445    Narrative:      The following orders were  created for panel order CBC & Differential.  Procedure                               Abnormality         Status                     ---------                               -----------         ------                     CBC Auto Differential[[id number]]        Abnormal            Final result                 Please view results for these tests on the individual orders.    CBC Auto Differential [[id number]]  (Abnormal) Collected: 06/30/25 0345    Specimen: Blood Updated: 06/30/25 0445     WBC 5.88 10*3/mm3      RBC 2.66 10*6/mm3      Hemoglobin 8.0 g/dL      Hematocrit 24.9 %      MCV 93.6 fL      MCH 30.1 pg      MCHC 32.1 g/dL      RDW 18.6 %      RDW-SD 60.4 fl      MPV 12.2 fL      Platelets 163 10*3/mm3      Neutrophil % 62.8 %      Lymphocyte % 24.5 %      Monocyte % 9.5 %      Eosinophil % 2.2 %      Basophil % 0.5 %      Immature Grans % 0.5 %      Neutrophils, Absolute 3.69 10*3/mm3      Lymphocytes, Absolute 1.44 10*3/mm3      Monocytes, Absolute 0.56 10*3/mm3      Eosinophils, Absolute 0.13 10*3/mm3      Basophils, Absolute 0.03 10*3/mm3      Immature Grans, Absolute 0.03 10*3/mm3      nRBC 0.0 /100 WBC              Echo EF Estimated  Results for orders placed during the hospital encounter of 06/25/25    Adult Transthoracic Echo Complete W/ Cont if Necessary Per Protocol    Interpretation Summary  Images from the original result were not included.      Left ventricular systolic function is severely decreased. Automated 2D EF = 21%  Left ventricular ejection fraction appears to be 21 - 25%.    The left ventricular cavity is moderately dilated.    Left ventricular diastolic function is consistent with (grade II w/high LAP) pseudonormalization.    Left atrial volume is severely increased.    The right atrial cavity is moderately dilated.    Moderate pulmonary hypertension is present.    There is a small (<1cm) pericardial effusion. There is no evidence of cardiac tamponade.    Compared to prior echo dated  "11/24/2024 LVEF has decreased from 31-35 % to current above      Abnormal left ventricular global strain with left ventricular apical sparing.  Recommend workup for cardiac amyloid.    Cath Ejection Fraction Quantitative  No results found for: \"CATHEF\"      Medication Review: yes  Current Facility-Administered Medications   Medication Dose Route Frequency Provider Last Rate Last Admin    acetaminophen (TYLENOL) tablet 1,000 mg  1,000 mg Oral Q6H PRN Lesa Gonzalez MD   1,000 mg at 06/29/25 2317    aspirin EC tablet 81 mg  81 mg Oral Daily Lesa Gonzalez MD   81 mg at 07/02/25 0845    bumetanide (BUMEX) 25 mg/100mL (0.25 mg/mL) infusion  1 mg/hr Intravenous Continuous Jenna Ospina MD 4 mL/hr at 07/02/25 1248 1 mg/hr at 07/02/25 1248    calcitriol (ROCALTROL) capsule 0.5 mcg  0.5 mcg Oral Daily Robinson Preston MD   0.5 mcg at 07/02/25 0845    carvedilol (COREG) tablet 25 mg  25 mg Oral BID With Meals Marta Clemens APRN   25 mg at 07/02/25 0845    epoetin yocasta-epbx (RETACRIT) injection 10,000 Units  10,000 Units Subcutaneous Once per day on Monday Wednesday Friday Robinson Preston MD   10,000 Units at 07/02/25 0846    hydrALAZINE (APRESOLINE) injection 10 mg  10 mg Intravenous Q6H PRN Lesa Gonzalez MD   10 mg at 06/26/25 1103    hydrALAZINE (APRESOLINE) tablet 100 mg  100 mg Oral Q8H Guille Aguirre MD   100 mg at 07/02/25 0625    isosorbide dinitrate (ISORDIL) tablet 20 mg  20 mg Oral TID - Nitrates Mani Mena MD   20 mg at 07/02/25 1248    levETIRAcetam (KEPPRA) tablet 500 mg  500 mg Oral BID Lesa Gonzalez MD   500 mg at 07/02/25 0845    losartan (COZAAR) tablet 25 mg  25 mg Oral Q24H Marta Clemens APRN   25 mg at 07/02/25 0845    nitroglycerin (NITROSTAT) SL tablet 0.4 mg  0.4 mg Sublingual Q5 Min PRN Lesa Gonzalez MD        ondansetron (ZOFRAN) injection 4 mg  4 mg Intravenous Q6H PRN Benjamin Dominguez MD   4 mg at 06/26/25 0840    pantoprazole (PROTONIX) EC tablet 40 mg  40 mg Oral BID AC " Jenna Ospina MD   40 mg at 07/02/25 0845    sodium chloride 0.9 % flush 10 mL  10 mL Intravenous Q12H Lesa Gonzalez MD   10 mL at 07/02/25 0846    sodium chloride 0.9 % flush 10 mL  10 mL Intravenous PRN Lesa Gonzalez MD        sodium chloride 0.9 % infusion 40 mL  40 mL Intravenous PRN Lesa Gonzalez MD         Assessment & Plan   -Congestive heart failure: LVEF 21-25% on echo 6/25/2025. Continue carvedilol, hydralalzine, isordil and losartan. Cardiac PET for ischemic evaluation to be done outpatient. Discussed LifeVest with patient and he declines at this time     -Acute on chronic kidney disease stage IV: Cr today 4.0. Baseline 4.0. Nephrology following     -anemia: hgb 8.3 today     -hypertension      -seizure disorder     Plan:   - patient was switched to bumex drip  - continue carvedilol, hydralalzine, isordil, and losartan  - continue aspirin and amlodipine  - continue to monitor kidney function and electrolytes closely   - continue to monitor I/O and daily weights  - continue to monitor H & H and transfuse as needed  - optimize GDMT as tolerated  - Cardiac PET ordered for ischemic evaluation to be done outpatient      Electronically signed by SUKHDEV Kelly, 07/02/25, 2:18 PM CDT.

## 2025-07-02 NOTE — PLAN OF CARE
Goal Outcome Evaluation:   Patient has a great appetite- since my shift, Patient has eaten approx 10 packs of [employer] with peanut butter, a sandwich box, 2 puddings, 3 apple juices and 3 orange juices. C/O BLE pain from swelling, patient requested Norco for pain, Up ad charli walking the halls, Scheduled PET for outpatient

## 2025-07-02 NOTE — PLAN OF CARE
Goal Outcome Evaluation:  Plan of Care Reviewed With: patient        Progress: improving  Outcome Evaluation: BLE edema noted. Started on Bumex gtt at 1 mg/hr. Having good urine output. No c/o pain. Up ad charli.

## 2025-07-03 LAB
ALBUMIN SERPL-MCNC: 3.4 G/DL (ref 3.5–5.2)
ALBUMIN/GLOB SERPL: 1.2 G/DL
ALP SERPL-CCNC: 77 U/L (ref 39–117)
ALT SERPL W P-5'-P-CCNC: 11 U/L (ref 1–41)
ANION GAP SERPL CALCULATED.3IONS-SCNC: 13 MMOL/L (ref 5–15)
AST SERPL-CCNC: 11 U/L (ref 1–40)
BASOPHILS # BLD AUTO: 0.04 10*3/MM3 (ref 0–0.2)
BASOPHILS NFR BLD AUTO: 0.7 % (ref 0–1.5)
BILIRUB SERPL-MCNC: 0.4 MG/DL (ref 0–1.2)
BUN SERPL-MCNC: 45.5 MG/DL (ref 6–20)
BUN/CREAT SERPL: 11.5 (ref 7–25)
CALCIUM SPEC-SCNC: 8.9 MG/DL (ref 8.6–10.5)
CHLORIDE SERPL-SCNC: 102 MMOL/L (ref 98–107)
CO2 SERPL-SCNC: 25 MMOL/L (ref 22–29)
CREAT SERPL-MCNC: 3.97 MG/DL (ref 0.76–1.27)
DEPRECATED RDW RBC AUTO: 66.1 FL (ref 37–54)
EGFRCR SERPLBLD CKD-EPI 2021: 20.1 ML/MIN/1.73
EOSINOPHIL # BLD AUTO: 0.17 10*3/MM3 (ref 0–0.4)
EOSINOPHIL NFR BLD AUTO: 3 % (ref 0.3–6.2)
ERYTHROCYTE [DISTWIDTH] IN BLOOD BY AUTOMATED COUNT: 20.4 % (ref 12.3–15.4)
GLOBULIN UR ELPH-MCNC: 2.8 GM/DL
GLUCOSE SERPL-MCNC: 95 MG/DL (ref 65–99)
HCT VFR BLD AUTO: 27 % (ref 37.5–51)
HGB BLD-MCNC: 8.3 G/DL (ref 13–17.7)
IMM GRANULOCYTES # BLD AUTO: 0.02 10*3/MM3 (ref 0–0.05)
IMM GRANULOCYTES NFR BLD AUTO: 0.3 % (ref 0–0.5)
LYMPHOCYTES # BLD AUTO: 1.2 10*3/MM3 (ref 0.7–3.1)
LYMPHOCYTES NFR BLD AUTO: 20.9 % (ref 19.6–45.3)
MCH RBC QN AUTO: 28.4 PG (ref 26.6–33)
MCHC RBC AUTO-ENTMCNC: 30.7 G/DL (ref 31.5–35.7)
MCV RBC AUTO: 92.5 FL (ref 79–97)
MONOCYTES # BLD AUTO: 0.47 10*3/MM3 (ref 0.1–0.9)
MONOCYTES NFR BLD AUTO: 8.2 % (ref 5–12)
NEUTROPHILS NFR BLD AUTO: 3.85 10*3/MM3 (ref 1.7–7)
NEUTROPHILS NFR BLD AUTO: 66.9 % (ref 42.7–76)
NRBC BLD AUTO-RTO: 0 /100 WBC (ref 0–0.2)
PLATELET # BLD AUTO: 178 10*3/MM3 (ref 140–450)
PMV BLD AUTO: 11.1 FL (ref 6–12)
POTASSIUM SERPL-SCNC: 4 MMOL/L (ref 3.5–5.2)
PROT SERPL-MCNC: 6.2 G/DL (ref 6–8.5)
RBC # BLD AUTO: 2.92 10*6/MM3 (ref 4.14–5.8)
SODIUM SERPL-SCNC: 140 MMOL/L (ref 136–145)
WBC NRBC COR # BLD AUTO: 5.75 10*3/MM3 (ref 3.4–10.8)

## 2025-07-03 PROCEDURE — 80053 COMPREHEN METABOLIC PANEL: CPT | Performed by: HOSPITALIST

## 2025-07-03 PROCEDURE — 99232 SBSQ HOSP IP/OBS MODERATE 35: CPT | Performed by: NURSE PRACTITIONER

## 2025-07-03 PROCEDURE — 25010000002 BUMETANIDE PER 0.5 MG: Performed by: INTERNAL MEDICINE

## 2025-07-03 PROCEDURE — 25010000002 BUMETANIDE PER 0.5 MG: Performed by: HOSPITALIST

## 2025-07-03 PROCEDURE — 85025 COMPLETE CBC W/AUTO DIFF WBC: CPT | Performed by: HOSPITALIST

## 2025-07-03 RX ORDER — BUMETANIDE 0.25 MG/ML
2 INJECTION, SOLUTION INTRAMUSCULAR; INTRAVENOUS EVERY 12 HOURS
Status: DISCONTINUED | OUTPATIENT
Start: 2025-07-03 | End: 2025-07-04

## 2025-07-03 RX ADMIN — LEVETIRACETAM 500 MG: 500 TABLET, FILM COATED ORAL at 08:15

## 2025-07-03 RX ADMIN — CALCITRIOL CAPSULES 0.25 MCG 0.5 MCG: 0.25 CAPSULE ORAL at 08:14

## 2025-07-03 RX ADMIN — HYDRALAZINE HYDROCHLORIDE 100 MG: 50 TABLET ORAL at 21:21

## 2025-07-03 RX ADMIN — ISOSORBIDE DINITRATE 20 MG: 10 TABLET ORAL at 08:15

## 2025-07-03 RX ADMIN — LEVETIRACETAM 500 MG: 500 TABLET, FILM COATED ORAL at 21:21

## 2025-07-03 RX ADMIN — ASPIRIN 81 MG: 81 TABLET, COATED ORAL at 08:14

## 2025-07-03 RX ADMIN — ACETAMINOPHEN 1000 MG: 500 TABLET, FILM COATED ORAL at 11:08

## 2025-07-03 RX ADMIN — CARVEDILOL 25 MG: 25 TABLET, FILM COATED ORAL at 17:17

## 2025-07-03 RX ADMIN — BUMETANIDE 1 MG/HR: 0.25 INJECTION INTRAMUSCULAR; INTRAVENOUS at 07:24

## 2025-07-03 RX ADMIN — HYDRALAZINE HYDROCHLORIDE 100 MG: 50 TABLET ORAL at 13:01

## 2025-07-03 RX ADMIN — BUMETANIDE 2 MG: 0.25 INJECTION INTRAMUSCULAR; INTRAVENOUS at 17:18

## 2025-07-03 RX ADMIN — ISOSORBIDE DINITRATE 20 MG: 10 TABLET ORAL at 13:01

## 2025-07-03 RX ADMIN — Medication 10 ML: at 21:21

## 2025-07-03 RX ADMIN — PANTOPRAZOLE SODIUM 40 MG: 40 TABLET, DELAYED RELEASE ORAL at 17:17

## 2025-07-03 RX ADMIN — CARVEDILOL 25 MG: 25 TABLET, FILM COATED ORAL at 08:14

## 2025-07-03 RX ADMIN — PANTOPRAZOLE SODIUM 40 MG: 40 TABLET, DELAYED RELEASE ORAL at 08:15

## 2025-07-03 RX ADMIN — ISOSORBIDE DINITRATE 20 MG: 10 TABLET ORAL at 17:17

## 2025-07-03 RX ADMIN — LOSARTAN POTASSIUM 25 MG: 25 TABLET, FILM COATED ORAL at 08:15

## 2025-07-03 RX ADMIN — HYDRALAZINE HYDROCHLORIDE 100 MG: 50 TABLET ORAL at 06:10

## 2025-07-03 RX ADMIN — ACETAMINOPHEN 1000 MG: 500 TABLET, FILM COATED ORAL at 17:17

## 2025-07-03 NOTE — PLAN OF CARE
Goal Outcome Evaluation:  Plan of Care Reviewed With: patient        Progress: improving  Outcome Evaluation: Bumex gtt stopped, started on 2 mg IV BID. Edema improving. Tylenol given once for pain. Cont to monitor.

## 2025-07-03 NOTE — PLAN OF CARE
Goal Outcome Evaluation:  Plan of Care Reviewed With: patient           Outcome Evaluation: Bumex gtt infusing. No new changes this shift

## 2025-07-03 NOTE — PROGRESS NOTES
Nephrology ([facility]) Progress Note      Patient:  José Miguel Webb  YOB: 1995  Date of Service: 7/3/2025  MRN: [id number]   Acct: [id number]   Primary Care Physician: He Ortega MD  Advance Directive:   Code Status and Medical Interventions: CPR (Attempt to Resuscitate); Full Support   Ordered at: 06/25/25 2000     Code Status (Patient has no pulse and is not breathing):    CPR (Attempt to Resuscitate)     Medical Interventions (Patient has pulse or is breathing):    Full Support     Level Of Support Discussed With:    Patient     Admit Date: 6/25/2025       Hospital Day: 8  Referring Provider: Nicola Redd MD      Patient personally seen and examined.  Complete chart including Consults, Notes, Operative Reports, Labs, Cardiology, and Radiology studies reviewed as able.    Chief complaint: Abnormal labs.    Subjective:  José Miguel Webb is a 30 y.o. male for whom we were consulted for evaluation and treatment of chronic kidney disease. Baseline chronic kidney disease stage 4, baseline creatinine approximately 4.0. Has followed with our providers on a sporadic basis over the years, typically no-shows for his office visits. History of hypertension, CHF, seizure disorder. Patient was recently treated at [facility] for GI bleed and MINDY. He was scheduled to have permcath placed and to start hemodialysis but left AMA on 6/24.  Presented to [facility] on 6/25 with chest pain and was transferred to [facility] for cardiology and nephrology evaluation. Creatinine 3.99 when admitted this time.  No complaint of pain or dyspnea at time of initial nephrology exam. Denied n/v/d. Denied edema. No recent changes in urination. He explained that he is willing to proceed with dialysis if it is needed    This afternoon patient feels well.  He is currently on Bumex drip and has good urine output.  However he wants to change to intermittent diuretics.  He  is currently complaining of generalized bodyaches.      Allergies:  Amoxicillin and Penicillins    Home Meds:  Medications Prior to Admission   Medication Sig Dispense Refill Last Dose/Taking    [Paused] sacubitril-valsartan (Entresto) 49-51 MG tablet Take 1 tablet by mouth 2 (Two) Times a Day. 180 tablet 3        Medicines:  Current Facility-Administered Medications   Medication Dose Route Frequency Provider Last Rate Last Admin    acetaminophen (TYLENOL) tablet 1,000 mg  1,000 mg Oral Q6H PRN Lesa Gonzalez MD   1,000 mg at 07/03/25 1108    aspirin EC tablet 81 mg  81 mg Oral Daily Lesa Gonzalez MD   81 mg at 07/03/25 0814    bumetanide (BUMEX) 25 mg/100mL (0.25 mg/mL) infusion  1 mg/hr Intravenous Continuous Jenna Ospina MD 4 mL/hr at 07/03/25 0724 1 mg/hr at 07/03/25 0724    calcitriol (ROCALTROL) capsule 0.5 mcg  0.5 mcg Oral Daily Robinson Preston MD   0.5 mcg at 07/03/25 0814    carvedilol (COREG) tablet 25 mg  25 mg Oral BID With Meals Marta Clemens APRN   25 mg at 07/03/25 0814    epoetin yocasta-epbx (RETACRIT) injection 10,000 Units  10,000 Units Subcutaneous Once per day on Monday Wednesday Friday Robinson Preston MD   10,000 Units at 07/02/25 0846    hydrALAZINE (APRESOLINE) injection 10 mg  10 mg Intravenous Q6H PRN Lesa Gonzalez MD   10 mg at 06/26/25 1103    hydrALAZINE (APRESOLINE) tablet 100 mg  100 mg Oral Q8H Guille Aguirre MD   100 mg at 07/03/25 1301    isosorbide dinitrate (ISORDIL) tablet 20 mg  20 mg Oral TID - Nitrates Mani Mena MD   20 mg at 07/03/25 1301    levETIRAcetam (KEPPRA) tablet 500 mg  500 mg Oral BID Lesa Gonzalez MD   500 mg at 07/03/25 0815    losartan (COZAAR) tablet 25 mg  25 mg Oral Q24H Marta Clemens APRN   25 mg at 07/03/25 0815    nitroglycerin (NITROSTAT) SL tablet 0.4 mg  0.4 mg Sublingual Q5 Min PRN Lesa Gonzalez MD        ondansetron (ZOFRAN) injection 4 mg  4 mg Intravenous Q6H PRN Benjamin Dominguez MD   4 mg at 06/26/25 0840    pantoprazole  (PROTONIX) EC tablet 40 mg  40 mg Oral BID AC Jenna Ospina MD   40 mg at 07/03/25 0815    sodium chloride 0.9 % flush 10 mL  10 mL Intravenous Q12H Lesa Gonzalez MD   10 mL at 07/02/25 2149    sodium chloride 0.9 % flush 10 mL  10 mL Intravenous PRN Lesa Gonzalez MD        sodium chloride 0.9 % infusion 40 mL  40 mL Intravenous PRN Lesa Gonzalez MD           Past Medical History:  Past Medical History:   Diagnosis Date    Cardiomegaly     CHF (congestive heart failure)     Congenital fusion of carpal bone     HTN (hypertension)     Non-STEMI (non-ST elevated myocardial infarction) 6/25/2025    Seizure        Past Surgical History:  Past Surgical History:   Procedure Laterality Date    ELBOW PROCEDURE      ENDOSCOPY N/A 6/22/2025    Procedure: ESOPHAGOGASTRODUODENOSCOPY;  Surgeon: Kevin Miranda MD;  Location: [facility];  Service: Gastroenterology;  Laterality: N/A;  preop; GI bleed   postop; gastric ulcer ; esophagitis       Family History  Family History   Problem Relation Age of Onset    Hypertension Mother     Heart disease Mother     No Known Problems Father        Social History  Social History     Socioeconomic History    Marital status: Single   Tobacco Use    Smoking status: Some Days     Current packs/day: 0.50     Types: Cigarettes    Smokeless tobacco: Never   Vaping Use    Vaping status: Never Used   Substance and Sexual Activity    Alcohol use: Yes    Drug use: Yes     Types: Marijuana, Cocaine(coke)    Sexual activity: Defer       Review of Systems:  History obtained from chart review and the patient  General ROS: No fever or chills  Respiratory ROS: No cough, shortness of breath, wheezing  Cardiovascular ROS: No chest pain or palpitations  Gastrointestinal ROS: No abdominal pain or melena  Genito-Urinary ROS: No dysuria or hematuria  Psych ROS: No anxiety and depression  14 point ROS reviewed with the patient and negative except as noted above and in the HPI unless unable to  "obtain.    Objective:  Patient Vitals for the past 24 hrs:   BP Temp Temp src Pulse Resp SpO2 Weight   07/03/25 1116 118/67 97.8 °F (36.6 °C) Oral 82 18 96 % --   07/03/25 0830 130/68 97.5 °F (36.4 °C) Oral 86 18 98 % --   07/03/25 0338 146/78 98.2 °F (36.8 °C) Oral 90 16 98 % 95.3 kg (210 lb 3.2 oz)   07/02/25 2333 140/77 97.5 °F (36.4 °C) Oral 89 18 96 % --   07/02/25 2132 137/73 98.9 °F (37.2 °C) Oral 89 16 95 % --   07/02/25 1551 139/74 98.4 °F (36.9 °C) Oral 87 18 98 % --       Intake/Output Summary (Last 24 hours) at 7/3/2025 1413  Last data filed at 7/3/2025 1353  Gross per 24 hour   Intake 552 ml   Output 4200 ml   Net -3648 ml     General: awake/alert   HEENT: Normocephalic atraumatic head  Neck: Supple with no JVD or carotid bruits.  Chest:  clear to auscultation bilaterally without respiratory distress  CVS: regular rate and rhythm  Abdominal: soft, nontender, positive bowel sounds  Extremities: 2+ pedal edema  Skin: warm and dry without rash      Labs:  Results from last 7 days   Lab Units 07/03/25 0319 07/02/25  0319 07/01/25  0430   WBC 10*3/mm3 5.75 5.91 5.87   HEMOGLOBIN g/dL 8.3* 8.3* 7.8*   HEMATOCRIT % 27.0* 26.0* 24.8*   PLATELETS 10*3/mm3 178 170 163         Results from last 7 days   Lab Units 07/03/25 0319 07/02/25  0320 07/01/25  0430   SODIUM mmol/L 140 137 138   POTASSIUM mmol/L 4.0 4.0 3.7   CHLORIDE mmol/L 102 103 105   CO2 mmol/L 25.0 22.0 22.0   BUN mg/dL 45.5* 42.1* 42.6*   CREATININE mg/dL 3.97* 4.00* 3.90*   CALCIUM mg/dL 8.9 8.4* 8.4*   EGFR mL/min/1.73 20.1* 19.9* 20.5*   BILIRUBIN mg/dL 0.4 0.4 0.4   ALK PHOS U/L 77 76 71   ALT (SGPT) U/L 11 10 10   AST (SGOT) U/L 11 11 10   GLUCOSE mg/dL 95 85 91       Radiology:   Imaging Results (Last 72 Hours)       ** No results found for the last 72 hours. **            Culture:  No results found for: \"BLOODCX\", \"URINECX\", \"WOUNDCX\", \"MRSACX\", \"RESPCX\", \"STOOLCX\"      Assessment   1.  Stage IV chronic kidney disease.  2.  Type 2 diabetes " with nephropathy.  3.  Recent history of acute kidney injury resolving.  4.  Congestive heart failure exacerbation.  5.  Benign essential hypertension.  6.  Metabolic acidemia.  7.  Non-STEMI.  8.  Noncompliance with medical management  9.  Secondary hyperparathyroidism.    Plan:  1.  Discontinue intravenous Bumex drip, initiate intermittent IV Bumex as per patient request.  2.  Continue KIANA for treatment of CKD.  3.  Continue p.o. calcitriol.  4.  Monitor renal function closely, if discharged home need follow-up at [facility].  5.  Plan was discussed with the patient.      Robinson Preston MD  7/3/2025  14:13 CDT

## 2025-07-03 NOTE — PROGRESS NOTES
[facility] -  Progress Note     LOS: 8 days   Patient Care Team:  He Ortega MD as PCP - General (Internal Medicine)    Chief Complaint: edema    Subjective     Interval History:     Patient Complaints: patient lying in bed this am. He reports that he is tired today. He denies any chest pain. He denies any shortness of breath. He is on bumex drip and has had good urine output. He reports improvement in leg swelling    Review of Systems:     Review of Systems   Respiratory:  Negative for shortness of breath.    Cardiovascular:  Positive for leg swelling. Negative for chest pain and palpitations.   All other systems reviewed and are negative.    Objective     Vital Sign Min/Max for last 24 hours  Temp  Min: 97.5 °F (36.4 °C)  Max: 98.9 °F (37.2 °C)   BP  Min: 130/68  Max: 146/78   Pulse  Min: 86  Max: 90   Resp  Min: 16  Max: 18   SpO2  Min: 95 %  Max: 100 %   No data recorded   Weight  Min: 95.3 kg (210 lb 3.2 oz)  Max: 95.3 kg (210 lb 3.2 oz)         07/03/25  0338   Weight: 95.3 kg (210 lb 3.2 oz)       Intake/Output Summary (Last 24 hours) at 7/3/2025 1016  Last data filed at 7/3/2025 0900  Gross per 24 hour   Intake 552 ml   Output 3400 ml   Net -2848 ml     Telemetry: sinus rhythm rates 80-90's.       Physical Exam:    Vitals reviewed.   Constitutional:       General: Not in acute distress.     Appearance: Normal appearance. Well-developed.   Eyes:      Pupils: Pupils are equal, round, and reactive to light.   HENT:      Head: Normocephalic and atraumatic.      Nose: Nose normal.   Neck:      Vascular: No carotid bruit.   Pulmonary:      Effort: Pulmonary effort is normal. No respiratory distress.      Breath sounds: Normal breath sounds. No wheezing. No rales.   Cardiovascular:      Normal rate. Regular rhythm.      Murmurs: There is no murmur.   Edema:     Peripheral edema present.     Ankle: bilateral 2+ edema of the ankle.     Feet: bilateral 2+ edema of the feet.  Abdominal:       General: There is no distension.      Palpations: Abdomen is soft.   Musculoskeletal: Normal range of motion.      Cervical back: Normal range of motion and neck supple. Skin:     General: Skin is warm.      Findings: No erythema or rash.   Neurological:      General: No focal deficit present.      Mental Status: Alert and oriented to person, place, and time.   Psychiatric:         Attention and Perception: Attention normal.         Mood and Affect: Mood normal.         Speech: Speech normal.         Behavior: Behavior normal.         Thought Content: Thought content normal.         Judgment: Judgment normal.       Results Review:   Lab Results (last 72 hours)       Procedure Component Value Units Date/Time    Comprehensive Metabolic Panel [[id number]]  (Abnormal) Collected: 07/03/25 0319    Specimen: Blood Updated: 07/03/25 0423     Glucose 95 mg/dL      BUN 45.5 mg/dL      Creatinine 3.97 mg/dL      Sodium 140 mmol/L      Potassium 4.0 mmol/L      Chloride 102 mmol/L      CO2 25.0 mmol/L      Calcium 8.9 mg/dL      Total Protein 6.2 g/dL      Albumin 3.4 g/dL      ALT (SGPT) 11 U/L      AST (SGOT) 11 U/L      Alkaline Phosphatase 77 U/L      Total Bilirubin 0.4 mg/dL      Globulin 2.8 gm/dL      A/G Ratio 1.2 g/dL      BUN/Creatinine Ratio 11.5     Anion Gap 13.0 mmol/L      eGFR 20.1 mL/min/1.73     Narrative:      GFR Categories in Chronic Kidney Disease (CKD)              GFR Category          GFR (mL/min/1.73)    Interpretation  G1                    90 or greater        Normal or high (1)  G2                    60-89                Mild decrease (1)  G3a                   45-59                Mild to moderate decrease  G3b                   30-44                Moderate to severe decrease  G4                    15-29                Severe decrease  G5                    14 or less           Kidney failure    (1)In the absence of evidence of kidney disease, neither GFR category G1 or G2 fulfill the criteria  for CKD.    eGFR calculation 2021 CKD-EPI creatinine equation, which does not include race as a factor    CBC & Differential [[id number]]  (Abnormal) Collected: 07/03/25 0319    Specimen: Blood Updated: 07/03/25 0358    Narrative:      The following orders were created for panel order CBC & Differential.  Procedure                               Abnormality         Status                     ---------                               -----------         ------                     CBC Auto Differential[[id number]]        Abnormal            Final result                 Please view results for these tests on the individual orders.    CBC Auto Differential [[id number]]  (Abnormal) Collected: 07/03/25 0319    Specimen: Blood Updated: 07/03/25 0358     WBC 5.75 10*3/mm3      RBC 2.92 10*6/mm3      Hemoglobin 8.3 g/dL      Hematocrit 27.0 %      MCV 92.5 fL      MCH 28.4 pg      MCHC 30.7 g/dL      RDW 20.4 %      RDW-SD 66.1 fl      MPV 11.1 fL      Platelets 178 10*3/mm3      Neutrophil % 66.9 %      Lymphocyte % 20.9 %      Monocyte % 8.2 %      Eosinophil % 3.0 %      Basophil % 0.7 %      Immature Grans % 0.3 %      Neutrophils, Absolute 3.85 10*3/mm3      Lymphocytes, Absolute 1.20 10*3/mm3      Monocytes, Absolute 0.47 10*3/mm3      Eosinophils, Absolute 0.17 10*3/mm3      Basophils, Absolute 0.04 10*3/mm3      Immature Grans, Absolute 0.02 10*3/mm3      nRBC 0.0 /100 WBC     Comprehensive Metabolic Panel [[id number]]  (Abnormal) Collected: 07/02/25 0320    Specimen: Blood Updated: 07/02/25 0429     Glucose 85 mg/dL      BUN 42.1 mg/dL      Creatinine 4.00 mg/dL      Sodium 137 mmol/L      Potassium 4.0 mmol/L      Chloride 103 mmol/L      CO2 22.0 mmol/L      Calcium 8.4 mg/dL      Total Protein 5.9 g/dL      Albumin 3.3 g/dL      ALT (SGPT) 10 U/L      AST (SGOT) 11 U/L      Alkaline Phosphatase 76 U/L      Total Bilirubin 0.4 mg/dL      Globulin 2.6 gm/dL      A/G Ratio 1.3 g/dL      BUN/Creatinine Ratio 10.5     Anion  Gap 12.0 mmol/L      eGFR 19.9 mL/min/1.73     Narrative:      GFR Categories in Chronic Kidney Disease (CKD)              GFR Category          GFR (mL/min/1.73)    Interpretation  G1                    90 or greater        Normal or high (1)  G2                    60-89                Mild decrease (1)  G3a                   45-59                Mild to moderate decrease  G3b                   30-44                Moderate to severe decrease  G4                    15-29                Severe decrease  G5                    14 or less           Kidney failure    (1)In the absence of evidence of kidney disease, neither GFR category G1 or G2 fulfill the criteria for CKD.    eGFR calculation 2021 CKD-EPI creatinine equation, which does not include race as a factor    CBC & Differential [[id number]]  (Abnormal) Collected: 07/02/25 0319    Specimen: Blood Updated: 07/02/25 0358    Narrative:      The following orders were created for panel order CBC & Differential.  Procedure                               Abnormality         Status                     ---------                               -----------         ------                     CBC Auto Differential[[id number]]        Abnormal            Final result                 Please view results for these tests on the individual orders.    CBC Auto Differential [[id number]]  (Abnormal) Collected: 07/02/25 0319    Specimen: Blood Updated: 07/02/25 0358     WBC 5.91 10*3/mm3      RBC 2.86 10*6/mm3      Hemoglobin 8.3 g/dL      Hematocrit 26.0 %      MCV 90.9 fL      MCH 29.0 pg      MCHC 31.9 g/dL      RDW 20.3 %      RDW-SD 60.9 fl      MPV 11.1 fL      Platelets 170 10*3/mm3      Neutrophil % 64.8 %      Lymphocyte % 22.7 %      Monocyte % 8.5 %      Eosinophil % 2.9 %      Basophil % 0.8 %      Immature Grans % 0.3 %      Neutrophils, Absolute 3.83 10*3/mm3      Lymphocytes, Absolute 1.34 10*3/mm3      Monocytes, Absolute 0.50 10*3/mm3      Eosinophils, Absolute 0.17  10*3/mm3      Basophils, Absolute 0.05 10*3/mm3      Immature Grans, Absolute 0.02 10*3/mm3      nRBC 0.0 /100 WBC     Comprehensive Metabolic Panel [[id number]]  (Abnormal) Collected: 07/01/25 0430    Specimen: Blood Updated: 07/01/25 0540     Glucose 91 mg/dL      BUN 42.6 mg/dL      Creatinine 3.90 mg/dL      Sodium 138 mmol/L      Potassium 3.7 mmol/L      Chloride 105 mmol/L      CO2 22.0 mmol/L      Calcium 8.4 mg/dL      Total Protein 5.6 g/dL      Albumin 3.1 g/dL      ALT (SGPT) 10 U/L      AST (SGOT) 10 U/L      Alkaline Phosphatase 71 U/L      Total Bilirubin 0.4 mg/dL      Globulin 2.5 gm/dL      A/G Ratio 1.2 g/dL      BUN/Creatinine Ratio 10.9     Anion Gap 11.0 mmol/L      eGFR 20.5 mL/min/1.73     Narrative:      GFR Categories in Chronic Kidney Disease (CKD)              GFR Category          GFR (mL/min/1.73)    Interpretation  G1                    90 or greater        Normal or high (1)  G2                    60-89                Mild decrease (1)  G3a                   45-59                Mild to moderate decrease  G3b                   30-44                Moderate to severe decrease  G4                    15-29                Severe decrease  G5                    14 or less           Kidney failure    (1)In the absence of evidence of kidney disease, neither GFR category G1 or G2 fulfill the criteria for CKD.    eGFR calculation 2021 CKD-EPI creatinine equation, which does not include race as a factor    Magnesium [[id number]]  (Normal) Collected: 07/01/25 0430    Specimen: Blood Updated: 07/01/25 0540     Magnesium 2.2 mg/dL     CBC & Differential [[id number]]  (Abnormal) Collected: 07/01/25 0430    Specimen: Blood Updated: 07/01/25 0508    Narrative:      The following orders were created for panel order CBC & Differential.  Procedure                               Abnormality         Status                     ---------                               -----------         ------                    "  CBC Auto Differential[[id number]]        Abnormal            Final result                 Please view results for these tests on the individual orders.    CBC Auto Differential [[id number]]  (Abnormal) Collected: 07/01/25 0430    Specimen: Blood Updated: 07/01/25 0508     WBC 5.87 10*3/mm3      RBC 2.66 10*6/mm3      Hemoglobin 7.8 g/dL      Hematocrit 24.8 %      MCV 93.2 fL      MCH 29.3 pg      MCHC 31.5 g/dL      RDW 19.8 %      RDW-SD 61.1 fl      MPV 11.0 fL      Platelets 163 10*3/mm3      Neutrophil % 62.7 %      Lymphocyte % 23.5 %      Monocyte % 9.9 %      Eosinophil % 2.7 %      Basophil % 0.9 %      Immature Grans % 0.3 %      Neutrophils, Absolute 3.68 10*3/mm3      Lymphocytes, Absolute 1.38 10*3/mm3      Monocytes, Absolute 0.58 10*3/mm3      Eosinophils, Absolute 0.16 10*3/mm3      Basophils, Absolute 0.05 10*3/mm3      Immature Grans, Absolute 0.02 10*3/mm3      nRBC 0.0 /100 WBC     Hemoglobin A1c [[id number]]  (Normal) Collected: 06/30/25 0345    Specimen: Blood Updated: 06/30/25 2005     Hemoglobin A1C 5.40 %     Narrative:      Hemoglobin A1C Ranges:    Increased Risk for Diabetes  5.7% to 6.4%  Diabetes                     >= 6.5%  Diabetic Goal                < 7.0%                Echo EF Estimated  No results found for: \"ECHOEFEST\"      Cath Ejection Fraction Quantitative  No results found for: \"CATHEF\"        Medication Review: yes  Current Facility-Administered Medications   Medication Dose Route Frequency Provider Last Rate Last Admin    acetaminophen (TYLENOL) tablet 1,000 mg  1,000 mg Oral Q6H PRN Lesa Gonzalez MD   1,000 mg at 06/29/25 2317    aspirin EC tablet 81 mg  81 mg Oral Daily Lesa Gonzalez MD   81 mg at 07/03/25 0814    bumetanide (BUMEX) 25 mg/100mL (0.25 mg/mL) infusion  1 mg/hr Intravenous Continuous Jenna Osipna MD 4 mL/hr at 07/03/25 0724 1 mg/hr at 07/03/25 0724    calcitriol (ROCALTROL) capsule 0.5 mcg  0.5 mcg Oral Daily Robinson Preston MD   0.5 mcg at 07/03/25 " 0814    carvedilol (COREG) tablet 25 mg  25 mg Oral BID With Meals Marta Clemens, APRN   25 mg at 07/03/25 0814    epoetin yocasta-epbx (RETACRIT) injection 10,000 Units  10,000 Units Subcutaneous Once per day on Monday Wednesday Friday Robinson Preston MD   10,000 Units at 07/02/25 0846    hydrALAZINE (APRESOLINE) injection 10 mg  10 mg Intravenous Q6H PRN Lesa Gonzalez MD   10 mg at 06/26/25 1103    hydrALAZINE (APRESOLINE) tablet 100 mg  100 mg Oral Q8H Guille Aguirre MD   100 mg at 07/03/25 0610    isosorbide dinitrate (ISORDIL) tablet 20 mg  20 mg Oral TID - Nitrates Mani Mena MD   20 mg at 07/03/25 0815    levETIRAcetam (KEPPRA) tablet 500 mg  500 mg Oral BID Lesa Gonzalez MD   500 mg at 07/03/25 0815    losartan (COZAAR) tablet 25 mg  25 mg Oral Q24H MartinezNegra higginbothamna A, APRN   25 mg at 07/03/25 0815    nitroglycerin (NITROSTAT) SL tablet 0.4 mg  0.4 mg Sublingual Q5 Min PRN Lesa Gonzalez MD        ondansetron (ZOFRAN) injection 4 mg  4 mg Intravenous Q6H PRN Benjamin Dominguez MD   4 mg at 06/26/25 0840    pantoprazole (PROTONIX) EC tablet 40 mg  40 mg Oral BID Jenna Foster MD   40 mg at 07/03/25 0815    sodium chloride 0.9 % flush 10 mL  10 mL Intravenous Q12H Lesa Gonzalez MD   10 mL at 07/02/25 2149    sodium chloride 0.9 % flush 10 mL  10 mL Intravenous PRN Lesa Gonzalez MD        sodium chloride 0.9 % infusion 40 mL  40 mL Intravenous PRN Lesa Gonzalez MD             Assessment & Plan     Congestive heart failure: LVEF 21-25% on echo 6/25/2025. Continue carvedilol, hydralalzine, isordil and losartan. Cardiac PET for ischemic evaluation to be done outpatient. Discussed LifeVest with patient and he declines at this time     -Acute on chronic kidney disease stage IV: Cr today 3.97 Baseline 4.0. Nephrology following     -anemia: hgb 8.3 today     -hypertension     -seizure disorder     Plan:   - continue bumex drip  - continue carvedilol, hydralalzine, isordil, and losartan  -  continue aspirin   - continue to monitor kidney function and electrolytes closely   - continue to monitor I/O and daily weights  - continue to monitor H & H and transfuse as needed  - optimize GDMT as tolerated  - Cardiac PET ordered for ischemic evaluation to be done outpatient    Electronically signed by SUKHDEV Kelly, 07/03/25, 10:14 AM CDT.

## 2025-07-03 NOTE — PAYOR COMM NOTE
"7/3/25 [facility] [phone]  [phone]    FAXING UPDATE CLINICAL FOR CONT STAY.    WE FAXED UPDATE FOR 7/1/25 THRU 7/2/25 FOR CONT STAY. WE HAVE NOT RECEIVED AN APPROVAL FOR THE DAYS.        José Miguel Wahl (30 y.o. Male)       Date of Birth   1995    Social Security Number       Address   [address] [facility] [address]    Home Phone   [phone]    MRN   [id number]       Religious   Caodaism    Marital Status   Single                            Admission Date   6/25/2025    Admission Type   Urgent    Admitting Provider   Jenna Ospina MD    Attending Provider   Jenna Ospina MD    Department, Room/Bed   [facility] 4B, 448/1       Discharge Date       Discharge Disposition       Discharge Destination                                 Attending Provider: Jenna Ospina MD    Allergies: Amoxicillin, Penicillins    Isolation: None   Infection: None   Code Status: CPR    Ht: 188 cm (74\")   Wt: 95.3 kg (210 lb 3.2 oz)    Admission Cmt: None   Principal Problem: Non-STEMI (non-ST elevated myocardial infarction) [I21.4]                   Active Insurance as of 6/25/2025       Primary Coverage       Payor Plan Insurance Group Employer/Plan Group    [place] [id number]       Payor Plan Address Payor Plan Phone Number Payor Plan Fax Number Effective Dates    [employer] [address] [address] [phone]  1/1/2025 - None Entered    [facility] [id number]         Subscriber Name Subscriber Birth Date Member ID       JOSÉ MIGUEL WAHL 1995 [id number]                     Emergency Contacts        (Rel.) Home Phone Work Phone Mobile Phone    Jesika Wahl (Mother) [phone] -- [phone]             [facility] Encounter Date/Time: 6/25/2025 1715   Hospital Account: [id number]    MRN: [id number]   Patient:  José Miguel Wahl   Contact Serial #: [id number]   SSN:          ENCOUNTER             Patient " Class: Inpatient   Unit: [address]   Hospital Service: Medicine     Bed: 448/1   Admitting Provider: Jenna Ospina MD   Referring Physician: Nicola Redd   Attending Provider: Jenna Ospina MD   Adm Diagnosis: Non-STEMI (non-ST elevat*               PATIENT             Name: José Miguel Webb : 1995 (30 yrs)   Address: [address] [place] [address] Sex: Male   City: [place] [address]   County: [facility]   Marital Status: SINGLE Ethnicity: NOT        Race: BLACK   Primary Care Provider: He Ortega MD Patients Phone: Home Phone: [phone]     Mobile Phone: [phone]     EMERGENCY CONTACT   Contact Name Legal Guardian? Relationship to Patient Home Phone Work Phone Mobile Phone   1. JonJesika  2. *No Contact Specified*      Mother    [phone]      GUARANTOR             Guarantor: José Miguel Webb     : 1995   Address: [address] [place] [address] Sex: [place] [address]     Relation to Patient: Self       Home Phone: [phone]   Guarantor ID: [id number]       Work Phone:     GUARANTOR EMPLOYER   Employer:           Status: NOT EMPLO*   COVERAGE          PRIMARY INSURANCE   Payor[place] Plan[place]   Group Number: [id number] Insurance Type: INDEMNITY   Subscriber Name: JOSÉ MIGUEL WEBB Subscriber : 1995   Subscriber ID: [id number] Coverage Address: [place] [address]   Pat. Rel. to Subscriber: Self Coverage Phone: [phone]   SECONDARY INSURANCE   Payor: N/A Plan: N/A   Group Number:   Insurance Type:     Subscriber Name:   Subscriber :     Subscriber ID:   Coverage Address:     Pat. Rel. to Subscriber:   Coverage Phone:        Contact Serial # [id number])         July 3, 2025    Chart ID ([id number])                Travon Teixeira APRN   Nurse Practitioner  Cardiology     Progress Notes      Attested     Date of Service: 25  Creation Time: 25     Attested            Attestation signed by Guille Aguirre MD at 07/02/25 2827     Patient was seen, evaluated, and examined with APRN.  I agree with assessment and plan as documented.  This note compliments hers.         CC: edema     S: Patient feels like his swelling is improving since being on the Bumex gtt.  He notes good urine output.  He denies any chest pain or shortness of breath.     Vital signs reviewed:  Gen: A&Ox3, NAD  CV: RRR w/o m/r/g  Chest: CTA bilateral, normal respiratory effort  Extremity: Moderate lower extreme edema bilaterally, improved slightly compared to yesterday.     Labs and diagnostic studies reviewed     Assessment:  1.  Acute on chronic systolic CHF: EF 21 to 25%.  Consistent with nonischemic cardiomyopathy.  Unable to classify further.  Remains volume overloaded with lower extremity edema on exam.  2.  Elevated troponin: Consistent with nonischemic myocardial injury due to CHF and CKD.  3.  Acute on chronic kidney disease stage IV: Overall creatinine is approximately at baseline.  4.  Noncompliance  5.  Anemia of chronic disease  6.  Essential hypertension        Plan:  - Agree with Bumex drip.  Monitor renal function and electrolytes closely.  - Continue hydralazine, carvedilol, losartan, and Isordil.  - Low-sodium diet            Expand All Collapse All    [facility] -  Progress Note      LOS: 7 days   Patient Care Team:  He Ortega MD as PCP - General (Internal Medicine)     Chief Complaint: leg swelling     Subjective  Interval History:      Patient Complaints: patient is sitting on side of bed today. He reports that he is still having leg swelling. He was stared on bumex drip today. He denies any chest pain. He denies any shortness of breath. He denies any orthopnea or PND.      Review of Systems:      Review of Systems   Respiratory:  Negative for shortness of breath.    Cardiovascular:  Positive for leg swelling. Negative for chest pain and palpitations.   All other  systems reviewed and are negative.     Objective[]Expand by Default  Vital Sign Min/Max for last 24 hours  Temp  Min: 97.5 °F (36.4 °C)  Max: 98.6 °F (37 °C)   BP  Min: 130/79  Max: 150/83   Pulse  Min: 81  Max: 92   Resp  Min: 16  Max: 18   SpO2  Min: 97 %  Max: 100 %   No data recorded   Weight  Min: 98.3 kg (216 lb 12.8 oz)  Max: 98.3 kg (216 lb 12.8 oz)      Vitals             07/02/25  0308   Weight: 98.3 kg (216 lb 12.8 oz)            Intake/Output Summary (Last 24 hours) at 7/2/2025 1419  Last data filed at 7/2/2025 1053      Gross per 24 hour   Intake 120 ml   Output --   Net 120 ml      Telemetry: sinus rhythm rates 80-90's        Physical Exam:     Vitals reviewed.   Constitutional:       General: Not in acute distress.     Appearance: Normal appearance. Well-developed.   Eyes:      Pupils: Pupils are equal, round, and reactive to light.   HENT:      Head: Normocephalic and atraumatic.      Nose: Nose normal.   Neck:      Vascular: No carotid bruit.   Pulmonary:      Effort: Pulmonary effort is normal. No respiratory distress.      Breath sounds: Normal breath sounds. No wheezing. No rales.   Cardiovascular:      Normal rate. Regular rhythm.      Murmurs: There is no murmur.   Edema:     Peripheral edema present.     Ankle: bilateral 2+ edema of the ankle.     Feet: bilateral 3+ edema of the feet.  Abdominal:      General: There is no distension.      Palpations: Abdomen is soft.   Musculoskeletal: Normal range of motion.      Cervical back: Normal range of motion and neck supple. Skin:     General: Skin is warm.      Findings: No erythema or rash.   Neurological:      General: No focal deficit present.      Mental Status: Alert and oriented to person, place, and time.   Psychiatric:         Attention and Perception: Attention normal.         Mood and Affect: Mood normal.         Speech: Speech normal.         Behavior: Behavior normal.         Thought Content: Thought content normal.         Judgment:  Judgment normal.         Results Review:   Lab Results (last 72 hours)         Procedure Component Value Units Date/Time     Comprehensive Metabolic Panel [[id number]]  (Abnormal) Collected: 07/02/25 0320     Specimen: Blood Updated: 07/02/25 0429       Glucose 85 mg/dL         BUN 42.1 mg/dL         Creatinine 4.00 mg/dL         Sodium 137 mmol/L         Potassium 4.0 mmol/L         Chloride 103 mmol/L         CO2 22.0 mmol/L         Calcium 8.4 mg/dL         Total Protein 5.9 g/dL         Albumin 3.3 g/dL         ALT (SGPT) 10 U/L         AST (SGOT) 11 U/L         Alkaline Phosphatase 76 U/L         Total Bilirubin 0.4 mg/dL         Globulin 2.6 gm/dL         A/G Ratio 1.3 g/dL         BUN/Creatinine Ratio 10.5       Anion Gap 12.0 mmol/L         eGFR 19.9 mL/min/1.73       Narrative:       GFR Categories in Chronic Kidney Disease (CKD)              GFR Category          GFR (mL/min/1.73)    Interpretation  G1                    90 or greater        Normal or high (1)  G2                    60-89                Mild decrease (1)  G3a                   45-59                Mild to moderate decrease  G3b                   30-44                Moderate to severe decrease  G4                    15-29                Severe decrease  G5                    14 or less           Kidney failure     (1)In the absence of evidence of kidney disease, neither GFR category G1 or G2 fulfill the criteria for CKD.     eGFR calculation 2021 CKD-EPI creatinine equation, which does not include race as a factor     CBC & Differential [[id number]]  (Abnormal) Collected: 07/02/25 0319     Specimen: Blood Updated: 07/02/25 0358     Narrative:       The following orders were created for panel order CBC & Differential.  Procedure                               Abnormality         Status                     ---------                               -----------         ------                     CBC Auto Differential[[id number]]        Abnormal             Final result                  Please view results for these tests on the individual orders.     CBC Auto Differential [[id number]]  (Abnormal) Collected: 07/02/25 0319     Specimen: Blood Updated: 07/02/25 0358       WBC 5.91 10*3/mm3         RBC 2.86 10*6/mm3         Hemoglobin 8.3 g/dL         Hematocrit 26.0 %         MCV 90.9 fL         MCH 29.0 pg         MCHC 31.9 g/dL         RDW 20.3 %         RDW-SD 60.9 fl         MPV 11.1 fL         Platelets 170 10*3/mm3         Neutrophil % 64.8 %         Lymphocyte % 22.7 %         Monocyte % 8.5 %         Eosinophil % 2.9 %         Basophil % 0.8 %         Immature Grans % 0.3 %         Neutrophils, Absolute 3.83 10*3/mm3         Lymphocytes, Absolute 1.34 10*3/mm3         Monocytes, Absolute 0.50 10*3/mm3         Eosinophils, Absolute 0.17 10*3/mm3         Basophils, Absolute 0.05 10*3/mm3         Immature Grans, Absolute 0.02 10*3/mm3         nRBC 0.0 /100 WBC       Comprehensive Metabolic Panel [[id number]]  (Abnormal) Collected: 07/01/25 0430     Specimen: Blood Updated: 07/01/25 0540       Glucose 91 mg/dL         BUN 42.6 mg/dL         Creatinine 3.90 mg/dL         Sodium 138 mmol/L         Potassium 3.7 mmol/L         Chloride 105 mmol/L         CO2 22.0 mmol/L         Calcium 8.4 mg/dL         Total Protein 5.6 g/dL         Albumin 3.1 g/dL         ALT (SGPT) 10 U/L         AST (SGOT) 10 U/L         Alkaline Phosphatase 71 U/L         Total Bilirubin 0.4 mg/dL         Globulin 2.5 gm/dL         A/G Ratio 1.2 g/dL         BUN/Creatinine Ratio 10.9       Anion Gap 11.0 mmol/L         eGFR 20.5 mL/min/1.73       Narrative:       GFR Categories in Chronic Kidney Disease (CKD)              GFR Category          GFR (mL/min/1.73)    Interpretation  G1                    90 or greater        Normal or high (1)  G2                    60-89                Mild decrease (1)  G3a                   45-59                Mild to moderate decrease  G3b                    30-44                Moderate to severe decrease  G4                    15-29                Severe decrease  G5                    14 or less           Kidney failure     (1)In the absence of evidence of kidney disease, neither GFR category G1 or G2 fulfill the criteria for CKD.     eGFR calculation 2021 CKD-EPI creatinine equation, which does not include race as a factor     Magnesium [[id number]]  (Normal) Collected: 07/01/25 0430     Specimen: Blood Updated: 07/01/25 0540       Magnesium 2.2 mg/dL       CBC & Differential [[id number]]  (Abnormal) Collected: 07/01/25 0430     Specimen: Blood Updated: 07/01/25 0508     Narrative:       The following orders were created for panel order CBC & Differential.  Procedure                               Abnormality         Status                     ---------                               -----------         ------                     CBC Auto Differential[[id number]]        Abnormal            Final result                  Please view results for these tests on the individual orders.     CBC Auto Differential [[id number]]  (Abnormal) Collected: 07/01/25 0430     Specimen: Blood Updated: 07/01/25 0508       WBC 5.87 10*3/mm3         RBC 2.66 10*6/mm3         Hemoglobin 7.8 g/dL         Hematocrit 24.8 %         MCV 93.2 fL         MCH 29.3 pg         MCHC 31.5 g/dL         RDW 19.8 %         RDW-SD 61.1 fl         MPV 11.0 fL         Platelets 163 10*3/mm3         Neutrophil % 62.7 %         Lymphocyte % 23.5 %         Monocyte % 9.9 %         Eosinophil % 2.7 %         Basophil % 0.9 %         Immature Grans % 0.3 %         Neutrophils, Absolute 3.68 10*3/mm3         Lymphocytes, Absolute 1.38 10*3/mm3         Monocytes, Absolute 0.58 10*3/mm3         Eosinophils, Absolute 0.16 10*3/mm3         Basophils, Absolute 0.05 10*3/mm3         Immature Grans, Absolute 0.02 10*3/mm3         nRBC 0.0 /100 WBC       Hemoglobin A1c [[id number]]  (Normal) Collected: 06/30/25 0097      Specimen: Blood Updated: 06/30/25 2005       Hemoglobin A1C 5.40 %       Narrative:       Hemoglobin A1C Ranges:     Increased Risk for Diabetes  5.7% to 6.4%  Diabetes                     >= 6.5%  Diabetic Goal                < 7.0%     Basic Metabolic Panel [[id number]]  (Abnormal) Collected: 06/30/25 0345     Specimen: Blood Updated: 06/30/25 0502       Glucose 96 mg/dL         BUN 44.5 mg/dL         Creatinine 3.73 mg/dL         Sodium 138 mmol/L         Potassium 3.6 mmol/L         Chloride 104 mmol/L         CO2 21.0 mmol/L         Calcium 8.1 mg/dL         BUN/Creatinine Ratio 11.9       Anion Gap 13.0 mmol/L         eGFR 21.7 mL/min/1.73       Narrative:       GFR Categories in Chronic Kidney Disease (CKD)              GFR Category          GFR (mL/min/1.73)    Interpretation  G1                    90 or greater        Normal or high (1)  G2                    60-89                Mild decrease (1)  G3a                   45-59                Mild to moderate decrease  G3b                   30-44                Moderate to severe decrease  G4                    15-29                Severe decrease  G5                    14 or less           Kidney failure     (1)In the absence of evidence of kidney disease, neither GFR category G1 or G2 fulfill the criteria for CKD.     eGFR calculation 2021 CKD-EPI creatinine equation, which does not include race as a factor     Magnesium [[id number]]  (Normal) Collected: 06/30/25 0345     Specimen: Blood Updated: 06/30/25 0502       Magnesium 2.1 mg/dL       CBC & Differential [[id number]]  (Abnormal) Collected: 06/30/25 0345     Specimen: Blood Updated: 06/30/25 0445     Narrative:       The following orders were created for panel order CBC & Differential.  Procedure                               Abnormality         Status                     ---------                               -----------         ------                     CBC Auto Differential[[id number]]        Abnormal             Final result                  Please view results for these tests on the individual orders.     CBC Auto Differential [[id number]]  (Abnormal) Collected: 06/30/25 0345     Specimen: Blood Updated: 06/30/25 0445       WBC 5.88 10*3/mm3         RBC 2.66 10*6/mm3         Hemoglobin 8.0 g/dL         Hematocrit 24.9 %         MCV 93.6 fL         MCH 30.1 pg         MCHC 32.1 g/dL         RDW 18.6 %         RDW-SD 60.4 fl         MPV 12.2 fL         Platelets 163 10*3/mm3         Neutrophil % 62.8 %         Lymphocyte % 24.5 %         Monocyte % 9.5 %         Eosinophil % 2.2 %         Basophil % 0.5 %         Immature Grans % 0.5 %         Neutrophils, Absolute 3.69 10*3/mm3         Lymphocytes, Absolute 1.44 10*3/mm3         Monocytes, Absolute 0.56 10*3/mm3         Eosinophils, Absolute 0.13 10*3/mm3         Basophils, Absolute 0.03 10*3/mm3         Immature Grans, Absolute 0.03 10*3/mm3         nRBC 0.0 /100 WBC                  Echo EF Estimated  Results for orders placed during the hospital encounter of 06/25/25     Adult Transthoracic Echo Complete W/ Cont if Necessary Per Protocol     Interpretation Summary  Images from the original result were not included.       Left ventricular systolic function is severely decreased. Automated 2D EF = 21%  Left ventricular ejection fraction appears to be 21 - 25%.    The left ventricular cavity is moderately dilated.    Left ventricular diastolic function is consistent with (grade II w/high LAP) pseudonormalization.    Left atrial volume is severely increased.    The right atrial cavity is moderately dilated.    Moderate pulmonary hypertension is present.    There is a small (<1cm) pericardial effusion. There is no evidence of cardiac tamponade.    Compared to prior echo dated 11/24/2024 LVEF has decreased from 31-35 % to current above        Abnormal left ventricular global strain with left ventricular apical sparing.  Recommend workup for cardiac amyloid.     Cath  "Ejection Fraction Quantitative  No results found for: \"CATHEF\"        Medication Review: yes  Current Medications             Current Facility-Administered Medications   Medication Dose Route Frequency Provider Last Rate Last Admin    acetaminophen (TYLENOL) tablet 1,000 mg  1,000 mg Oral Q6H PRN Lesa Gonzalez MD   1,000 mg at 06/29/25 2317    aspirin EC tablet 81 mg  81 mg Oral Daily Lesa Gonzalez MD   81 mg at 07/02/25 0845    bumetanide (BUMEX) 25 mg/100mL (0.25 mg/mL) infusion  1 mg/hr Intravenous Continuous Jenna Ospina MD 4 mL/hr at 07/02/25 1248 1 mg/hr at 07/02/25 1248    calcitriol (ROCALTROL) capsule 0.5 mcg  0.5 mcg Oral Daily Robinson Preston MD   0.5 mcg at 07/02/25 0845    carvedilol (COREG) tablet 25 mg  25 mg Oral BID With Meals Marta Clemens, APRN   25 mg at 07/02/25 0845    epoetin yocasta-epbx (RETACRIT) injection 10,000 Units  10,000 Units Subcutaneous Once per day on Monday Wednesday Friday Robinson Preston MD   10,000 Units at 07/02/25 0846    hydrALAZINE (APRESOLINE) injection 10 mg  10 mg Intravenous Q6H PRN Lesa Gonzalez MD   10 mg at 06/26/25 1103    hydrALAZINE (APRESOLINE) tablet 100 mg  100 mg Oral Q8H Guille Aguirre MD   100 mg at 07/02/25 0625    isosorbide dinitrate (ISORDIL) tablet 20 mg  20 mg Oral TID - Nitrates Mani Mena MD   20 mg at 07/02/25 1248    levETIRAcetam (KEPPRA) tablet 500 mg  500 mg Oral BID Lesa Gonzalez MD   500 mg at 07/02/25 0845    losartan (COZAAR) tablet 25 mg  25 mg Oral Q24H Marta Clemens, APRN   25 mg at 07/02/25 0845    nitroglycerin (NITROSTAT) SL tablet 0.4 mg  0.4 mg Sublingual Q5 Min PRN Lesa Gonzalez MD        ondansetron (ZOFRAN) injection 4 mg  4 mg Intravenous Q6H PRN Benjamin Dominguez MD   4 mg at 06/26/25 0840    pantoprazole (PROTONIX) EC tablet 40 mg  40 mg Oral BID AC Jenna Ospina MD   40 mg at 07/02/25 0845    sodium chloride 0.9 % flush 10 mL  10 mL Intravenous Q12H Lesa Gonzalez MD   10 mL at 07/02/25 0846    " sodium chloride 0.9 % flush 10 mL  10 mL Intravenous PRN Lesa Gonzalez MD        sodium chloride 0.9 % infusion 40 mL  40 mL Intravenous PRN Lesa Gonzalez MD             Assessment & Plan  -Congestive heart failure: LVEF 21-25% on echo 6/25/2025. Continue carvedilol, hydralalzine, isordil and losartan. Cardiac PET for ischemic evaluation to be done outpatient. Discussed LifeVest with patient and he declines at this time     -Acute on chronic kidney disease stage IV: Cr today 4.0. Baseline 4.0. Nephrology following     -anemia: hgb 8.3 today     -hypertension      -seizure disorder     Plan:   - patient was switched to bumex drip  - continue carvedilol, hydralalzine, isordil, and losartan  - continue aspirin and amlodipine  - continue to monitor kidney function and electrolytes closely   - continue to monitor I/O and daily weights  - continue to monitor H & H and transfuse as needed  - optimize GDMT as tolerated  - Cardiac PET ordered for ischemic evaluation to be done outpatient        Electronically signed by SUKHDEV Kelly, 07/02/25, 2:18 PM CDT.                     Cosigned by: Guille Aguirre MD at 07/02/25 1744            ED to Hosp-Admission (Current) on 6/25/2025             Signed       Expand All Collapse All    Nephrology ([facility]) Progress Note        Patient:  José Miguel Webb  YOB: 1995  Date of Service: 7/2/2025  MRN: [id number]        Acct: [id number]      Primary Care Physician: He Ortega MD  Advance Directive:       Code Status and Medical Interventions: CPR (Attempt to Resuscitate); Full Support   Ordered at: 06/25/25 2000     Code Status (Patient has no pulse and is not breathing):     CPR (Attempt to Resuscitate)     Medical Interventions (Patient has pulse or is breathing):     Full Support     Level Of Support Discussed With:     Patient      Admit Date: 6/25/2025                        Hospital Day: 7  Referring Provider: Nicola  Carlitos Redd MD        Patient personally seen and examined.  Complete chart including Consults, Notes, Operative Reports, Labs, Cardiology, and Radiology studies reviewed as able.     Chief complaint: Abnormal labs.     Subjective:  José Miguel Webb is a 30 y.o. male for whom we were consulted for evaluation and treatment of chronic kidney disease. Baseline chronic kidney disease stage 4, baseline creatinine approximately 4.0. Has followed with our providers on a sporadic basis over the years, typically no-shows for his office visits. History of hypertension, CHF, seizure disorder. Patient was recently treated at [facility] for GI bleed and MINDY. He was scheduled to have permcath placed and to start hemodialysis but left AMA on 6/24.  Presented to [facility] on 6/25 with chest pain and was transferred to [facility] for cardiology and nephrology evaluation. Creatinine 3.99 when admitted this time.  No complaint of pain or dyspnea at time of initial nephrology exam. Denied n/v/d. Denied edema. No recent changes in urination. He explained that he is willing to proceed with dialysis if it is needed     This afternoon patient feels well.  He is lying flat, denies any shortness of breath.  His edema has significantly improved.  His serum creatinine is slowly rising/GFR is slowly declining        Allergies:  Amoxicillin and Penicillins     Home Meds:  Prescriptions Prior to Admission           Medications Prior to Admission   Medication Sig Dispense Refill Last Dose/Taking    [Paused] sacubitril-valsartan (Entresto) 49-51 MG tablet Take 1 tablet by mouth 2 (Two) Times a Day. 180 tablet 3              Medicines:  Current Medications             Current Facility-Administered Medications   Medication Dose Route Frequency Provider Last Rate Last Admin    acetaminophen (TYLENOL) tablet 1,000 mg  1,000 mg Oral Q6H PRN Lesa Gonzalez MD   1,000 mg at 06/29/25 5487    aspirin EC tablet 81 mg  81 mg Oral  Daily Lesa Gonzalze MD   81 mg at 07/02/25 0845    bumetanide (BUMEX) 25 mg/100mL (0.25 mg/mL) infusion  1 mg/hr Intravenous Continuous Jenna Ospina MD 4 mL/hr at 07/02/25 1248 1 mg/hr at 07/02/25 1248    calcitriol (ROCALTROL) capsule 0.5 mcg  0.5 mcg Oral Daily Robinson Preston MD   0.5 mcg at 07/02/25 0845    carvedilol (COREG) tablet 25 mg  25 mg Oral BID With Meals Marta Clemens, APRN   25 mg at 07/02/25 0845    epoetin yocasta-epbx (RETACRIT) injection 10,000 Units  10,000 Units Subcutaneous Once per day on Monday Wednesday Friday Robinson Preston MD   10,000 Units at 07/02/25 0846    hydrALAZINE (APRESOLINE) injection 10 mg  10 mg Intravenous Q6H PRN Lesa Gonzalez MD   10 mg at 06/26/25 1103    hydrALAZINE (APRESOLINE) tablet 100 mg  100 mg Oral Q8H Guille Aguirre MD   100 mg at 07/02/25 0625    isosorbide dinitrate (ISORDIL) tablet 20 mg  20 mg Oral TID - Nitrates Mani Mena MD   20 mg at 07/02/25 1248    levETIRAcetam (KEPPRA) tablet 500 mg  500 mg Oral BID Lesa Gonzalez MD   500 mg at 07/02/25 0845    losartan (COZAAR) tablet 25 mg  25 mg Oral Q24H Marta Clemens, APRN   25 mg at 07/02/25 0845    nitroglycerin (NITROSTAT) SL tablet 0.4 mg  0.4 mg Sublingual Q5 Min PRN Lesa Gonzalez MD        ondansetron (ZOFRAN) injection 4 mg  4 mg Intravenous Q6H PRN Benjamin Dominguez MD   4 mg at 06/26/25 0840    pantoprazole (PROTONIX) EC tablet 40 mg  40 mg Oral BID AC Jenna Ospina MD   40 mg at 07/02/25 0845    sodium chloride 0.9 % flush 10 mL  10 mL Intravenous Q12H Lesa Gonzalez MD   10 mL at 07/02/25 0846    sodium chloride 0.9 % flush 10 mL  10 mL Intravenous PRN Lesa Gonzalez MD        sodium chloride 0.9 % infusion 40 mL  40 mL Intravenous PRN Lesa Gonzalez MD                Past Medical History:  Medical History[]Expand by Default        Past Medical History:   Diagnosis Date    Cardiomegaly      CHF (congestive heart failure)      Congenital fusion of carpal bone      HTN  (hypertension)      Non-STEMI (non-ST elevated myocardial infarction) 6/25/2025    Seizure              Past Surgical History:  Surgical History         Past Surgical History:   Procedure Laterality Date    ELBOW PROCEDURE        ENDOSCOPY N/A 6/22/2025     Procedure: ESOPHAGOGASTRODUODENOSCOPY;  Surgeon: Kevin Miranda MD;  Location: [facility];  Service: Gastroenterology;  Laterality: N/A;  preop; GI bleed   postop; gastric ulcer ; esophagitis            Family History        Family History   Problem Relation Age of Onset    Hypertension Mother      Heart disease Mother      No Known Problems Father           Social History  Social History   Social History            Socioeconomic History    Marital status: Single   Tobacco Use    Smoking status: Some Days       Current packs/day: 0.50       Types: Cigarettes    Smokeless tobacco: Never   Vaping Use    Vaping status: Never Used   Substance and Sexual Activity    Alcohol use: Yes    Drug use: Yes       Types: Marijuana, Cocaine(coke)    Sexual activity: Defer            Review of Systems:  History obtained from chart review and the patient  General ROS: No fever or chills  Respiratory ROS: No cough, shortness of breath, wheezing  Cardiovascular ROS: No chest pain or palpitations  Gastrointestinal ROS: No abdominal pain or melena  Genito-Urinary ROS: No dysuria or hematuria  Psych ROS: No anxiety and depression  14 point ROS reviewed with the patient and negative except as noted above and in the HPI unless unable to obtain.     Objective:  Patient Vitals for the past 24 hrs:    BP Temp Temp src Pulse Resp SpO2 Weight   07/02/25 1053 137/71 97.5 °F (36.4 °C) Oral 90 16 100 % --   07/02/25 0747 148/89 98.1 °F (36.7 °C) Oral 88 16 99 % --   07/02/25 0308 149/87 98.3 °F (36.8 °C) Oral 90 17 98 % 98.3 kg (216 lb 12.8 oz)   07/01/25 2329 150/83 98.4 °F (36.9 °C) Oral 92 18 99 % --   07/01/25 2018 137/74 98.6 °F (37 °C) Oral 88 16 97 % --   07/01/25 1702 130/79 98.2 °F  "(36.8 °C) Oral 81 18 98 % --         Intake/Output Summary (Last 24 hours) at 7/2/2025 1359  Last data filed at 7/2/2025 1053      Gross per 24 hour   Intake 120 ml   Output --   Net 120 ml      General: awake/alert   HEENT: Normocephalic atraumatic head  Neck: Supple with no JVD or carotid bruits.  Chest:  clear to auscultation bilaterally without respiratory distress  CVS: regular rate and rhythm  Abdominal: soft, nontender, positive bowel sounds  Extremities: 2+ pedal edema  Skin: warm and dry without rash        Labs:         Results from last 7 days   Lab Units 07/02/25  0319 07/01/25  0430 06/30/25  0345   WBC 10*3/mm3 5.91 5.87 5.88   HEMOGLOBIN g/dL 8.3* 7.8* 8.0*   HEMATOCRIT % 26.0* 24.8* 24.9*   PLATELETS 10*3/mm3 170 163 163                     Results from last 7 days   Lab Units 07/02/25  0320 07/01/25  0430 06/30/25  0345 06/27/25  0337 06/26/25  0313   SODIUM mmol/L 137 138 138   < > 132*   POTASSIUM mmol/L 4.0 3.7 3.6   < > 4.0   CHLORIDE mmol/L 103 105 104   < > 101   CO2 mmol/L 22.0 22.0 21.0*   < > 20.0*   BUN mg/dL 42.1* 42.6* 44.5*   < > 63.5*   CREATININE mg/dL 4.00* 3.90* 3.73*   < > 3.96*   CALCIUM mg/dL 8.4* 8.4* 8.1*   < > 8.2*   EGFR mL/min/1.73 19.9* 20.5* 21.7*   < > 20.2*   BILIRUBIN mg/dL 0.4 0.4  --   --  0.8   ALK PHOS U/L 76 71  --   --  66   ALT (SGPT) U/L 10 10  --   --  18   AST (SGOT) U/L 11 10  --   --  25   GLUCOSE mg/dL 85 91 96   < > 140*    < > = values in this interval not displayed.         Radiology:   Imaging Results (Last 72 Hours)         ** No results found for the last 72 hours. **                Culture:  No results found for: \"BLOODCX\", \"URINECX\", \"WOUNDCX\", \"MRSACX\", \"RESPCX\", \"STOOLCX\"        Assessment   1.  Stage IV chronic kidney disease.  2.  Type 2 diabetes with nephropathy.  3.  Recent history of acute kidney injury resolving.  4.  Congestive heart failure exacerbation.  5.  Benign essential hypertension.  6.  Metabolic acidemia.  7.  Non-STEMI.  8.  " Noncompliance with medical management  9.  Secondary hyperparathyroidism.     Plan:  1.  Continue current dose of loop diuretics.  2.  Continue KIANA for treatment of CKD.  3.  Continue p.o. calcitriol.  4.  Monitor renal function closely, if discharged home need follow-up at [facility].  5.  Plan was discussed with the patient.        Robinson Preston MD  7/2/2025  13:59 CDT                   ate/Time Temp Pulse Resp BP Patient Position Device (Oxygen Therapy) SpO2   07/03/25 0830 97.5 (36.4) 86 18 130/68 Sitting room air 98   07/03/25 0338 98.2 (36.8) 90 16 146/78 Lying room air 98   07/02/25 2333 97.5 (36.4) 89 18 140/77 Sitting room air 96   07/02/25 2132 98.9 (37.2) 89 16 137/73 Sitting room air 95   07/02/25 1551 98.4 (36.9) 87 18 139/74 Sitting room air 98   07/              ate/Time Temp Pulse Resp BP Patient Position Device (Oxygen Therapy) SpO2   07/03/25 0830 97.5 (36.4) 86 18 130/68 Sitting room air 98   07/03/25 0338 98.2 (36.8) 90 16 146/78 Lying room air 98   07/02/25 2333 97.5 (36.4) 89 18 140/77 Sitting room air 96   07/02/25 2132 98.9 (37.2) 89 16 137/73 Sitting room air 95   07/02/25 1551 98.4 (36.9) 87 18 139/74 Sitting room air 98   07/                      Patient Communication    CBC & Differential     Not Released  Not seen        CBC Auto Differential     Not Released  Not seen     Results   CBC & Differential (Order [id number])  Linked Results    Procedure Abnormality Status   CBC Auto Differential Abnormal Abnormal  Final result      Contains abnormal data CBC & Differential  Order: [id number]   Status: Final result    Test Result Released: No    Specimen Information: Blood   0 Result Notes       Specimen Collected: 07/03/25 03:19 CDT Last Resulted: 07/03/25 03:58 CDT       Order Details        View Encounter        Lab and Collection Details        Routing        Result History     View All Conversations on this Encounter     Result Care Coordination      Patient Communication     Not Released   Not seen Back to Top      Contains abnormal data CBC Auto Differential  Order: [id number] - Part of Panel Order [id number]   Status: Final result    Test Result Released: No    Specimen Information: Blood   0 Result Notes            Component  Ref Range & Units (hover) 03:19 1 d ago 2 d ago 3 d ago 4 d ago 5 d ago 6 d ago   WBC 5.75 5.91 5.87 5.88 5.97 6.91 6.27   RBC 2.92 Low  2.86 Low  2.66 Low  2.66 Low  2.34 Low  2.59 Low  2.86 Low    Hemoglobin 8.3 Low  8.3 Low  7.8 Low  8.0 Low  6.9 Low Critical  7.2 Low  7.9 Low    Hematocrit 27.0 Low  26.0 Low  24.8 Low  24.9 Low  21.8 Low  23.4 Low  25.3 Low    MCV 92.5 90.9 93.2 93.6 93.2 90.3 88.5   MCH 28.4 29.0 29.3 30.1 29.5 27.8 27.6   MCHC 30.7 Low  31.9 31.5 32.1 31.7 30.8 Low  31.2 Low    RDW 20.4 High  20.3 High  19.8 High  18.6 High  18.6 High  18.3 High  18.3 High    RDW-SD 66.1 High  60.9 High  61.1 High  60.4 High  60.2 High  58.8 High  57.3 High    MPV 11.1 11.1 11.0 12.2 High  11.4 11.6 11.2   Platelets 178 170 163 163 147 150 140   Neutrophil % 66.9 64.8 62.7 62.8 65.7 67.7 61.7   Lymphocyte % 20.9 22.7 23.5 24.5 21.9 21.0 25.4   Monocyte % 8.2 8.5 9.9 9.5 9.2 8.0 7.5   Eosinophil % 3.0 2.9 2.7 2.2 2.2 2.3 4.3   Basophil % 0.7 0.8 0.9 0.5 0.7 0.7 0.8   Immature Grans % 0.3 0.3 0.3 0.5 0.3 0.3 0.3   Neutrophils, Absolute 3.85 3.83 3.68 3.69 3.92 4.68 3.87   Lymphocytes, Absolute 1.20 1.34 1.38 1.44 1.31 1.45 1.59   Monocytes, Absolute 0.47 0.50 0.58 0.56 0.55 0.55 0.47   Eosinophils, Absolute 0.17 0.17 0.16 0.13 0.13 0.16 0.27   Basophils, Absolute 0.04 0.05 0.05 0.03 0.04 0.05 0.05   Immature Grans, Absolute 0.02 0.02 0.02 0.03 0.02 0.02 0.02   nRBC 0.0                  Comprehensive Metabolic Panel [LAB17] (Order [id number])  Order  Date: 7/2/2025 Department: [address] Released By/Authorizing: Jenna Ospina MD (auto-released)     Reprint Order Requisition    Comprehensive Metabolic Panel (Order #[id number]) on 7/2/25         Contains  abnormal data Comprehensive Metabolic Panel  Order: [id number]   Status: Final result       Next appt: 08/19/2025 at 10:00 AM in Radiology (PAD PET RM)    Test Result Released: No    Specimen Information: Blood   0 Result Notes            Component  Ref Range & Units (hover) 03:19 1 d ago 2 d ago 3 d ago 4 d ago 5 d ago 6 d ago   Glucose 95 85 91 96 98 106 High  123 High    BUN 45.5 High  42.1 High  42.6 High  44.5 High  51.2 High  55.8 High  57.4 High    Creatinine 3.97 High  4.00 High  3.90 High  3.73 High  4.03 High  4.29 High  3.97 High    Sodium 140 137 138 138 137 132 Low  133 Low    Potassium 4.0 4.0 3.7 3.6 3.9 4.1 4.4   Chloride 102 103 105 104 104 101 100   CO2 25.0 22.0 22.0 21.0 Low  20.0 Low  21.0 Low  21.0 Low    Calcium 8.9 8.4 Low  8.4 Low  8.1 Low  8.2 Low  8.0 Low  8.3 Low    Total Protein 6.2 5.9 Low  5.6 Low        Albumin 3.4 Low  3.3 Low  3.1 Low        ALT (SGPT) 11 10 10       AST (SGOT) 11 11 10       Alkaline Phosphatase 77 76 71       Total Bilirubin 0.4 0.4 0.4       Globulin 2.8 2.6 2.5       A/G Ratio 1.2 1.3 1.2       BUN/Creatinine Ratio 11.5 10.5 10.9 11.9 12.7 13.0 14.5   Anion Gap 13.0 12.0 11.0 13.0 13.0 10.0 12.0   eGFR 20.1 Low                                       Current Facility-Administered Medications   Medication Dose Route Frequency Provider Last Rate Last Admin    acetaminophen (TYLENOL) tablet 1,000 mg  1,000 mg Oral Q6H PRN Lesa Gonzalez MD   1,000 mg at 06/29/25 2317    aspirin EC tablet 81 mg  81 mg Oral Daily Lesa Gonzalez MD   81 mg at 07/03/25 0814    bumetanide (BUMEX) 25 mg/100mL (0.25 mg/mL) infusion  1 mg/hr Intravenous Continuous Jenna Ospina MD 4 mL/hr at 07/03/25 0724 1 mg/hr at 07/03/25 0724    calcitriol (ROCALTROL) capsule 0.5 mcg  0.5 mcg Oral Daily Robinson Preston MD   0.5 mcg at 07/03/25 0814    carvedilol (COREG) tablet 25 mg  25 mg Oral BID With Meals Marta Clemens APRN   25 mg at 07/03/25 0814    epoetin yocasta-epbx (RETACRIT) injection  10,000 Units  10,000 Units Subcutaneous Once per day on Monday Wednesday Friday Robinson Preston MD   10,000 Units at 07/02/25 0846    hydrALAZINE (APRESOLINE) injection 10 mg  10 mg Intravenous Q6H PRN Lesa Gonzalez MD   10 mg at 06/26/25 1103    hydrALAZINE (APRESOLINE) tablet 100 mg  100 mg Oral Q8H Guille Aguirre MD   100 mg at 07/03/25 0610    isosorbide dinitrate (ISORDIL) tablet 20 mg  20 mg Oral TID - Nitrates Mani Mena MD   20 mg at 07/03/25 0815    levETIRAcetam (KEPPRA) tablet 500 mg  500 mg Oral BID Lesa Gonzalez MD   500 mg at 07/03/25 0815    losartan (COZAAR) tablet 25 mg  25 mg Oral Q24H Marta Clemens APRN   25 mg at 07/03/25 0815    nitroglycerin (NITROSTAT) SL tablet 0.4 mg  0.4 mg Sublingual Q5 Min PRN Lesa Gonzalez MD        ondansetron (ZOFRAN) injection 4 mg  4 mg Intravenous Q6H PRN Benjamin Dominguez MD   4 mg at 06/26/25 0840    pantoprazole (PROTONIX) EC tablet 40 mg  40 mg Oral BID Jenna Foster MD   40 mg at 07/03/25 0815    sodium chloride 0.9 % flush 10 mL  10 mL Intravenous Q12H Lesa Gonzalez MD   10 mL at 07/02/25 2149    sodium chloride 0.9 % flush 10 mL  10 mL Intravenous PRN Lesa Gonzalez MD        sodium chloride 0.9 % infusion 40 mL  40 mL Intravenous PRN Lesa Gonzalez MD

## 2025-07-03 NOTE — PROGRESS NOTES
[facility] Medicine Services  INPATIENT PROGRESS NOTE    Patient Name: José Miguel Webb  Date of Admission: 6/25/2025  Today's Date: 07/03/25  Length of Stay: 8  Primary Care Physician: He Ortega MD    Subjective   Chief Complaint: Transferred for NSTEMI  History of Present Illness  This is a 30-year-old male patient with a medical history of CHF, cardiomegaly, hypertension, CKD , seizures, history of drug use, recent history of peptic ulcer with GI bleed, presenting to the ED for the evaluation of chest pain.  As reported, patient was initially evaluated at [facility] for chest pain and he was given loading dose of aspirin in addition to nitro dose.  Patient was complaining of left-sided chest pain radiating to his left upper extremity, that started around 3 PM.  He denies having shortness of breath or abdominal pain, fever or chills, nausea vomiting or diarrhea.  To note that he was admitted few days ago to our hospital with GI bleed and was found to have peptic ulcer, received an endoscopy, was on PPI twice daily, and received blood transfusions.  He was also evaluated during the hospitalization by the nephrology team, and he was planned for dialysis and permacath placement, however he decided to leave AMA.  Today he mentions that he does not want to leave AMA at this time and he is ready to receive the care.  6/30  Patient still complained of worsening lower extremity edema.  7/1  Before history of cardiomegaly cardiomyopathy EF 21 to 25% nonischemic hypertension chronic kidney disease drug abuse peptic ulcer disease seizures presented with chest pain non-STEMI was transferred to us per cardiology felt elevated troponin secondary to CHF chronic kidney disease appreciate nephrology remains on IV Bumex continue monitoring continue medications appreciate nephrology  7/2  As before the patient actually gained about 24 pounds Per RD we will go ahead and  start him on Bumex drip appreciate cardiology and nephrology continue monitoring again emphasized fluid restriction might need to be evaluated for sleep apnea  7/3  As before continue current Bumex drip continue Coreg hydralazine Imdur losartan aspirin continue monitoring Cardiac PET ordered for ischemic evaluation to be done outpatient   Review of Systems   All pertinent negatives and positives are as above. All other systems have been reviewed and are negative unless otherwise stated.     Objective    Temp:  [97.5 °F (36.4 °C)-98.9 °F (37.2 °C)] 97.5 °F (36.4 °C)  Heart Rate:  [86-90] 86  Resp:  [16-18] 18  BP: (130-146)/(68-78) 130/68  Physical Exam      Constitutional:       Appearance: He is ill-appearing.   Cardiovascular:      Rate and Rhythm: Normal rate and regular rhythm.      Pulses: Normal pulses.      Heart sounds: Normal heart sounds. No murmur heard.  Pulmonary:      Effort: Pulmonary effort is normal. No respiratory distress.      Breath sounds: Normal breath sounds. No wheezing or rales.   Abdominal:      General: Bowel sounds are normal. There is no distension.      Palpations: Abdomen is soft.      Tenderness: There is no abdominal tenderness. There is no guarding.   Musculoskeletal:      Right lower leg: Edema present.      Left lower leg: Edema present.   Skin:     General: Skin is warm.   Neurological:      Mental Status: He is alert and oriented to person, place, and time. Mental status is at baseline.       Results Review:  I have reviewed the labs, radiology results, and diagnostic studies.    Laboratory Data:   Results from last 7 days   Lab Units 07/03/25  0319 07/02/25  0319 07/01/25  0430   WBC 10*3/mm3 5.75 5.91 5.87   HEMOGLOBIN g/dL 8.3* 8.3* 7.8*   HEMATOCRIT % 27.0* 26.0* 24.8*   PLATELETS 10*3/mm3 178 170 163        Results from last 7 days   Lab Units 07/03/25  0319 07/02/25  0320 07/01/25  0430   SODIUM mmol/L 140 137 138   POTASSIUM mmol/L 4.0 4.0 3.7   CHLORIDE mmol/L 102 103  "105   CO2 mmol/L 25.0 22.0 22.0   BUN mg/dL 45.5* 42.1* 42.6*   CREATININE mg/dL 3.97* 4.00* 3.90*   CALCIUM mg/dL 8.9 8.4* 8.4*   BILIRUBIN mg/dL 0.4 0.4 0.4   ALK PHOS U/L 77 76 71   ALT (SGPT) U/L 11 10 10   AST (SGOT) U/L 11 11 10   GLUCOSE mg/dL 95 85 91       Culture Data:   No results found for: \"BLOODCX\", \"URINECX\", \"WOUNDCX\", \"MRSACX\", \"RESPCX\", \"STOOLCX\"    Radiology Data:   Imaging Results (Last 24 Hours)       ** No results found for the last 24 hours. **            I have reviewed the patient's current medications.     Assessment/Plan   Assessment  Active Hospital Problems    Diagnosis     **Non-STEMI (non-ST elevated myocardial infarction)     Hypertensive crisis     Congestive heart failure     Seizure disorder     Chronic systolic (congestive) heart failure        Treatment Plan    -NSTEMI  Cardiology was consulted and recommended hemodialysis as indicated, echocardiogram showed very low ejection fraction at 21%, patient may benefit from AICD.  Cardiologist switched Imdur to Isordil, added amlodipine and continued hydralazine and Coreg.  Anticoagulation was contraindicated because of recent GI bleed and patient was anemic.  Patient has a history of noncompliance, cocaine abuse and therefore beta-blocker will be avoided.    -ESRD with impending hemodialysis  Nephrology is on consult to help with management.  Nephrologist has ruled out initiation of hemodialysis at this point, patient's acute renal failure improved.    -Uncontrolled blood pressure  Patient was on Entresto at home, currently on losartan, Norvasc, Coreg and hydralazine as well as as needed IV hydralazine.    -Recent history of present GI bleed  Anticoagulation is avoided, patient's hemoglobin is also low.  We will continue patient on PPI    -Acute on chronic anemia secondary to end-stage renal disease/recent GI bleed  Patient is status post 2 units of PRBC this admission.    DVT prophylaxis-SCD    Disposition-follow cardiologist and " nephrologist recommendations/discharge planning.      Electronically signed by Jenna Ospina MD, 07/03/25, 10:26 CDT.

## 2025-07-04 VITALS
OXYGEN SATURATION: 98 % | TEMPERATURE: 98.3 F | HEIGHT: 74 IN | SYSTOLIC BLOOD PRESSURE: 139 MMHG | BODY MASS INDEX: 25.93 KG/M2 | DIASTOLIC BLOOD PRESSURE: 80 MMHG | HEART RATE: 88 BPM | RESPIRATION RATE: 17 BRPM | WEIGHT: 202 LBS

## 2025-07-04 PROBLEM — R79.89 ELEVATED TROPONIN: Status: ACTIVE | Noted: 2025-06-25

## 2025-07-04 PROBLEM — N18.4 CKD (CHRONIC KIDNEY DISEASE) STAGE 4, GFR 15-29 ML/MIN: Status: ACTIVE | Noted: 2025-07-04

## 2025-07-04 PROBLEM — F19.90 DRUG USE: Status: ACTIVE | Noted: 2025-07-04

## 2025-07-04 LAB
ALBUMIN SERPL-MCNC: 3.1 G/DL (ref 3.5–5.2)
ALBUMIN/GLOB SERPL: 1.2 G/DL
ALP SERPL-CCNC: 75 U/L (ref 39–117)
ALT SERPL W P-5'-P-CCNC: 9 U/L (ref 1–41)
ANION GAP SERPL CALCULATED.3IONS-SCNC: 13 MMOL/L (ref 5–15)
AST SERPL-CCNC: 12 U/L (ref 1–40)
BASOPHILS # BLD AUTO: 0.03 10*3/MM3 (ref 0–0.2)
BASOPHILS NFR BLD AUTO: 0.6 % (ref 0–1.5)
BILIRUB SERPL-MCNC: 0.4 MG/DL (ref 0–1.2)
BUN SERPL-MCNC: 43 MG/DL (ref 6–20)
BUN/CREAT SERPL: 11 (ref 7–25)
CALCIUM SPEC-SCNC: 8.6 MG/DL (ref 8.6–10.5)
CHLORIDE SERPL-SCNC: 99 MMOL/L (ref 98–107)
CO2 SERPL-SCNC: 26 MMOL/L (ref 22–29)
CREAT SERPL-MCNC: 3.92 MG/DL (ref 0.76–1.27)
DEPRECATED RDW RBC AUTO: 67.2 FL (ref 37–54)
EGFRCR SERPLBLD CKD-EPI 2021: 20.4 ML/MIN/1.73
EOSINOPHIL # BLD AUTO: 0.19 10*3/MM3 (ref 0–0.4)
EOSINOPHIL NFR BLD AUTO: 3.8 % (ref 0.3–6.2)
ERYTHROCYTE [DISTWIDTH] IN BLOOD BY AUTOMATED COUNT: 20.2 % (ref 12.3–15.4)
FERRITIN SERPL-MCNC: 189.3 NG/ML (ref 30–400)
GLOBULIN UR ELPH-MCNC: 2.6 GM/DL
GLUCOSE SERPL-MCNC: 84 MG/DL (ref 65–99)
HCT VFR BLD AUTO: 28.3 % (ref 37.5–51)
HGB BLD-MCNC: 8.5 G/DL (ref 13–17.7)
IMM GRANULOCYTES # BLD AUTO: 0.01 10*3/MM3 (ref 0–0.05)
IMM GRANULOCYTES NFR BLD AUTO: 0.2 % (ref 0–0.5)
IRON 24H UR-MRATE: 35 MCG/DL (ref 59–158)
IRON SATN MFR SERPL: 10 % (ref 20–50)
LYMPHOCYTES # BLD AUTO: 1.07 10*3/MM3 (ref 0.7–3.1)
LYMPHOCYTES NFR BLD AUTO: 21.2 % (ref 19.6–45.3)
MCH RBC QN AUTO: 27.5 PG (ref 26.6–33)
MCHC RBC AUTO-ENTMCNC: 30 G/DL (ref 31.5–35.7)
MCV RBC AUTO: 91.6 FL (ref 79–97)
MONOCYTES # BLD AUTO: 0.49 10*3/MM3 (ref 0.1–0.9)
MONOCYTES NFR BLD AUTO: 9.7 % (ref 5–12)
NEUTROPHILS NFR BLD AUTO: 3.26 10*3/MM3 (ref 1.7–7)
NEUTROPHILS NFR BLD AUTO: 64.5 % (ref 42.7–76)
NRBC BLD AUTO-RTO: 0 /100 WBC (ref 0–0.2)
PLATELET # BLD AUTO: 163 10*3/MM3 (ref 140–450)
PMV BLD AUTO: 10.9 FL (ref 6–12)
POTASSIUM SERPL-SCNC: 3.8 MMOL/L (ref 3.5–5.2)
PROT SERPL-MCNC: 5.7 G/DL (ref 6–8.5)
RBC # BLD AUTO: 3.09 10*6/MM3 (ref 4.14–5.8)
RETICS # AUTO: 0.15 10*6/MM3 (ref 0.02–0.13)
RETICS/RBC NFR AUTO: 4.95 % (ref 0.7–1.9)
SODIUM SERPL-SCNC: 138 MMOL/L (ref 136–145)
TIBC SERPL-MCNC: 347 MCG/DL (ref 298–536)
TRANSFERRIN SERPL-MCNC: 233 MG/DL (ref 200–360)
WBC NRBC COR # BLD AUTO: 5.05 10*3/MM3 (ref 3.4–10.8)

## 2025-07-04 PROCEDURE — 82728 ASSAY OF FERRITIN: CPT | Performed by: NURSE PRACTITIONER

## 2025-07-04 PROCEDURE — 25010000002 BUMETANIDE PER 0.5 MG: Performed by: INTERNAL MEDICINE

## 2025-07-04 PROCEDURE — 85025 COMPLETE CBC W/AUTO DIFF WBC: CPT | Performed by: INTERNAL MEDICINE

## 2025-07-04 PROCEDURE — 83540 ASSAY OF IRON: CPT | Performed by: NURSE PRACTITIONER

## 2025-07-04 PROCEDURE — 85045 AUTOMATED RETICULOCYTE COUNT: CPT | Performed by: NURSE PRACTITIONER

## 2025-07-04 PROCEDURE — 84466 ASSAY OF TRANSFERRIN: CPT | Performed by: NURSE PRACTITIONER

## 2025-07-04 PROCEDURE — 25010000002 NA FERRIC GLUC CPLX PER 12.5 MG: Performed by: NURSE PRACTITIONER

## 2025-07-04 PROCEDURE — 80053 COMPREHEN METABOLIC PANEL: CPT | Performed by: INTERNAL MEDICINE

## 2025-07-04 PROCEDURE — 25010000002 EPOETIN ALFA-EPBX 10000 UNIT/ML SOLUTION: Performed by: INTERNAL MEDICINE

## 2025-07-04 RX ORDER — ISOSORBIDE DINITRATE 20 MG/1
20 TABLET ORAL
Qty: 90 TABLET | Refills: 0 | Status: SHIPPED | OUTPATIENT
Start: 2025-07-04

## 2025-07-04 RX ORDER — BUMETANIDE 2 MG/1
1 TABLET ORAL 2 TIMES DAILY
Qty: 60 TABLET | Refills: 3 | Status: SHIPPED | OUTPATIENT
Start: 2025-07-04

## 2025-07-04 RX ORDER — LEVETIRACETAM 500 MG/1
500 TABLET ORAL 2 TIMES DAILY
Qty: 60 TABLET | Refills: 0 | Status: SHIPPED | OUTPATIENT
Start: 2025-07-04

## 2025-07-04 RX ORDER — CARVEDILOL 25 MG/1
25 TABLET ORAL 2 TIMES DAILY WITH MEALS
Qty: 60 TABLET | Refills: 0 | Status: SHIPPED | OUTPATIENT
Start: 2025-07-04

## 2025-07-04 RX ORDER — BUMETANIDE 1 MG/1
2 TABLET ORAL
Status: DISCONTINUED | OUTPATIENT
Start: 2025-07-04 | End: 2025-07-04 | Stop reason: HOSPADM

## 2025-07-04 RX ORDER — ASPIRIN 81 MG/1
81 TABLET ORAL DAILY
Qty: 30 TABLET | Refills: 0 | Status: SHIPPED | OUTPATIENT
Start: 2025-07-05

## 2025-07-04 RX ORDER — HYDRALAZINE HYDROCHLORIDE 100 MG/1
100 TABLET, FILM COATED ORAL EVERY 8 HOURS SCHEDULED
Qty: 90 TABLET | Refills: 3 | Status: SHIPPED | OUTPATIENT
Start: 2025-07-04

## 2025-07-04 RX ORDER — CALCITRIOL 0.5 UG/1
0.5 CAPSULE, LIQUID FILLED ORAL DAILY
Qty: 30 CAPSULE | Refills: 0 | Status: SHIPPED | OUTPATIENT
Start: 2025-07-05

## 2025-07-04 RX ADMIN — ISOSORBIDE DINITRATE 20 MG: 10 TABLET ORAL at 14:19

## 2025-07-04 RX ADMIN — CALCITRIOL CAPSULES 0.25 MCG 0.5 MCG: 0.25 CAPSULE ORAL at 08:04

## 2025-07-04 RX ADMIN — HYDRALAZINE HYDROCHLORIDE 100 MG: 50 TABLET ORAL at 14:20

## 2025-07-04 RX ADMIN — HYDRALAZINE HYDROCHLORIDE 100 MG: 50 TABLET ORAL at 05:51

## 2025-07-04 RX ADMIN — ASPIRIN 81 MG: 81 TABLET, COATED ORAL at 08:05

## 2025-07-04 RX ADMIN — LOSARTAN POTASSIUM 25 MG: 25 TABLET, FILM COATED ORAL at 08:04

## 2025-07-04 RX ADMIN — ISOSORBIDE DINITRATE 20 MG: 10 TABLET ORAL at 08:04

## 2025-07-04 RX ADMIN — Medication 10 ML: at 09:21

## 2025-07-04 RX ADMIN — BUMETANIDE 2 MG: 0.25 INJECTION INTRAMUSCULAR; INTRAVENOUS at 05:51

## 2025-07-04 RX ADMIN — PANTOPRAZOLE SODIUM 40 MG: 40 TABLET, DELAYED RELEASE ORAL at 08:04

## 2025-07-04 RX ADMIN — LEVETIRACETAM 500 MG: 500 TABLET, FILM COATED ORAL at 08:04

## 2025-07-04 RX ADMIN — EPOETIN ALFA-EPBX 10000 UNITS: 10000 INJECTION, SOLUTION INTRAVENOUS; SUBCUTANEOUS at 10:10

## 2025-07-04 RX ADMIN — CARVEDILOL 25 MG: 25 TABLET, FILM COATED ORAL at 08:04

## 2025-07-04 NOTE — PROGRESS NOTES
Nephrology ([facility]) Progress Note      Patient:  José Miguel Webb  YOB: 1995  Date of Service: 7/4/2025  MRN: [id number]   Acct: [id number]   Primary Care Physician: He Ortega MD  Advance Directive:   Code Status and Medical Interventions: CPR (Attempt to Resuscitate); Full Support   Ordered at: 06/25/25 2000     Code Status (Patient has no pulse and is not breathing):    CPR (Attempt to Resuscitate)     Medical Interventions (Patient has pulse or is breathing):    Full Support     Level Of Support Discussed With:    Patient     Admit Date: 6/25/2025       Hospital Day: 9  Referring Provider: Nicola Redd MD      Patient personally seen and examined.  Complete chart including Consults, Notes, Operative Reports, Labs, Cardiology, and Radiology studies reviewed as able.    Chief complaint: Abnormal labs.    Subjective:  José Migeul Webb is a 30 y.o. male for whom we were consulted for evaluation and treatment of chronic kidney disease. Baseline chronic kidney disease stage 4, baseline creatinine approximately 4.0. Has followed with our providers on a sporadic basis over the years, typically no-shows for his office visits. History of hypertension, CHF, seizure disorder. Patient was recently treated at [facility] for GI bleed and MINDY. He was scheduled to have permcath placed and to start hemodialysis but left AMA on 6/24.  Presented to [facility] on 6/25 with chest pain and was transferred to [facility] for cardiology and nephrology evaluation. Creatinine 3.99 when admitted this time.  No complaint of pain or dyspnea at time of initial nephrology exam. Denied n/v/d. Denied edema. No recent changes in urination. He explained that he is willing to proceed with dialysis if it is needed    This afternoon patient is walking in the hallways, denies any shortness of breath or swelling of legs.  His renal function has stabilized.  He is  hoping to go home today.      Allergies:  Amoxicillin and Penicillins    Home Meds:  Medications Prior to Admission   Medication Sig Dispense Refill Last Dose/Taking    [Paused] sacubitril-valsartan (Entresto) 49-51 MG tablet Take 1 tablet by mouth 2 (Two) Times a Day. 180 tablet 3        Medicines:  Current Facility-Administered Medications   Medication Dose Route Frequency Provider Last Rate Last Admin    acetaminophen (TYLENOL) tablet 1,000 mg  1,000 mg Oral Q6H PRN Lesa Gonzalez MD   1,000 mg at 07/03/25 1717    aspirin EC tablet 81 mg  81 mg Oral Daily Lesa Gonzalez MD   81 mg at 07/04/25 0805    bumetanide (BUMEX) injection 2 mg  2 mg Intravenous Q12H Robinson Preston MD   2 mg at 07/04/25 0551    calcitriol (ROCALTROL) capsule 0.5 mcg  0.5 mcg Oral Daily Robinson Preston MD   0.5 mcg at 07/04/25 0804    carvedilol (COREG) tablet 25 mg  25 mg Oral BID With Meals Marta Clemens APRN   25 mg at 07/04/25 0804    epoetin yocasta-epbx (RETACRIT) injection 10,000 Units  10,000 Units Subcutaneous Once per day on Monday Wednesday Friday Robinson Preston MD   10,000 Units at 07/04/25 1010    hydrALAZINE (APRESOLINE) injection 10 mg  10 mg Intravenous Q6H PRN Lesa Gonzalez MD   10 mg at 06/26/25 1103    hydrALAZINE (APRESOLINE) tablet 100 mg  100 mg Oral Q8H Guille Aguirre MD   100 mg at 07/04/25 1420    isosorbide dinitrate (ISORDIL) tablet 20 mg  20 mg Oral TID - Nitrates Mani Mena MD   20 mg at 07/04/25 1419    levETIRAcetam (KEPPRA) tablet 500 mg  500 mg Oral BID Lesa Gonzalez MD   500 mg at 07/04/25 0804    losartan (COZAAR) tablet 25 mg  25 mg Oral Q24H Marta Clemens APRN   25 mg at 07/04/25 0804    nitroglycerin (NITROSTAT) SL tablet 0.4 mg  0.4 mg Sublingual Q5 Min PRN Lesa Gonzalez MD        ondansetron (ZOFRAN) injection 4 mg  4 mg Intravenous Q6H PRN Benjamin Dominguez MD   4 mg at 06/26/25 0840    pantoprazole (PROTONIX) EC tablet 40 mg  40 mg Oral BID AC Jenna Ospina MD   40 mg at  07/04/25 0804    sodium chloride 0.9 % flush 10 mL  10 mL Intravenous Q12H Lesa Gonzalez MD   10 mL at 07/04/25 0921    sodium chloride 0.9 % flush 10 mL  10 mL Intravenous PRN Lesa Gonzalez MD        sodium chloride 0.9 % infusion 40 mL  40 mL Intravenous PRN Lesa Gonzalez MD           Past Medical History:  Past Medical History:   Diagnosis Date    Cardiomegaly     CHF (congestive heart failure)     Congenital fusion of carpal bone     HTN (hypertension)     Non-STEMI (non-ST elevated myocardial infarction) 6/25/2025    Seizure        Past Surgical History:  Past Surgical History:   Procedure Laterality Date    ELBOW PROCEDURE      ENDOSCOPY N/A 6/22/2025    Procedure: ESOPHAGOGASTRODUODENOSCOPY;  Surgeon: Kevin Miranda MD;  Location: [facility];  Service: Gastroenterology;  Laterality: N/A;  preop; GI bleed   postop; gastric ulcer ; esophagitis       Family History  Family History   Problem Relation Age of Onset    Hypertension Mother     Heart disease Mother     No Known Problems Father        Social History  Social History     Socioeconomic History    Marital status: Single   Tobacco Use    Smoking status: Some Days     Current packs/day: 0.50     Types: Cigarettes    Smokeless tobacco: Never   Vaping Use    Vaping status: Never Used   Substance and Sexual Activity    Alcohol use: Yes    Drug use: Yes     Types: Marijuana, Cocaine(coke)    Sexual activity: Defer       Review of Systems:  History obtained from chart review and the patient  General ROS: No fever or chills  Respiratory ROS: No cough, shortness of breath, wheezing  Cardiovascular ROS: No chest pain or palpitations  Gastrointestinal ROS: No abdominal pain or melena  Genito-Urinary ROS: No dysuria or hematuria  Psych ROS: No anxiety and depression  14 point ROS reviewed with the patient and negative except as noted above and in the HPI unless unable to obtain.    Objective:  Patient Vitals for the past 24 hrs:   BP Temp Temp src Pulse Resp  "SpO2 Weight   07/04/25 1419 139/80 -- -- 88 -- -- --   07/04/25 1156 133/80 98.3 °F (36.8 °C) Oral 91 17 98 % --   07/04/25 0700 133/74 98.4 °F (36.9 °C) Oral 87 17 99 % --   07/04/25 0600 -- -- -- -- -- -- 91.6 kg (202 lb)   07/04/25 0308 159/86 98.1 °F (36.7 °C) Oral 93 16 99 % 91.6 kg (202 lb)   07/03/25 1948 134/73 98 °F (36.7 °C) Oral 87 16 98 % --   07/03/25 1628 139/85 97.3 °F (36.3 °C) Oral 84 18 100 % --       Intake/Output Summary (Last 24 hours) at 7/4/2025 1534  Last data filed at 7/4/2025 1419  Gross per 24 hour   Intake 1200 ml   Output 250 ml   Net 950 ml     General: awake/alert   HEENT: Normocephalic atraumatic head  Neck: Supple with no JVD or carotid bruits.  Chest:  clear to auscultation bilaterally without respiratory distress  CVS: regular rate and rhythm  Abdominal: soft, nontender, positive bowel sounds  Extremities: 2+ pedal edema  Skin: warm and dry without rash      Labs:  Results from last 7 days   Lab Units 07/04/25  0509 07/03/25 0319 07/02/25  0319   WBC 10*3/mm3 5.05 5.75 5.91   HEMOGLOBIN g/dL 8.5* 8.3* 8.3*   HEMATOCRIT % 28.3* 27.0* 26.0*   PLATELETS 10*3/mm3 163 178 170         Results from last 7 days   Lab Units 07/04/25  0509 07/03/25  0319 07/02/25  0320   SODIUM mmol/L 138 140 137   POTASSIUM mmol/L 3.8 4.0 4.0   CHLORIDE mmol/L 99 102 103   CO2 mmol/L 26.0 25.0 22.0   BUN mg/dL 43.0* 45.5* 42.1*   CREATININE mg/dL 3.92* 3.97* 4.00*   CALCIUM mg/dL 8.6 8.9 8.4*   EGFR mL/min/1.73 20.4* 20.1* 19.9*   BILIRUBIN mg/dL 0.4 0.4 0.4   ALK PHOS U/L 75 77 76   ALT (SGPT) U/L 9 11 10   AST (SGOT) U/L 12 11 11   GLUCOSE mg/dL 84 95 85       Radiology:   Imaging Results (Last 72 Hours)       ** No results found for the last 72 hours. **            Culture:  No results found for: \"BLOODCX\", \"URINECX\", \"WOUNDCX\", \"MRSACX\", \"RESPCX\", \"STOOLCX\"      Assessment   1.  Stage IV chronic kidney disease.  2.  Type 2 diabetes with nephropathy.  3.  Recent history of acute kidney injury " resolving.  4.  Congestive heart failure exacerbation.  5.  Benign essential hypertension.  6.  Metabolic acidemia.  7.  Non-STEMI.  8.  Noncompliance with medical management  9.  Secondary hyperparathyroidism.    Plan:  1.  Change to p.o. Bumex.  2.  Continue KIANA for treatment of CKD.  3.  Continue p.o. calcitriol.  4.  Monitor renal function closely, if discharged today, follow-up needed at nephrology at [place] office  5.  Plan was discussed with the patient.      Robinson Preston MD  7/4/2025  15:34 CDT

## 2025-07-04 NOTE — PLAN OF CARE
Goal Outcome Evaluation:  Plan of Care Reviewed With: patient           Outcome Evaluation: Bumex IVP given, Edema improved, Patient c/o generalized pain and requesting Morphine IV, Hospitalist denied narcotics at this time. Patient declined Tylenol

## 2025-07-04 NOTE — CASE MANAGEMENT/SOCIAL WORK
Continued Stay Note   Erik     Patient Name: José Miguel Webb  MRN: [id number]  Today's Date: 7/4/2025    Admit Date: 6/25/2025    Plan: Home   Discharge Plan       Row Name 07/04/25 1506       Plan    Plan Home    Plan Comments Cab voucher provided    Final Discharge Disposition Code 01 - home or self-care                   Discharge Codes    No documentation.                 Expected Discharge Date and Time       Expected Discharge Date Expected Discharge Time    Jul 4, 2025               Del Funes

## 2025-07-04 NOTE — PLAN OF CARE
Problem: Adult Inpatient Plan of Care  Goal: Plan of Care Review  Outcome: Adequate for Care Transition  Goal: Patient-Specific Goal (Individualized)  Outcome: Adequate for Care Transition  Goal: Absence of Hospital-Acquired Illness or Injury  Outcome: Adequate for Care Transition  Goal: Optimal Comfort and Wellbeing  Outcome: Adequate for Care Transition  Goal: Readiness for Transition of Care  Outcome: Adequate for Care Transition     Problem: Chest Pain  Goal: Resolution of Chest Pain Symptoms  Outcome: Adequate for Care Transition     Problem: Acute Kidney Injury/Impairment  Goal: Fluid and Electrolyte Balance  Outcome: Adequate for Care Transition  Goal: Improved Oral Intake  Outcome: Adequate for Care Transition  Goal: Effective Renal Function  Outcome: Adequate for Care Transition     Problem: Heart Failure  Goal: Optimal Coping  Outcome: Adequate for Care Transition  Goal: Optimal Cardiac Output and Blood Flow  Outcome: Adequate for Care Transition  Goal: Stable Heart Rate and Rhythm  Outcome: Adequate for Care Transition  Goal: Fluid and Electrolyte Balance  Outcome: Adequate for Care Transition  Goal: Improved Oral Intake  Outcome: Adequate for Care Transition     Problem: Comorbidity Management  Goal: Maintenance of Heart Failure Symptom Control  Outcome: Adequate for Care Transition  Goal: Blood Pressure in Desired Range  Outcome: Adequate for Care Transition   Goal Outcome Evaluation:

## 2025-07-04 NOTE — DISCHARGE SUMMARY
[facility] Medicine Services  DISCHARGE SUMMARY       Date of Admission: 6/25/2025  Date of Discharge:  7/4/2025  Primary Care Physician: He Ortega MD    Presenting Problem/History of Present Illness:  Chief Complaint: Transferred for NSTEMI  History of Present Illness  This is a 30-year-old male patient with a medical history of CHF, cardiomegaly, hypertension, CKD , seizures, history of drug use, recent history of peptic ulcer with GI bleed, presenting to the ED for the evaluation of chest pain.  As reported, patient was initially evaluated at [facility] for chest pain and he was given loading dose of aspirin in addition to nitro dose.  Patient was complaining of left-sided chest pain radiating to his left upper extremity, that started around 3 PM.  He denies having shortness of breath or abdominal pain, fever or chills, nausea vomiting or diarrhea.  To note that he was admitted few days ago to our hospital with GI bleed and was found to have peptic ulcer, received an endoscopy, was on PPI twice daily, and received blood transfusions.  He was also evaluated during the hospitalization by the nephrology team, and he was planned for dialysis and permacath placement, however he decided to leave AMA.  Today he mentions that he does not want to leave AMA at this time and he is ready to receive the care.  6/30  Patient still complained of worsening lower extremity edema.  7/1  Before history of cardiomegaly cardiomyopathy EF 21 to 25% nonischemic hypertension chronic kidney disease drug abuse peptic ulcer disease seizures presented with chest pain non-STEMI was transferred to us per cardiology felt elevated troponin secondary to CHF chronic kidney disease appreciate nephrology remains on IV Bumex continue monitoring continue medications appreciate nephrology  7/2  As before the patient actually gained about 24 pounds Per RD we will go ahead and start him on  Bumex drip appreciate cardiology and nephrology continue monitoring again emphasized fluid restriction might need to be evaluated for sleep apnea  7/3  As before continue current Bumex drip continue Coreg hydralazine Imdur losartan aspirin continue monitoring Cardiac PET ordered for ischemic evaluation to be done outpatient   7/4  Appreciate nephrology Bumex drip was discontinued started intermittent IV Bumex  Appreciate cardiology Cardiac PET ordered for ischemic evaluation to be done outpatient     Final Discharge Diagnoses:  Active Hospital Problems    Diagnosis     **Elevated troponin     Drug use     CKD (chronic kidney disease) stage 4, GFR 15-29 ml/min     Hypertensive crisis     Congestive heart failure     Seizure disorder     Chronic systolic (congestive) heart failure        Consults: Cardiology nephrology    Procedures Performed: As above    Pertinent Test Results:   Results for orders placed during the hospital encounter of 06/25/25    Adult Transthoracic Echo Complete W/ Cont if Necessary Per Protocol    Interpretation Summary  Images from the original result were not included.      Left ventricular systolic function is severely decreased. Automated 2D EF = 21%  Left ventricular ejection fraction appears to be 21 - 25%.    The left ventricular cavity is moderately dilated.    Left ventricular diastolic function is consistent with (grade II w/high LAP) pseudonormalization.    Left atrial volume is severely increased.    The right atrial cavity is moderately dilated.    Moderate pulmonary hypertension is present.    There is a small (<1cm) pericardial effusion. There is no evidence of cardiac tamponade.    Compared to prior echo dated 11/24/2024 LVEF has decreased from 31-35 % to current above      Abnormal left ventricular global strain with left ventricular apical sparing.  Recommend workup for cardiac amyloid.      Imaging Results (All)       Procedure Component Value Units Date/Time    XR Chest 1  View [[id number]] Collected: 06/25/25 1746     Updated: 06/25/25 1750    Narrative:      EXAM/TECHNIQUE: XR CHEST 1 VW-     INDICATION: Chest pain, shortness of breath     COMPARISON: None     FINDINGS:  Marked enlargement of the cardiac silhouette. No pleural effusion or  visible pneumothorax. No focal consolidation. No acute osseous finding.       Impression:      1.  Marked enlargement of the cardiac silhouette.  2.  Lungs are clear.        This report was signed and finalized on 6/25/2025 5:47 PM by Dr. Juan Miguel Aparicio MD.             LAB RESULTS:      Lab 07/04/25  0509 07/03/25  0319 07/02/25  0319 07/01/25  0430 06/30/25  0345   WBC 5.05 5.75 5.91 5.87 5.88   HEMOGLOBIN 8.5* 8.3* 8.3* 7.8* 8.0*   HEMATOCRIT 28.3* 27.0* 26.0* 24.8* 24.9*   PLATELETS 163 178 170 163 163   NEUTROS ABS 3.26 3.85 3.83 3.68 3.69   IMMATURE GRANS (ABS) 0.01 0.02 0.02 0.02 0.03   LYMPHS ABS 1.07 1.20 1.34 1.38 1.44   MONOS ABS 0.49 0.47 0.50 0.58 0.56   EOS ABS 0.19 0.17 0.17 0.16 0.13   MCV 91.6 92.5 90.9 93.2 93.6         Lab 07/04/25  0509 07/03/25  0319 07/02/25  0320 07/01/25  0430 06/30/25  0345 06/29/25  0359 06/28/25  0305   SODIUM 138 140 137 138 138 137 132*   POTASSIUM 3.8 4.0 4.0 3.7 3.6 3.9 4.1   CHLORIDE 99 102 103 105 104 104 101   CO2 26.0 25.0 22.0 22.0 21.0* 20.0* 21.0*   ANION GAP 13.0 13.0 12.0 11.0 13.0 13.0 10.0   BUN 43.0* 45.5* 42.1* 42.6* 44.5* 51.2* 55.8*   CREATININE 3.92* 3.97* 4.00* 3.90* 3.73* 4.03* 4.29*   EGFR 20.4* 20.1* 19.9* 20.5* 21.7* 19.7* 18.3*   GLUCOSE 84 95 85 91 96 98 106*   CALCIUM 8.6 8.9 8.4* 8.4* 8.1* 8.2* 8.0*   MAGNESIUM  --   --   --  2.2 2.1 2.3 2.4   HEMOGLOBIN A1C  --   --   --   --  5.40  --   --          Lab 07/04/25  0509 07/03/25  0319 07/02/25  0320 07/01/25  0430   TOTAL PROTEIN 5.7* 6.2 5.9* 5.6*   ALBUMIN 3.1* 3.4* 3.3* 3.1*   GLOBULIN 2.6 2.8 2.6 2.5   ALT (SGPT) 9 11 10 10   AST (SGOT) 12 11 11 10   BILIRUBIN 0.4 0.4 0.4 0.4   ALK PHOS 75 77 76 71                      Brief Urine Lab Results  (Last result in the past 365 days)        Color   Clarity   Blood   Leuk Est   Nitrite   Protein   CREAT   Urine HCG        06/25/25 1953 Yellow   Clear   Negative   Negative   Negative   >=300 mg/dL (3+)                 Microbiology Results (last 10 days)       ** No results found for the last 240 hours. **            Hospital Course:   Chief Complaint: Transferred for NSTEMI  History of Present Illness  This is a 30-year-old male patient with a medical history of CHF, cardiomegaly, hypertension, CKD , seizures, history of drug use, recent history of peptic ulcer with GI bleed, presenting to the ED for the evaluation of chest pain.  As reported, patient was initially evaluated at [facility] for chest pain and he was given loading dose of aspirin in addition to nitro dose.  Patient was complaining of left-sided chest pain radiating to his left upper extremity, that started around 3 PM.  He denies having shortness of breath or abdominal pain, fever or chills, nausea vomiting or diarrhea.  To note that he was admitted few days ago to our hospital with GI bleed and was found to have peptic ulcer, received an endoscopy, was on PPI twice daily, and received blood transfusions.  He was also evaluated during the hospitalization by the nephrology team, and he was planned for dialysis and permacath placement, however he decided to leave AMA.  Today he mentions that he does not want to leave AMA at this time and he is ready to receive the care.  6/30  Patient still complained of worsening lower extremity edema.  7/1  Before history of cardiomegaly cardiomyopathy EF 21 to 25% nonischemic hypertension chronic kidney disease drug abuse peptic ulcer disease seizures presented with chest pain non-STEMI was transferred to us per cardiology felt elevated troponin secondary to CHF chronic kidney disease appreciate nephrology remains on IV Bumex continue monitoring continue medications  "appreciate nephrology  7/2  As before the patient actually gained about 24 pounds Per RD we will go ahead and start him on Bumex drip appreciate cardiology and nephrology continue monitoring again emphasized fluid restriction might need to be evaluated for sleep apnea  7/3  As before continue current Bumex drip continue Coreg hydralazine Imdur losartan aspirin continue monitoring Cardiac PET ordered for ischemic evaluation to be done outpatient   7/4  Appreciate nephrology Bumex drip was discontinued started intermittent IV Bumex  Appreciate cardiology Cardiac PET ordered for ischemic evaluation to be done outpatient     Physical Exam on Discharge:  /80 (BP Location: Right arm, Patient Position: Lying)   Pulse 91   Temp 98.3 °F (36.8 °C) (Oral)   Resp 17   Ht 188 cm (74\")   Wt 91.6 kg (202 lb)   SpO2 98%   BMI 25.94 kg/m²   Physical Exam      Condition on Discharge: stable     Discharge Disposition:  Home or Self Care    Discharge Medications:     Discharge Medications        PAUSE taking these medications        Instructions Start Date   Entresto 49-51 MG tablet  Wait to take this until your doctor or other care provider tells you to start again.  Generic drug: sacubitril-valsartan   1 tablet, Oral, 2 Times Daily             New Medications        Instructions Start Date   bumetanide 2 MG tablet  Commonly known as: BUMEX   1 mg, Oral, 2 Times Daily      calcitriol 0.5 MCG capsule  Commonly known as: ROCALTROL   0.5 mcg, Oral, Daily   Start Date: July 5, 2025     carvedilol 25 MG tablet  Commonly known as: COREG   25 mg, Oral, 2 Times Daily With Meals      dilTIAZem HCl  MG 24 hr tablet  Commonly known as: Cardizem LA   240 mg, Oral, Daily      epoetin yocasta-epbx 51264 UNIT/ML injection  Commonly known as: RETACRIT   10,000 Units, Subcutaneous, 3 Times Weekly   Start Date: July 7, 2025     hydrALAZINE 25 MG tablet  Commonly known as: APRESOLINE   25 mg, Oral, Every 8 Hours Scheduled    "   hydrALAZINE 100 MG tablet  Commonly known as: APRESOLINE   100 mg, Oral, Every 8 Hours Scheduled      isosorbide dinitrate 20 MG tablet  Commonly known as: ISORDIL   20 mg, Oral, 3 Times Daily (Nitrates)      isosorbide mononitrate 30 MG 24 hr tablet  Commonly known as: IMDUR   30 mg, Oral, Daily             Continue These Medications        Instructions Start Date   aspirin 81 MG EC tablet   81 mg, Oral, Daily   Start Date: July 5, 2025     levETIRAcetam 500 MG tablet  Commonly known as: KEPPRA   500 mg, Oral, 2 Times Daily                     Discharge Diet:     Activity at Discharge:     Follow-up Appointments:   Future Appointments   Date Time Provider Department Center   8/19/2025 10:00 AM [facility]  [facility] None       Test Results Pending at Discharge: stable    Electronically signed by Jenna Ospina MD, 07/04/25, 13:08 CDT.    Time: 45 minutes.

## 2025-07-04 NOTE — PROGRESS NOTES
[facility] Medicine Services  INPATIENT PROGRESS NOTE    Patient Name: José Miguel Webb  Date of Admission: 6/25/2025  Today's Date: 07/04/25  Length of Stay: 9  Primary Care Physician: He Ortega MD    Subjective   Chief Complaint: Transferred for NSTEMI  History of Present Illness  This is a 30-year-old male patient with a medical history of CHF, cardiomegaly, hypertension, CKD , seizures, history of drug use, recent history of peptic ulcer with GI bleed, presenting to the ED for the evaluation of chest pain.  As reported, patient was initially evaluated at [facility] for chest pain and he was given loading dose of aspirin in addition to nitro dose.  Patient was complaining of left-sided chest pain radiating to his left upper extremity, that started around 3 PM.  He denies having shortness of breath or abdominal pain, fever or chills, nausea vomiting or diarrhea.  To note that he was admitted few days ago to our hospital with GI bleed and was found to have peptic ulcer, received an endoscopy, was on PPI twice daily, and received blood transfusions.  He was also evaluated during the hospitalization by the nephrology team, and he was planned for dialysis and permacath placement, however he decided to leave AMA.  Today he mentions that he does not want to leave AMA at this time and he is ready to receive the care.  6/30  Patient still complained of worsening lower extremity edema.  7/1  Before history of cardiomegaly cardiomyopathy EF 21 to 25% nonischemic hypertension chronic kidney disease drug abuse peptic ulcer disease seizures presented with chest pain non-STEMI was transferred to us per cardiology felt elevated troponin secondary to CHF chronic kidney disease appreciate nephrology remains on IV Bumex continue monitoring continue medications appreciate nephrology  7/2  As before the patient actually gained about 24 pounds Per RD we will go ahead and  start him on Bumex drip appreciate cardiology and nephrology continue monitoring again emphasized fluid restriction might need to be evaluated for sleep apnea  7/3  As before continue current Bumex drip continue Coreg hydralazine Imdur losartan aspirin continue monitoring Cardiac PET ordered for ischemic evaluation to be done outpatient   7/4  Julita nephrology Bumex drip was discontinued started intermittent IV Bumex  Appreciate cardiology Cardiac PET ordered for ischemic evaluation to be done outpatient   Review of Systems   All pertinent negatives and positives are as above. All other systems have been reviewed and are negative unless otherwise stated.     Objective    Temp:  [97.3 °F (36.3 °C)-98.4 °F (36.9 °C)] 98.4 °F (36.9 °C)  Heart Rate:  [82-93] 87  Resp:  [16-18] 17  BP: (118-159)/(67-86) 133/74  Physical Exam      Constitutional:       Appearance: He is ill-appearing.   Cardiovascular:      Rate and Rhythm: Normal rate and regular rhythm.      Pulses: Normal pulses.      Heart sounds: Normal heart sounds. No murmur heard.  Pulmonary:      Effort: Pulmonary effort is normal. No respiratory distress.      Breath sounds: Normal breath sounds. No wheezing or rales.   Abdominal:      General: Bowel sounds are normal. There is no distension.      Palpations: Abdomen is soft.      Tenderness: There is no abdominal tenderness. There is no guarding.   Musculoskeletal:      Right lower leg: Edema present.      Left lower leg: Edema present.   Skin:     General: Skin is warm.   Neurological:      Mental Status: He is alert and oriented to person, place, and time. Mental status is at baseline.       Results Review:  I have reviewed the labs, radiology results, and diagnostic studies.    Laboratory Data:   Results from last 7 days   Lab Units 07/04/25  0509 07/03/25  0319 07/02/25  0319   WBC 10*3/mm3 5.05 5.75 5.91   HEMOGLOBIN g/dL 8.5* 8.3* 8.3*   HEMATOCRIT % 28.3* 27.0* 26.0*   PLATELETS 10*3/mm3 163 178 170  "       Results from last 7 days   Lab Units 07/04/25  0509 07/03/25  0319 07/02/25  0320   SODIUM mmol/L 138 140 137   POTASSIUM mmol/L 3.8 4.0 4.0   CHLORIDE mmol/L 99 102 103   CO2 mmol/L 26.0 25.0 22.0   BUN mg/dL 43.0* 45.5* 42.1*   CREATININE mg/dL 3.92* 3.97* 4.00*   CALCIUM mg/dL 8.6 8.9 8.4*   BILIRUBIN mg/dL 0.4 0.4 0.4   ALK PHOS U/L 75 77 76   ALT (SGPT) U/L 9 11 10   AST (SGOT) U/L 12 11 11   GLUCOSE mg/dL 84 95 85       Culture Data:   No results found for: \"BLOODCX\", \"URINECX\", \"WOUNDCX\", \"MRSACX\", \"RESPCX\", \"STOOLCX\"    Radiology Data:   Imaging Results (Last 24 Hours)       ** No results found for the last 24 hours. **            I have reviewed the patient's current medications.     Assessment/Plan   Assessment  Active Hospital Problems    Diagnosis     **Non-STEMI (non-ST elevated myocardial infarction)     Hypertensive crisis     Congestive heart failure     Seizure disorder     Chronic systolic (congestive) heart failure        Treatment Plan    -NSTEMI  Cardiology was consulted and recommended hemodialysis as indicated, echocardiogram showed very low ejection fraction at 21%, patient may benefit from AICD.  Cardiologist switched Imdur to Isordil, added amlodipine and continued hydralazine and Coreg.  Anticoagulation was contraindicated because of recent GI bleed and patient was anemic.  Patient has a history of noncompliance, cocaine abuse and therefore beta-blocker will be avoided.    -ESRD with impending hemodialysis  Nephrology is on consult to help with management.  Nephrologist has ruled out initiation of hemodialysis at this point, patient's acute renal failure improved.    -Uncontrolled blood pressure  Patient was on Entresto at home, currently on losartan, Norvasc, Coreg and hydralazine as well as as needed IV hydralazine.    -Recent history of present GI bleed  Anticoagulation is avoided, patient's hemoglobin is also low.  We will continue patient on PPI    -Acute on chronic anemia " secondary to end-stage renal disease/recent GI bleed  Patient is status post 2 units of PRBC this admission.    DVT prophylaxis-SCD    Disposition-follow cardiologist and nephrologist recommendations/discharge planning.      Electronically signed by Jenna Ospina MD, 07/04/25, 11:01 CDT.

## 2025-07-04 NOTE — PROGRESS NOTES
[facility] -  Progress Note     LOS: 9 days   Patient Care Team:  He Ortega MD as PCP - General (Internal Medicine)    Chief Complaint:CHF f/u    Subjective     Interval History:   Reports he feels better today. He continues to have swelling however reports it to be better than when he came in. Current edema present from his knees down. Bumex gtt was stopped yesterday per nephrology. Creatinine unchanged from previous at 3.92. hgb 8.5 from 8.3 yesterday.       Telemetry: NSR 87-95  Review of Systems:   Review of Systems   Constitutional:  Negative for chills, diaphoresis, fatigue and unexpected weight change.   HENT:  Negative for nosebleeds.    Respiratory:  Negative for cough, chest tightness, shortness of breath and wheezing.    Cardiovascular:  Positive for leg swelling. Negative for chest pain and palpitations.   Gastrointestinal:  Negative for anal bleeding, blood in stool, diarrhea and nausea.   Neurological:  Negative for dizziness, syncope and light-headedness.   All other systems reviewed and are negative.      Objective     Vital Sign Min/Max for last 24 hours  Temp  Min: 97.3 °F (36.3 °C)  Max: 98.4 °F (36.9 °C)   BP  Min: 133/74  Max: 159/86   Pulse  Min: 84  Max: 93   Resp  Min: 16  Max: 18   SpO2  Min: 98 %  Max: 100 %   No data recorded   Weight  Min: 91.6 kg (202 lb)  Max: 91.6 kg (202 lb)         07/04/25  0600   Weight: 91.6 kg (202 lb)         Intake/Output Summary (Last 24 hours) at 7/4/2025 1138  Last data filed at 7/4/2025 0900  Gross per 24 hour   Intake 840 ml   Output 850 ml   Net -10 ml         Physical Exam:  Vitals reviewed.   Constitutional:       General: Not in acute distress.     Appearance: Healthy appearance. Well-developed. Not diaphoretic.   Eyes:      General: No scleral icterus.     Conjunctiva/sclera: Conjunctivae normal.      Pupils: Pupils are equal, round, and reactive to light.   HENT:      Head: Normocephalic.    Mouth/Throat:      Pharynx:  No oropharyngeal exudate.   Neck:      Vascular: No JVR.   Pulmonary:      Effort: Pulmonary effort is normal. No respiratory distress.      Breath sounds: Normal breath sounds. No wheezing. No rhonchi. No rales.   Chest:      Chest wall: Not tender to palpatation.   Cardiovascular:      Normal rate. Regular rhythm.   Pulses:     Intact distal pulses.   Edema:     Peripheral edema present.     Pretibial: bilateral 2+ pitting edema of the pretibial area.     Ankle: bilateral 2+ pitting edema of the ankle.     Feet: bilateral 2+ pitting edema of the feet.  Abdominal:      General: Bowel sounds are normal. There is no distension.      Palpations: Abdomen is soft.      Tenderness: There is no abdominal tenderness.   Musculoskeletal: Normal range of motion.      Cervical back: Normal range of motion and neck supple. Skin:     General: Skin is warm and dry.      Coloration: Skin is not pale.      Findings: No erythema or rash.   Neurological:      Mental Status: Alert, oriented to person, place, and time and oriented to person, place and time.      Deep Tendon Reflexes: Reflexes are normal and symmetric.   Psychiatric:         Behavior: Behavior normal.          Results Review:   Lab Results (last 72 hours)       Procedure Component Value Units Date/Time    Comprehensive Metabolic Panel [[id number]]  (Abnormal) Collected: 07/04/25 0509    Specimen: Blood Updated: 07/04/25 0557     Glucose 84 mg/dL      BUN 43.0 mg/dL      Creatinine 3.92 mg/dL      Sodium 138 mmol/L      Potassium 3.8 mmol/L      Chloride 99 mmol/L      CO2 26.0 mmol/L      Calcium 8.6 mg/dL      Total Protein 5.7 g/dL      Albumin 3.1 g/dL      ALT (SGPT) 9 U/L      AST (SGOT) 12 U/L      Alkaline Phosphatase 75 U/L      Total Bilirubin 0.4 mg/dL      Globulin 2.6 gm/dL      A/G Ratio 1.2 g/dL      BUN/Creatinine Ratio 11.0     Anion Gap 13.0 mmol/L      eGFR 20.4 mL/min/1.73     Narrative:      GFR Categories in Chronic Kidney Disease (CKD)               GFR Category          GFR (mL/min/1.73)    Interpretation  G1                    90 or greater        Normal or high (1)  G2                    60-89                Mild decrease (1)  G3a                   45-59                Mild to moderate decrease  G3b                   30-44                Moderate to severe decrease  G4                    15-29                Severe decrease  G5                    14 or less           Kidney failure    (1)In the absence of evidence of kidney disease, neither GFR category G1 or G2 fulfill the criteria for CKD.    eGFR calculation 2021 CKD-EPI creatinine equation, which does not include race as a factor    CBC & Differential [[id number]]  (Abnormal) Collected: 07/04/25 0509    Specimen: Blood Updated: 07/04/25 0534    Narrative:      The following orders were created for panel order CBC & Differential.  Procedure                               Abnormality         Status                     ---------                               -----------         ------                     CBC Auto Differential[[id number]]        Abnormal            Final result                 Please view results for these tests on the individual orders.    CBC Auto Differential [[id number]]  (Abnormal) Collected: 07/04/25 0509    Specimen: Blood Updated: 07/04/25 0534     WBC 5.05 10*3/mm3      RBC 3.09 10*6/mm3      Hemoglobin 8.5 g/dL      Hematocrit 28.3 %      MCV 91.6 fL      MCH 27.5 pg      MCHC 30.0 g/dL      RDW 20.2 %      RDW-SD 67.2 fl      MPV 10.9 fL      Platelets 163 10*3/mm3      Neutrophil % 64.5 %      Lymphocyte % 21.2 %      Monocyte % 9.7 %      Eosinophil % 3.8 %      Basophil % 0.6 %      Immature Grans % 0.2 %      Neutrophils, Absolute 3.26 10*3/mm3      Lymphocytes, Absolute 1.07 10*3/mm3      Monocytes, Absolute 0.49 10*3/mm3      Eosinophils, Absolute 0.19 10*3/mm3      Basophils, Absolute 0.03 10*3/mm3      Immature Grans, Absolute 0.01 10*3/mm3      nRBC 0.0 /100 WBC      Comprehensive Metabolic Panel [[id number]]  (Abnormal) Collected: 07/03/25 0319    Specimen: Blood Updated: 07/03/25 0423     Glucose 95 mg/dL      BUN 45.5 mg/dL      Creatinine 3.97 mg/dL      Sodium 140 mmol/L      Potassium 4.0 mmol/L      Chloride 102 mmol/L      CO2 25.0 mmol/L      Calcium 8.9 mg/dL      Total Protein 6.2 g/dL      Albumin 3.4 g/dL      ALT (SGPT) 11 U/L      AST (SGOT) 11 U/L      Alkaline Phosphatase 77 U/L      Total Bilirubin 0.4 mg/dL      Globulin 2.8 gm/dL      A/G Ratio 1.2 g/dL      BUN/Creatinine Ratio 11.5     Anion Gap 13.0 mmol/L      eGFR 20.1 mL/min/1.73     Narrative:      GFR Categories in Chronic Kidney Disease (CKD)              GFR Category          GFR (mL/min/1.73)    Interpretation  G1                    90 or greater        Normal or high (1)  G2                    60-89                Mild decrease (1)  G3a                   45-59                Mild to moderate decrease  G3b                   30-44                Moderate to severe decrease  G4                    15-29                Severe decrease  G5                    14 or less           Kidney failure    (1)In the absence of evidence of kidney disease, neither GFR category G1 or G2 fulfill the criteria for CKD.    eGFR calculation 2021 CKD-EPI creatinine equation, which does not include race as a factor    CBC & Differential [[id number]]  (Abnormal) Collected: 07/03/25 0319    Specimen: Blood Updated: 07/03/25 0358    Narrative:      The following orders were created for panel order CBC & Differential.  Procedure                               Abnormality         Status                     ---------                               -----------         ------                     CBC Auto Differential[[id number]]        Abnormal            Final result                 Please view results for these tests on the individual orders.    CBC Auto Differential [[id number]]  (Abnormal) Collected: 07/03/25 0319    Specimen: Blood  Updated: 07/03/25 0358     WBC 5.75 10*3/mm3      RBC 2.92 10*6/mm3      Hemoglobin 8.3 g/dL      Hematocrit 27.0 %      MCV 92.5 fL      MCH 28.4 pg      MCHC 30.7 g/dL      RDW 20.4 %      RDW-SD 66.1 fl      MPV 11.1 fL      Platelets 178 10*3/mm3      Neutrophil % 66.9 %      Lymphocyte % 20.9 %      Monocyte % 8.2 %      Eosinophil % 3.0 %      Basophil % 0.7 %      Immature Grans % 0.3 %      Neutrophils, Absolute 3.85 10*3/mm3      Lymphocytes, Absolute 1.20 10*3/mm3      Monocytes, Absolute 0.47 10*3/mm3      Eosinophils, Absolute 0.17 10*3/mm3      Basophils, Absolute 0.04 10*3/mm3      Immature Grans, Absolute 0.02 10*3/mm3      nRBC 0.0 /100 WBC     Comprehensive Metabolic Panel [[id number]]  (Abnormal) Collected: 07/02/25 0320    Specimen: Blood Updated: 07/02/25 0429     Glucose 85 mg/dL      BUN 42.1 mg/dL      Creatinine 4.00 mg/dL      Sodium 137 mmol/L      Potassium 4.0 mmol/L      Chloride 103 mmol/L      CO2 22.0 mmol/L      Calcium 8.4 mg/dL      Total Protein 5.9 g/dL      Albumin 3.3 g/dL      ALT (SGPT) 10 U/L      AST (SGOT) 11 U/L      Alkaline Phosphatase 76 U/L      Total Bilirubin 0.4 mg/dL      Globulin 2.6 gm/dL      A/G Ratio 1.3 g/dL      BUN/Creatinine Ratio 10.5     Anion Gap 12.0 mmol/L      eGFR 19.9 mL/min/1.73     Narrative:      GFR Categories in Chronic Kidney Disease (CKD)              GFR Category          GFR (mL/min/1.73)    Interpretation  G1                    90 or greater        Normal or high (1)  G2                    60-89                Mild decrease (1)  G3a                   45-59                Mild to moderate decrease  G3b                   30-44                Moderate to severe decrease  G4                    15-29                Severe decrease  G5                    14 or less           Kidney failure    (1)In the absence of evidence of kidney disease, neither GFR category G1 or G2 fulfill the criteria for CKD.    eGFR calculation 2021 CKD-EPI creatinine  equation, which does not include race as a factor    CBC & Differential [[id number]]  (Abnormal) Collected: 07/02/25 0319    Specimen: Blood Updated: 07/02/25 0358    Narrative:      The following orders were created for panel order CBC & Differential.  Procedure                               Abnormality         Status                     ---------                               -----------         ------                     CBC Auto Differential[[id number]]        Abnormal            Final result                 Please view results for these tests on the individual orders.    CBC Auto Differential [[id number]]  (Abnormal) Collected: 07/02/25 0319    Specimen: Blood Updated: 07/02/25 0358     WBC 5.91 10*3/mm3      RBC 2.86 10*6/mm3      Hemoglobin 8.3 g/dL      Hematocrit 26.0 %      MCV 90.9 fL      MCH 29.0 pg      MCHC 31.9 g/dL      RDW 20.3 %      RDW-SD 60.9 fl      MPV 11.1 fL      Platelets 170 10*3/mm3      Neutrophil % 64.8 %      Lymphocyte % 22.7 %      Monocyte % 8.5 %      Eosinophil % 2.9 %      Basophil % 0.8 %      Immature Grans % 0.3 %      Neutrophils, Absolute 3.83 10*3/mm3      Lymphocytes, Absolute 1.34 10*3/mm3      Monocytes, Absolute 0.50 10*3/mm3      Eosinophils, Absolute 0.17 10*3/mm3      Basophils, Absolute 0.05 10*3/mm3      Immature Grans, Absolute 0.02 10*3/mm3      nRBC 0.0 /100 WBC              Results for orders placed during the hospital encounter of 06/25/25    Adult Transthoracic Echo Complete W/ Cont if Necessary Per Protocol 06/27/2025  5:26 AM    Interpretation Summary  Images from the original result were not included.      Left ventricular systolic function is severely decreased. Automated 2D EF = 21%  Left ventricular ejection fraction appears to be 21 - 25%.    The left ventricular cavity is moderately dilated.    Left ventricular diastolic function is consistent with (grade II w/high LAP) pseudonormalization.    Left atrial volume is severely increased.    The right  atrial cavity is moderately dilated.    Moderate pulmonary hypertension is present.    There is a small (<1cm) pericardial effusion. There is no evidence of cardiac tamponade.    Compared to prior echo dated 11/24/2024 LVEF has decreased from 31-35 % to current above      Abnormal left ventricular global strain with left ventricular apical sparing.  Recommend workup for cardiac amyloid.          Medication Review: yes  Current Facility-Administered Medications   Medication Dose Route Frequency Provider Last Rate Last Admin    acetaminophen (TYLENOL) tablet 1,000 mg  1,000 mg Oral Q6H PRN Lesa Gonzalez MD   1,000 mg at 07/03/25 1717    aspirin EC tablet 81 mg  81 mg Oral Daily Lesa Gonzalez MD   81 mg at 07/04/25 0805    bumetanide (BUMEX) injection 2 mg  2 mg Intravenous Q12H Robinson Preston MD   2 mg at 07/04/25 0551    calcitriol (ROCALTROL) capsule 0.5 mcg  0.5 mcg Oral Daily Robinson Preston MD   0.5 mcg at 07/04/25 0804    carvedilol (COREG) tablet 25 mg  25 mg Oral BID With Meals Marta Clemens APRN   25 mg at 07/04/25 0804    epoetin yocasta-epbx (RETACRIT) injection 10,000 Units  10,000 Units Subcutaneous Once per day on Monday Wednesday Friday Robinson Preston MD   10,000 Units at 07/04/25 1010    hydrALAZINE (APRESOLINE) injection 10 mg  10 mg Intravenous Q6H PRN Lesa Gonzalez MD   10 mg at 06/26/25 1103    hydrALAZINE (APRESOLINE) tablet 100 mg  100 mg Oral Q8H Guille Aguirre MD   100 mg at 07/04/25 0551    isosorbide dinitrate (ISORDIL) tablet 20 mg  20 mg Oral TID - Nitrates Mani Mena MD   20 mg at 07/04/25 0804    levETIRAcetam (KEPPRA) tablet 500 mg  500 mg Oral BID Lesa Gonzalez MD   500 mg at 07/04/25 0804    losartan (COZAAR) tablet 25 mg  25 mg Oral Q24H Marta Clemens APRN   25 mg at 07/04/25 0804    nitroglycerin (NITROSTAT) SL tablet 0.4 mg  0.4 mg Sublingual Q5 Min PRN Lesa Gonzalez MD        ondansetron (ZOFRAN) injection 4 mg  4 mg Intravenous Q6H PRN Benjamin Dominguez MD    4 mg at 06/26/25 0840    pantoprazole (PROTONIX) EC tablet 40 mg  40 mg Oral BID AC Jenna Ospina MD   40 mg at 07/04/25 0804    sodium chloride 0.9 % flush 10 mL  10 mL Intravenous Q12H Lesa Gonzalez MD   10 mL at 07/04/25 0921    sodium chloride 0.9 % flush 10 mL  10 mL Intravenous PRN Lesa Gonzalez MD        sodium chloride 0.9 % infusion 40 mL  40 mL Intravenous PRN Lesa Gonzalez MD             Assessment & Plan       Elevated troponin    Chronic systolic (congestive) heart failure    Seizure disorder    Congestive heart failure    Hypertensive crisis    Drug use    CKD (chronic kidney disease) stage 4, GFR 15-29 ml/min    Plan:   Acute on chronic CHF: EF 21-25% per echo on 6/25. Due to CKD, nephrology is managing diuretics. Bumex gtt was changed to IVP Bumex today. Continue Coreg, hydralazine, isordil and losartan. Declined lifevest. Will recheck iron profile.     Elevated troponin: likely secondary to acute CHF exacerbation and CKD. Cardiac PET ordered to be completed outpatient.     HTN: fairly controlled. Defer to attending.     Drug use: positive for THC and cocaine per UDS.     CKD: creatinine stable at 3.92 with baseline at 4. Nephrology following.     Further recommendations per Dr. Bernard     Electronically signed by SUKHDEV Conrad, 07/04/25, 11:25 AM CDT.    Time spent: 50 minutes

## 2025-07-05 NOTE — PAYOR COMM NOTE
"NM HOME 7-4-25    José Miguel Webb (30 y.o. Male)       Date of Birth   1995    Social Security Number       Address   [address] [place] [address]    Home Phone   [phone]    MRN   [id number]       Zoroastrian   Muslim    Marital Status   Single                            Admission Date   6/25/2025    Admission Type   Urgent    Admitting Provider   Jenna Ospina MD    Attending Provider       Department, Room/Bed   [facility] 4B, 448/1       Discharge Date   7/4/2025    Discharge Disposition   Home or Self Care    Discharge Destination   Home                              Attending Provider: (none)   Allergies: Amoxicillin, Penicillins    Isolation: None   Infection: None   Code Status: Prior    Ht: 188 cm (74\")   Wt: 91.6 kg (202 lb)    Admission Cmt: None   Principal Problem: Elevated troponin [R79.89]                   Active Insurance as of 6/25/2025       Primary Coverage       Payor Plan Insurance Group Employer/Plan Group    [place] [id number]       Payor Plan Address Payor Plan Phone Number Payor Plan Fax Number Effective Dates    [employer] [address] [address] [phone]  1/1/2025 - None Entered    [facility] [id number]         Subscriber Name Subscriber Birth Date Member ID       JOSÉ MIGUEL WEBB 1995 [id number]                     Emergency Contacts        (Rel.) Home Phone Work Phone Mobile Phone    Jesika Webb (Mother) [phone] -- [phone]                 Discharge Summary        Jenna Ospina MD at 07/04/25 1308                [facility] Medicine Services  DISCHARGE SUMMARY       Date of Admission: 6/25/2025  Date of Discharge:  7/4/2025  Primary Care Physician: He Ortega MD    Presenting Problem/History of Present Illness:  Chief Complaint: Transferred for NSTEMI  History of Present Illness  This is a 30-year-old male patient with a medical history of CHF, cardiomegaly, " hypertension, CKD , seizures, history of drug use, recent history of peptic ulcer with GI bleed, presenting to the ED for the evaluation of chest pain.  As reported, patient was initially evaluated at [facility] for chest pain and he was given loading dose of aspirin in addition to nitro dose.  Patient was complaining of left-sided chest pain radiating to his left upper extremity, that started around 3 PM.  He denies having shortness of breath or abdominal pain, fever or chills, nausea vomiting or diarrhea.  To note that he was admitted few days ago to our hospital with GI bleed and was found to have peptic ulcer, received an endoscopy, was on PPI twice daily, and received blood transfusions.  He was also evaluated during the hospitalization by the nephrology team, and he was planned for dialysis and permacath placement, however he decided to leave AMA.  Today he mentions that he does not want to leave AMA at this time and he is ready to receive the care.  6/30  Patient still complained of worsening lower extremity edema.  7/1  Before history of cardiomegaly cardiomyopathy EF 21 to 25% nonischemic hypertension chronic kidney disease drug abuse peptic ulcer disease seizures presented with chest pain non-STEMI was transferred to us per cardiology felt elevated troponin secondary to CHF chronic kidney disease appreciate nephrology remains on IV Bumex continue monitoring continue medications appreciate nephrology  7/2  As before the patient actually gained about 24 pounds Per RD we will go ahead and start him on Bumex drip appreciate cardiology and nephrology continue monitoring again emphasized fluid restriction might need to be evaluated for sleep apnea  7/3  As before continue current Bumex drip continue Coreg hydralazine Imdur losartan aspirin continue monitoring Cardiac PET ordered for ischemic evaluation to be done outpatient   7/4  Appreciate nephrology Bumex drip was discontinued started  intermittent IV Bumex  Appreciate cardiology Cardiac PET ordered for ischemic evaluation to be done outpatient     Final Discharge Diagnoses:  Active Hospital Problems    Diagnosis     **Elevated troponin     Drug use     CKD (chronic kidney disease) stage 4, GFR 15-29 ml/min     Hypertensive crisis     Congestive heart failure     Seizure disorder     Chronic systolic (congestive) heart failure        Consults: Cardiology nephrology    Procedures Performed: As above    Pertinent Test Results:   Results for orders placed during the hospital encounter of 06/25/25    Adult Transthoracic Echo Complete W/ Cont if Necessary Per Protocol    Interpretation Summary  Images from the original result were not included.      Left ventricular systolic function is severely decreased. Automated 2D EF = 21%  Left ventricular ejection fraction appears to be 21 - 25%.    The left ventricular cavity is moderately dilated.    Left ventricular diastolic function is consistent with (grade II w/high LAP) pseudonormalization.    Left atrial volume is severely increased.    The right atrial cavity is moderately dilated.    Moderate pulmonary hypertension is present.    There is a small (<1cm) pericardial effusion. There is no evidence of cardiac tamponade.    Compared to prior echo dated 11/24/2024 LVEF has decreased from 31-35 % to current above      Abnormal left ventricular global strain with left ventricular apical sparing.  Recommend workup for cardiac amyloid.      Imaging Results (All)       Procedure Component Value Units Date/Time    XR Chest 1 View [[id number]] Collected: 06/25/25 1746     Updated: 06/25/25 1750    Narrative:      EXAM/TECHNIQUE: XR CHEST 1 VW-     INDICATION: Chest pain, shortness of breath     COMPARISON: None     FINDINGS:  Marked enlargement of the cardiac silhouette. No pleural effusion or  visible pneumothorax. No focal consolidation. No acute osseous finding.       Impression:      1.  Marked enlargement of  the cardiac silhouette.  2.  Lungs are clear.        This report was signed and finalized on 6/25/2025 5:47 PM by Dr. Juan Miguel Aparicio MD.             LAB RESULTS:      Lab 07/04/25  0509 07/03/25  0319 07/02/25  0319 07/01/25  0430 06/30/25  0345   WBC 5.05 5.75 5.91 5.87 5.88   HEMOGLOBIN 8.5* 8.3* 8.3* 7.8* 8.0*   HEMATOCRIT 28.3* 27.0* 26.0* 24.8* 24.9*   PLATELETS 163 178 170 163 163   NEUTROS ABS 3.26 3.85 3.83 3.68 3.69   IMMATURE GRANS (ABS) 0.01 0.02 0.02 0.02 0.03   LYMPHS ABS 1.07 1.20 1.34 1.38 1.44   MONOS ABS 0.49 0.47 0.50 0.58 0.56   EOS ABS 0.19 0.17 0.17 0.16 0.13   MCV 91.6 92.5 90.9 93.2 93.6         Lab 07/04/25  0509 07/03/25  0319 07/02/25  0320 07/01/25  0430 06/30/25  0345 06/29/25  0359 06/28/25  0305   SODIUM 138 140 137 138 138 137 132*   POTASSIUM 3.8 4.0 4.0 3.7 3.6 3.9 4.1   CHLORIDE 99 102 103 105 104 104 101   CO2 26.0 25.0 22.0 22.0 21.0* 20.0* 21.0*   ANION GAP 13.0 13.0 12.0 11.0 13.0 13.0 10.0   BUN 43.0* 45.5* 42.1* 42.6* 44.5* 51.2* 55.8*   CREATININE 3.92* 3.97* 4.00* 3.90* 3.73* 4.03* 4.29*   EGFR 20.4* 20.1* 19.9* 20.5* 21.7* 19.7* 18.3*   GLUCOSE 84 95 85 91 96 98 106*   CALCIUM 8.6 8.9 8.4* 8.4* 8.1* 8.2* 8.0*   MAGNESIUM  --   --   --  2.2 2.1 2.3 2.4   HEMOGLOBIN A1C  --   --   --   --  5.40  --   --          Lab 07/04/25  0509 07/03/25  0319 07/02/25  0320 07/01/25  0430   TOTAL PROTEIN 5.7* 6.2 5.9* 5.6*   ALBUMIN 3.1* 3.4* 3.3* 3.1*   GLOBULIN 2.6 2.8 2.6 2.5   ALT (SGPT) 9 11 10 10   AST (SGOT) 12 11 11 10   BILIRUBIN 0.4 0.4 0.4 0.4   ALK PHOS 75 77 76 71                     Brief Urine Lab Results  (Last result in the past 365 days)        Color   Clarity   Blood   Leuk Est   Nitrite   Protein   CREAT   Urine HCG        06/25/25 1953 Yellow   Clear   Negative   Negative   Negative   >=300 mg/dL (3+)                 Microbiology Results (last 10 days)       ** No results found for the last 240 hours. **            Hospital Course:   Chief Complaint: Transferred for  NSTEMI  History of Present Illness  This is a 30-year-old male patient with a medical history of CHF, cardiomegaly, hypertension, CKD , seizures, history of drug use, recent history of peptic ulcer with GI bleed, presenting to the ED for the evaluation of chest pain.  As reported, patient was initially evaluated at [facility] for chest pain and he was given loading dose of aspirin in addition to nitro dose.  Patient was complaining of left-sided chest pain radiating to his left upper extremity, that started around 3 PM.  He denies having shortness of breath or abdominal pain, fever or chills, nausea vomiting or diarrhea.  To note that he was admitted few days ago to our hospital with GI bleed and was found to have peptic ulcer, received an endoscopy, was on PPI twice daily, and received blood transfusions.  He was also evaluated during the hospitalization by the nephrology team, and he was planned for dialysis and permacath placement, however he decided to leave AMA.  Today he mentions that he does not want to leave AMA at this time and he is ready to receive the care.  6/30  Patient still complained of worsening lower extremity edema.  7/1  Before history of cardiomegaly cardiomyopathy EF 21 to 25% nonischemic hypertension chronic kidney disease drug abuse peptic ulcer disease seizures presented with chest pain non-STEMI was transferred to us per cardiology felt elevated troponin secondary to CHF chronic kidney disease appreciate nephrology remains on IV Bumex continue monitoring continue medications appreciate nephrology  7/2  As before the patient actually gained about 24 pounds Per RD we will go ahead and start him on Bumex drip appreciate cardiology and nephrology continue monitoring again emphasized fluid restriction might need to be evaluated for sleep apnea  7/3  As before continue current Bumex drip continue Coreg hydralazine Imdur losartan aspirin continue monitoring Cardiac PET ordered  "for ischemic evaluation to be done outpatient   7/4  Appreciate nephrology Bumex drip was discontinued started intermittent IV Bumex  Appreciate cardiology Cardiac PET ordered for ischemic evaluation to be done outpatient     Physical Exam on Discharge:  /80 (BP Location: Right arm, Patient Position: Lying)   Pulse 91   Temp 98.3 °F (36.8 °C) (Oral)   Resp 17   Ht 188 cm (74\")   Wt 91.6 kg (202 lb)   SpO2 98%   BMI 25.94 kg/m²   Physical Exam      Condition on Discharge: stable     Discharge Disposition:  Home or Self Care    Discharge Medications:     Discharge Medications        PAUSE taking these medications        Instructions Start Date   Entresto 49-51 MG tablet  Wait to take this until your doctor or other care provider tells you to start again.  Generic drug: sacubitril-valsartan   1 tablet, Oral, 2 Times Daily             New Medications        Instructions Start Date   bumetanide 2 MG tablet  Commonly known as: BUMEX   1 mg, Oral, 2 Times Daily      calcitriol 0.5 MCG capsule  Commonly known as: ROCALTROL   0.5 mcg, Oral, Daily   Start Date: July 5, 2025     carvedilol 25 MG tablet  Commonly known as: COREG   25 mg, Oral, 2 Times Daily With Meals      dilTIAZem HCl  MG 24 hr tablet  Commonly known as: Cardizem LA   240 mg, Oral, Daily      epoetin yocasta-epbx 03312 UNIT/ML injection  Commonly known as: RETACRIT   10,000 Units, Subcutaneous, 3 Times Weekly   Start Date: July 7, 2025     hydrALAZINE 25 MG tablet  Commonly known as: APRESOLINE   25 mg, Oral, Every 8 Hours Scheduled      hydrALAZINE 100 MG tablet  Commonly known as: APRESOLINE   100 mg, Oral, Every 8 Hours Scheduled      isosorbide dinitrate 20 MG tablet  Commonly known as: ISORDIL   20 mg, Oral, 3 Times Daily (Nitrates)      isosorbide mononitrate 30 MG 24 hr tablet  Commonly known as: IMDUR   30 mg, Oral, Daily             Continue These Medications        Instructions Start Date   aspirin 81 MG EC tablet   81 mg, Oral, " Daily   Start Date: July 5, 2025     levETIRAcetam 500 MG tablet  Commonly known as: KEPPRA   500 mg, Oral, 2 Times Daily                     Discharge Diet:     Activity at Discharge:     Follow-up Appointments:   Future Appointments   Date Time Provider Department Center   8/19/2025 10:00 AM PAD PET [facility] None       Test Results Pending at Discharge: stable    Electronically signed by Jenna Ospina MD, 07/04/25, 13:08 CDT.    Time: 45 minutes.         Electronically signed by Jenna Ospina MD at 07/04/25 8709

## 2025-07-07 ENCOUNTER — READMISSION MANAGEMENT (OUTPATIENT)
Dept: CALL CENTER | Facility: HOSPITAL | Age: 30
End: 2025-07-07
Payer: MEDICAID

## 2025-07-07 NOTE — OUTREACH NOTE
Prep Survey      Flowsheet Row Responses   [facility] facility patient discharged from? [facility]   Is LACE score < 7 ? No   Eligibility Readm Mgmt   Discharge diagnosis congestive heart failure  Non-STEMI (non-ST elevated   Does the patient have one of the following disease processes/diagnoses(primary or secondary)? CHF   Does the patient have Home health ordered? No   Is there a DME ordered? No   Prep survey completed? Yes            REJI TINAJERO - Registered Nurse

## 2025-07-11 ENCOUNTER — READMISSION MANAGEMENT (OUTPATIENT)
Dept: CALL CENTER | Facility: HOSPITAL | Age: 30
End: 2025-07-11
Payer: MEDICAID

## 2025-07-11 NOTE — OUTREACH NOTE
CHF Week 1 Survey      Flowsheet Row Responses   [facility] patient discharged from? [place]   Does the patient have one of the following disease processes/diagnoses(primary or secondary)? CHF   CHF Week 1 attempt successful? Yes   Call start time 1625   Call end time 1631   Discharge diagnosis congestive heart failure  Non-STEMI (non-ST elevated   Meds reviewed with patient/caregiver? Yes   Is the patient having any side effects they believe may be caused by any medication additions or changes? No   Does the patient have all medications ordered at discharge? Yes   Is the patient taking all medications as directed (includes completed medication regime)? Yes   Does the patient have a primary care provider?  Yes   Does the patient have an appointment with their PCP within 7 days of discharge? No   What is preventing the patient from scheduling follow up appointments within 7 days of discharge? Haven't had time  [waiting for new insurance card in mail so can make appts]   Nursing Interventions Advised patient to make appointment   Has the patient kept scheduled appointments due by today? N/A   Has home health visited the patient within 72 hours of discharge? N/A   Pulse Ox monitoring None   Psychosocial issues? No   Did the patient receive a copy of their discharge instructions? Yes   Nursing interventions Reviewed instructions with patient   What is the patient's perception of their health status since discharge? Improving   Nursing interventions Nurse provided patient education   Is the patient able to teach back signs and symptoms of worsening condition? (i.e. weight gain, shortness of air, etc.) Yes   Is the patient/caregiver able to teach back the hierarchy of who to call/visit for symptoms/problems? PCP, Specialist, Home health nurse, Urgent Care, ED, 911 Yes   Additional teach back comments states fluid in ankles in decreasing, intends to start weighing daily, follows low sodium diet, suggested compression  socks, pt agreed with plan   Is the patient able to teach back Heart Failure Zones? Yes   CHF Zone this Call Green Zone   Green Zone Patient reports doing well, No new or worsening shortness of breath, Physical activity level is normal for you, No new swelling -  feet, ankles and legs look normal for you, Weight check stable, No chest pain   Green Zone Interventions Daily weight check, Low sodium diet, Follow up visits planned, Meds as directed    CHF Week 1 call completed? Yes   Call end time 1631            Eveline CHURCH - Registered Nurse

## 2025-07-22 ENCOUNTER — READMISSION MANAGEMENT (OUTPATIENT)
Dept: CALL CENTER | Facility: HOSPITAL | Age: 30
End: 2025-07-22
Payer: MEDICAID

## 2025-07-22 NOTE — OUTREACH NOTE
CHF Week 2 Survey      Flowsheet Row Responses   [facility] patient discharged from? [facility]   Does the patient have one of the following disease processes/diagnoses(primary or secondary)? CHF   Week 2 attempt successful? No   Unsuccessful attempts Attempt 1   Revoke Venancio Hopkins Registered Nurse

## 2025-08-26 PROBLEM — N18.9 ACUTE KIDNEY INJURY SUPERIMPOSED ON CHRONIC KIDNEY DISEASE: Status: ACTIVE | Noted: 2025-08-26

## 2025-08-26 PROBLEM — N17.9 ACUTE KIDNEY INJURY SUPERIMPOSED ON CHRONIC KIDNEY DISEASE: Status: ACTIVE | Noted: 2025-08-26

## 2025-08-27 ENCOUNTER — ANESTHESIA (OUTPATIENT)
Dept: PERIOP | Facility: HOSPITAL | Age: 30
End: 2025-08-27
Payer: MEDICAID

## 2025-08-27 ENCOUNTER — ANESTHESIA EVENT (OUTPATIENT)
Dept: PERIOP | Facility: HOSPITAL | Age: 30
End: 2025-08-27
Payer: MEDICAID

## 2025-08-27 PROCEDURE — 25010000002 CLINDAMYCIN 900 MG/50ML SOLUTION: Performed by: NURSE PRACTITIONER

## 2025-08-27 PROCEDURE — 25010000002 PROPOFOL 10 MG/ML EMULSION: Performed by: NURSE ANESTHETIST, CERTIFIED REGISTERED

## 2025-08-27 RX ORDER — PROPOFOL 10 MG/ML
VIAL (ML) INTRAVENOUS AS NEEDED
Status: DISCONTINUED | OUTPATIENT
Start: 2025-08-27 | End: 2025-08-27 | Stop reason: SURG

## 2025-08-27 RX ADMIN — PROPOFOL 210 MG: 10 INJECTION, EMULSION INTRAVENOUS at 11:10

## 2025-08-27 RX ADMIN — CLINDAMYCIN PHOSPHATE 900 MG: 900 INJECTION, SOLUTION INTRAVENOUS at 11:10

## 2025-08-31 PROBLEM — I50.22 CHRONIC HFREF (HEART FAILURE WITH REDUCED EJECTION FRACTION): Status: ACTIVE | Noted: 2025-08-31

## (undated) DEVICE — NDL SCLEROTHRPY INTERJECT 25G 4 240 CLR

## (undated) DEVICE — CONMED SCOPE SAVER BITE BLOCK, 20X27 MM: Brand: SCOPE SAVER

## (undated) DEVICE — SENSR O2 OXIMAX FNGR A/ 18IN NONSTR

## (undated) DEVICE — FRCP GRASP RESCUE RAT/TOOTH ALLGTR 8X230CM

## (undated) DEVICE — CUFF,BP,DISP,1 TUBE,ADULT,HP: Brand: MEDLINE

## (undated) DEVICE — DEFENDO AIR WATER SUCTION AND BIOPSY VALVE KIT FOR  OLYMPUS: Brand: DEFENDO AIR/WATER/SUCTION AND BIOPSY VALVE

## (undated) DEVICE — ARGYLE YANKAUER BULB TIP WITH VENT: Brand: ARGYLE

## (undated) DEVICE — DEV CLIP HEMO RESOLUTION360/ULTR 235CM 17MM STRL: Type: IMPLANTABLE DEVICE | Site: STOMACH | Status: NON-FUNCTIONAL

## (undated) DEVICE — THE CHANNEL CLEANING BRUSH IS A NYLON FLEXI BRUSH ATTACHED TO A FLEXIBLE PLASTIC SHEATH DESIGNED TO SAFELY REMOVE DEBRIS FROM FLEXIBLE ENDOSCOPES.